# Patient Record
Sex: MALE | Race: BLACK OR AFRICAN AMERICAN | NOT HISPANIC OR LATINO | Employment: OTHER | URBAN - METROPOLITAN AREA
[De-identification: names, ages, dates, MRNs, and addresses within clinical notes are randomized per-mention and may not be internally consistent; named-entity substitution may affect disease eponyms.]

---

## 2017-01-23 DIAGNOSIS — J45.20 MILD INTERMITTENT ASTHMA WITHOUT COMPLICATION: ICD-10-CM

## 2017-01-23 RX ORDER — ALBUTEROL SULFATE 90 UG/1
AEROSOL, METERED RESPIRATORY (INHALATION)
Qty: 18 INHALER | Refills: 2 | Status: SHIPPED | OUTPATIENT
Start: 2017-01-23 | End: 2017-08-22 | Stop reason: SDUPTHER

## 2017-02-23 RX ORDER — METOPROLOL TARTRATE 50 MG/1
TABLET ORAL
Qty: 90 TABLET | Refills: 1 | Status: SHIPPED | OUTPATIENT
Start: 2017-02-23 | End: 2017-08-22 | Stop reason: SDUPTHER

## 2017-03-02 ENCOUNTER — HOSPITAL ENCOUNTER (EMERGENCY)
Facility: HOSPITAL | Age: 77
Discharge: HOME | End: 2017-03-02
Attending: EMERGENCY MEDICINE
Payer: MEDICARE

## 2017-03-02 VITALS
OXYGEN SATURATION: 95 % | TEMPERATURE: 99 F | HEIGHT: 68 IN | BODY MASS INDEX: 36.37 KG/M2 | DIASTOLIC BLOOD PRESSURE: 90 MMHG | WEIGHT: 240 LBS | RESPIRATION RATE: 20 BRPM | SYSTOLIC BLOOD PRESSURE: 191 MMHG | HEART RATE: 69 BPM

## 2017-03-02 DIAGNOSIS — R20.2 PARESTHESIA OF BOTH LOWER EXTREMITIES: ICD-10-CM

## 2017-03-02 DIAGNOSIS — R10.9 ACUTE RIGHT FLANK PAIN: Primary | ICD-10-CM

## 2017-03-02 PROCEDURE — 99283 EMERGENCY DEPT VISIT LOW MDM: CPT

## 2017-03-02 RX ORDER — METHOCARBAMOL 500 MG/1
500 TABLET, FILM COATED ORAL 3 TIMES DAILY
Qty: 30 TABLET | Refills: 0 | Status: SHIPPED | OUTPATIENT
Start: 2017-03-02 | End: 2018-05-03 | Stop reason: ALTCHOICE

## 2017-03-02 RX ORDER — TEMAZEPAM 15 MG/1
15 CAPSULE ORAL NIGHTLY PRN
Qty: 20 CAPSULE | Refills: 0 | Status: SHIPPED | OUTPATIENT
Start: 2017-03-02 | End: 2017-03-22

## 2017-03-02 NOTE — ED PROVIDER NOTES
"Encounter Date: 3/2/2017    SCRIBE #1 NOTE: I, Dolores Jose , am scribing for, and in the presence of,  Armando Hughes MD. I have scribed the following portions of the note - Other sections scribed: HPI and ROS .       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 Pt states his symptoms have improved. No trauma. Pt denies loss of sensation to his feet. Pt reports intermittent previous similar episodes of leg numbness that began approximately 2 years ago. Pt's previous episodes generally last 5-10 minutes then resolve and do not include back pain. Pt states his episode today is worse than usual. Pt is able to walk. Pt also presents difficulty sleeping at night. Pt denies fever, chills, headache, arm trouble or other associated symptoms. Pt's PCP is Dr.Lombard. Pt denies allergies. Pt denies smoking, endorses occasional drinking. Pt states he has a "touch" of DM per his PCP. Pt denies HLD or heart problems. Pt denies physical therapy for his arthritic spine. Pt is compliant with his HTN medication; pt's last dose was this morning.     The history is provided by the patient. No  was used.     Past Medical History:   Diagnosis Date    Allergy     Hypertension      Past Surgical History:   Procedure Laterality Date    APPENDECTOMY      COLONOSCOPY Left 10/19/2015    Procedure: COLONOSCOPY;  Surgeon: Randell Briceno MD;  Location: [facility];  " Service: Endoscopy;  Laterality: Left;     History reviewed. No pertinent family history.  Social History   Substance Use Topics    Smoking status: Former Smoker     Years: 15.00    Smokeless tobacco: Never Used      Comment: Quit 15 years ago    Alcohol use 0.6 oz/week     1 Standard drinks or equivalent per week      Comment: sometimes beer or crown royal     Review of Systems   Constitutional: Negative for chills and fever.   HENT: Negative for ear pain and sore throat.    Eyes: Negative for pain.   Respiratory: Negative for cough and shortness of breath.    Cardiovascular: Negative for chest pain.   Gastrointestinal: Negative for abdominal pain, diarrhea, nausea and vomiting.   Genitourinary: Negative for dysuria.   Musculoskeletal: Positive for arthralgias (bilat knee ) and back pain (right lower laterael lumbar). Negative for myalgias (arm or leg pain).   Skin: Negative for rash.   Neurological: Positive for numbness (legs, bilateral ). Negative for headaches.       Physical Exam   Initial Vitals   BP Pulse Resp Temp SpO2   03/02/17 1252 03/02/17 1252 03/02/17 1252 03/02/17 1252 03/02/17 1252   177/95 79 20 98 °F (36.7 °C) 95 %     Physical Exam    ED Course   Procedures  Labs Reviewed - No data to display                     Scribe Attestation:   Scribe #1: I performed the above scribed service and the documentation accurately describes the services I performed. I attest to the accuracy of the note.    Attending Attestation:           Physician Attestation for Scribe:  Physician Attestation Statement for Scribe #1: I, Armando Hughes MD, reviewed documentation, as scribed by Dolores Jose  in my presence, and it is both accurate and complete.                 ED Course     Clinical Impression:   There were no encounter diagnoses.

## 2017-03-02 NOTE — ED AVS SNAPSHOT
[facility]  [place] [address]               Alexander Duong Jr.   3/2/2017  2:00 PM   ED    Description:  Male : 1940   Department:  [facility]           Your Care was Coordinated By:     Provider Role From To    Armando Hughes MD Attending Provider 17 1413 --      Reason for Visit     Back Pain     Numbness           Diagnoses this Visit        Comments    Acute right flank pain    -  Primary     Paresthesia of both lower extremities     Transient and resolved, history of same for 2 years      ED Disposition     None           To Do List           Follow-up Information     Follow up with Azikiwe K Lombard, MD In 3 days.    Specialty:  Family Medicine    Contact information:    [address]  [place] [address] [phone]         These Medications        Disp Refills Start End    methocarbamol (ROBAXIN) 500 MG Tab 30 tablet 0 3/2/2017     Take 1 tablet (500 mg total) by mouth 3 (three) times daily. - Oral    Pharmacy: [facility]/pharmacy #[id number] - [facility] [address] [place] [address] Ph #: [phone]       temazepam (RESTORIL) 15 mg Cap 20 capsule 0 3/2/2017 3/22/2017    Take 1 capsule (15 mg total) by mouth nightly as needed (Insomnia). - Oral    Pharmacy: [facility]/pharmacy #[id number] - [address] Ph #: [phone]         [facility]     [facility] -  Assistance  Registered nurses in the [facility] provide clinical advisement, health education, appointment booking, and other advisory services.  Call for this free service at [phone].             Medications           Message regarding Medications     Verify the changes and/or additions to your medication regime listed below are the same as discussed with your clinician today.  If any of these changes or additions are incorrect, please notify your healthcare provider.        START taking  these NEW medications        Refills    methocarbamol (ROBAXIN) 500 MG Tab 0    Sig: Take 1 tablet (500 mg total) by mouth 3 (three) times daily.    Class: Print    Route: Oral    temazepam (RESTORIL) 15 mg Cap 0    Sig: Take 1 capsule (15 mg total) by mouth nightly as needed (Insomnia).    Class: Print    Route: Oral           Verify that the below list of medications is an accurate representation of the medications you are currently taking.  If none reported, the list may be blank. If incorrect, please contact your healthcare provider. Carry this list with you in case of emergency.           Current Medications     amlodipine-atorvastatin (CADUET) 10-20 mg per tablet Take 1 tablet by mouth once daily.    benazepril-hydrochlorthiazide (LOTENSIN HCT) 20-25 mg Tab Take 1 tablet by mouth once daily.    levalbuterol (XOPENEX) 1.25 mg/3 mL nebulizer solution Take 3 mLs (1.25 mg total) by nebulization every 8 (eight) hours as needed for Wheezing.    methocarbamol (ROBAXIN) 500 MG Tab Take 1 tablet (500 mg total) by mouth 3 (three) times daily.    metoprolol tartrate (LOPRESSOR) 50 MG tablet TAKE 1 TABLET BY MOUTH EVERY DAY    temazepam (RESTORIL) 15 mg Cap Take 1 capsule (15 mg total) by mouth nightly as needed (Insomnia).    VENTOLIN HFA 90 mcg/actuation inhaler INHALE 2 PUFFS INTO THE LUNGS EVERY 6 HOURS AS NEEDED FOR WHEEZING           Clinical Reference Information           Your Vitals Were     BP                   177/95 (BP Location: Right arm)           Allergies as of 3/2/2017     No Known Allergies      Immunizations Administered on Date of Encounter - 3/2/2017     None      ED Micro, Lab, POCT     None      ED Imaging Orders     None        Discharge Instructions       Please return immediately if you get worse or if new problems develop.  Please make an appointment to see your primary care doctor this week.  Rest.    [facility] Sign-Up     Activating your [facility] account is as easy as 1-2-3!     1) Visit  [email or web address], select Sign Up Now, enter this activation code and your date of birth, then select Next.  [email or web address]  Expires: 4/16/2017  2:43 PM      2) Create a username and password to use when you visit [facility] in the future and select a security question in case you lose your password and select Next.    3) Enter your e-mail address and click Sign Up!    Additional Information  If you have questions, please e-mail [email or web address] or call [phone] to talk to our [facility] staff. Remember, [facility] is NOT to be used for urgent needs. For medical emergencies, dial 911.         Smoking Cessation     If you would like to quit smoking:   You may be eligible for free services if you are a [place] resident and started smoking cigarettes before September 1, 1988.  Call the Smoking Cessation [employer] ([facility]) toll free at ([phone] 825-9723 or ([phone].   Call 2-268-QUIT-NOW if you do not meet the above criteria.             [facility] complies with applicable Federal civil rights laws and does not discriminate on the basis of race, color, national origin, age, disability, or sex.        Language Assistance Services     ATTENTION: Language assistance services are available, free of charge. Please call [phone].      ATENCIÓN: Si habla español, tiene a zamora disposición servicios gratuitos de asistencia lingüística. Llame al [phone].     CHÚ Ý: N?u b?n nói Ti?ng Vi?t, có các d?ch v? h? tr? ngôn ng? mi?n phí dành cho b?n. G?i s? [phone].

## 2017-03-02 NOTE — ED PROVIDER NOTES
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 Pt states his symptoms have nearly resolved. No trauma. Pt denies loss of sensation to his feet. Pt reports intermittent previous similar episodes of leg numbness that began approximately 2 years ago. Pt's previous episodes generally last 5-10 minutes then resolve and do not include back pain. Pt states his episode today is worse than usual. Pt is able to walk. Pt also presents difficulty sleeping at night. Pt denies fever, chills, headache, arm trouble or other associated symptoms. Pt's PCP is Dr.Lombard. Pt denies allergies. Pt denies smoking, endorses occasional drinking. Pt states he has a "touch" of DM per his PCP. Pt denies HLD or heart problems. Pt denies physical therapy for his arthritic spine. Pt is compliant with his HTN medication; pt's last dose was this morning.     Past Medical History:   Diagnosis Date    Allergy     Hypertension      Past Surgical History:   Procedure Laterality Date    APPENDECTOMY      COLONOSCOPY Left 10/19/2015    Procedure: COLONOSCOPY;  Surgeon: Randell Briceno MD;  Location: [facility];  Service: Endoscopy;  Laterality: Left;     History reviewed. No pertinent family history.  Social History   Substance Use Topics    Smoking status: Former Smoker     Years: 15.00    Smokeless tobacco: Never Used      Comment: Quit 15 years ago    Alcohol " use 0.6 oz/week     1 Standard drinks or equivalent per week      Comment: sometimes beer or crown royal     Review of Systems   Constitutional: Negative for chills and fever.   HENT: Negative for ear pain and sore throat.    Eyes: Negative for pain.   Respiratory: Negative for cough and shortness of breath.    Cardiovascular: Negative for chest pain.   Gastrointestinal: Negative for abdominal pain, diarrhea, nausea and vomiting.   Genitourinary: Negative for dysuria.   Musculoskeletal: Positive for arthralgias (bilat knees) and back pain (right lower lateral lumbar ). Negative for myalgias (arm or leg pain).   Skin: Negative for rash.   Neurological: Positive for numbness (bilateral legs). Negative for headaches.       Physical Exam   Initial Vitals   BP Pulse Resp Temp SpO2   03/02/17 1252 03/02/17 1252 03/02/17 1252 03/02/17 1252 03/02/17 1252   177/95 79 20 98 °F (36.7 °C) 95 %     Physical Exam  The patient was examined specifically for the following:   General:No significant distress, Good color, Warm and dry. Head and neck:Scalp atraumatic, Neck supple. Neurological:Appropriate conversation, Gross motor deficits. Eyes:Conjugate gaze, Clear corneas. ENT: No epistaxis. Cardiac: Regular rate and rhythm, Grossly normal heart tones. Pulmonary: Wheezing, Rales. Gastrointestinal: Abdominal tenderness, Abdominal distention. Musculoskeletal: Extremity deformity, Apparent pain with range of motion of the joints. Skin: Rash.   The findings on examination were normal except for the following: The patient has good dorsalis pedis and posterior tibial pulses bilaterally in the lower extremities.  The patient's it stands and changes posture without splinting.  The patient has good sensation and motor function in the lower extremities.  There is minimal tenderness in the right lateral low lumbar back.  The abdomen is soft.  The lungs are clear.  The heart tones are normal.  Patient has regular rate and rhythm.  ED Course    Procedures  Labs Reviewed - No data to display       Medical decision making: Given the above, this patient had very brief very lateral right low lumbar back pain that is now almost completely resolved at onset last night while sleeping.  The midline is unaffected.  The patient has good pulses in both feet dorsalis pedis and posterior tibial.  He has good sensation in both feet.  He has no abdominal pain there is no midline lumbar back pain.  I've considered aortic rupture or dissection.  I feel these are unlikely.  Lumbar radiculopathy is possible.  The patient had pain in his anterior knees.  I favor this being arthritis or possibly a neuropathy.  I will treat with Robaxin and have this patient follow up with primary care.  The patient looks well.  He is concerned about insomnia request a sleep medicine.                       ED Course     Clinical Impression:   The primary encounter diagnosis was Acute right flank pain. A diagnosis of Paresthesia of both lower extremities was also pertinent to this visit.          Armando Hughes MD  03/11/17 7021

## 2017-03-02 NOTE — ED TRIAGE NOTES
Bilateral le3g numbness today and right lower back pain says legs felt funny during the night no trauma

## 2017-06-08 ENCOUNTER — TELEPHONE (OUTPATIENT)
Dept: FAMILY MEDICINE | Facility: CLINIC | Age: 77
End: 2017-06-08

## 2017-06-08 NOTE — TELEPHONE ENCOUNTER
[facility] requesting order which would allow trained personnel to go to patient's home to clip his toe nails.  Please advise.

## 2017-06-08 NOTE — TELEPHONE ENCOUNTER
Called to inform patient that he is due for an office visit.  Phone rings then goes to busy signal - [phone].  [phone] male answered phone and stated that he is not patient and does not know patient.     Patient needs to be advised that an office visit is needed.

## 2017-06-08 NOTE — TELEPHONE ENCOUNTER
----- Message from Paulinatyrone Romano sent at 6/8/2017 10:13 AM CDT -----  Contact: [facility]   Request an order for someone to come to the patient's house to cut the pt's toe nails. Fax number - [phone]. Phone number - [phone]. Thanks!

## 2017-07-16 DIAGNOSIS — I10 ESSENTIAL HYPERTENSION: ICD-10-CM

## 2017-07-16 RX ORDER — BENAZEPRIL/HYDROCHLOROTHIAZIDE 20 MG-25MG
TABLET ORAL
Qty: 30 TABLET | Refills: 0 | Status: SHIPPED | OUTPATIENT
Start: 2017-07-16 | End: 2017-08-22 | Stop reason: SDUPTHER

## 2017-08-22 DIAGNOSIS — J45.20 MILD INTERMITTENT ASTHMA WITHOUT COMPLICATION: ICD-10-CM

## 2017-08-22 DIAGNOSIS — I10 ESSENTIAL HYPERTENSION: ICD-10-CM

## 2017-08-22 NOTE — TELEPHONE ENCOUNTER
Last ov 7-2016  140/80        ----- Message from Cleo Wilson sent at 8/22/2017  2:35 PM CDT -----  Contact: Self  Refill: metoprolol tartrate (LOPRESSOR) 50 MG tablet             benazepril-hydrochlorthiazide (LOTENSIN HCT) 20-25 mg Tab            VENTOLIN HFA 90 mcg/actuation inhaler         Please send to  [facility]/PHARMACY #[id number] - [place], [place] - [address] [facility]

## 2017-08-23 DIAGNOSIS — I10 ESSENTIAL HYPERTENSION: ICD-10-CM

## 2017-08-23 RX ORDER — METOPROLOL TARTRATE 50 MG/1
50 TABLET ORAL DAILY
Qty: 90 TABLET | Refills: 1 | Status: SHIPPED | OUTPATIENT
Start: 2017-08-23 | End: 2017-08-28

## 2017-08-23 RX ORDER — ALBUTEROL SULFATE 90 UG/1
AEROSOL, METERED RESPIRATORY (INHALATION)
Qty: 18 INHALER | Refills: 2 | Status: SHIPPED | OUTPATIENT
Start: 2017-08-23 | End: 2018-01-30 | Stop reason: SDUPTHER

## 2017-08-23 RX ORDER — BENAZEPRIL/HYDROCHLOROTHIAZIDE 20 MG-25MG
1 TABLET ORAL DAILY
Qty: 90 TABLET | Refills: 0 | Status: SHIPPED | OUTPATIENT
Start: 2017-08-23 | End: 2017-08-23

## 2017-08-23 RX ORDER — BENAZEPRIL/HYDROCHLOROTHIAZIDE 20 MG-25MG
TABLET ORAL
Qty: 30 TABLET | Refills: 0 | Status: SHIPPED | OUTPATIENT
Start: 2017-08-23 | End: 2017-09-22

## 2017-08-28 RX ORDER — METOPROLOL TARTRATE 50 MG/1
TABLET ORAL
Qty: 90 TABLET | Refills: 0 | Status: SHIPPED | OUTPATIENT
Start: 2017-08-28 | End: 2018-02-26 | Stop reason: SDUPTHER

## 2017-09-22 ENCOUNTER — OFFICE VISIT (OUTPATIENT)
Dept: FAMILY MEDICINE | Facility: CLINIC | Age: 77
End: 2017-09-22
Payer: MEDICARE

## 2017-09-22 VITALS
WEIGHT: 236.56 LBS | HEART RATE: 70 BPM | SYSTOLIC BLOOD PRESSURE: 176 MMHG | TEMPERATURE: 98 F | HEIGHT: 68 IN | DIASTOLIC BLOOD PRESSURE: 92 MMHG | OXYGEN SATURATION: 95 % | RESPIRATION RATE: 17 BRPM | BODY MASS INDEX: 35.85 KG/M2

## 2017-09-22 DIAGNOSIS — M15.9 PRIMARY OSTEOARTHRITIS INVOLVING MULTIPLE JOINTS: ICD-10-CM

## 2017-09-22 DIAGNOSIS — Z23 NEED FOR PNEUMOCOCCAL VACCINATION: ICD-10-CM

## 2017-09-22 DIAGNOSIS — I10 ESSENTIAL HYPERTENSION: Primary | ICD-10-CM

## 2017-09-22 DIAGNOSIS — E78.00 PURE HYPERCHOLESTEROLEMIA: ICD-10-CM

## 2017-09-22 DIAGNOSIS — N18.30 CKD (CHRONIC KIDNEY DISEASE) STAGE 3, GFR 30-59 ML/MIN: ICD-10-CM

## 2017-09-22 PROCEDURE — 99214 OFFICE O/P EST MOD 30 MIN: CPT | Mod: S$GLB,,, | Performed by: FAMILY MEDICINE

## 2017-09-22 PROCEDURE — 99499 UNLISTED E&M SERVICE: CPT | Mod: S$GLB,,, | Performed by: FAMILY MEDICINE

## 2017-09-22 PROCEDURE — 99999 PR PBB SHADOW E&M-EST. PATIENT-LVL III: CPT | Mod: PBBFAC,,, | Performed by: FAMILY MEDICINE

## 2017-09-22 RX ORDER — AMLODIPINE, VALSARTAN AND HYDROCHLOROTHIAZIDE 10; 320; 25 MG/1; MG/1; MG/1
1 TABLET ORAL DAILY
Qty: 90 TABLET | Refills: 1 | Status: SHIPPED | OUTPATIENT
Start: 2017-09-22 | End: 2018-04-09 | Stop reason: SDUPTHER

## 2017-09-22 RX ORDER — ACETAMINOPHEN AND CODEINE PHOSPHATE 300; 60 MG/1; MG/1
1 TABLET ORAL EVERY 8 HOURS PRN
Qty: 90 TABLET | Refills: 2 | Status: SHIPPED | OUTPATIENT
Start: 2017-09-22 | End: 2018-04-09 | Stop reason: SDUPTHER

## 2017-09-22 NOTE — PROGRESS NOTES
Chief Complaint   Patient presents with    Leg Pain     both sides    Back Pain    Neck Pain       Alexander Duong Jr. is a 77 y.o. male who presents per the Chief Complaint.  Pt is known to me and was last seen by me on 7/12/2016.  All known chronic medical issues have been documented.       Back Pain   This is a chronic problem. The current episode started more than 1 year ago. The problem occurs daily. The problem is unchanged. The pain is present in the lumbar spine. The quality of the pain is described as aching. The pain does not radiate. The pain is at a severity of 5/10. The pain is moderate. The pain is worse during the day. The symptoms are aggravated by bending and position. Associated symptoms include leg pain. Pertinent negatives include no abdominal pain, bladder incontinence, bowel incontinence, chest pain, dysuria, fever, headaches, numbness, paresis, paresthesias, pelvic pain, perianal numbness, tingling, weakness or weight loss. He has tried analgesics, NSAIDs and home exercises for the symptoms. The treatment provided moderate relief.   Hypertension   This is a chronic problem. The current episode started more than 1 year ago. The problem is unchanged. The problem is uncontrolled. Associated symptoms include neck pain. Pertinent negatives include no anxiety, blurred vision, chest pain, headaches, malaise/fatigue, orthopnea, palpitations, peripheral edema, PND, shortness of breath or sweats. There are no associated agents to hypertension. Risk factors for coronary artery disease include male gender, obesity, dyslipidemia and sedentary lifestyle. Past treatments include ACE inhibitors, diuretics and calcium channel blockers. The current treatment provides moderate improvement. Compliance problems include diet and exercise.  There is no history of angina, kidney disease, CAD/MI, CVA, heart failure, left ventricular hypertrophy, PVD, renovascular disease, retinopathy or a thyroid problem. There is no  history of chronic renal disease, coarctation of the aorta, hyperaldosteronism, hypercortisolism, hyperparathyroidism, a hypertension causing med, pheochromocytoma or sleep apnea.   Arthritis   Presents for follow-up visit. He complains of pain and stiffness. He reports no joint swelling or joint warmth. The symptoms have been worsening. Affected locations include the left knee, right knee and neck. His pain is at a severity of 6/10. Pertinent negatives include no diarrhea, dry eyes, dry mouth, dysuria, fatigue, fever, pain at night, pain while resting, rash, Raynaud's syndrome, uveitis or weight loss.        ROS  Review of Systems   Constitutional: Negative for activity change, appetite change, chills, fatigue, fever, malaise/fatigue and weight loss.   HENT: Negative for congestion, ear pain, postnasal drip, rhinorrhea, sinus pressure, sore throat and trouble swallowing.    Eyes: Negative for blurred vision, pain and visual disturbance.   Respiratory: Negative for cough, hemoptysis, chest tightness, shortness of breath and wheezing.    Cardiovascular: Negative for chest pain, palpitations, orthopnea and PND.   Gastrointestinal: Negative for abdominal pain, bowel incontinence, constipation, diarrhea, heartburn, nausea and vomiting.   Genitourinary: Negative for bladder incontinence, difficulty urinating, dysuria, flank pain, frequency, pelvic pain, penile pain, penile swelling, scrotal swelling, testicular pain and urgency.   Musculoskeletal: Positive for arthralgias (both knees), arthritis, back pain, gait problem, myalgias, neck pain and stiffness. Negative for joint swelling and neck stiffness.   Skin: Negative.  Negative for rash.   Allergic/Immunologic: Negative for environmental allergies, food allergies and immunocompromised state.   Neurological: Negative for dizziness, tingling, weakness, light-headedness, numbness, headaches and paresthesias.   Psychiatric/Behavioral: Negative.        Physical  "Exam  Vitals:    09/22/17 0939   BP: (!) 176/92   Pulse: 70   Resp: 17   Temp: 97.9 °F (36.6 °C)    Body mass index is 35.97 kg/m².  Weight: 107.3 kg (236 lb 8.9 oz)   Height: 5' 8" (172.7 cm)     Physical Exam   Constitutional: He is oriented to person, place, and time. He appears well-developed and well-nourished. He is active and cooperative.  Non-toxic appearance. He does not have a sickly appearance. He does not appear ill. No distress.   HENT:   Head: Normocephalic and atraumatic.   Right Ear: Hearing, tympanic membrane, external ear and ear canal normal. No tenderness. No foreign bodies. No mastoid tenderness. Tympanic membrane is not injected, not scarred, not perforated, not erythematous, not retracted and not bulging. No hemotympanum. No decreased hearing is noted.   Left Ear: Hearing, tympanic membrane, external ear and ear canal normal. No tenderness. No foreign bodies. No mastoid tenderness. Tympanic membrane is not injected, not scarred, not perforated, not erythematous, not retracted and not bulging. No hemotympanum. No decreased hearing is noted.   Nose: Nose normal. No sinus tenderness, nasal deformity or septal deviation. No epistaxis.  No foreign bodies.   Mouth/Throat: Uvula is midline, oropharynx is clear and moist and mucous membranes are normal. He does not have dentures. Normal dentition. No uvula swelling or dental caries. No oropharyngeal exudate, posterior oropharyngeal edema, posterior oropharyngeal erythema or tonsillar abscesses.   Eyes: Conjunctivae, EOM and lids are normal. Pupils are equal, round, and reactive to light. Right eye exhibits no chemosis, no discharge, no exudate and no hordeolum. No foreign body present in the right eye. Left eye exhibits no chemosis, no discharge, no exudate and no hordeolum. No foreign body present in the left eye. No scleral icterus.   Neck: Normal range of motion and full passive range of motion without pain. Neck supple. No thyroid mass and no " thyromegaly present.   Cardiovascular: Normal rate, regular rhythm, S1 normal, S2 normal and normal heart sounds.  Exam reveals no gallop and no friction rub.    No murmur heard.  Pulmonary/Chest: Effort normal and breath sounds normal. He has no decreased breath sounds. He has no wheezes. He has no rhonchi. He has no rales.   Abdominal: Soft. Normal appearance and bowel sounds are normal. He exhibits no distension, no ascites and no mass. There is no hepatosplenomegaly. There is no tenderness. There is no rigidity, no rebound and no guarding. No hernia.   Musculoskeletal:        Right knee: He exhibits decreased range of motion and bony tenderness. He exhibits no swelling, no effusion, no ecchymosis, no deformity, no laceration, no erythema, normal alignment, no LCL laxity, normal meniscus and no MCL laxity. Tenderness found. Medial joint line, lateral joint line and patellar tendon tenderness noted. No MCL and no LCL tenderness noted.        Left knee: He exhibits decreased range of motion and bony tenderness. He exhibits no swelling, no effusion, no ecchymosis, no deformity, no laceration, no erythema, normal alignment, no LCL laxity, normal patellar mobility, normal meniscus and no MCL laxity. Tenderness found. Medial joint line, lateral joint line and patellar tendon tenderness noted. No MCL and no LCL tenderness noted.        Cervical back: He exhibits decreased range of motion, tenderness, bony tenderness and pain. He exhibits no swelling, no edema, no deformity, no laceration, no spasm and normal pulse.        Lumbar back: He exhibits decreased range of motion, tenderness, bony tenderness and pain. He exhibits no swelling, no edema, no deformity, no laceration, no spasm and normal pulse.   Lymphadenopathy:        Head (right side): No submental, no submandibular, no preauricular and no posterior auricular adenopathy present.        Head (left side): No submental, no submandibular, no preauricular and no  posterior auricular adenopathy present.     He has no cervical adenopathy.   Neurological: He is alert and oriented to person, place, and time. He has normal strength. He displays no atrophy and no tremor. No cranial nerve deficit or sensory deficit. He exhibits normal muscle tone. He displays no seizure activity.   Skin: Skin is warm, dry and intact. No rash noted. He is not diaphoretic. No pallor.   Psychiatric: He has a normal mood and affect. His speech is normal and behavior is normal. Judgment and thought content normal. Cognition and memory are normal. He is attentive.   Vitals reviewed.      Assessment & Plan    1. Essential hypertension  Patient was counseled and encouraged to maintain a low sodium diet, as well as increasing physical activity.  Recommend random BP checks at home on a regular basis.  Will continue medication at this time, and follow up in 4 weeks, or sooner if blood pressure is not well controlled.  Will follow up in a week for nurse BP check.  - amlodipine-valsartan-hcthiazid -25 mg Tab; Take 1 tablet by mouth once daily.  Dispense: 90 tablet; Refill: 1  - CBC auto differential; Future  - Comprehensive metabolic panel; Future    2. Primary osteoarthritis involving multiple joints  Patient is advised that arthritis is a result of regular daily use, and is caused by inflammation in the joint.  Patient was encouraged to exercise as tolerated, and attempt weight loss with sensible diet and lifestyle changes.  Patient was recommended to incorporate stretching into a daily routine.  Pain medication will be prescribed as necessary.  Patient should follow up if pain is not well controlled.   - acetaminophen-codeine 300-60mg (TYLENOL #4) 300-60 mg Tab; Take 1 tablet by mouth every 8 (eight) hours as needed.  Dispense: 90 tablet; Refill: 2    3. CKD (chronic kidney disease) stage 3, GFR 30-59 ml/min  Stable; encouraged patient to avoid NSAID medications and to increase water and clear liquid  hydration.  Will continue to monitor for decline and refer as necessary.     4. Pure hypercholesterolemia  We discussed ways to manage cholesterol levels, including increasing whole grain foods and decreasing fried and fatty foods.  I also recommended OTC Omega 3 and Omega 6 supplements to improve overall cholesterol levels.  Will not restart therapy at this visit; patient is due for screening blood test at this time.   - Lipid panel; Future    5. Need for pneumococcal vaccination  Refused     Follow up documented    ACTIVE MEDICAL ISSUES:  Documented in Problem List    PAST MEDICAL HISTORY  Documented    PAST SURGICAL HISTORY:  Documented    SOCIAL HISTORY:  Documented    FAMILY HISTORY:  Documented    ALLERGIES AND MEDICATIONS: updated and reviewed.  Documented    Health Maintenance       Date Due Completion Date    TETANUS VACCINE [birth year]1956 ---    Pneumococcal (65+) (1 of 2 - PCV13) 02/11/2003 ---    Influenza Vaccine 08/01/2017 4/8/2016 (Done)    Override on 4/8/2016: Done (not at this present time)    Override on 10/29/2015: Declined (not at this present time)    Lipid Panel 07/20/2021 7/20/2016    Override on 4/8/2016: Done (future)

## 2017-09-24 DIAGNOSIS — I10 ESSENTIAL HYPERTENSION: ICD-10-CM

## 2017-09-25 RX ORDER — BENAZEPRIL/HYDROCHLOROTHIAZIDE 20 MG-25MG
TABLET ORAL
Qty: 30 TABLET | Refills: 0 | OUTPATIENT
Start: 2017-09-25

## 2017-09-29 ENCOUNTER — CLINICAL SUPPORT (OUTPATIENT)
Dept: FAMILY MEDICINE | Facility: CLINIC | Age: 77
End: 2017-09-29
Payer: MEDICARE

## 2017-09-29 ENCOUNTER — LAB VISIT (OUTPATIENT)
Dept: LAB | Facility: HOSPITAL | Age: 77
End: 2017-09-29
Attending: FAMILY MEDICINE
Payer: MEDICARE

## 2017-09-29 VITALS
HEART RATE: 100 BPM | OXYGEN SATURATION: 90 % | DIASTOLIC BLOOD PRESSURE: 66 MMHG | RESPIRATION RATE: 16 BRPM | SYSTOLIC BLOOD PRESSURE: 124 MMHG

## 2017-09-29 DIAGNOSIS — E78.00 PURE HYPERCHOLESTEROLEMIA: ICD-10-CM

## 2017-09-29 DIAGNOSIS — I10 ESSENTIAL HYPERTENSION: Primary | ICD-10-CM

## 2017-09-29 DIAGNOSIS — I10 ESSENTIAL HYPERTENSION: ICD-10-CM

## 2017-09-29 LAB
ALBUMIN SERPL BCP-MCNC: 3.7 G/DL
ALP SERPL-CCNC: 79 U/L
ALT SERPL W/O P-5'-P-CCNC: 18 U/L
ANION GAP SERPL CALC-SCNC: 7 MMOL/L
AST SERPL-CCNC: 25 U/L
BASOPHILS # BLD AUTO: 0.04 K/UL
BASOPHILS NFR BLD: 0.5 %
BILIRUB SERPL-MCNC: 0.7 MG/DL
BUN SERPL-MCNC: 28 MG/DL
CALCIUM SERPL-MCNC: 9.5 MG/DL
CHLORIDE SERPL-SCNC: 102 MMOL/L
CHOLEST SERPL-MCNC: 174 MG/DL
CHOLEST/HDLC SERPL: 7 {RATIO}
CO2 SERPL-SCNC: 29 MMOL/L
CREAT SERPL-MCNC: 2.3 MG/DL
DIFFERENTIAL METHOD: ABNORMAL
EOSINOPHIL # BLD AUTO: 0.5 K/UL
EOSINOPHIL NFR BLD: 5.9 %
ERYTHROCYTE [DISTWIDTH] IN BLOOD BY AUTOMATED COUNT: 12.4 %
EST. GFR  (AFRICAN AMERICAN): 30.1 ML/MIN/1.73 M^2
EST. GFR  (NON AFRICAN AMERICAN): 26 ML/MIN/1.73 M^2
GLUCOSE SERPL-MCNC: 124 MG/DL
HCT VFR BLD AUTO: 37.4 %
HDLC SERPL-MCNC: 25 MG/DL
HDLC SERPL: 14.4 %
HGB BLD-MCNC: 12.5 G/DL
LDLC SERPL CALC-MCNC: 122.2 MG/DL
LYMPHOCYTES # BLD AUTO: 3 K/UL
LYMPHOCYTES NFR BLD: 38.8 %
MCH RBC QN AUTO: 31.2 PG
MCHC RBC AUTO-ENTMCNC: 33.4 G/DL
MCV RBC AUTO: 93 FL
MONOCYTES # BLD AUTO: 0.7 K/UL
MONOCYTES NFR BLD: 8.5 %
NEUTROPHILS # BLD AUTO: 3.5 K/UL
NEUTROPHILS NFR BLD: 46.2 %
NONHDLC SERPL-MCNC: 149 MG/DL
PLATELET # BLD AUTO: 270 K/UL
PMV BLD AUTO: 11.5 FL
POTASSIUM SERPL-SCNC: 4.1 MMOL/L
PROT SERPL-MCNC: 7.9 G/DL
RBC # BLD AUTO: 4.01 M/UL
SODIUM SERPL-SCNC: 138 MMOL/L
TRIGL SERPL-MCNC: 134 MG/DL
WBC # BLD AUTO: 7.63 K/UL

## 2017-09-29 PROCEDURE — 80053 COMPREHEN METABOLIC PANEL: CPT

## 2017-09-29 PROCEDURE — 85025 COMPLETE CBC W/AUTO DIFF WBC: CPT

## 2017-09-29 PROCEDURE — 80061 LIPID PANEL: CPT

## 2017-09-29 PROCEDURE — 99999 PR PBB SHADOW E&M-EST. PATIENT-LVL III: CPT | Mod: PBBFAC,,,

## 2017-09-29 PROCEDURE — 36415 COLL VENOUS BLD VENIPUNCTURE: CPT | Mod: PO

## 2017-09-29 PROCEDURE — 99499 UNLISTED E&M SERVICE: CPT | Mod: S$GLB,,, | Performed by: FAMILY MEDICINE

## 2017-09-29 NOTE — PROGRESS NOTES
..  Alexander Duong Jr. 77 y.o. male is here today for Blood Pressure check.   History of HTN yes.    Review of patient's allergies indicates:  No Known Allergies  Creatinine   Date Value Ref Range Status   07/20/2016 1.7 (H) 0.5 - 1.4 mg/dL Final     Sodium   Date Value Ref Range Status   07/20/2016 142 136 - 145 mmol/L Final     Potassium   Date Value Ref Range Status   07/20/2016 4.4 3.5 - 5.1 mmol/L Final   ]  Patient verifies taking blood pressure medications on a regular basis at the same time of the day.     Current Outpatient Prescriptions:     acetaminophen-codeine 300-60mg (TYLENOL #4) 300-60 mg Tab, Take 1 tablet by mouth every 8 (eight) hours as needed., Disp: 90 tablet, Rfl: 2    albuterol (VENTOLIN HFA) 90 mcg/actuation inhaler, INHALE 2 PUFFS INTO THE LUNGS EVERY 6 HOURS AS NEEDED FOR WHEEZING, Disp: 18 Inhaler, Rfl: 2    amlodipine-valsartan-hcthiazid -25 mg Tab, Take 1 tablet by mouth once daily., Disp: 90 tablet, Rfl: 1    levalbuterol (XOPENEX) 1.25 mg/3 mL nebulizer solution, Take 3 mLs (1.25 mg total) by nebulization every 8 (eight) hours as needed for Wheezing., Disp: 90 mL, Rfl: 5    methocarbamol (ROBAXIN) 500 MG Tab, Take 1 tablet (500 mg total) by mouth 3 (three) times daily., Disp: 30 tablet, Rfl: 0    metoprolol tartrate (LOPRESSOR) 50 MG tablet, TAKE 1 TABLET BY MOUTH EVERY DAY, Disp: 90 tablet, Rfl: 0  Does patient have record of home blood pressure readings no. Readings have been averaging not checking at home.   Last dose of blood pressure medication was taken at 7:30am.  Patient is asymptomatic.   Complains of no complaints.    Vitals:    09/29/17 0855   BP: 124/66   BP Location: Left arm   Patient Position: Sitting   BP Method: Medium (Manual)   Pulse: 100   Resp: 16   SpO2: (!) 90%         Dr. Lombard notified.

## 2017-10-02 ENCOUNTER — TELEPHONE (OUTPATIENT)
Dept: FAMILY MEDICINE | Facility: CLINIC | Age: 77
End: 2017-10-02

## 2017-10-02 NOTE — TELEPHONE ENCOUNTER
----- Message from Azikiwe K. Lombard, MD sent at 10/2/2017  9:47 AM CDT -----  Please notify patient of abnormal sugar and kidney results by phone, and advise to keep scheduled follow up in 3 weeks.

## 2017-10-02 NOTE — PROGRESS NOTES
Please notify patient of abnormal sugar and kidney results by phone, and advise to keep scheduled follow up in 3 weeks.

## 2017-10-23 ENCOUNTER — OFFICE VISIT (OUTPATIENT)
Dept: FAMILY MEDICINE | Facility: CLINIC | Age: 77
End: 2017-10-23
Payer: MEDICARE

## 2017-10-23 VITALS
BODY MASS INDEX: 35.18 KG/M2 | OXYGEN SATURATION: 92 % | RESPIRATION RATE: 18 BRPM | WEIGHT: 232.13 LBS | TEMPERATURE: 98 F | SYSTOLIC BLOOD PRESSURE: 132 MMHG | DIASTOLIC BLOOD PRESSURE: 74 MMHG | HEIGHT: 68 IN | HEART RATE: 83 BPM

## 2017-10-23 DIAGNOSIS — M15.9 PRIMARY OSTEOARTHRITIS INVOLVING MULTIPLE JOINTS: ICD-10-CM

## 2017-10-23 DIAGNOSIS — I10 ESSENTIAL HYPERTENSION: Primary | ICD-10-CM

## 2017-10-23 DIAGNOSIS — Z23 NEED FOR INFLUENZA VACCINATION: ICD-10-CM

## 2017-10-23 PROCEDURE — 99214 OFFICE O/P EST MOD 30 MIN: CPT | Mod: S$GLB,,, | Performed by: FAMILY MEDICINE

## 2017-10-23 PROCEDURE — 99499 UNLISTED E&M SERVICE: CPT | Mod: S$GLB,,, | Performed by: FAMILY MEDICINE

## 2017-10-23 PROCEDURE — 99999 PR PBB SHADOW E&M-EST. PATIENT-LVL III: CPT | Mod: PBBFAC,,, | Performed by: FAMILY MEDICINE

## 2017-10-23 PROCEDURE — G0008 ADMIN INFLUENZA VIRUS VAC: HCPCS | Mod: S$GLB,,, | Performed by: FAMILY MEDICINE

## 2017-10-23 PROCEDURE — 90662 IIV NO PRSV INCREASED AG IM: CPT | Mod: S$GLB,,, | Performed by: FAMILY MEDICINE

## 2017-10-23 RX ORDER — AMLODIPINE, VALSARTAN AND HYDROCHLOROTHIAZIDE 10; 320; 25 MG/1; MG/1; MG/1
1 TABLET ORAL DAILY
Qty: 90 TABLET | Refills: 1 | Status: CANCELLED | OUTPATIENT
Start: 2017-10-23 | End: 2018-10-23

## 2017-10-23 RX ORDER — AMLODIPINE, VALSARTAN AND HYDROCHLOROTHIAZIDE 10; 160; 25 MG/1; MG/1; MG/1
1 TABLET ORAL DAILY
Qty: 90 TABLET | Refills: 0 | Status: SHIPPED | OUTPATIENT
Start: 2017-10-23 | End: 2018-04-09 | Stop reason: DRUGHIGH

## 2017-10-23 RX ORDER — VALSARTAN 160 MG/1
160 TABLET ORAL DAILY
Qty: 90 TABLET | Refills: 0 | Status: SHIPPED | OUTPATIENT
Start: 2017-10-23 | End: 2018-02-08 | Stop reason: ALTCHOICE

## 2017-10-23 NOTE — PROGRESS NOTES
Flu vaccine administered.VIS form given. Patient tolerated well. Advised to wait 15 min to monitor for adverse reactions.

## 2017-10-23 NOTE — PROGRESS NOTES
Chief Complaint   Patient presents with    Hypertension     follow-up    Flu Vaccine    Pneumococcal Vaccine       Alexander Duong Jr. is a 77 y.o. male who presents per the Chief Complaint.  Pt is known to me and was last seen by me on 9/22/2017.  All known chronic medical issues have been documented.       Hypertension   This is a chronic problem. The current episode started more than 1 year ago. The problem is unchanged. The problem is uncontrolled. Associated symptoms include neck pain. Pertinent negatives include no anxiety, blurred vision, chest pain, headaches, malaise/fatigue, orthopnea, palpitations, peripheral edema, PND, shortness of breath or sweats. There are no associated agents to hypertension. Risk factors for coronary artery disease include male gender, obesity, dyslipidemia and sedentary lifestyle. Past treatments include ACE inhibitors, diuretics and calcium channel blockers. The current treatment provides moderate improvement. Compliance problems include diet and exercise.  There is no history of angina, kidney disease, CAD/MI, CVA, heart failure, left ventricular hypertrophy, PVD, renovascular disease, retinopathy or a thyroid problem. There is no history of chronic renal disease, coarctation of the aorta, hyperaldosteronism, hypercortisolism, hyperparathyroidism, a hypertension causing med, pheochromocytoma or sleep apnea.   Back Pain   This is a chronic problem. The current episode started more than 1 year ago. The problem occurs daily. The problem is unchanged. The pain is present in the lumbar spine. The quality of the pain is described as aching. The pain does not radiate. The pain is at a severity of 5/10. The pain is moderate. The pain is worse during the day. The symptoms are aggravated by bending and position. Associated symptoms include leg pain. Pertinent negatives include no abdominal pain, bladder incontinence, bowel incontinence, chest pain, dysuria, fever, headaches, numbness,  paresis, paresthesias, pelvic pain, perianal numbness, tingling, weakness or weight loss. He has tried analgesics, NSAIDs and home exercises for the symptoms. The treatment provided moderate relief.   Arthritis   Presents for follow-up visit. He complains of pain and stiffness. He reports no joint swelling or joint warmth. The symptoms have been worsening. Affected locations include the left knee, right knee and neck. His pain is at a severity of 6/10. Pertinent negatives include no diarrhea, dry eyes, dry mouth, dysuria, fatigue, fever, pain at night, pain while resting, rash, Raynaud's syndrome, uveitis or weight loss.        ROS  Review of Systems   Constitutional: Negative for activity change, appetite change, chills, fatigue, fever, malaise/fatigue and weight loss.   HENT: Negative for congestion, ear pain, postnasal drip, rhinorrhea, sinus pressure, sore throat and trouble swallowing.    Eyes: Negative for blurred vision, pain and visual disturbance.   Respiratory: Negative for cough, chest tightness, shortness of breath and wheezing.    Cardiovascular: Negative for chest pain, palpitations, orthopnea and PND.   Gastrointestinal: Negative for abdominal pain, bowel incontinence, constipation, diarrhea, nausea and vomiting.   Genitourinary: Negative for bladder incontinence, difficulty urinating, dysuria, flank pain, frequency, pelvic pain, penile pain, penile swelling, scrotal swelling, testicular pain and urgency.   Musculoskeletal: Positive for arthralgias (both knees), arthritis, back pain, gait problem, myalgias, neck pain and stiffness. Negative for joint swelling and neck stiffness.   Skin: Negative.  Negative for rash.   Allergic/Immunologic: Negative for environmental allergies, food allergies and immunocompromised state.   Neurological: Negative for dizziness, tingling, weakness, light-headedness, numbness, headaches and paresthesias.   Psychiatric/Behavioral: Negative.        Physical Exam  Vitals:     "10/23/17 1028   BP: 132/74   Pulse: 83   Resp: 18   Temp: 98.1 °F (36.7 °C)    Body mass index is 35.3 kg/m².  Weight: 105.3 kg (232 lb 2.3 oz)   Height: 5' 8" (172.7 cm)     Physical Exam   Constitutional: He is oriented to person, place, and time. He appears well-developed and well-nourished. He is active and cooperative.  Non-toxic appearance. He does not have a sickly appearance. He does not appear ill. No distress.   HENT:   Head: Normocephalic and atraumatic.   Right Ear: Hearing, tympanic membrane, external ear and ear canal normal. No tenderness. No foreign bodies. No mastoid tenderness. Tympanic membrane is not injected, not scarred, not perforated, not erythematous, not retracted and not bulging. No hemotympanum. No decreased hearing is noted.   Left Ear: Hearing, tympanic membrane, external ear and ear canal normal. No tenderness. No foreign bodies. No mastoid tenderness. Tympanic membrane is not injected, not scarred, not perforated, not erythematous, not retracted and not bulging. No hemotympanum. No decreased hearing is noted.   Nose: Nose normal. No sinus tenderness, nasal deformity or septal deviation. No epistaxis.  No foreign bodies.   Mouth/Throat: Uvula is midline, oropharynx is clear and moist and mucous membranes are normal. He does not have dentures. Normal dentition. No uvula swelling or dental caries. No oropharyngeal exudate, posterior oropharyngeal edema, posterior oropharyngeal erythema or tonsillar abscesses.   Eyes: Conjunctivae, EOM and lids are normal. Pupils are equal, round, and reactive to light. Right eye exhibits no chemosis, no discharge, no exudate and no hordeolum. No foreign body present in the right eye. Left eye exhibits no chemosis, no discharge, no exudate and no hordeolum. No foreign body present in the left eye. No scleral icterus.   Neck: Normal range of motion and full passive range of motion without pain. Neck supple. No thyroid mass and no thyromegaly present. "   Cardiovascular: Normal rate, regular rhythm, S1 normal, S2 normal and normal heart sounds.  Exam reveals no gallop and no friction rub.    No murmur heard.  Pulmonary/Chest: Effort normal and breath sounds normal. He has no decreased breath sounds. He has no wheezes. He has no rhonchi. He has no rales.   Abdominal: Soft. Normal appearance and bowel sounds are normal. He exhibits no distension, no ascites and no mass. There is no hepatosplenomegaly. There is no tenderness. There is no rigidity, no rebound and no guarding. No hernia.   Musculoskeletal:        Right knee: He exhibits decreased range of motion and bony tenderness. He exhibits no swelling, no effusion, no ecchymosis, no deformity, no laceration, no erythema, normal alignment, no LCL laxity, normal meniscus and no MCL laxity. Tenderness found. Medial joint line, lateral joint line and patellar tendon tenderness noted. No MCL and no LCL tenderness noted.        Left knee: He exhibits decreased range of motion and bony tenderness. He exhibits no swelling, no effusion, no ecchymosis, no deformity, no laceration, no erythema, normal alignment, no LCL laxity, normal patellar mobility, normal meniscus and no MCL laxity. Tenderness found. Medial joint line, lateral joint line and patellar tendon tenderness noted. No MCL and no LCL tenderness noted.        Cervical back: He exhibits decreased range of motion, tenderness, bony tenderness and pain. He exhibits no swelling, no edema, no deformity, no laceration, no spasm and normal pulse.        Lumbar back: He exhibits decreased range of motion, tenderness, bony tenderness and pain. He exhibits no swelling, no edema, no deformity, no laceration, no spasm and normal pulse.   Lymphadenopathy:        Head (right side): No submental, no submandibular, no preauricular and no posterior auricular adenopathy present.        Head (left side): No submental, no submandibular, no preauricular and no posterior auricular  adenopathy present.     He has no cervical adenopathy.   Neurological: He is alert and oriented to person, place, and time. He has normal strength. He displays no atrophy and no tremor. No cranial nerve deficit or sensory deficit. He exhibits normal muscle tone. He displays no seizure activity.   Skin: Skin is warm, dry and intact. No rash noted. He is not diaphoretic. No pallor.   Psychiatric: He has a normal mood and affect. His speech is normal and behavior is normal. Judgment and thought content normal. Cognition and memory are normal. He is attentive.   Vitals reviewed.      Assessment & Plan    1. Essential hypertension  Patient was counseled and encouraged to maintain a low sodium diet, as well as increasing physical activity.  Recommend random BP checks at home on a regular basis.  Repeat BP at end of visit was not necessary. Will continue medication at this time, and follow up in 2 months, or sooner if blood pressure is not well controlled.  Patient received 3 month supply of current medication in 3 separate bottles and inadvertently took a pill from each bottle for a month.  Due to insurance restrictions, will start 2 separate medication equal to same dose at this time and restart previous medication in January.    - amlodipine-valsartan-hcthiazid -25 mg Tab; Take 1 tablet by mouth once daily.  Dispense: 90 tablet; Refill: 0  - valsartan (DIOVAN) 160 MG tablet; Take 1 tablet (160 mg total) by mouth once daily.  Dispense: 90 tablet; Refill: 0    2. Primary osteoarthritis involving multiple joints  The current medical regimen is effective at this time and no changes to present plan or medications will be made at this visit.       3. Need for influenza vaccination  Patient requested Flu vaccine, and was consented and vaccine given in office without complication.   - Influenza - Quadrivalent (3 years & older) (PF)       Follow up documented    ACTIVE MEDICAL ISSUES:  Documented in Problem List    PAST  MEDICAL HISTORY  Documented    PAST SURGICAL HISTORY:  Documented    SOCIAL HISTORY:  Documented    FAMILY HISTORY:  Documented    ALLERGIES AND MEDICATIONS: updated and reviewed.  Documented    Health Maintenance       Date Due Completion Date    TETANUS VACCINE [birth year]1956 ---    Pneumococcal (65+) (1 of 2 - PCV13) 02/11/2003 ---    Influenza Vaccine 08/01/2017 4/8/2016 (Done)    Override on 4/8/2016: Done (not at this present time)    Override on 10/29/2015: Declined (not at this present time)    Lipid Panel 09/29/2022 9/29/2017    Override on 4/8/2016: Done (future)

## 2017-12-26 ENCOUNTER — NURSE TRIAGE (OUTPATIENT)
Dept: ADMINISTRATIVE | Facility: CLINIC | Age: 77
End: 2017-12-26

## 2018-01-23 DIAGNOSIS — I10 ESSENTIAL HYPERTENSION: ICD-10-CM

## 2018-01-23 RX ORDER — AMLODIPINE, VALSARTAN AND HYDROCHLOROTHIAZIDE 10; 160; 25 MG/1; MG/1; MG/1
1 TABLET ORAL DAILY
Qty: 90 TABLET | Refills: 0 | OUTPATIENT
Start: 2018-01-23 | End: 2019-01-23

## 2018-01-26 ENCOUNTER — TELEPHONE (OUTPATIENT)
Dept: FAMILY MEDICINE | Facility: CLINIC | Age: 78
End: 2018-01-26

## 2018-01-26 NOTE — TELEPHONE ENCOUNTER
----- Message from Tonia Cheung sent at 1/26/2018  9:17 AM CST -----  Contact: Self   [employer] _     Patient is experiencing cough, chest pain, headache, nasal pain, wheezing.  Please call back at [phone].    XAVIER MANRIQUE

## 2018-01-26 NOTE — TELEPHONE ENCOUNTER
[facility] patient    Have you had any of the following symptoms: fever >100.4/chills, URI symptoms, body aches, nausea, vomiting, or diarrhea?    Which symptoms have you had?  How long have you had the symptoms?   Did the symptoms start suddenly or did they come on gradually?   Have you been in contact with anyone who has the flu?   If so, how close was the contact and when was the last contact?  Did you receive the flu shot during this current flu season?    Patient stated that he does not have any fever, feel really bad, patient sxs started Monday but was taking nyquil to try to help sxs, but it is worse, nasal congestion, non productive cough. Unable to sleep, no N&V. Have not been around anyone sick., patient did receive vaccine

## 2018-01-29 NOTE — TELEPHONE ENCOUNTER
Patient stated that someone told him he had an appointment scheduled appt for this AM, I notified patient that there was no documentation noting this, offered patient appt tomorrow, patient scheduled with Meera

## 2018-01-30 ENCOUNTER — OFFICE VISIT (OUTPATIENT)
Dept: FAMILY MEDICINE | Facility: CLINIC | Age: 78
End: 2018-01-30
Payer: MEDICARE

## 2018-01-30 VITALS
DIASTOLIC BLOOD PRESSURE: 94 MMHG | WEIGHT: 233.94 LBS | HEART RATE: 85 BPM | TEMPERATURE: 98 F | OXYGEN SATURATION: 95 % | BODY MASS INDEX: 35.45 KG/M2 | SYSTOLIC BLOOD PRESSURE: 150 MMHG | HEIGHT: 68 IN | RESPIRATION RATE: 18 BRPM

## 2018-01-30 DIAGNOSIS — J42 CHRONIC BRONCHITIS WITH ACUTE EXACERBATION: Primary | ICD-10-CM

## 2018-01-30 DIAGNOSIS — Z23 NEED FOR PNEUMOCOCCAL VACCINATION: ICD-10-CM

## 2018-01-30 DIAGNOSIS — J20.9 CHRONIC BRONCHITIS WITH ACUTE EXACERBATION: Primary | ICD-10-CM

## 2018-01-30 PROCEDURE — 1159F MED LIST DOCD IN RCRD: CPT | Mod: S$GLB,,, | Performed by: PHYSICIAN ASSISTANT

## 2018-01-30 PROCEDURE — 3008F BODY MASS INDEX DOCD: CPT | Mod: S$GLB,,, | Performed by: PHYSICIAN ASSISTANT

## 2018-01-30 PROCEDURE — 99499 UNLISTED E&M SERVICE: CPT | Mod: S$GLB,,, | Performed by: PHYSICIAN ASSISTANT

## 2018-01-30 PROCEDURE — 99999 PR PBB SHADOW E&M-EST. PATIENT-LVL IV: CPT | Mod: PBBFAC,,, | Performed by: PHYSICIAN ASSISTANT

## 2018-01-30 PROCEDURE — 1126F AMNT PAIN NOTED NONE PRSNT: CPT | Mod: S$GLB,,, | Performed by: PHYSICIAN ASSISTANT

## 2018-01-30 PROCEDURE — 99214 OFFICE O/P EST MOD 30 MIN: CPT | Mod: S$GLB,,, | Performed by: PHYSICIAN ASSISTANT

## 2018-01-30 RX ORDER — DOXYCYCLINE 100 MG/1
100 CAPSULE ORAL EVERY 12 HOURS
Qty: 14 CAPSULE | Refills: 0 | Status: SHIPPED | OUTPATIENT
Start: 2018-01-30 | End: 2018-02-09

## 2018-01-30 RX ORDER — ALBUTEROL SULFATE 90 UG/1
AEROSOL, METERED RESPIRATORY (INHALATION)
Qty: 18 INHALER | Refills: 2 | Status: SHIPPED | OUTPATIENT
Start: 2018-01-30 | End: 2019-02-04 | Stop reason: SDUPTHER

## 2018-01-30 RX ORDER — PREDNISONE 20 MG/1
TABLET ORAL
Qty: 10 TABLET | Refills: 0 | Status: SHIPPED | OUTPATIENT
Start: 2018-01-30 | End: 2018-04-09 | Stop reason: ALTCHOICE

## 2018-01-30 RX ORDER — PROMETHAZINE HYDROCHLORIDE AND DEXTROMETHORPHAN HYDROBROMIDE 6.25; 15 MG/5ML; MG/5ML
5 SYRUP ORAL NIGHTLY PRN
Qty: 118 ML | Refills: 0 | Status: SHIPPED | OUTPATIENT
Start: 2018-01-30 | End: 2018-02-09

## 2018-01-30 RX ORDER — LEVALBUTEROL INHALATION SOLUTION 1.25 MG/3ML
1 SOLUTION RESPIRATORY (INHALATION) EVERY 8 HOURS PRN
Qty: 90 ML | Refills: 5 | Status: SHIPPED | OUTPATIENT
Start: 2018-01-30 | End: 2021-05-31

## 2018-01-30 NOTE — PROGRESS NOTES
Subjective:       Patient ID: Alexander Duong Jr. is a 77 y.o. male.    Chief Complaint: Sinus Problem (cold,chest congestion, nasal congestion, headache for 1 week)    Cough   This is a new problem. The current episode started 1 to 4 weeks ago. The problem has been unchanged. The cough is non-productive. Associated symptoms include ear congestion, headaches, nasal congestion, shortness of breath and wheezing. Pertinent negatives include no chest pain, ear pain, fever, hemoptysis or sore throat. Treatments tried: nyquil, vicks,  The treatment provided mild relief.     Began smoking at 17, quit around 48. 1 ppd    Review of Systems   Constitutional: Negative for fever.   HENT: Negative for ear pain and sore throat.    Respiratory: Positive for cough, shortness of breath and wheezing. Negative for hemoptysis.    Cardiovascular: Negative for chest pain.   Neurological: Positive for headaches.       Objective:      Physical Exam   Constitutional: He appears well-developed. No distress.   HENT:   Right Ear: Tympanic membrane and ear canal normal.   Left Ear: Tympanic membrane and ear canal normal.   Cardiovascular: Normal rate and regular rhythm.    Pulmonary/Chest: He has wheezes in the right upper field, the right middle field, the right lower field, the left upper field, the left middle field and the left lower field. He has rhonchi in the right lower field and the left lower field.   Skin: Skin is warm and dry. He is not diaphoretic.   Vitals reviewed.      Assessment:       1. Chronic bronchitis with acute exacerbation    2. Need for pneumococcal vaccination        Plan:         Alexander was seen today for sinus problem.    Diagnoses and all orders for this visit:    Chronic bronchitis with acute exacerbation  -     albuterol (VENTOLIN HFA) 90 mcg/actuation inhaler; INHALE 2 PUFFS INTO THE LUNGS EVERY 6 HOURS AS NEEDED FOR WHEEZING  -     levalbuterol (XOPENEX) 1.25 mg/3 mL nebulizer solution; Take 3 mLs (1.25 mg total)  by nebulization every 8 (eight) hours as needed for Wheezing.  -     Complete PFT w/o bronchodilator; Future  -     predniSONE (DELTASONE) 20 MG tablet; Take 3 pills for 1 day then 2 pills for 2 days then 1 pill for 3 days  -     promethazine-dextromethorphan (PROMETHAZINE-DM) 6.25-15 mg/5 mL Syrp; Take 5 mLs by mouth nightly as needed.  -     doxycycline (VIBRAMYCIN) 100 MG Cap; Take 1 capsule (100 mg total) by mouth every 12 (twelve) hours.  -     Increase fluids, return if worse, go to ED if SOB worsens; recommend PFT to assess     Need for pneumococcal vaccination  - will wait until symptoms resolve     Other orders  -     Cancel: Pneumococcal Conjugate Vaccine (13 Valent) (IM)

## 2018-01-31 ENCOUNTER — TELEPHONE (OUTPATIENT)
Dept: FAMILY MEDICINE | Facility: CLINIC | Age: 78
End: 2018-01-31

## 2018-01-31 NOTE — TELEPHONE ENCOUNTER
Spoke with patient. States that when he called the pharmacy the first time they told him it wasn't available,then when he called again they told him it was ready. Instructed patient to call back if he has any other concerns or questions. Verbalized understanding.

## 2018-01-31 NOTE — TELEPHONE ENCOUNTER
----- Message from Deneen Eaton sent at 1/31/2018 12:42 PM CST -----  Contact: Self   Patient says his pharmacy told him they did not have his medications. Please call patient at [phone]

## 2018-02-01 ENCOUNTER — TELEPHONE (OUTPATIENT)
Dept: FAMILY MEDICINE | Facility: CLINIC | Age: 78
End: 2018-02-01

## 2018-02-01 DIAGNOSIS — I10 ESSENTIAL HYPERTENSION: ICD-10-CM

## 2018-02-01 NOTE — TELEPHONE ENCOUNTER
Advised pt that promethazine cough syrup is for the cough and sleep. Pt verbally understood.            Pt stated that a medication was supposed to be sent over to his pharmacy for sleep. Please advise.      ----- Message from Shanique Bailon sent at 2/1/2018  9:50 AM CST -----  Contact: self  Patient would like to know if doxycycline (VIBRAMYCIN) for sleeping? Patient can be reached at [phone].        Thanks,

## 2018-02-02 NOTE — TELEPHONE ENCOUNTER
----- Message from Shanique Bailon sent at 2/2/2018  8:55 AM CST -----  Contact: self  Patient is following up on cough syrup and something to sleep. Patient can be reached at [phone].      Thanks,

## 2018-02-05 ENCOUNTER — TELEPHONE (OUTPATIENT)
Dept: FAMILY MEDICINE | Facility: CLINIC | Age: 78
End: 2018-02-05

## 2018-02-05 NOTE — TELEPHONE ENCOUNTER
This cough medication has been on back order for months.            ----- Message from Agustín Harvey sent at 2/5/2018  2:12 PM CST -----  Contact: Self    Pt states [facility] says they did not receive script for promethazine-dextromethorphan (PROMETHAZINE-DM) 6.25-15 mg/5 mL Syrp. Pt can be reached @ [phone].

## 2018-02-08 RX ORDER — AMLODIPINE, VALSARTAN AND HYDROCHLOROTHIAZIDE 10; 160; 25 MG/1; MG/1; MG/1
1 TABLET ORAL DAILY
Qty: 90 TABLET | Refills: 0 | OUTPATIENT
Start: 2018-02-08 | End: 2019-02-08

## 2018-02-23 ENCOUNTER — TELEPHONE (OUTPATIENT)
Dept: FAMILY MEDICINE | Facility: CLINIC | Age: 78
End: 2018-02-23

## 2018-02-23 DIAGNOSIS — I10 ESSENTIAL HYPERTENSION: Primary | ICD-10-CM

## 2018-02-23 RX ORDER — METOPROLOL TARTRATE 50 MG/1
50 TABLET ORAL DAILY
Qty: 90 TABLET | Refills: 0 | Status: CANCELLED | OUTPATIENT
Start: 2018-02-23

## 2018-02-23 NOTE — TELEPHONE ENCOUNTER
----- Message from Lissa Jefferson sent at 2/23/2018 12:30 PM CST -----  Contact: self  Refill: metoprolol tartrate (LOPRESSOR) 50 MG tablet    [facility] #[id number] - [place] - [address]    Patient can be reached at [phone]. Thank you!

## 2018-02-23 NOTE — TELEPHONE ENCOUNTER
----- Message from Landy Londono sent at 2/22/2018  4:40 PM CST -----  Contact: self  Pt would like to know why---metoprolol tartrate (LOPRESSOR) 50 MG tablet--- was denied.   Pt call back [phone]

## 2018-02-23 NOTE — TELEPHONE ENCOUNTER
----- Message from Madie Dubose sent at 2/23/2018  3:36 PM CST -----  Contact: Alexander [phone]  Pt is calling to check on the status of a refill: METOPROLOL TARTRATE . Please call at your earliest convenience.

## 2018-02-26 NOTE — TELEPHONE ENCOUNTER
----- Message from Deneen Eaton sent at 2/26/2018  8:56 AM CST -----  Contact: Self   Patient says he is still waiting on a refill for his blood pressure medication. Please call patient at [phone]. He is completely out.     metoprolol tartrate (LOPRESSOR) 50 MG tablet

## 2018-03-01 RX ORDER — METOPROLOL TARTRATE 50 MG/1
50 TABLET ORAL DAILY
Qty: 90 TABLET | Refills: 0 | Status: SHIPPED | OUTPATIENT
Start: 2018-03-01 | End: 2018-04-26 | Stop reason: SDUPTHER

## 2018-04-09 ENCOUNTER — CLINICAL SUPPORT (OUTPATIENT)
Dept: FAMILY MEDICINE | Facility: CLINIC | Age: 78
End: 2018-04-09
Payer: MEDICARE

## 2018-04-09 ENCOUNTER — LAB VISIT (OUTPATIENT)
Dept: LAB | Facility: HOSPITAL | Age: 78
End: 2018-04-09
Attending: FAMILY MEDICINE
Payer: MEDICARE

## 2018-04-09 ENCOUNTER — OFFICE VISIT (OUTPATIENT)
Dept: FAMILY MEDICINE | Facility: CLINIC | Age: 78
End: 2018-04-09
Payer: MEDICARE

## 2018-04-09 VITALS
HEIGHT: 68 IN | DIASTOLIC BLOOD PRESSURE: 68 MMHG | HEART RATE: 64 BPM | SYSTOLIC BLOOD PRESSURE: 112 MMHG | TEMPERATURE: 98 F | HEART RATE: 64 BPM | RESPIRATION RATE: 20 BRPM | DIASTOLIC BLOOD PRESSURE: 68 MMHG | SYSTOLIC BLOOD PRESSURE: 112 MMHG | OXYGEN SATURATION: 97 % | OXYGEN SATURATION: 97 %

## 2018-04-09 DIAGNOSIS — Z23 NEED FOR VACCINATION AGAINST STREPTOCOCCUS PNEUMONIAE USING PNEUMOCOCCAL CONJUGATE VACCINE 13: ICD-10-CM

## 2018-04-09 DIAGNOSIS — N18.30 CKD (CHRONIC KIDNEY DISEASE) STAGE 3, GFR 30-59 ML/MIN: ICD-10-CM

## 2018-04-09 DIAGNOSIS — M15.9 PRIMARY OSTEOARTHRITIS INVOLVING MULTIPLE JOINTS: ICD-10-CM

## 2018-04-09 DIAGNOSIS — I10 ESSENTIAL HYPERTENSION: Primary | ICD-10-CM

## 2018-04-09 DIAGNOSIS — F51.01 PRIMARY INSOMNIA: ICD-10-CM

## 2018-04-09 DIAGNOSIS — E78.00 PURE HYPERCHOLESTEROLEMIA: ICD-10-CM

## 2018-04-09 LAB
ANION GAP SERPL CALC-SCNC: 8 MMOL/L
BUN SERPL-MCNC: 24 MG/DL
CALCIUM SERPL-MCNC: 9.3 MG/DL
CHLORIDE SERPL-SCNC: 102 MMOL/L
CHOLEST SERPL-MCNC: 173 MG/DL
CHOLEST/HDLC SERPL: 6.4 {RATIO}
CO2 SERPL-SCNC: 28 MMOL/L
CREAT SERPL-MCNC: 1.6 MG/DL
EST. GFR  (AFRICAN AMERICAN): 46.3 ML/MIN/1.73 M^2
EST. GFR  (NON AFRICAN AMERICAN): 40.1 ML/MIN/1.73 M^2
GLUCOSE SERPL-MCNC: 95 MG/DL
HDLC SERPL-MCNC: 27 MG/DL
HDLC SERPL: 15.6 %
LDLC SERPL CALC-MCNC: 120.4 MG/DL
NONHDLC SERPL-MCNC: 146 MG/DL
POTASSIUM SERPL-SCNC: 4.2 MMOL/L
SODIUM SERPL-SCNC: 138 MMOL/L
TRIGL SERPL-MCNC: 128 MG/DL

## 2018-04-09 PROCEDURE — 90670 PCV13 VACCINE IM: CPT | Mod: S$GLB,,, | Performed by: FAMILY MEDICINE

## 2018-04-09 PROCEDURE — 36415 COLL VENOUS BLD VENIPUNCTURE: CPT | Mod: PO

## 2018-04-09 PROCEDURE — 83036 HEMOGLOBIN GLYCOSYLATED A1C: CPT

## 2018-04-09 PROCEDURE — 3074F SYST BP LT 130 MM HG: CPT | Mod: CPTII,S$GLB,, | Performed by: FAMILY MEDICINE

## 2018-04-09 PROCEDURE — 99999 PR PBB SHADOW E&M-EST. PATIENT-LVL I: CPT | Mod: PBBFAC,,,

## 2018-04-09 PROCEDURE — G0009 ADMIN PNEUMOCOCCAL VACCINE: HCPCS | Mod: S$GLB,,, | Performed by: FAMILY MEDICINE

## 2018-04-09 PROCEDURE — 99999 PR PBB SHADOW E&M-EST. PATIENT-LVL III: CPT | Mod: PBBFAC,,, | Performed by: FAMILY MEDICINE

## 2018-04-09 PROCEDURE — 80061 LIPID PANEL: CPT

## 2018-04-09 PROCEDURE — 99499 UNLISTED E&M SERVICE: CPT | Mod: S$GLB,,, | Performed by: FAMILY MEDICINE

## 2018-04-09 PROCEDURE — 99215 OFFICE O/P EST HI 40 MIN: CPT | Mod: S$GLB,,, | Performed by: FAMILY MEDICINE

## 2018-04-09 PROCEDURE — 3078F DIAST BP <80 MM HG: CPT | Mod: CPTII,S$GLB,, | Performed by: FAMILY MEDICINE

## 2018-04-09 PROCEDURE — 80048 BASIC METABOLIC PNL TOTAL CA: CPT

## 2018-04-09 RX ORDER — TRAZODONE HYDROCHLORIDE 100 MG/1
100 TABLET ORAL NIGHTLY PRN
Qty: 90 TABLET | Refills: 0 | Status: SHIPPED | OUTPATIENT
Start: 2018-04-09 | End: 2018-07-04 | Stop reason: SDUPTHER

## 2018-04-09 RX ORDER — ACETAMINOPHEN AND CODEINE PHOSPHATE 300; 60 MG/1; MG/1
1 TABLET ORAL EVERY 8 HOURS PRN
Qty: 90 TABLET | Refills: 2 | Status: SHIPPED | OUTPATIENT
Start: 2018-04-09 | End: 2018-12-04 | Stop reason: SDUPTHER

## 2018-04-09 RX ORDER — AMLODIPINE, VALSARTAN AND HYDROCHLOROTHIAZIDE 10; 320; 25 MG/1; MG/1; MG/1
1 TABLET ORAL DAILY
Qty: 90 TABLET | Refills: 1 | Status: SHIPPED | OUTPATIENT
Start: 2018-04-09 | End: 2018-04-26 | Stop reason: SDUPTHER

## 2018-04-09 NOTE — PROGRESS NOTES
"Chief Complaint   Patient presents with    Chest Pain     denies pain at this time    Insomnia    Leg Swelling     left leg    Headache     of and on       Alexander Duong Jr. is a 78 y.o. male who presents per the Chief Complaint.  Pt is known to me and was last seen by me on 10/23/2017.  All known chronic medical issues have been documented.       HPI     ROS  Review of Systems   Constitutional: Negative for activity change, appetite change, chills, fatigue and fever.   HENT: Negative for congestion, ear pain, postnasal drip, rhinorrhea, sinus pressure, sore throat and trouble swallowing.    Eyes: Negative for pain and visual disturbance.   Respiratory: Negative for cough, chest tightness, shortness of breath and wheezing.    Cardiovascular: Positive for chest pain. Negative for palpitations.   Gastrointestinal: Negative for abdominal pain, constipation, diarrhea, nausea and vomiting.   Genitourinary: Negative for difficulty urinating, dysuria, flank pain, frequency, penile pain, penile swelling, scrotal swelling, testicular pain and urgency.   Musculoskeletal: Positive for arthralgias (both knees), back pain, gait problem, myalgias and neck pain. Negative for joint swelling and neck stiffness.   Skin: Negative.  Negative for rash.   Allergic/Immunologic: Negative for environmental allergies, food allergies and immunocompromised state.   Neurological: Positive for light-headedness. Negative for dizziness, seizures, syncope, facial asymmetry, speech difficulty, weakness, numbness and headaches.   Psychiatric/Behavioral: Positive for sleep disturbance.       Physical Exam  Vitals:    04/09/18 1240   BP: 112/68   Pulse: 64   Resp: 20   Temp: 98.2 °F (36.8 °C)    There is no height or weight on file to calculate BMI.      Height: 5' 8" (172.7 cm)     Physical Exam   Constitutional: He is oriented to person, place, and time. He appears well-developed and well-nourished. He is active and cooperative.  Non-toxic " appearance. He does not have a sickly appearance. He does not appear ill. No distress.   HENT:   Head: Normocephalic and atraumatic.   Right Ear: Hearing and external ear normal. No decreased hearing is noted.   Left Ear: Hearing and external ear normal. No decreased hearing is noted.   Nose: Nose normal. No rhinorrhea or nasal deformity.   Mouth/Throat: Uvula is midline and oropharynx is clear and moist. He does not have dentures. Normal dentition.   Eyes: Conjunctivae, EOM and lids are normal. Pupils are equal, round, and reactive to light. Right eye exhibits no chemosis, no discharge and no exudate. No foreign body present in the right eye. Left eye exhibits no chemosis, no discharge and no exudate. No foreign body present in the left eye. No scleral icterus.   Neck: Normal range of motion and full passive range of motion without pain. Neck supple.   Cardiovascular: Normal rate, regular rhythm, S1 normal, S2 normal and normal heart sounds.  Exam reveals no gallop and no friction rub.    No murmur heard.  Pulmonary/Chest: Effort normal and breath sounds normal. No accessory muscle usage. No respiratory distress. He has no decreased breath sounds. He has no wheezes. He has no rhonchi. He has no rales.   Abdominal: Soft. Normal appearance. He exhibits no distension. There is no hepatosplenomegaly. There is no tenderness. There is no rigidity, no rebound and no guarding.   Musculoskeletal:        Right hip: He exhibits decreased range of motion and decreased strength. He exhibits no tenderness, no bony tenderness, no swelling, no crepitus, no deformity and no laceration.        Left hip: He exhibits decreased range of motion and decreased strength. He exhibits no tenderness, no bony tenderness, no swelling, no crepitus, no deformity and no laceration.        Right knee: He exhibits decreased range of motion. He exhibits no swelling, no effusion, no ecchymosis, no deformity, no laceration, no erythema, normal  alignment, no LCL laxity, no bony tenderness, normal meniscus and no MCL laxity. Tenderness found. Medial joint line, lateral joint line and patellar tendon tenderness noted. No MCL and no LCL tenderness noted.        Left knee: He exhibits normal range of motion, no swelling, no effusion, no ecchymosis, no deformity, no laceration, no erythema, normal alignment, no LCL laxity, normal patellar mobility, no bony tenderness, normal meniscus and no MCL laxity. Tenderness found. Medial joint line, lateral joint line and patellar tendon tenderness noted. No MCL and no LCL tenderness noted.        Arms:  Neurological: He is alert and oriented to person, place, and time. He has normal strength. No cranial nerve deficit or sensory deficit. He exhibits normal muscle tone. He displays no seizure activity. Coordination and gait normal.   Skin: Skin is warm, dry and intact. No rash noted. He is not diaphoretic.   Psychiatric: He has a normal mood and affect. His speech is normal and behavior is normal. Judgment and thought content normal. Cognition and memory are normal. He is attentive.       Assessment & Plan    1. Essential hypertension  Patient was counseled and encouraged to maintain a low sodium diet, as well as increasing physical activity.  Recommend random BP checks at home on a regular basis.  Repeat BP at end of visit was not necessary. Will continue medication at this time, and follow up in 3-6 months, or sooner if blood pressure begins to increase.     - amLODIPine-valsartan-hcthiazid -25 mg Tab; Take 1 tablet by mouth once daily.  Dispense: 90 tablet; Refill: 1    2. Primary osteoarthritis involving multiple joints  Chest pain most likely inner shoulder pain related to arthritis; non-cardiac.  Will continue non-NSAID therapy due to CKD.    - acetaminophen-codeine 300-60mg (TYLENOL #4) 300-60 mg Tab; Take 1 tablet by mouth every 8 (eight) hours as needed.  Dispense: 90 tablet; Refill: 2    3. Pure  hypercholesterolemia  We discussed ways to manage cholesterol levels, including increasing whole grain foods and decreasing fried and fatty foods.  I also recommended OTC Omega 3 and Omega 6 supplements to improve overall cholesterol levels.  Will continue current therapy at this visit; patient is due for screening blood test at this time.   - Lipid panel; Future    4. Primary insomnia  Patient was advised to practice good sleep hygiene, which includes decreasing stimulation at least one hour before bedtime.  This includes no television, no stimulation reading or games.  Natural sleep aids were recommended as well including lavender and chamomile.  The problem of recurrent insomnia is discussed. Avoidance of caffeine sources is strongly encouraged. Sleep hygiene issues are reviewed.  Medication prescribed to treat current symptoms; advised to use medication only as symptoms require.   - traZODone (DESYREL) 100 MG tablet; Take 1 tablet (100 mg total) by mouth nightly as needed for Insomnia.  Dispense: 90 tablet; Refill: 0    5. CKD (chronic kidney disease) stage 3, GFR 30-59 ml/min  Screening test will be ordered and once results available patient will be notified of results and managed accordingly.    - Basic metabolic panel; Future  - Hemoglobin A1c; Future    6. Need for vaccination against Streptococcus pneumoniae using pneumococcal conjugate vaccine 13  Patient due for pneumonia vaccine; Patient agreed and vaccine was administered in clinic today without complications.   - (In Office Administered) Pneumococcal Conjugate Vaccine (13 Valent) (IM)      Follow up documented    ACTIVE MEDICAL ISSUES:  Documented in Problem List    PAST MEDICAL HISTORY  Documented    PAST SURGICAL HISTORY:  Documented    SOCIAL HISTORY:  Documented    FAMILY HISTORY:  Documented    ALLERGIES AND MEDICATIONS: updated and reviewed.  Documented    Health Maintenance       Date Due Completion Date    TETANUS VACCINE 02/11/1958 ---     Pneumococcal (65+) (1 of 2 - PCV13) 02/11/2003 ---    Lipid Panel 09/29/2022 9/29/2017    Override on 4/8/2016: Done (future)

## 2018-04-09 NOTE — PROGRESS NOTES
Pt tolerated pneumonia 13 vaccine to right deltoid without difficulty; no adverse reaction noted; VIS given

## 2018-04-09 NOTE — PROGRESS NOTES
..  Alexander Duong Jr. 78 y.o. male is here today for Blood Pressure check.   History of HTN yes.    Review of patient's allergies indicates:  No Known Allergies  Creatinine   Date Value Ref Range Status   09/29/2017 2.3 (H) 0.5 - 1.4 mg/dL Final     Sodium   Date Value Ref Range Status   09/29/2017 138 136 - 145 mmol/L Final     Potassium   Date Value Ref Range Status   09/29/2017 4.1 3.5 - 5.1 mmol/L Final   ]  Patient verifies taking blood pressure medications on a regular basis at the same time of the day.     Current Outpatient Prescriptions:     acetaminophen-codeine 300-60mg (TYLENOL #4) 300-60 mg Tab, Take 1 tablet by mouth every 8 (eight) hours as needed., Disp: 90 tablet, Rfl: 2    albuterol (VENTOLIN HFA) 90 mcg/actuation inhaler, INHALE 2 PUFFS INTO THE LUNGS EVERY 6 HOURS AS NEEDED FOR WHEEZING, Disp: 18 Inhaler, Rfl: 2    amlodipine-valsartan-hcthiazid -25 mg Tab, Take 1 tablet by mouth once daily., Disp: 90 tablet, Rfl: 0    amlodipine-valsartan-hcthiazid -25 mg Tab, Take 1 tablet by mouth once daily., Disp: 90 tablet, Rfl: 1    levalbuterol (XOPENEX) 1.25 mg/3 mL nebulizer solution, Take 3 mLs (1.25 mg total) by nebulization every 8 (eight) hours as needed for Wheezing., Disp: 90 mL, Rfl: 5    methocarbamol (ROBAXIN) 500 MG Tab, Take 1 tablet (500 mg total) by mouth 3 (three) times daily., Disp: 30 tablet, Rfl: 0    metoprolol tartrate (LOPRESSOR) 50 MG tablet, Take 1 tablet (50 mg total) by mouth once daily., Disp: 90 tablet, Rfl: 0    predniSONE (DELTASONE) 20 MG tablet, Take 3 pills for 1 day then 2 pills for 2 days then 1 pill for 3 days, Disp: 10 tablet, Rfl: 0  Does patient have record of home blood pressure readings no. Readings have been averaging N/A.   Last dose of blood pressure medication was taken at 4/9/2018.  Patient is symptomatic.   Complains of chest pains and headaches.Patient states last night he started with chest pain,headache,dizziness and swelling in left  leg.Denies shortness of breath. Denies heaviness on chest. No sure of how long headaches have been going on . Come and go.Also states that he needs something for sleep. Patient scheduled at this time with Dr.Lombard.      ,   .    Blood pressure reading after 15 minutes was 112/68, Pulse 64.  Dr. Lombard notified.

## 2018-04-10 LAB
ESTIMATED AVG GLUCOSE: 97 MG/DL
HBA1C MFR BLD HPLC: 5 %

## 2018-04-12 ENCOUNTER — TELEPHONE (OUTPATIENT)
Dept: FAMILY MEDICINE | Facility: CLINIC | Age: 78
End: 2018-04-12

## 2018-04-12 NOTE — TELEPHONE ENCOUNTER
----- Message from Azikiwe K. Lombard, MD sent at 4/12/2018  1:16 PM CDT -----  Please notify patient of normal results by phone, and keep follow up in 2 months.

## 2018-04-26 DIAGNOSIS — I10 ESSENTIAL HYPERTENSION: ICD-10-CM

## 2018-04-27 ENCOUNTER — TELEPHONE (OUTPATIENT)
Dept: FAMILY MEDICINE | Facility: CLINIC | Age: 78
End: 2018-04-27

## 2018-04-27 RX ORDER — AMLODIPINE, VALSARTAN AND HYDROCHLOROTHIAZIDE 10; 320; 25 MG/1; MG/1; MG/1
1 TABLET ORAL DAILY
Qty: 90 TABLET | Refills: 1 | Status: ON HOLD | OUTPATIENT
Start: 2018-04-27 | End: 2018-07-15 | Stop reason: HOSPADM

## 2018-04-27 RX ORDER — METOPROLOL TARTRATE 50 MG/1
50 TABLET ORAL DAILY
Qty: 90 TABLET | Refills: 0 | Status: SHIPPED | OUTPATIENT
Start: 2018-04-27 | End: 2018-08-20

## 2018-04-27 NOTE — TELEPHONE ENCOUNTER
Spoke with patient requesting refill for amlodipine-valsartan-[facility] -25 was told that is ready to  at the pharmacy [facility] , patient verbalized understand .

## 2018-04-27 NOTE — TELEPHONE ENCOUNTER
----- Message from Lissa Jefferson sent at 4/27/2018 10:27 AM CDT -----  Contact: self  Pt calling to check the status of Amlodipine & Metoprolol. Pt states he is out of his medication. He can be reached at [phone]. Thank you!

## 2018-05-03 ENCOUNTER — OFFICE VISIT (OUTPATIENT)
Dept: FAMILY MEDICINE | Facility: CLINIC | Age: 78
End: 2018-05-03
Payer: MEDICARE

## 2018-05-03 VITALS
WEIGHT: 235.25 LBS | BODY MASS INDEX: 35.65 KG/M2 | HEART RATE: 65 BPM | HEIGHT: 68 IN | OXYGEN SATURATION: 95 % | RESPIRATION RATE: 18 BRPM | SYSTOLIC BLOOD PRESSURE: 130 MMHG | TEMPERATURE: 98 F | DIASTOLIC BLOOD PRESSURE: 60 MMHG

## 2018-05-03 DIAGNOSIS — N18.30 CKD (CHRONIC KIDNEY DISEASE) STAGE 3, GFR 30-59 ML/MIN: ICD-10-CM

## 2018-05-03 DIAGNOSIS — I10 ESSENTIAL HYPERTENSION: Primary | ICD-10-CM

## 2018-05-03 PROCEDURE — 3078F DIAST BP <80 MM HG: CPT | Mod: CPTII,S$GLB,, | Performed by: FAMILY MEDICINE

## 2018-05-03 PROCEDURE — 99999 PR PBB SHADOW E&M-EST. PATIENT-LVL III: CPT | Mod: PBBFAC,,, | Performed by: FAMILY MEDICINE

## 2018-05-03 PROCEDURE — 99214 OFFICE O/P EST MOD 30 MIN: CPT | Mod: S$GLB,,, | Performed by: FAMILY MEDICINE

## 2018-05-03 PROCEDURE — 3075F SYST BP GE 130 - 139MM HG: CPT | Mod: CPTII,S$GLB,, | Performed by: FAMILY MEDICINE

## 2018-05-03 PROCEDURE — 99499 UNLISTED E&M SERVICE: CPT | Mod: S$GLB,,, | Performed by: FAMILY MEDICINE

## 2018-05-03 RX ORDER — AMLODIPINE AND VALSARTAN 10; 320 MG/1; MG/1
1 TABLET ORAL DAILY
Qty: 90 TABLET | Refills: 0 | Status: SHIPPED | OUTPATIENT
Start: 2018-05-03 | End: 2018-06-04 | Stop reason: ALTCHOICE

## 2018-05-03 NOTE — PROGRESS NOTES
Chief Complaint   Patient presents with    Dizziness       Alexander Duong Jr. is a 78 y.o. male who presents per the Chief Complaint.  Pt is known to me and was last seen by me on 4/9/2018.  All known chronic medical issues have been documented.       Dizziness:    Associated symptoms: light-headedness and chest pain.no ear pain, no fever, no headaches, no nausea, no vomiting, no weakness and no palpitations.no anxiety and no environmental allergies.  Hypertension   This is a chronic problem. The current episode started more than 1 year ago. The problem is unchanged. The problem is uncontrolled. Associated symptoms include chest pain and neck pain. Pertinent negatives include no anxiety, blurred vision, headaches, malaise/fatigue, orthopnea, palpitations, peripheral edema, PND, shortness of breath or sweats. There are no associated agents to hypertension. Risk factors for coronary artery disease include male gender, obesity, dyslipidemia and sedentary lifestyle. Past treatments include ACE inhibitors, diuretics and calcium channel blockers. The current treatment provides moderate improvement. Compliance problems include diet and exercise.  There is no history of angina, kidney disease, CAD/MI, CVA, heart failure, left ventricular hypertrophy, PVD, renovascular disease or retinopathy. There is no history of chronic renal disease, coarctation of the aorta, hyperaldosteronism, hypercortisolism, hyperparathyroidism, a hypertension causing med, pheochromocytoma, sleep apnea or a thyroid problem.   Back Pain   This is a chronic problem. The current episode started more than 1 year ago. The problem occurs daily. The problem is unchanged. The pain is present in the lumbar spine. The quality of the pain is described as aching. The pain does not radiate. The pain is at a severity of 5/10. The pain is moderate. The pain is worse during the day. The symptoms are aggravated by bending and position. Associated symptoms include  chest pain and leg pain. Pertinent negatives include no abdominal pain, bladder incontinence, bowel incontinence, dysuria, fever, headaches, numbness, paresis, paresthesias, pelvic pain, perianal numbness, tingling, weakness or weight loss. He has tried analgesics, NSAIDs and home exercises for the symptoms. The treatment provided moderate relief.   Arthritis   Presents for follow-up visit. He complains of pain and stiffness. He reports no joint swelling or joint warmth. The symptoms have been worsening. Affected locations include the left knee, right knee and neck. His pain is at a severity of 6/10. Pertinent negatives include no diarrhea, dry eyes, dry mouth, dysuria, fatigue, fever, pain at night, pain while resting, rash, Raynaud's syndrome, uveitis or weight loss.        ROS  Review of Systems   Constitutional: Negative for activity change, appetite change, chills, fatigue, fever, malaise/fatigue and weight loss.   HENT: Negative for congestion, ear pain, postnasal drip, rhinorrhea, sinus pressure, sore throat and trouble swallowing.    Eyes: Negative for blurred vision, pain and visual disturbance.   Respiratory: Negative for cough, chest tightness, shortness of breath and wheezing.    Cardiovascular: Positive for chest pain. Negative for palpitations, orthopnea and PND.   Gastrointestinal: Negative for abdominal pain, bowel incontinence, constipation, diarrhea, nausea and vomiting.   Genitourinary: Positive for frequency. Negative for bladder incontinence, decreased urine volume, difficulty urinating, discharge, dysuria, enuresis, flank pain, genital sores, hematuria, pelvic pain, penile pain, penile swelling, scrotal swelling, testicular pain and urgency.   Musculoskeletal: Positive for arthralgias (both knees), arthritis, back pain, gait problem, myalgias, neck pain and stiffness. Negative for joint swelling and neck stiffness.   Skin: Negative.  Negative for rash.   Allergic/Immunologic: Negative for  "environmental allergies, food allergies and immunocompromised state.   Neurological: Positive for dizziness and light-headedness. Negative for tingling, tremors, seizures, syncope, facial asymmetry, speech difficulty, weakness, numbness, headaches and paresthesias.   Psychiatric/Behavioral: Positive for sleep disturbance.       Physical Exam  Vitals:    05/03/18 1313   BP: 130/60   Pulse: 65   Resp: 18   Temp: 98 °F (36.7 °C)    Body mass index is 35.77 kg/m².  Weight: 106.7 kg (235 lb 3.7 oz)   Height: 5' 8" (172.7 cm)     Physical Exam   Constitutional: He is oriented to person, place, and time. He appears well-developed and well-nourished. He is active and cooperative.  Non-toxic appearance. He does not have a sickly appearance. He does not appear ill. No distress.   HENT:   Head: Normocephalic and atraumatic.   Right Ear: Hearing and external ear normal. No decreased hearing is noted.   Left Ear: Hearing and external ear normal. No decreased hearing is noted.   Nose: Nose normal. No rhinorrhea or nasal deformity.   Mouth/Throat: Uvula is midline and oropharynx is clear and moist. He does not have dentures. Normal dentition.   Eyes: Conjunctivae, EOM and lids are normal. Pupils are equal, round, and reactive to light. Right eye exhibits no chemosis, no discharge and no exudate. No foreign body present in the right eye. Left eye exhibits no chemosis, no discharge and no exudate. No foreign body present in the left eye. No scleral icterus.   Neck: Normal range of motion and full passive range of motion without pain. Neck supple.   Cardiovascular: Normal rate, regular rhythm, S1 normal, S2 normal and normal heart sounds.  Exam reveals no gallop and no friction rub.    No murmur heard.  Pulmonary/Chest: Effort normal and breath sounds normal. No accessory muscle usage. No respiratory distress. He has no decreased breath sounds. He has no wheezes. He has no rhonchi. He has no rales.   Abdominal: Soft. Normal " appearance. He exhibits no distension. There is no hepatosplenomegaly. There is no tenderness. There is no rigidity, no rebound and no guarding.   Musculoskeletal:        Right hip: He exhibits decreased range of motion and decreased strength. He exhibits no tenderness, no bony tenderness, no swelling, no crepitus, no deformity and no laceration.        Left hip: He exhibits decreased range of motion and decreased strength. He exhibits no tenderness, no bony tenderness, no swelling, no crepitus, no deformity and no laceration.        Right knee: He exhibits decreased range of motion. He exhibits no swelling, no effusion, no ecchymosis, no deformity, no laceration, no erythema, normal alignment, no LCL laxity, no bony tenderness, normal meniscus and no MCL laxity. Tenderness found. Medial joint line, lateral joint line and patellar tendon tenderness noted. No MCL and no LCL tenderness noted.        Left knee: He exhibits normal range of motion, no swelling, no effusion, no ecchymosis, no deformity, no laceration, no erythema, normal alignment, no LCL laxity, normal patellar mobility, no bony tenderness, normal meniscus and no MCL laxity. Tenderness found. Medial joint line, lateral joint line and patellar tendon tenderness noted. No MCL and no LCL tenderness noted.        Arms:  Neurological: He is alert and oriented to person, place, and time. He has normal strength. No cranial nerve deficit or sensory deficit. He exhibits normal muscle tone. He displays no seizure activity. Coordination and gait normal.   Skin: Skin is warm, dry and intact. No rash noted. He is not diaphoretic.   Psychiatric: He has a normal mood and affect. His speech is normal and behavior is normal. Judgment and thought content normal. Cognition and memory are normal. He is attentive.       Assessment & Plan    1. Essential hypertension  ***      Follow up documented    ACTIVE MEDICAL ISSUES:  Documented in Problem List    PAST MEDICAL  HISTORY  Documented    PAST SURGICAL HISTORY:  Documented    SOCIAL HISTORY:  Documented    FAMILY HISTORY:  Documented    ALLERGIES AND MEDICATIONS: updated and reviewed.  Documented    Health Maintenance       Date Due Completion Date    TETANUS VACCINE 02/11/1958 ---    Influenza Vaccine 08/01/2018 10/23/2017    Override on 4/8/2016: Done (not at this present time)    Override on 10/29/2015: Declined (not at this present time)    Pneumococcal (65+) (2 of 2 - PPSV23) 04/09/2019 4/9/2018    Lipid Panel 04/09/2023 4/9/2018    Override on 4/8/2016: Done (future)

## 2018-05-03 NOTE — PROGRESS NOTES
Chief Complaint   Patient presents with    Dizziness       Alexander Duong Jr. is a 78 y.o. male who presents per the Chief Complaint.  Pt is known to me and was last seen by me on 4/9/2018.  All known chronic medical issues have been documented.       Dizziness:   Chronicity:  Recurrent  Onset:  1 to 4 weeks ago  Progression since onset:  Waxing and waning  Frequency:  Every few hours  Pain Scale:  0/10  Severity:  Moderate  Duration:  1 minute  Dizziness characteristics:  Lightheaded/impending faintno hearing loss, no ear congestion, no ear pain, no fever, no headaches, no tinnitus, no nausea, no vomiting, no diaphoresis, no aural fullness, no weakness, no visual disturbances, no light-headedness, no syncope, no palpitations, no panic, no facial weakness, no slurred speech, no numbness in extremities and no chest pain.  Aggravated by:  Getting up and position changesno strokes, no cardiac surgery, no neurologic disease, no head trauma, no balance testing, no ear trauma, no ear surgery, no head trauma, no ear infections, no anxiety, no ear tubes, no environmental allergies, no MRI head and no CT head.       ROS  Review of Systems   Constitutional: Negative for diaphoresis and fever.   HENT: Negative for ear pain, hearing loss and tinnitus.    Cardiovascular: Negative for chest pain, palpitations and syncope.   Gastrointestinal: Negative for nausea and vomiting.   Genitourinary: Positive for frequency. Negative for decreased urine volume, difficulty urinating, discharge, dysuria, enuresis, flank pain, genital sores, hematuria, penile pain, penile swelling, scrotal swelling, testicular pain and urgency.   Allergic/Immunologic: Negative for environmental allergies.   Neurological: Positive for dizziness. Negative for weakness, light-headedness and headaches.       Physical Exam  Vitals:    05/03/18 1313   BP: 130/60   Pulse: 65   Resp: 18   Temp: 98 °F (36.7 °C)    Body mass index is 35.77 kg/m².  Weight: 106.7 kg (235  "lb 3.7 oz)   Height: 5' 8" (172.7 cm)     Physical Exam   Constitutional: He is oriented to person, place, and time. He appears well-developed and well-nourished. He is active and cooperative.  Non-toxic appearance. He does not have a sickly appearance. He does not appear ill. No distress.   HENT:   Head: Normocephalic and atraumatic.   Right Ear: Hearing and external ear normal. No decreased hearing is noted.   Left Ear: Hearing and external ear normal. No decreased hearing is noted.   Nose: Nose normal. No rhinorrhea or nasal deformity.   Mouth/Throat: Uvula is midline and oropharynx is clear and moist. He does not have dentures. Normal dentition.   Eyes: Conjunctivae, EOM and lids are normal. Pupils are equal, round, and reactive to light. Right eye exhibits no chemosis, no discharge and no exudate. No foreign body present in the right eye. Left eye exhibits no chemosis, no discharge and no exudate. No foreign body present in the left eye. No scleral icterus.   Neck: Normal range of motion and full passive range of motion without pain. Neck supple.   Cardiovascular: Normal rate, regular rhythm, S1 normal, S2 normal and normal heart sounds.  Exam reveals no gallop and no friction rub.    No murmur heard.  Pulmonary/Chest: Effort normal and breath sounds normal. No accessory muscle usage. No respiratory distress. He has no decreased breath sounds. He has no wheezes. He has no rhonchi. He has no rales.   Abdominal: Soft. Normal appearance. He exhibits no distension. There is no hepatosplenomegaly. There is no tenderness. There is no rigidity, no rebound and no guarding.   Musculoskeletal:        Right hip: He exhibits decreased range of motion and decreased strength. He exhibits no tenderness, no bony tenderness, no swelling, no crepitus, no deformity and no laceration.        Left hip: He exhibits decreased range of motion and decreased strength. He exhibits no tenderness, no bony tenderness, no swelling, no " crepitus, no deformity and no laceration.        Right knee: He exhibits decreased range of motion. He exhibits no swelling, no effusion, no ecchymosis, no deformity, no laceration, no erythema, normal alignment, no LCL laxity, no bony tenderness, normal meniscus and no MCL laxity. Tenderness found. Medial joint line, lateral joint line and patellar tendon tenderness noted. No MCL and no LCL tenderness noted.        Left knee: He exhibits normal range of motion, no swelling, no effusion, no ecchymosis, no deformity, no laceration, no erythema, normal alignment, no LCL laxity, normal patellar mobility, no bony tenderness, normal meniscus and no MCL laxity. Tenderness found. Medial joint line, lateral joint line and patellar tendon tenderness noted. No MCL and no LCL tenderness noted.        Arms:  Neurological: He is alert and oriented to person, place, and time. He has normal strength. No cranial nerve deficit or sensory deficit. He exhibits normal muscle tone. He displays no seizure activity. Coordination and gait normal.   Skin: Skin is warm, dry and intact. No rash noted. He is not diaphoretic.   Psychiatric: He has a normal mood and affect. His speech is normal and behavior is normal. Judgment and thought content normal. Cognition and memory are normal. He is attentive.       Assessment & Plan    1. Essential hypertension  Will hold diuretic due to increased urination and symptoms of lightheadedness and dizziness, which could be associated with HCTZ use.  Will have patient return in 2 weeks for BP check.  - amlodipine-valsartan (EXFORGE)  mg per tablet; Take 1 tablet by mouth once daily.  Dispense: 90 tablet; Refill: 0    2. CKD (chronic kidney disease) stage 3, GFR 30-59 ml/min  Stable; encouraged patient to avoid NSAID medications and to increase water and clear liquid hydration.  Will continue to monitor for decline and refer as necessary.       Follow up documented    ACTIVE MEDICAL ISSUES:  Documented  in Problem List    PAST MEDICAL HISTORY  Documented    PAST SURGICAL HISTORY:  Documented    SOCIAL HISTORY:  Documented    FAMILY HISTORY:  Documented    ALLERGIES AND MEDICATIONS: updated and reviewed.  Documented    Health Maintenance       Date Due Completion Date    TETANUS VACCINE 02/11/1958 ---    Influenza Vaccine 08/01/2018 10/23/2017    Override on 4/8/2016: Done (not at this present time)    Override on 10/29/2015: Declined (not at this present time)    Pneumococcal (65+) (2 of 2 - PPSV23) 04/09/2019 4/9/2018    Lipid Panel 04/09/2023 4/9/2018    Override on 4/8/2016: Done (future)

## 2018-05-17 ENCOUNTER — CLINICAL SUPPORT (OUTPATIENT)
Dept: FAMILY MEDICINE | Facility: CLINIC | Age: 78
End: 2018-05-17
Payer: MEDICARE

## 2018-05-17 VITALS — DIASTOLIC BLOOD PRESSURE: 72 MMHG | SYSTOLIC BLOOD PRESSURE: 126 MMHG

## 2018-05-17 DIAGNOSIS — I10 HYPERTENSION, UNSPECIFIED TYPE: Primary | ICD-10-CM

## 2018-05-17 PROCEDURE — 99999 PR PBB SHADOW E&M-EST. PATIENT-LVL II: CPT | Mod: PBBFAC,,,

## 2018-05-17 PROCEDURE — 99499 UNLISTED E&M SERVICE: CPT | Mod: S$GLB,,, | Performed by: FAMILY MEDICINE

## 2018-05-17 NOTE — PROGRESS NOTES
Alexander Duong Jr. 78 y.o. male is here today for Blood Pressure check.   History of HTN yes.    Review of patient's allergies indicates:  No Known Allergies  Creatinine   Date Value Ref Range Status   04/09/2018 1.6 (H) 0.5 - 1.4 mg/dL Final     Sodium   Date Value Ref Range Status   04/09/2018 138 136 - 145 mmol/L Final     Potassium   Date Value Ref Range Status   04/09/2018 4.2 3.5 - 5.1 mmol/L Final   ]  Patient verifies taking blood pressure medications on a regular basis at the same time of the day.     Current Outpatient Prescriptions:     acetaminophen-codeine 300-60mg (TYLENOL #4) 300-60 mg Tab, Take 1 tablet by mouth every 8 (eight) hours as needed., Disp: 90 tablet, Rfl: 2    albuterol (VENTOLIN HFA) 90 mcg/actuation inhaler, INHALE 2 PUFFS INTO THE LUNGS EVERY 6 HOURS AS NEEDED FOR WHEEZING, Disp: 18 Inhaler, Rfl: 2    amlodipine-valsartan (EXFORGE)  mg per tablet, Take 1 tablet by mouth once daily., Disp: 90 tablet, Rfl: 0    amLODIPine-valsartan-hcthiazid -25 mg Tab, Take 1 tablet by mouth once daily., Disp: 90 tablet, Rfl: 1    levalbuterol (XOPENEX) 1.25 mg/3 mL nebulizer solution, Take 3 mLs (1.25 mg total) by nebulization every 8 (eight) hours as needed for Wheezing., Disp: 90 mL, Rfl: 5    metoprolol tartrate (LOPRESSOR) 50 MG tablet, Take 1 tablet (50 mg total) by mouth once daily., Disp: 90 tablet, Rfl: 0    traZODone (DESYREL) 100 MG tablet, Take 1 tablet (100 mg total) by mouth nightly as needed for Insomnia., Disp: 90 tablet, Rfl: 0  Does patient have record of home blood pressure readings no. Readings have been averaging no readings to report.   Last dose of blood pressure medication was taken at 5:30 am 5/17/18.  Patient is asymptomatic.   Complains of no complaints.    Vitals:    05/17/18 0921   BP: 126/72   BP Location: Right arm   Patient Position: Sitting   BP Method: Medium (Manual)         Dr. Lombard informed of nurse visit.

## 2018-05-18 NOTE — PROGRESS NOTES
Patient, Alexander Duong Jr. (MRN #[id number]), presented with a recorded BMI of 35.77 kg/m^2 and a documented comorbidity(s):  - Hypertension  to which the severe obesity is a contributing factor. This is consistent with the definition of severe obesity (BMI 35.0-35.9) with comorbidity (ICD-10 E66.01, Z68.35). The patient's severe obesity was monitored, evaluated, addressed and/or treated. This addendum to the medical record is made on 05/18/2018.

## 2018-06-04 ENCOUNTER — OFFICE VISIT (OUTPATIENT)
Dept: FAMILY MEDICINE | Facility: CLINIC | Age: 78
End: 2018-06-04
Payer: MEDICARE

## 2018-06-04 VITALS
BODY MASS INDEX: 35.52 KG/M2 | WEIGHT: 234.38 LBS | TEMPERATURE: 98 F | OXYGEN SATURATION: 95 % | HEIGHT: 68 IN | DIASTOLIC BLOOD PRESSURE: 82 MMHG | HEART RATE: 80 BPM | SYSTOLIC BLOOD PRESSURE: 136 MMHG | RESPIRATION RATE: 17 BRPM

## 2018-06-04 DIAGNOSIS — I10 ESSENTIAL HYPERTENSION: Primary | ICD-10-CM

## 2018-06-04 DIAGNOSIS — N18.30 CKD (CHRONIC KIDNEY DISEASE) STAGE 3, GFR 30-59 ML/MIN: ICD-10-CM

## 2018-06-04 DIAGNOSIS — M15.9 PRIMARY OSTEOARTHRITIS INVOLVING MULTIPLE JOINTS: ICD-10-CM

## 2018-06-04 PROCEDURE — 3075F SYST BP GE 130 - 139MM HG: CPT | Mod: CPTII,S$GLB,, | Performed by: FAMILY MEDICINE

## 2018-06-04 PROCEDURE — 3079F DIAST BP 80-89 MM HG: CPT | Mod: CPTII,S$GLB,, | Performed by: FAMILY MEDICINE

## 2018-06-04 PROCEDURE — 99214 OFFICE O/P EST MOD 30 MIN: CPT | Mod: S$GLB,,, | Performed by: FAMILY MEDICINE

## 2018-06-04 PROCEDURE — 99999 PR PBB SHADOW E&M-EST. PATIENT-LVL III: CPT | Mod: PBBFAC,,, | Performed by: FAMILY MEDICINE

## 2018-06-04 NOTE — PROGRESS NOTES
Chief Complaint   Patient presents with    Hypertension     follow up     Dizziness       Alexander Duong Jr. is a 78 y.o. male who presents per the Chief Complaint.  Pt is known to me and was last seen by me on 5/3/2018.  All known chronic medical issues have been documented.       Hypertension   This is a chronic problem. The current episode started more than 1 year ago. The problem has been gradually improving since onset. The problem is controlled. Associated symptoms include neck pain. Pertinent negatives include no anxiety, blurred vision, chest pain, headaches, malaise/fatigue, orthopnea, palpitations, peripheral edema, PND, shortness of breath or sweats. There are no associated agents to hypertension. Risk factors for coronary artery disease include male gender, obesity, dyslipidemia and sedentary lifestyle. Past treatments include ACE inhibitors, diuretics and calcium channel blockers. The current treatment provides moderate improvement. Compliance problems include diet and exercise.  There is no history of angina, kidney disease, CAD/MI, CVA, heart failure, left ventricular hypertrophy, PVD or retinopathy. There is no history of chronic renal disease, coarctation of the aorta, hyperaldosteronism, hypercortisolism, hyperparathyroidism, a hypertension causing med, pheochromocytoma, renovascular disease, sleep apnea or a thyroid problem.   Dizziness:   Chronicity:  Recurrent  Onset:  1 to 4 weeks ago  Progression since onset:  Waxing and waning  Frequency:  Every few hours  Pain Scale:  0/10  Severity:  Moderate  Duration:  1 minute  Dizziness characteristics:  Lightheaded/impending faintno hearing loss, no ear congestion, no ear pain, no fever, no headaches, no tinnitus, no nausea, no vomiting, no diaphoresis, no aural fullness, no weakness, no visual disturbances, no light-headedness, no syncope, no palpitations, no panic, no facial weakness, no slurred speech, no numbness in extremities and no chest  pain.  Aggravated by:  Getting up and position changesno strokes, no cardiac surgery, no neurologic disease, no head trauma, no balance testing, no ear trauma, no ear surgery, no head trauma, no ear infections, no anxiety, no ear tubes, no environmental allergies, no MRI head and no CT head.  Arthritis   Presents for follow-up visit. He complains of pain and stiffness. He reports no joint swelling or joint warmth. The symptoms have been worsening. Affected locations include the left knee, right knee and neck. His pain is at a severity of 6/10. Pertinent negatives include no diarrhea, dry eyes, dry mouth, dysuria, fatigue, fever, pain at night, pain while resting, rash, Raynaud's syndrome, uveitis or weight loss.        ROS  Review of Systems   Constitutional: Negative.  Negative for activity change, appetite change, chills, diaphoresis, fatigue, fever, malaise/fatigue, unexpected weight change and weight loss.   HENT: Negative.  Negative for congestion, ear pain, hearing loss, nosebleeds, postnasal drip, rhinorrhea, sinus pressure, sneezing, sore throat, tinnitus and trouble swallowing.    Eyes: Negative for blurred vision, pain and visual disturbance.   Respiratory: Negative for cough, choking and shortness of breath.    Cardiovascular: Negative for chest pain, palpitations, orthopnea, leg swelling, syncope and PND.   Gastrointestinal: Negative for abdominal pain, bowel incontinence, constipation, diarrhea, nausea and vomiting.   Genitourinary: Negative for bladder incontinence, difficulty urinating, dysuria, frequency, pelvic pain and urgency.   Musculoskeletal: Positive for arthritis, back pain, neck pain and stiffness. Negative for arthralgias, gait problem, joint swelling and myalgias.   Skin: Negative.  Negative for rash.   Allergic/Immunologic: Negative for environmental allergies and food allergies.   Neurological: Positive for dizziness. Negative for tingling, tremors, seizures, syncope, facial asymmetry,  "speech difficulty, weakness, light-headedness, numbness, headaches and paresthesias.   Psychiatric/Behavioral: Negative.  Negative for confusion, decreased concentration, dysphoric mood and sleep disturbance. The patient is not nervous/anxious.        Physical Exam  Vitals:    06/04/18 1339   BP: 136/82   Pulse: 80   Resp: 17   Temp: 97.8 °F (36.6 °C)    Body mass index is 35.63 kg/m².  Weight: 106.3 kg (234 lb 5.6 oz)   Height: 5' 8" (172.7 cm)     Physical Exam   Constitutional: He is oriented to person, place, and time. He appears well-developed and well-nourished. He is active and cooperative.  Non-toxic appearance. He does not have a sickly appearance. He does not appear ill. No distress.   HENT:   Head: Normocephalic and atraumatic.   Right Ear: Hearing and external ear normal. No decreased hearing is noted.   Left Ear: Hearing and external ear normal. No decreased hearing is noted.   Nose: Nose normal. No rhinorrhea or nasal deformity.   Mouth/Throat: Uvula is midline and oropharynx is clear and moist. He does not have dentures. Normal dentition.   Eyes: Conjunctivae, EOM and lids are normal. Pupils are equal, round, and reactive to light. Right eye exhibits no chemosis, no discharge and no exudate. No foreign body present in the right eye. Left eye exhibits no chemosis, no discharge and no exudate. No foreign body present in the left eye. No scleral icterus.   Neck: Normal range of motion and full passive range of motion without pain. Neck supple.   Cardiovascular: Normal rate, regular rhythm, S1 normal, S2 normal and normal heart sounds.  Exam reveals no gallop and no friction rub.    No murmur heard.  Pulmonary/Chest: Effort normal and breath sounds normal. No accessory muscle usage. No respiratory distress. He has no decreased breath sounds. He has no wheezes. He has no rhonchi. He has no rales.   Abdominal: Soft. Normal appearance. He exhibits no distension. There is no hepatosplenomegaly. There is no " tenderness. There is no rigidity, no rebound and no guarding.   Musculoskeletal:        Right hip: He exhibits decreased range of motion and decreased strength. He exhibits no tenderness, no bony tenderness, no swelling, no crepitus, no deformity and no laceration.        Left hip: He exhibits decreased range of motion and decreased strength. He exhibits no tenderness, no bony tenderness, no swelling, no crepitus, no deformity and no laceration.        Right knee: He exhibits decreased range of motion. He exhibits no swelling, no effusion, no ecchymosis, no deformity, no laceration, no erythema, normal alignment, no LCL laxity, no bony tenderness, normal meniscus and no MCL laxity. Tenderness found. Medial joint line, lateral joint line and patellar tendon tenderness noted. No MCL and no LCL tenderness noted.        Left knee: He exhibits normal range of motion, no swelling, no effusion, no ecchymosis, no deformity, no laceration, no erythema, normal alignment, no LCL laxity, normal patellar mobility, no bony tenderness, normal meniscus and no MCL laxity. Tenderness found. Medial joint line, lateral joint line and patellar tendon tenderness noted. No MCL and no LCL tenderness noted.        Arms:  Neurological: He is alert and oriented to person, place, and time. He has normal strength. No cranial nerve deficit or sensory deficit. He exhibits normal muscle tone. He displays no seizure activity. Coordination and gait normal.   Skin: Skin is warm, dry and intact. No rash noted. He is not diaphoretic.   Psychiatric: He has a normal mood and affect. His speech is normal and behavior is normal. Judgment and thought content normal. Cognition and memory are normal. He is attentive.       Assessment & Plan    1. Essential hypertension  Patient was counseled and encouraged to maintain a low sodium diet, as well as increasing physical activity.  Recommend random BP checks at home on a regular basis.  Repeat BP at end of visit  was not necessary. Will continue medication at this time, and follow up in 3-6 months, or sooner if blood pressure begins to increase.       2. CKD (chronic kidney disease) stage 3, GFR 30-59 ml/min  Stable; encouraged patient to avoid NSAID medications and to increase water and clear liquid hydration.  Will continue to monitor for decline and refer as necessary.     3. Primary osteoarthritis involving multiple joints  The current medical regimen is effective at this time and no changes to present plan or medications will be made at this visit.  Patient states he does not need medication daily.      Follow up documented    ACTIVE MEDICAL ISSUES:  Documented in Problem List    PAST MEDICAL HISTORY  Documented    PAST SURGICAL HISTORY:  Documented    SOCIAL HISTORY:  Documented    FAMILY HISTORY:  Documented    ALLERGIES AND MEDICATIONS: updated and reviewed.  Documented    Health Maintenance       Date Due Completion Date    TETANUS VACCINE 02/11/1958 ---    Influenza Vaccine 08/01/2018 10/23/2017    Override on 4/8/2016: Done (not at this present time)    Override on 10/29/2015: Declined (not at this present time)    Pneumococcal (65+) (2 of 2 - PPSV23) 04/09/2019 4/9/2018    Lipid Panel 04/09/2023 4/9/2018    Override on 4/8/2016: Done (future)

## 2018-07-04 DIAGNOSIS — F51.01 PRIMARY INSOMNIA: ICD-10-CM

## 2018-07-05 RX ORDER — TRAZODONE HYDROCHLORIDE 100 MG/1
100 TABLET ORAL NIGHTLY PRN
Qty: 90 TABLET | Refills: 0 | Status: SHIPPED | OUTPATIENT
Start: 2018-07-05 | End: 2018-08-02

## 2018-07-14 ENCOUNTER — HOSPITAL ENCOUNTER (OUTPATIENT)
Facility: HOSPITAL | Age: 78
Discharge: HOME | End: 2018-07-15
Attending: EMERGENCY MEDICINE | Admitting: INTERNAL MEDICINE
Payer: MEDICARE

## 2018-07-14 DIAGNOSIS — N18.30 CKD (CHRONIC KIDNEY DISEASE) STAGE 3, GFR 30-59 ML/MIN: ICD-10-CM

## 2018-07-14 DIAGNOSIS — R73.09 ELEVATED GLUCOSE: ICD-10-CM

## 2018-07-14 DIAGNOSIS — E78.00 PURE HYPERCHOLESTEROLEMIA: ICD-10-CM

## 2018-07-14 DIAGNOSIS — I10 ESSENTIAL HYPERTENSION: ICD-10-CM

## 2018-07-14 DIAGNOSIS — E78.2 MIXED HYPERLIPIDEMIA: ICD-10-CM

## 2018-07-14 DIAGNOSIS — R07.9 CHEST PAIN: Primary | ICD-10-CM

## 2018-07-14 DIAGNOSIS — R53.1 WEAKNESS: ICD-10-CM

## 2018-07-14 LAB
ALBUMIN SERPL BCP-MCNC: 3.9 G/DL
ALP SERPL-CCNC: 86 U/L
ALT SERPL W/O P-5'-P-CCNC: 12 U/L
ANION GAP SERPL CALC-SCNC: 12 MMOL/L
AST SERPL-CCNC: 24 U/L
BASOPHILS # BLD AUTO: 0.04 K/UL
BASOPHILS NFR BLD: 0.7 %
BILIRUB SERPL-MCNC: 0.6 MG/DL
BNP SERPL-MCNC: 82 PG/ML
BUN SERPL-MCNC: 15 MG/DL
CALCIUM SERPL-MCNC: 9.2 MG/DL
CHLORIDE SERPL-SCNC: 106 MMOL/L
CO2 SERPL-SCNC: 24 MMOL/L
CREAT SERPL-MCNC: 1.5 MG/DL
DIFFERENTIAL METHOD: ABNORMAL
EOSINOPHIL # BLD AUTO: 0.4 K/UL
EOSINOPHIL NFR BLD: 6.7 %
ERYTHROCYTE [DISTWIDTH] IN BLOOD BY AUTOMATED COUNT: 12.7 %
EST. GFR  (AFRICAN AMERICAN): 50 ML/MIN/1.73 M^2
EST. GFR  (NON AFRICAN AMERICAN): 43 ML/MIN/1.73 M^2
GLUCOSE SERPL-MCNC: 117 MG/DL
HCT VFR BLD AUTO: 34.6 %
HGB BLD-MCNC: 12 G/DL
LYMPHOCYTES # BLD AUTO: 2.2 K/UL
LYMPHOCYTES NFR BLD: 35.6 %
MCH RBC QN AUTO: 32 PG
MCHC RBC AUTO-ENTMCNC: 34.7 G/DL
MCV RBC AUTO: 92 FL
MONOCYTES # BLD AUTO: 0.6 K/UL
MONOCYTES NFR BLD: 9.6 %
NEUTROPHILS # BLD AUTO: 2.9 K/UL
NEUTROPHILS NFR BLD: 47.4 %
PLATELET # BLD AUTO: 221 K/UL
PMV BLD AUTO: 10.9 FL
POTASSIUM SERPL-SCNC: 3.9 MMOL/L
PROT SERPL-MCNC: 7.9 G/DL
RBC # BLD AUTO: 3.75 M/UL
SODIUM SERPL-SCNC: 142 MMOL/L
TROPONIN I SERPL DL<=0.01 NG/ML-MCNC: 0.01 NG/ML
TROPONIN I SERPL DL<=0.01 NG/ML-MCNC: 0.02 NG/ML
WBC # BLD AUTO: 6.13 K/UL

## 2018-07-14 PROCEDURE — 36000 PLACE NEEDLE IN VEIN: CPT

## 2018-07-14 PROCEDURE — 84484 ASSAY OF TROPONIN QUANT: CPT

## 2018-07-14 PROCEDURE — 25000003 PHARM REV CODE 250: Performed by: NURSE PRACTITIONER

## 2018-07-14 PROCEDURE — G0378 HOSPITAL OBSERVATION PER HR: HCPCS

## 2018-07-14 PROCEDURE — 99284 EMERGENCY DEPT VISIT MOD MDM: CPT | Mod: 25

## 2018-07-14 PROCEDURE — 85025 COMPLETE CBC W/AUTO DIFF WBC: CPT

## 2018-07-14 PROCEDURE — 93010 ELECTROCARDIOGRAM REPORT: CPT | Mod: ,,, | Performed by: INTERNAL MEDICINE

## 2018-07-14 PROCEDURE — 80053 COMPREHEN METABOLIC PANEL: CPT

## 2018-07-14 PROCEDURE — 83880 ASSAY OF NATRIURETIC PEPTIDE: CPT

## 2018-07-14 RX ORDER — PANTOPRAZOLE SODIUM 40 MG/1
40 TABLET, DELAYED RELEASE ORAL DAILY
Status: DISCONTINUED | OUTPATIENT
Start: 2018-07-15 | End: 2018-07-15 | Stop reason: HOSPADM

## 2018-07-14 RX ORDER — ASPIRIN 325 MG
325 TABLET ORAL
Status: COMPLETED | OUTPATIENT
Start: 2018-07-14 | End: 2018-07-14

## 2018-07-14 RX ORDER — ACETAMINOPHEN 325 MG/1
650 TABLET ORAL EVERY 8 HOURS PRN
Status: DISCONTINUED | OUTPATIENT
Start: 2018-07-14 | End: 2018-07-15 | Stop reason: HOSPADM

## 2018-07-14 RX ORDER — RAMELTEON 8 MG/1
8 TABLET ORAL NIGHTLY PRN
Status: DISCONTINUED | OUTPATIENT
Start: 2018-07-15 | End: 2018-07-15 | Stop reason: HOSPADM

## 2018-07-14 RX ORDER — ONDANSETRON 8 MG/1
8 TABLET, ORALLY DISINTEGRATING ORAL EVERY 8 HOURS PRN
Status: DISCONTINUED | OUTPATIENT
Start: 2018-07-14 | End: 2018-07-15 | Stop reason: HOSPADM

## 2018-07-14 RX ADMIN — ASPIRIN 325 MG ORAL TABLET 325 MG: 325 PILL ORAL at 03:07

## 2018-07-14 NOTE — ED TRIAGE NOTES
Pt arrived to ED w/ c/o chest pain and left leg pain that began intermittently since last Tuesday. Pt reports that pain worsened today in his leg and chest and last 5-10 minutes. Pt denies any other symptoms such as SOB, nausea, fever, and chills.

## 2018-07-14 NOTE — ED PROVIDER NOTES
"Encounter Date: 7/14/2018    This is a SORT/MSE of a 78 y.o. male presenting to the ED with c/o chest pain which has been intermittent since Tuesday and more consistent today.  Patient also reports that he is concerned because his left leg is getting stiff on me which started today. Pertinent exam findings remarkable for no acute distress, speaking with ease in full sentences, ambulated to the triage desk without difficulty. Care will be transferred to an alternate provider when patient is roomed for a full evaluation and final disposition. SAMUEL Fung, VIVI-RHEA 7/14/2018 2:51 PM    SCRIBE #1 NOTE: I, Keke De La Torre, am scribing for, and in the presence of,  Bala Juárez MD. I have scribed the following portions of the note - Other sections scribed: HPI, ROS, PEx, and EKG.       History     Chief Complaint   Patient presents with    Chest Pain     intermittent since Tuesday, more consistent today; left leg "stiffness" today    Leg Pain     CC: Chest Pain and Leg Pain    HPI: This 78 y.o. Male with PMHx of allergy and Hypertension presents to the ED c/o 3 day hx of acute onset, intermittent, and severe chest pain with an onset that lasts for 5 to 10 minutes. Pt describes chest pain as "dull." Pain is exacerbated with minimal exertion. Pt also c/o left leg cramping. Pt describes leg pain as "tight." Pt does not take Aspirin daily. Pt is compliant with his medications. Pt denies a hx of stress test. Pt is followed by Dr.Lombard. Pt reports a family hx of heart problems (father and mother). Pt denies a hx of heart attack. Pt also denies fever, SOB, and any other associated symptoms.       The history is provided by the patient. No  was used.     Review of patient's allergies indicates:  No Known Allergies  Past Medical History:   Diagnosis Date    Allergy     Hypertension      Past Surgical History:   Procedure Laterality Date    APPENDECTOMY      COLONOSCOPY Left 10/19/2015    Procedure: " "COLONOSCOPY;  Surgeon: Randell Briceno MD;  Location: [facility];  Service: Endoscopy;  Laterality: Left;     History reviewed. No pertinent family history.  Social History   Substance Use Topics    Smoking status: Former Smoker     Years: 15.00    Smokeless tobacco: Never Used      Comment: Quit 15 years ago    Alcohol use 0.6 oz/week     1 Standard drinks or equivalent per week      Comment: sometimes beer or [employer]     Review of Systems   Constitutional: Negative for chills, diaphoresis, fatigue and fever.   HENT: Negative for ear pain, nosebleeds and sore throat.    Eyes: Negative for pain and redness.   Respiratory: Negative for shortness of breath.    Cardiovascular: Positive for chest pain ( intermittent, and severe chest pain with an onset that lasts for 5 to 10 minutes, "dull"). Negative for leg swelling.   Gastrointestinal: Negative for abdominal pain, diarrhea, nausea and vomiting.   Genitourinary: Negative for dysuria and hematuria.   Musculoskeletal: Positive for myalgias ( left leg cramping, "tight"). Negative for back pain and joint swelling.   Skin: Negative for rash and wound.   Neurological: Negative for seizures, syncope, weakness and headaches.   Psychiatric/Behavioral: Negative for confusion. The patient is not nervous/anxious.        Physical Exam     Initial Vitals [07/14/18 1452]   BP Pulse Resp Temp SpO2   (!) 181/83 90 20 98 °F (36.7 °C) 95 %      MAP       --         Physical Exam    Nursing note and vitals reviewed.  Constitutional: He appears well-developed and well-nourished. He is not diaphoretic. No distress.   HENT:   Head: Normocephalic and atraumatic.   Eyes: EOM are normal. Pupils are equal, round, and reactive to light.   Neck: Normal range of motion. Neck supple.   Cardiovascular: Normal rate and regular rhythm.   Pulmonary/Chest: Breath sounds normal. No stridor. No respiratory distress. He has no wheezes. He exhibits no tenderness.   No chest wall TTP   Abdominal: " Soft. Bowel sounds are normal. He exhibits no distension. There is no tenderness. There is no rebound.   Musculoskeletal: Normal range of motion. He exhibits no edema or tenderness.   Neurological: He is alert and oriented to person, place, and time. He has normal strength.   Skin: Skin is warm and dry. No rash noted.   Psychiatric: He has a normal mood and affect. His behavior is normal.         ED Course   Procedures  Labs Reviewed   CBC W/ AUTO DIFFERENTIAL - Abnormal; Notable for the following:        Result Value    RBC 3.75 (*)     Hemoglobin 12.0 (*)     Hematocrit 34.6 (*)     MCH 32.0 (*)     All other components within normal limits   COMPREHENSIVE METABOLIC PANEL - Abnormal; Notable for the following:     Glucose 117 (*)     Creatinine 1.5 (*)     eGFR if  50 (*)     eGFR if non  43 (*)     All other components within normal limits   TROPONIN I   B-TYPE NATRIURETIC PEPTIDE   TROPONIN I     EKG Readings: (Independently Interpreted)   Initial Reading: No STEMI. Heart Rate: 90.   Sinus rhythm with PVC.       Imaging Results          X-Ray Chest PA And Lateral (Final result)  Result time 07/14/18 16:25:08    Final result by Edmond Gurrola MD (07/14/18 16:25:08)                 Impression:      As above.      Electronically signed by: Edmond Gurrola MD  Date:    07/14/2018  Time:    16:25             Narrative:    EXAMINATION:  XR CHEST PA AND LATERAL    CLINICAL HISTORY:  Chest pain, unspecified    TECHNIQUE:  PA and lateral views of the chest were performed.    COMPARISON:  None.    FINDINGS:  Nonspecific mild interstitial prominence noted in the lower lung zones.  There is no consolidation, effusion, or pneumothorax.  Cardiac silhouette is enlarged.  Thoracic spondylosis noted.                                 Medical Decision Making:   Clinical Tests:   Lab Tests: Ordered and Reviewed  Radiological Study: Ordered and Reviewed  Medical Tests: Ordered and Reviewed  ED  Management:  78-year-old male with history of hypertension with complaint of chest pain. EKG with no significant ischemic changes with PVC, troponin is negative x1.  Patient heart score is 4, appropriate for cardiac obs.  Will admit patient to observation.  Other:   I have discussed this case with another health care provider.            Scribe Attestation:   Scribe #1: I performed the above scribed service and the documentation accurately describes the services I performed. I attest to the accuracy of the note.    Attending Attestation:           Physician Attestation for Scribe:  Physician Attestation Statement for Scribe #1: I, Bala Juárez MD, reviewed documentation, as scribed by Keke De La Torre in my presence, and it is both accurate and complete.                    Clinical Impression:   The encounter diagnosis was Chest pain.                             Bala Juárez MD  07/14/18 [id number]

## 2018-07-15 VITALS
HEIGHT: 68 IN | SYSTOLIC BLOOD PRESSURE: 169 MMHG | OXYGEN SATURATION: 96 % | WEIGHT: 229.25 LBS | HEART RATE: 71 BPM | BODY MASS INDEX: 34.75 KG/M2 | DIASTOLIC BLOOD PRESSURE: 88 MMHG | TEMPERATURE: 99 F | RESPIRATION RATE: 18 BRPM

## 2018-07-15 PROBLEM — E78.2 MIXED HYPERLIPIDEMIA: Status: ACTIVE | Noted: 2018-07-15

## 2018-07-15 PROBLEM — R07.9 CHEST PAIN: Status: RESOLVED | Noted: 2018-07-14 | Resolved: 2018-07-15

## 2018-07-15 PROBLEM — R73.09 ELEVATED GLUCOSE: Status: ACTIVE | Noted: 2018-07-15

## 2018-07-15 LAB
BACTERIA #/AREA URNS HPF: NORMAL /HPF
BILIRUB UR QL STRIP: NEGATIVE
CHOLEST SERPL-MCNC: 206 MG/DL
CHOLEST/HDLC SERPL: 6.4 {RATIO}
CLARITY UR: CLEAR
COLOR UR: YELLOW
ESTIMATED PA SYSTOLIC PRESSURE: 58.13
GLOBAL PERICARDIAL EFFUSION: ABNORMAL
GLUCOSE UR QL STRIP: NEGATIVE
HDLC SERPL-MCNC: 32 MG/DL
HDLC SERPL: 15.5 %
HGB UR QL STRIP: ABNORMAL
HYALINE CASTS #/AREA URNS LPF: 1 /LPF
KETONES UR QL STRIP: NEGATIVE
LDLC SERPL CALC-MCNC: 151.2 MG/DL
LEUKOCYTE ESTERASE UR QL STRIP: NEGATIVE
MICROSCOPIC COMMENT: NORMAL
MITRAL VALVE MOBILITY: NORMAL
NITRITE UR QL STRIP: NEGATIVE
NON-SQ EPI CELLS #/AREA URNS HPF: 0 /HPF
NONHDLC SERPL-MCNC: 174 MG/DL
PH UR STRIP: 7 [PH] (ref 5–8)
PROT UR QL STRIP: ABNORMAL
RBC #/AREA URNS HPF: 0 /HPF (ref 0–4)
RETIRED EF AND QEF - SEE NOTES: 60 (ref 55–65)
SP GR UR STRIP: 1.01 (ref 1–1.03)
SQUAMOUS #/AREA URNS HPF: 1 /HPF
TRICUSPID VALVE REGURGITATION: ABNORMAL
TRIGL SERPL-MCNC: 114 MG/DL
TROPONIN I SERPL DL<=0.01 NG/ML-MCNC: 0.01 NG/ML
TROPONIN I SERPL DL<=0.01 NG/ML-MCNC: 0.03 NG/ML
URN SPEC COLLECT METH UR: ABNORMAL
UROBILINOGEN UR STRIP-ACNC: NEGATIVE EU/DL
WBC #/AREA URNS HPF: 0 /HPF (ref 0–5)

## 2018-07-15 PROCEDURE — 25000003 PHARM REV CODE 250: Performed by: INTERNAL MEDICINE

## 2018-07-15 PROCEDURE — 93306 TTE W/DOPPLER COMPLETE: CPT | Mod: 26,,, | Performed by: INTERNAL MEDICINE

## 2018-07-15 PROCEDURE — 36415 COLL VENOUS BLD VENIPUNCTURE: CPT

## 2018-07-15 PROCEDURE — 80061 LIPID PANEL: CPT

## 2018-07-15 PROCEDURE — 84484 ASSAY OF TROPONIN QUANT: CPT | Mod: 91

## 2018-07-15 PROCEDURE — 93306 TTE W/DOPPLER COMPLETE: CPT

## 2018-07-15 PROCEDURE — 81000 URINALYSIS NONAUTO W/SCOPE: CPT

## 2018-07-15 PROCEDURE — 25000003 PHARM REV CODE 250: Performed by: EMERGENCY MEDICINE

## 2018-07-15 PROCEDURE — 84484 ASSAY OF TROPONIN QUANT: CPT

## 2018-07-15 PROCEDURE — 25000003 PHARM REV CODE 250: Performed by: NURSE PRACTITIONER

## 2018-07-15 PROCEDURE — G0378 HOSPITAL OBSERVATION PER HR: HCPCS

## 2018-07-15 PROCEDURE — 99220 PR INITIAL OBSERVATION CARE,LEVL III: CPT | Mod: ,,, | Performed by: INTERNAL MEDICINE

## 2018-07-15 RX ORDER — PANTOPRAZOLE SODIUM 40 MG/1
40 TABLET, DELAYED RELEASE ORAL DAILY
Qty: 30 TABLET | Refills: 11 | Status: SHIPPED | OUTPATIENT
Start: 2018-07-16 | End: 2018-08-02 | Stop reason: SDUPTHER

## 2018-07-15 RX ORDER — LOSARTAN POTASSIUM 25 MG/1
50 TABLET ORAL DAILY
Status: DISCONTINUED | OUTPATIENT
Start: 2018-07-15 | End: 2018-07-15

## 2018-07-15 RX ORDER — HYDROCHLOROTHIAZIDE 25 MG/1
25 TABLET ORAL DAILY
Status: DISCONTINUED | OUTPATIENT
Start: 2018-07-15 | End: 2018-07-15

## 2018-07-15 RX ORDER — METOPROLOL TARTRATE 25 MG/1
25 TABLET, FILM COATED ORAL 2 TIMES DAILY
Status: DISCONTINUED | OUTPATIENT
Start: 2018-07-15 | End: 2018-07-15 | Stop reason: HOSPADM

## 2018-07-15 RX ORDER — ASPIRIN 81 MG/1
81 TABLET ORAL DAILY
Refills: 0 | COMMUNITY
Start: 2018-07-16 | End: 2018-08-02 | Stop reason: SDUPTHER

## 2018-07-15 RX ORDER — HYDROCHLOROTHIAZIDE 12.5 MG/1
12.5 TABLET ORAL DAILY
Status: DISCONTINUED | OUTPATIENT
Start: 2018-07-15 | End: 2018-07-15

## 2018-07-15 RX ORDER — AMLODIPINE BESYLATE 10 MG/1
10 TABLET ORAL DAILY
Qty: 30 TABLET | Refills: 6 | Status: SHIPPED | OUTPATIENT
Start: 2018-07-16 | End: 2018-08-02 | Stop reason: SDUPTHER

## 2018-07-15 RX ORDER — ATORVASTATIN CALCIUM 40 MG/1
40 TABLET, FILM COATED ORAL NIGHTLY
Status: DISCONTINUED | OUTPATIENT
Start: 2018-07-15 | End: 2018-07-15 | Stop reason: HOSPADM

## 2018-07-15 RX ORDER — ATORVASTATIN CALCIUM 40 MG/1
40 TABLET, FILM COATED ORAL NIGHTLY
Qty: 90 TABLET | Refills: 3 | Status: SHIPPED | OUTPATIENT
Start: 2018-07-15 | End: 2018-08-02 | Stop reason: SDUPTHER

## 2018-07-15 RX ORDER — HYDRALAZINE HYDROCHLORIDE 50 MG/1
50 TABLET, FILM COATED ORAL EVERY 12 HOURS
Qty: 90 TABLET | Refills: 6 | Status: SHIPPED | OUTPATIENT
Start: 2018-07-15 | End: 2018-08-02 | Stop reason: SDUPTHER

## 2018-07-15 RX ORDER — AMLODIPINE BESYLATE 5 MG/1
10 TABLET ORAL DAILY
Status: DISCONTINUED | OUTPATIENT
Start: 2018-07-15 | End: 2018-07-15 | Stop reason: HOSPADM

## 2018-07-15 RX ORDER — AMLODIPINE BESYLATE 5 MG/1
10 TABLET ORAL DAILY
Status: DISCONTINUED | OUTPATIENT
Start: 2018-07-15 | End: 2018-07-15

## 2018-07-15 RX ORDER — ASPIRIN 81 MG/1
81 TABLET ORAL DAILY
Status: DISCONTINUED | OUTPATIENT
Start: 2018-07-15 | End: 2018-07-15 | Stop reason: HOSPADM

## 2018-07-15 RX ADMIN — METOPROLOL TARTRATE 25 MG: 25 TABLET ORAL at 08:07

## 2018-07-15 RX ADMIN — RAMELTEON 8 MG: 8 TABLET, FILM COATED ORAL at 12:07

## 2018-07-15 RX ADMIN — AMLODIPINE BESYLATE 10 MG: 5 TABLET ORAL at 08:07

## 2018-07-15 RX ADMIN — PANTOPRAZOLE SODIUM 40 MG: 40 TABLET, DELAYED RELEASE ORAL at 08:07

## 2018-07-15 RX ADMIN — ASPIRIN 81 MG: 81 TABLET, COATED ORAL at 08:07

## 2018-07-15 RX ADMIN — HYDROCHLOROTHIAZIDE 25 MG: 25 TABLET ORAL at 08:07

## 2018-07-15 NOTE — CONSULTS
"[facility]  Cardiology  Consult Note    Patient Name: Alexander Duong Jr.  MRN: [id number]  Admission Date: 7/14/2018  Hospital Length of Stay: 0 days  Code Status: Full Code   Attending Provider: Malissa Richmond MD   Consulting Provider: Partha Dobson MD  Primary Care Physician: Azikiwe K Lombard, MD  Principal Problem:Chest pain    Patient information was obtained from patient and ER records.     Inpatient consult to Cardiology  Consult performed by: PARTHA DOBSON  Consult ordered by: JOSÉ SHETTY  Reason for consult: CP        Subjective:     Chief Complaint:  CP, leg pain     HPI:   78 y.o. Male with PMHx of Hypertension presents to the ED c/o 3 day hx of acute onset, intermittent, and severe chest pain with an onset that lasts for 5 to 10 minutes. Pt describes chest pain as "dull." Pain is exacerbated with minimal exertion. Pt also c/o left leg cramping. Pt describes leg pain as "tight." Pt does not take Aspirin daily. Pt is compliant with his medications. Pt denies a hx of stress test. Pt is followed by Dr.Lombard. Pt reports a family hx of heart problems (father and mother). Pt denies a hx of heart attack. Pt also denies fever, SOB, and any other associated symptoms.     Pt presented to [facility] complaining of LLE "warmth" and episodic atyp CP.  CP would last for 10 sec and resolve, nondescript sxs.  Currently pain free.  EKG nonischemic.  Tropo negx2, with minimal elev in-between.  Doubt ACS.    Past Medical History:   Diagnosis Date    Allergy     Hypertension        Past Surgical History:   Procedure Laterality Date    APPENDECTOMY      COLONOSCOPY Left 10/19/2015    Procedure: COLONOSCOPY;  Surgeon: Randell Briceno MD;  Location: [facility];  Service: Endoscopy;  Laterality: Left;       Review of patient's allergies indicates:  No Known Allergies    No current facility-administered medications on file prior to encounter.      Current Outpatient Prescriptions on File " Prior to Encounter   Medication Sig    acetaminophen-codeine 300-60mg (TYLENOL #4) 300-60 mg Tab Take 1 tablet by mouth every 8 (eight) hours as needed.    albuterol (VENTOLIN HFA) 90 mcg/actuation inhaler INHALE 2 PUFFS INTO THE LUNGS EVERY 6 HOURS AS NEEDED FOR WHEEZING    amLODIPine-valsartan-hcthiazid -25 mg Tab Take 1 tablet by mouth once daily.    levalbuterol (XOPENEX) 1.25 mg/3 mL nebulizer solution Take 3 mLs (1.25 mg total) by nebulization every 8 (eight) hours as needed for Wheezing.    metoprolol tartrate (LOPRESSOR) 50 MG tablet Take 1 tablet (50 mg total) by mouth once daily.    traZODone (DESYREL) 100 MG tablet TAKE 1 TABLET (100 MG TOTAL) BY MOUTH NIGHTLY AS NEEDED FOR INSOMNIA.     Family History     None        Social History Main Topics    Smoking status: Former Smoker     Years: 15.00    Smokeless tobacco: Never Used      Comment: Quit 15 years ago    Alcohol use 0.6 oz/week     1 Standard drinks or equivalent per week      Comment: sometimes beer or crown royal    Drug use: No    Sexual activity: Not Currently     Review of Systems   Constitution: Negative for chills, diaphoresis, fever, weakness and malaise/fatigue.   HENT: Negative for nosebleeds.    Eyes: Negative for blurred vision and double vision.   Cardiovascular: Positive for chest pain. Negative for claudication, cyanosis, dyspnea on exertion, leg swelling, orthopnea, palpitations, paroxysmal nocturnal dyspnea and syncope.   Respiratory: Negative for cough, shortness of breath and wheezing.    Skin: Negative for dry skin and poor wound healing.   Musculoskeletal: Positive for muscle cramps. Negative for back pain, joint swelling and myalgias.   Gastrointestinal: Negative for abdominal pain, nausea and vomiting.   Genitourinary: Negative for hematuria.   Neurological: Negative for dizziness, headaches, numbness and seizures.   Psychiatric/Behavioral: Negative for altered mental status and depression.     Objective:      Vital Signs (Most Recent):  Temp: 98.5 °F (36.9 °C) (07/15/18 0729)  Pulse: 73 (07/15/18 0733)  Resp: 18 (07/15/18 0729)  BP: (!) 170/79 (07/15/18 0729)  SpO2: 95 % (07/15/18 0729) Vital Signs (24h Range):  Temp:  [97.9 °F (36.6 °C)-98.6 °F (37 °C)] 98.5 °F (36.9 °C)  Pulse:  [65-90] 73  Resp:  [18-20] 18  SpO2:  [94 %-98 %] 95 %  BP: (137-210)/(64-83) 170/79     Weight: 104 kg (229 lb 4.5 oz)  Body mass index is 34.86 kg/m².    SpO2: 95 %  O2 Device (Oxygen Therapy): room air      Intake/Output Summary (Last 24 hours) at 07/15/18 0811  Last data filed at 07/15/18 0600   Gross per 24 hour   Intake                0 ml   Output             1100 ml   Net            -1100 ml       Lines/Drains/Airways     Peripheral Intravenous Line                 Peripheral IV - Single Lumen 07/14/18 1500 Left Antecubital less than 1 day                Physical Exam   Constitutional: He is oriented to person, place, and time. He appears well-developed and well-nourished.   HENT:   Head: Normocephalic and atraumatic.   Eyes: Conjunctivae and EOM are normal. Pupils are equal, round, and reactive to light. No scleral icterus.   Neck: Normal range of motion. Neck supple. No JVD present. Carotid bruit is not present. No tracheal deviation present. No thyromegaly present.   Cardiovascular: Normal rate, regular rhythm, S1 normal and S2 normal.  Exam reveals no gallop and no friction rub.    No murmur heard.  Pulses:       Posterior tibial pulses are 2+ on the left side.   Pulmonary/Chest: Effort normal and breath sounds normal. No respiratory distress. He has no wheezes. He has no rales. He exhibits no tenderness.   Abdominal: Soft. He exhibits no distension.   obese   Musculoskeletal: He exhibits no edema.   Neurological: He is alert and oriented to person, place, and time. He has normal strength. No cranial nerve deficit.   Skin: Skin is warm and dry. No rash noted.   Psychiatric: He has a normal mood and affect. His behavior is  normal.       Current Medications:   pantoprazole  40 mg Oral Daily       acetaminophen, acetaminophen, ondansetron, ramelteon    Laboratory:  CBC:    Recent Labs  Lab 10/21/15  0509  10/22/15  2351 10/23/15  0731 07/20/16  0750 09/29/17  0910 07/14/18  1518   WHITE BLOOD CELL COUNT 7.87  --   --  7.79 7.99 7.63 6.13   HEMOGLOBIN 7.4 L  < > 8.4 L 8.8 L 12.6 L 12.5 L 12.0 L   HEMATOCRIT 22.6 L  < > 26.6 L 27.6 L 40.1 37.4 L 34.6 L   PLATELETS 203  --   --  258 252 270 221   < > = values in this interval not displayed.    CHEMISTRIES:    Recent Labs  Lab 10/16/15  1335  10/18/15  0510 07/20/16  0750 09/29/17  0910 04/09/18  1407 07/14/18  1518   GLUCOSE 126 H  < > 108 135 H 124 H 95 117 H   SODIUM 142  < > 140 142 138 138 142   POTASSIUM 4.0  < > 3.7 4.4 4.1 4.2 3.9   BUN BLD 26 H  < > 13 16 28 H 24 H 15   CREATININE 1.6 H  < > 1.5 H 1.7 H 2.3 H 1.6 H 1.5 H   EGFR IF  47 A  < > 51 A 43.7 A 30.1 A 46.3 A 50 A   EGFR IF NON- 41 A  < > 44 A 37.8 A 26.0 A 40.1 A 43 A   CALCIUM 8.7  < > 8.3 L 9.6 9.5 9.3 9.2   MAGNESIUM 1.6  --   --   --   --   --   --    < > = values in this interval not displayed.    CARDIAC BIOMARKERS:    Recent Labs  Lab 10/16/15  1335 07/14/18  1518 07/14/18  1749 07/15/18  0014 07/15/18  0546   TROPONIN I <0.006 0.017 0.011 0.029 H 0.011       COAGS:    Recent Labs  Lab 10/16/15  1335 10/17/15  0611 10/18/15  0510 10/19/15  0448   INR 1.1 1.1 1.1 1.1       LIPIDS/LFTS:    Recent Labs  Lab 10/16/15  1335 10/17/15  0611 07/20/16  0750 09/29/17  0910 04/09/18  1407 07/14/18  1518   CHOLESTEROL  --   --  131 174 173  --    TRIGLYCERIDES  --   --  118 134 128  --    HDL  --   --  28 L 25 L 27 L  --    LDL CHOLESTEROL  --   --  79.4 122.2 120.4  --    NON-HDL CHOLESTEROL  --   --  103 149 146  --    AST 17 19 25 25  --  24   ALT 13 14 18 18  --  12       BNP:    Recent Labs  Lab 07/14/18  1518   BNP 82       TSH:        Free T4:            Diagnostic Results:  ECG  (personally reviewed tracings):  7/14/18 1454 SR 90, PVCs    Chest X-Ray (personally reviewed image(s)): 7/14/18 NAD    Echo: ordered      Assessment and Plan:     * Chest pain    Atyp CP  Neg EKG, nondiag trop in setting of CRI, doubt ACS  Agree with asa  Add statin  Check echo  Assuming no significant abnormality on echo, OK for discharge and follow up with me in the office for outpatient stress testing.        Essential hypertension    Cont home med rx        CKD (chronic kidney disease) stage 3, GFR 30-59 ml/min    Stable, monitor creat        Mixed hyperlipidemia    Start atrova 40mg qhs  Check lipids/LFT 3 months (mid Oct 2018)            VTE Risk Mitigation         Ordered     Place SATINDER hose  Until discontinued      07/15/18 0646     IP VTE HIGH RISK PATIENT  Once      07/14/18 2124          Thank you for your consult. I will f/u with pt. Please contact us if you have any additional questions.    Partha Rodriguez MD  Cardiology   [facility]    Addendum 1230pm:  Echo 7/15/18 (images pers rev):    1 - Normal left ventricular systolic function (EF 60-65%).     2 - No wall motion abnormalities.     3 - Concentric hypertrophy.     4 - Mild tricuspid regurgitation.     5 - Pulmonary hypertension. The estimated PA systolic pressure is 58 mmHg.     No further inpat cardiac testing planned.  Cardiology will sign off, pls call with questions.  Pt to follow up with me in the office for outpat stress testing.

## 2018-07-15 NOTE — ASSESSMENT & PLAN NOTE
Appears atypical - Lencho score 1 - CP resolved - non-ischemic EKG - normal initial troponin 1 level, mildly bumped second trop unimpressive at 0.029 trended to normal. Cardiology following  Continuous cardiac monitoring   initial dose then 81 mg PO Daily  Nitro SL prn CP  2D Echo  UA, TSH, lipid panel

## 2018-07-15 NOTE — SUBJECTIVE & OBJECTIVE
Past Medical History:   Diagnosis Date    Allergy     Hypertension        Past Surgical History:   Procedure Laterality Date    APPENDECTOMY      COLONOSCOPY Left 10/19/2015    Procedure: COLONOSCOPY;  Surgeon: Randell Briceno MD;  Location: [facility];  Service: Endoscopy;  Laterality: Left;       Review of patient's allergies indicates:  No Known Allergies    No current facility-administered medications on file prior to encounter.      Current Outpatient Prescriptions on File Prior to Encounter   Medication Sig    acetaminophen-codeine 300-60mg (TYLENOL #4) 300-60 mg Tab Take 1 tablet by mouth every 8 (eight) hours as needed.    albuterol (VENTOLIN HFA) 90 mcg/actuation inhaler INHALE 2 PUFFS INTO THE LUNGS EVERY 6 HOURS AS NEEDED FOR WHEEZING    amLODIPine-valsartan-hcthiazid -25 mg Tab Take 1 tablet by mouth once daily.    levalbuterol (XOPENEX) 1.25 mg/3 mL nebulizer solution Take 3 mLs (1.25 mg total) by nebulization every 8 (eight) hours as needed for Wheezing.    metoprolol tartrate (LOPRESSOR) 50 MG tablet Take 1 tablet (50 mg total) by mouth once daily.    traZODone (DESYREL) 100 MG tablet TAKE 1 TABLET (100 MG TOTAL) BY MOUTH NIGHTLY AS NEEDED FOR INSOMNIA.     Family History     None        Social History Main Topics    Smoking status: Former Smoker     Years: 15.00    Smokeless tobacco: Never Used      Comment: Quit 15 years ago    Alcohol use 0.6 oz/week     1 Standard drinks or equivalent per week      Comment: sometimes beer or crown royal    Drug use: No    Sexual activity: Not Currently     Review of Systems   Constitutional: Negative for appetite change, chills, diaphoresis and fever.   HENT: Negative for congestion, hearing loss, sore throat, tinnitus and trouble swallowing.    Eyes: Negative for photophobia, discharge, itching and visual disturbance.   Respiratory: Negative for apnea, cough, wheezing and stridor.    Cardiovascular: Positive for chest pain. Negative for  palpitations and leg swelling.   Gastrointestinal: Negative for abdominal distention, abdominal pain, blood in stool, constipation, diarrhea and nausea.   Endocrine: Negative for polydipsia, polyphagia and polyuria.   Genitourinary: Negative for difficulty urinating, dysuria, flank pain and frequency.   Musculoskeletal: Negative for arthralgias, joint swelling and neck stiffness.        Leg pain   Skin: Negative for color change, rash and wound.   Neurological: Negative for dizziness, tremors, seizures, light-headedness, numbness and headaches.   Hematological: Negative for adenopathy.   Psychiatric/Behavioral: Negative for hallucinations and self-injury.     Objective:     Vital Signs (Most Recent):  Temp: 98.5 °F (36.9 °C) (07/15/18 0729)  Pulse: 73 (07/15/18 0733)  Resp: 18 (07/15/18 0729)  BP: (!) 170/79 (07/15/18 0729)  SpO2: 95 % (07/15/18 0729) Vital Signs (24h Range):  Temp:  [97.9 °F (36.6 °C)-98.6 °F (37 °C)] 98.5 °F (36.9 °C)  Pulse:  [65-90] 73  Resp:  [18-20] 18  SpO2:  [94 %-98 %] 95 %  BP: (137-210)/(64-83) 170/79     Weight: 104 kg (229 lb 4.5 oz)  Body mass index is 34.86 kg/m².    Physical Exam   Constitutional: He is oriented to person, place, and time. He appears well-developed and well-nourished. He is cooperative.   HENT:   Head: Normocephalic and atraumatic.   Eyes: Conjunctivae, EOM and lids are normal. Pupils are equal, round, and reactive to light.   Neck: Normal range of motion and full passive range of motion without pain. Neck supple. No JVD present. No edema present. No thyroid mass present.   Cardiovascular: Normal rate, S1 normal, S2 normal and intact distal pulses.    No murmur heard.  Pulmonary/Chest: Effort normal.   Abdominal: Soft. Bowel sounds are normal. He exhibits no distension and no abdominal bruit. There is no splenomegaly or hepatomegaly. There is no tenderness. There is no CVA tenderness.   Musculoskeletal: Normal range of motion.   Lymphadenopathy:     He has no  cervical adenopathy.     He has no axillary adenopathy.   Neurological: He is alert and oriented to person, place, and time. He has normal reflexes. He displays no tremor. He displays no seizure activity.   Skin: Skin is warm, dry and intact.   Psychiatric: He has a normal mood and affect. His speech is normal. Thought content normal. Cognition and memory are normal.         CRANIAL NERVES     CN III, IV, VI   Pupils are equal, round, and reactive to light.  Extraocular motions are normal.        Significant Labs:   CBC:   Recent Labs  Lab 07/14/18  1518   WBC 6.13   HGB 12.0*   HCT 34.6*        CMP:   Recent Labs  Lab 07/14/18  1518      K 3.9      CO2 24   *   BUN 15   CREATININE 1.5*   CALCIUM 9.2   PROT 7.9   ALBUMIN 3.9   BILITOT 0.6   ALKPHOS 86   AST 24   ALT 12   ANIONGAP 12   EGFRNONAA 43*     Cardiac Markers:   Recent Labs  Lab 07/14/18  1518   BNP 82        Recent Labs  Lab 07/14/18  1749 07/15/18  0014 07/15/18  0546   TROPONINI 0.011 0.029* 0.011     Significant Imaging:   Imaging Results          X-Ray Chest PA And Lateral (Final result)  Result time 07/14/18 16:25:08    Final result by Edmond Gurrola MD (07/14/18 16:25:08)                 Impression:      As above.      Electronically signed by: Edmond Gurrola MD  Date:    07/14/2018  Time:    16:25             Narrative:    EXAMINATION:  XR CHEST PA AND LATERAL    CLINICAL HISTORY:  Chest pain, unspecified    TECHNIQUE:  PA and lateral views of the chest were performed.    COMPARISON:  None.    FINDINGS:  Nonspecific mild interstitial prominence noted in the lower lung zones.  There is no consolidation, effusion, or pneumothorax.  Cardiac silhouette is enlarged.  Thoracic spondylosis noted.

## 2018-07-15 NOTE — HPI
"78 y.o. Male with PMHx of Hypertension presents to the ED c/o 3 day hx of acute onset, intermittent, and severe chest pain with an onset that lasts for 5 to 10 minutes. Pt describes chest pain as "dull." Pain is exacerbated with minimal exertion. Pt also c/o left leg cramping. Pt describes leg pain as "tight." Pt does not take Aspirin daily. Pt is compliant with his medications. Pt denies a hx of stress test. Pt is followed by Dr.Lombard. Pt reports a family hx of heart problems (father and mother). Pt denies a hx of heart attack. Pt also denies fever, SOB, and any other associated symptoms.     [facility] "warm[facility]" and episodic atyp CP.  CP would last for 10 sec and resolve, nondescript sxs.  Currently pain free.  EKG nonischemic.  Tropo negx2, with minimal elev in-between.  Doubt ACS.  "

## 2018-07-15 NOTE — PLAN OF CARE
07/15/18 0931   Discharge Assessment   Assessment Type Discharge Planning Assessment   Confirmed/corrected address and phone number on facesheet? Yes   Assessment information obtained from? Patient   Communicated expected length of stay with patient/caregiver yes   Prior to hospitilization cognitive status: Alert/Oriented   Prior to hospitalization functional status: Independent;Needs Assistance  ([place] assists and transports)   Current cognitive status: Alert/Oriented   Current Functional Status: Independent;Needs Assistance  (Sister assists and transports)   Facility Arrived From: Home   Lives With alone  (Sister visits frequently)   Able to Return to Prior Arrangements yes   Is patient able to care for self after discharge? Yes   Who are your caregiver(s) and their phone number(s)? Areli-sister: [phone]; [phone]   Patient's perception of discharge disposition home or selfcare   Readmission Within The Last 30 Days no previous admission in last 30 days   Patient currently being followed by outpatient case management? No   Patient currently receives any other outside agency services? No   Equipment Currently Used at Home none   Do you have any problems affording any of your prescribed medications? No   Is the patient taking medications as prescribed? yes   Does the patient have transportation home? Yes   Transportation Available family or friend will provide   Does the patient receive services at the [facility]mad[facility] Clinic? No   Discharge Plan A Home   Discharge Plan B Other  (TBD)   Patient/Family In Agreement With Plan yes     Patient indicated that he is home alone; sister comes by frequently-almost daily to assist as needed; some assist needed at times; sister transports; no current services; no DME; no needs anticipated at this time.    PCP: Azikiwe K Lombard, MD     Extended Emergency Contact Information  Primary Emergency Contact: Davie Seymourna  Address: [address] [place] [id number]  [place]  Home Phone: [phone]  Mobile Phone: [phone]  Relation: Sister     [id number] - [place], [place] - [address]  [address] [facility] [address]  Phone: [phone] Fax: [phone]    Payor: [employer] MEDICARE / Plan: [employer] [facility] HEALTH / Product Type: Medicare Advantage /      1)  Warning signs/symptoms information reviewed with and provided to patient in blue folder  2)  Discharge planning begins upon admission   3)  Discussed and reinforced patient responsibility for managing health care at home including:        a) Acquiring prescribed medications; b) following-through with scheduled follow-up appointments or scheduling those that could not be set; and c) monitoring health at home.

## 2018-07-15 NOTE — PLAN OF CARE
Problem: Fall Risk (Adult)  Goal: Absence of Falls  Patient will demonstrate the desired outcomes by discharge/transition of care.   Outcome: Ongoing (interventions implemented as appropriate)   07/15/18 0590   Fall Risk (Adult)   Absence of Falls making progress toward outcome       Problem: Patient Care Overview  Goal: Plan of Care Review  Outcome: Ongoing (interventions implemented as appropriate)   07/15/18 0516   Coping/Psychosocial   Plan Of Care Reviewed With patient

## 2018-07-15 NOTE — ASSESSMENT & PLAN NOTE
Poorly controlled start amlodipine, HCTZ, metoprolol at home doses, hold valsartan for now and monitor

## 2018-07-15 NOTE — SUBJECTIVE & OBJECTIVE
Past Medical History:   Diagnosis Date    Allergy     Hypertension        Past Surgical History:   Procedure Laterality Date    APPENDECTOMY      COLONOSCOPY Left 10/19/2015    Procedure: COLONOSCOPY;  Surgeon: Randell Briceno MD;  Location: [facility];  Service: Endoscopy;  Laterality: Left;       Review of patient's allergies indicates:  No Known Allergies    No current facility-administered medications on file prior to encounter.      Current Outpatient Prescriptions on File Prior to Encounter   Medication Sig    acetaminophen-codeine 300-60mg (TYLENOL #4) 300-60 mg Tab Take 1 tablet by mouth every 8 (eight) hours as needed.    albuterol (VENTOLIN HFA) 90 mcg/actuation inhaler INHALE 2 PUFFS INTO THE LUNGS EVERY 6 HOURS AS NEEDED FOR WHEEZING    amLODIPine-valsartan-hcthiazid -25 mg Tab Take 1 tablet by mouth once daily.    levalbuterol (XOPENEX) 1.25 mg/3 mL nebulizer solution Take 3 mLs (1.25 mg total) by nebulization every 8 (eight) hours as needed for Wheezing.    metoprolol tartrate (LOPRESSOR) 50 MG tablet Take 1 tablet (50 mg total) by mouth once daily.    traZODone (DESYREL) 100 MG tablet TAKE 1 TABLET (100 MG TOTAL) BY MOUTH NIGHTLY AS NEEDED FOR INSOMNIA.     Family History     None        Social History Main Topics    Smoking status: Former Smoker     Years: 15.00    Smokeless tobacco: Never Used      Comment: Quit 15 years ago    Alcohol use 0.6 oz/week     1 Standard drinks or equivalent per week      Comment: sometimes beer or crown royal    Drug use: No    Sexual activity: Not Currently     Review of Systems   Constitution: Negative for chills, diaphoresis, fever, weakness and malaise/fatigue.   HENT: Negative for nosebleeds.    Eyes: Negative for blurred vision and double vision.   Cardiovascular: Positive for chest pain. Negative for claudication, cyanosis, dyspnea on exertion, leg swelling, orthopnea, palpitations, paroxysmal nocturnal dyspnea and syncope.   Respiratory:  Negative for cough, shortness of breath and wheezing.    Skin: Negative for dry skin and poor wound healing.   Musculoskeletal: Positive for muscle cramps. Negative for back pain, joint swelling and myalgias.   Gastrointestinal: Negative for abdominal pain, nausea and vomiting.   Genitourinary: Negative for hematuria.   Neurological: Negative for dizziness, headaches, numbness and seizures.   Psychiatric/Behavioral: Negative for altered mental status and depression.     Objective:     Vital Signs (Most Recent):  Temp: 98.5 °F (36.9 °C) (07/15/18 0729)  Pulse: 73 (07/15/18 0733)  Resp: 18 (07/15/18 0729)  BP: (!) 170/79 (07/15/18 0729)  SpO2: 95 % (07/15/18 0729) Vital Signs (24h Range):  Temp:  [97.9 °F (36.6 °C)-98.6 °F (37 °C)] 98.5 °F (36.9 °C)  Pulse:  [65-90] 73  Resp:  [18-20] 18  SpO2:  [94 %-98 %] 95 %  BP: (137-210)/(64-83) 170/79     Weight: 104 kg (229 lb 4.5 oz)  Body mass index is 34.86 kg/m².    SpO2: 95 %  O2 Device (Oxygen Therapy): room air      Intake/Output Summary (Last 24 hours) at 07/15/18 0811  Last data filed at 07/15/18 0600   Gross per 24 hour   Intake                0 ml   Output             1100 ml   Net            -1100 ml       Lines/Drains/Airways     Peripheral Intravenous Line                 Peripheral IV - Single Lumen 07/14/18 1500 Left Antecubital less than 1 day                Physical Exam   Constitutional: He is oriented to person, place, and time. He appears well-developed and well-nourished.   HENT:   Head: Normocephalic and atraumatic.   Eyes: Conjunctivae and EOM are normal. Pupils are equal, round, and reactive to light. No scleral icterus.   Neck: Normal range of motion. Neck supple. No JVD present. Carotid bruit is not present. No tracheal deviation present. No thyromegaly present.   Cardiovascular: Normal rate, regular rhythm, S1 normal and S2 normal.  Exam reveals no gallop and no friction rub.    No murmur heard.  Pulses:       Posterior tibial pulses are 2+ on the  left side.   Pulmonary/Chest: Effort normal and breath sounds normal. No respiratory distress. He has no wheezes. He has no rales. He exhibits no tenderness.   Abdominal: Soft. He exhibits no distension.   obese   Musculoskeletal: He exhibits no edema.   Neurological: He is alert and oriented to person, place, and time. He has normal strength. No cranial nerve deficit.   Skin: Skin is warm and dry. No rash noted.   Psychiatric: He has a normal mood and affect. His behavior is normal.       Current Medications:   pantoprazole  40 mg Oral Daily       acetaminophen, acetaminophen, ondansetron, ramelteon    Laboratory:  CBC:    Recent Labs  Lab 10/21/15  0509  10/22/15  2351 10/23/15  0731 07/20/16  0750 09/29/17  0910 07/14/18  1518   WHITE BLOOD CELL COUNT 7.87  --   --  7.79 7.99 7.63 6.13   HEMOGLOBIN 7.4 L  < > 8.4 L 8.8 L 12.6 L 12.5 L 12.0 L   HEMATOCRIT 22.6 L  < > 26.6 L 27.6 L 40.1 37.4 L 34.6 L   PLATELETS 203  --   --  258 252 270 221   < > = values in this interval not displayed.    CHEMISTRIES:    Recent Labs  Lab 10/16/15  1335  10/18/15  0510 07/20/16  0750 09/29/17  0910 04/09/18  1407 07/14/18  1518   GLUCOSE 126 H  < > 108 135 H 124 H 95 117 H   SODIUM 142  < > 140 142 138 138 142   POTASSIUM 4.0  < > 3.7 4.4 4.1 4.2 3.9   BUN BLD 26 H  < > 13 16 28 H 24 H 15   CREATININE 1.6 H  < > 1.5 H 1.7 H 2.3 H 1.6 H 1.5 H   EGFR IF  47 A  < > 51 A 43.7 A 30.1 A 46.3 A 50 A   EGFR IF NON- 41 A  < > 44 A 37.8 A 26.0 A 40.1 A 43 A   CALCIUM 8.7  < > 8.3 L 9.6 9.5 9.3 9.2   MAGNESIUM 1.6  --   --   --   --   --   --    < > = values in this interval not displayed.    CARDIAC BIOMARKERS:    Recent Labs  Lab 10/16/15  1335 07/14/18  1518 07/14/18  1749 07/15/18  0014 07/15/18  0546   TROPONIN I <0.006 0.017 0.011 0.029 H 0.011       COAGS:    Recent Labs  Lab 10/16/15  1335 10/17/15  0611 10/18/15  0510 10/19/15  0448   INR 1.1 1.1 1.1 1.1       LIPIDS/LFTS:    Recent Labs  Lab  10/16/15  1335 10/17/15  0611 07/20/16  0750 09/29/17  0910 04/09/18  1407 07/14/18  1518   CHOLESTEROL  --   --  131 174 173  --    TRIGLYCERIDES  --   --  118 134 128  --    HDL  --   --  28 L 25 L 27 L  --    LDL CHOLESTEROL  --   --  79.4 122.2 120.4  --    NON-HDL CHOLESTEROL  --   --  103 149 146  --    AST 17 19 25 25  --  24   ALT 13 14 18 18  --  12       BNP:    Recent Labs  Lab 07/14/18  1518   BNP 82       TSH:        Free T4:            Diagnostic Results:  ECG (personally reviewed tracings):  7/14/18 1454 SR 90, PVCs    Chest X-Ray (personally reviewed image(s)): 7/14/18 NAD    Echo: ordered

## 2018-07-15 NOTE — ASSESSMENT & PLAN NOTE
Atyp CP  Neg EKG, nondiag trop in setting of CRI, doubt ACS  Agree with asa  Add statin  Check echo  Assuming no significant abnormality on echo, OK for discharge and follow up with me in the office for outpatient stress testing.

## 2018-07-15 NOTE — ASSESSMENT & PLAN NOTE
At baseline, slightly better - monitor closely, holding losartan X 1 day - hopefully will self correct  Check Urinalysis  Voiding well

## 2018-07-15 NOTE — DISCHARGE SUMMARY
[facility] Hospital Medicine  Discharge Summary      Patient Name: Alexander Duong Jr.  MRN: [id number]  Admission Date: 7/14/2018  Hospital Length of Stay: 0 days  Discharge Date and Time:  07/15/2018 10:21 AM  Attending Physician: Malissa iRchmond MD   Discharging Provider: VIVI Plaza  Primary Care Provider: Azikiwe K Lombard, MD      HPI:   Alexander Duong Jr. is a 78 y.o. AAM with medical history significant for HTN, presents for evaluation of acute intermittent CP that has been off and on for about 4 weeks that feels like a dull ache 5/10. It is located under his Left lateral breast slightly upper abdominal without radiation. Per patient Dr. Lombard gave him something for it about 4 weeks ago that has not yet resolved the issue. He denies HX of smoking or FmHx CAD.    Creatine 1.5/GFR 50;     * No surgery found *      Hospital Course:   Patient's pain was transient seen by cardiology who recommended 2D echo and outpatient close followup for further workup as warranted. Cannot exclude GI origin, recently started on medication that is not on his med list by primary. Suspect it is protonix or PPI - will order PPI trial. Additionally, mild dehydration will hold ARB & HCTZ for now expect to self-correct. Stop home losartan & HCTZ; Continue home amlodipine 10 mg and metoprolol 50 mg BID. Add hydralazine 50 mg BID for tighter BP control. Cardiology can consider adding ACEI back after renal functions rechecked in outpatient setting.     Consults:   Consults         Status Ordering Provider     Inpatient consult to Cardiology  Once     Provider:  Partha Rodriguez MD    Completed JOSÉ SHETTY          No new Assessment & Plan notes have been filed under this hospital service since the last note was generated.  Service: Hospital Medicine    Final Active Diagnoses:    Diagnosis Date Noted POA    Elevated glucose [R73.09] 07/15/2018 Yes    Essential hypertension [I10] 10/17/2015  Yes    CKD (chronic kidney disease) stage 3, GFR 30-59 ml/min [N18.3] 10/17/2015 Yes    Mixed hyperlipidemia [E78.2] 07/15/2018 Yes      Problems Resolved During this Admission:    Diagnosis Date Noted Date Resolved POA    PRINCIPAL PROBLEM:  Chest pain [R07.9] 07/14/2018 07/15/2018 Yes       Discharged Condition: stable    Disposition: Home or Self Care    Follow Up:  Follow-up Information     Partha Rodriguez MD.    Specialties:  Cardiology, INTERVENTIONAL CARDIOLOGY  Why:  Call the office to schedule appointment, For outpatient follow-up/post hospitalizaton outpatient workup  Contact information:  [address]  [address]  [place] [address]  [phone]             Azikiwe K Lombard, MD.    Specialty:  Family Medicine  Contact information:  [address]  [place] [address] [phone]                 Patient Instructions:     Diet Cardiac     Activity as tolerated         Significant Diagnostic Studies: Labs:   CMP   Recent Labs  Lab 07/14/18  1518      K 3.9      CO2 24   *   BUN 15   CREATININE 1.5*   CALCIUM 9.2   PROT 7.9   ALBUMIN 3.9   BILITOT 0.6   ALKPHOS 86   AST 24   ALT 12   ANIONGAP 12   ESTGFRAFRICA 50*   EGFRNONAA 43*   , CBC   Recent Labs  Lab 07/14/18  1518   WBC 6.13   HGB 12.0*   HCT 34.6*      , INR   Lab Results   Component Value Date    INR 1.1 10/19/2015    INR 1.1 10/18/2015    INR 1.1 10/17/2015   , Lipid Panel   Lab Results   Component Value Date    CHOL 206 (H) 07/15/2018    HDL 32 (L) 07/15/2018    LDLCALC 120.4 04/09/2018    TRIG 128 04/09/2018    CHOLHDL 15.5 (L) 07/15/2018   , Troponin   Recent Labs  Lab 07/15/18  0546   TROPONINI 0.011    and A1C:   Recent Labs  Lab 04/09/18  1407   HGBA1C 5.0       Pending Diagnostic Studies:     Procedure Component Value Units Date/Time    2D echo with color flow doppler [[id number]]     Order Status:  Sent Lab Status:  No result          Medications:  Reconciled Home Medications:      Medication List       START taking these medications    amLODIPine 10 MG tablet  Commonly known as:  NORVASC  Take 1 tablet (10 mg total) by mouth once daily.  Start taking on:  7/16/2018     aspirin 81 MG EC tablet  Commonly known as:  ECOTRIN  Take 1 tablet (81 mg total) by mouth once daily.  Start taking on:  7/16/2018     atorvastatin 40 MG tablet  Commonly known as:  LIPITOR  Take 1 tablet (40 mg total) by mouth every evening.     hydrALAZINE 50 MG tablet  Commonly known as:  APRESOLINE  Take 1 tablet (50 mg total) by mouth every 12 (twelve) hours.     pantoprazole 40 MG tablet  Commonly known as:  PROTONIX  Take 1 tablet (40 mg total) by mouth once daily.  Start taking on:  7/16/2018        CONTINUE taking these medications    acetaminophen-codeine 300-60mg 300-60 mg Tab  Commonly known as:  TYLENOL #4  Take 1 tablet by mouth every 8 (eight) hours as needed.     albuterol 90 mcg/actuation inhaler  Commonly known as:  VENTOLIN HFA  INHALE 2 PUFFS INTO THE LUNGS EVERY 6 HOURS AS NEEDED FOR WHEEZING     levalbuterol 1.25 mg/3 mL nebulizer solution  Commonly known as:  XOPENEX  Take 3 mLs (1.25 mg total) by nebulization every 8 (eight) hours as needed for Wheezing.     metoprolol tartrate 50 MG tablet  Commonly known as:  LOPRESSOR  Take 1 tablet (50 mg total) by mouth once daily.     traZODone 100 MG tablet  Commonly known as:  DESYREL  TAKE 1 TABLET (100 MG TOTAL) BY MOUTH NIGHTLY AS NEEDED FOR INSOMNIA.        STOP taking these medications    amLODIPine-valsartan-hcthiazid -25 mg Tab            Indwelling Lines/Drains at time of discharge:   Lines/Drains/Airways          No matching active lines, drains, or airways          Time spent on the discharge of patient: 45 minutes  Patient was seen and examined on the date of discharge and determined to be suitable for discharge.         SAMUEL Green, FNP-C  Hospitalist - Department of Hospital Medicine  [address] [place]  [address]  Office [phone]; Pager [phone]

## 2018-07-15 NOTE — HPI
Alexander Duong  is a 78 y.o. AAM with medical history significant for HTN, presents for evaluation of acute intermittent CP that has been off and on for about 4 weeks that feels like a dull ache 5/10. It is located under his Left lateral breast slightly upper abdominal without radiation. Per patient Dr. Lombard gave him something for it about 4 weeks ago that has not yet resolved the issue. He denies HX of smoking or FmHx CAD.    Creatine 1.5/GFR 50;

## 2018-07-15 NOTE — PROGRESS NOTES
Pt arrived to unit; dx of chest pain; no distress noted; transported by staff via w/c; awake and alert; ambulated independently to bathroom; tele box in place; oriented to room; call light and personal belongings within reach; will continue to monitor.

## 2018-07-15 NOTE — H&P
"[facility] Hospital Medicine  History & Physical    Patient Name: Alexander Duong Jr.  MRN: [id number]  Admission Date: 7/14/2018  Attending Physician: Malissa Richmond MD   Primary Care Provider: Azikiwe K Lombard, MD         Patient information was obtained from patient and ER records.     Subjective:     Principal Problem:Chest pain    Chief Complaint:   Chief Complaint   Patient presents with    Chest Pain     intermittent since Tuesday, more consistent today; left leg "stiffness" today    Leg Pain        HPI: Alexander Duong Jr. is a 78 y.o. AAM with medical history significant for HTN, presents for evaluation of acute onset of chest pain that started like a dull deep pain, lasted about 5 or 10 minutes    Family history; denies smoking history    Past Medical History:   Diagnosis Date    Allergy     Hypertension        Past Surgical History:   Procedure Laterality Date    APPENDECTOMY      COLONOSCOPY Left 10/19/2015    Procedure: COLONOSCOPY;  Surgeon: Randell Briceno MD;  Location: [facility];  Service: Endoscopy;  Laterality: Left;       Review of patient's allergies indicates:  No Known Allergies    No current facility-administered medications on file prior to encounter.      Current Outpatient Prescriptions on File Prior to Encounter   Medication Sig    acetaminophen-codeine 300-60mg (TYLENOL #4) 300-60 mg Tab Take 1 tablet by mouth every 8 (eight) hours as needed.    albuterol (VENTOLIN HFA) 90 mcg/actuation inhaler INHALE 2 PUFFS INTO THE LUNGS EVERY 6 HOURS AS NEEDED FOR WHEEZING    amLODIPine-valsartan-hcthiazid -25 mg Tab Take 1 tablet by mouth once daily.    levalbuterol (XOPENEX) 1.25 mg/3 mL nebulizer solution Take 3 mLs (1.25 mg total) by nebulization every 8 (eight) hours as needed for Wheezing.    metoprolol tartrate (LOPRESSOR) 50 MG tablet Take 1 tablet (50 mg total) by mouth once daily.    traZODone (DESYREL) 100 MG tablet TAKE 1 TABLET (100 MG TOTAL) BY " MOUTH NIGHTLY AS NEEDED FOR INSOMNIA.     Family History     None        Social History Main Topics    Smoking status: Former Smoker     Years: 15.00    Smokeless tobacco: Never Used      Comment: Quit 15 years ago    Alcohol use 0.6 oz/week     1 Standard drinks or equivalent per week      Comment: sometimes beer or crown royal    Drug use: No    Sexual activity: Not Currently     Review of Systems   Constitutional: Negative for appetite change, chills, diaphoresis and fever.   HENT: Negative for congestion, hearing loss, sore throat, tinnitus and trouble swallowing.    Eyes: Negative for photophobia, discharge, itching and visual disturbance.   Respiratory: Negative for apnea, cough, wheezing and stridor.    Cardiovascular: Positive for chest pain. Negative for palpitations and leg swelling.   Gastrointestinal: Negative for abdominal distention, abdominal pain, blood in stool, constipation, diarrhea and nausea.   Endocrine: Negative for polydipsia, polyphagia and polyuria.   Genitourinary: Negative for difficulty urinating, dysuria, flank pain and frequency.   Musculoskeletal: Negative for arthralgias, joint swelling and neck stiffness.        Leg pain   Skin: Negative for color change, rash and wound.   Neurological: Negative for dizziness, tremors, seizures, light-headedness, numbness and headaches.   Hematological: Negative for adenopathy.   Psychiatric/Behavioral: Negative for hallucinations and self-injury.     Objective:     Vital Signs (Most Recent):  Temp: 98.5 °F (36.9 °C) (07/15/18 0729)  Pulse: 73 (07/15/18 0733)  Resp: 18 (07/15/18 0729)  BP: (!) 170/79 (07/15/18 0729)  SpO2: 95 % (07/15/18 0729) Vital Signs (24h Range):  Temp:  [97.9 °F (36.6 °C)-98.6 °F (37 °C)] 98.5 °F (36.9 °C)  Pulse:  [65-90] 73  Resp:  [18-20] 18  SpO2:  [94 %-98 %] 95 %  BP: (137-210)/(64-83) 170/79     Weight: 104 kg (229 lb 4.5 oz)  Body mass index is 34.86 kg/m².    Physical Exam   Constitutional: He is oriented to  person, place, and time. He appears well-developed and well-nourished. He is cooperative.   HENT:   Head: Normocephalic and atraumatic.   Eyes: Conjunctivae, EOM and lids are normal. Pupils are equal, round, and reactive to light.   Neck: Normal range of motion and full passive range of motion without pain. Neck supple. No JVD present. No edema present. No thyroid mass present.   Cardiovascular: Normal rate, S1 normal, S2 normal and intact distal pulses.    No murmur heard.  Pulmonary/Chest: Effort normal.   Abdominal: Soft. Bowel sounds are normal. He exhibits no distension and no abdominal bruit. There is no splenomegaly or hepatomegaly. There is no tenderness. There is no CVA tenderness.   Musculoskeletal: Normal range of motion.   Lymphadenopathy:     He has no cervical adenopathy.     He has no axillary adenopathy.   Neurological: He is alert and oriented to person, place, and time. He has normal reflexes. He displays no tremor. He displays no seizure activity.   Skin: Skin is warm, dry and intact.   Psychiatric: He has a normal mood and affect. His speech is normal. Thought content normal. Cognition and memory are normal.         CRANIAL NERVES     CN III, IV, VI   Pupils are equal, round, and reactive to light.  Extraocular motions are normal.        Significant Labs:   CBC:   Recent Labs  Lab 07/14/18  1518   WBC 6.13   HGB 12.0*   HCT 34.6*        CMP:   Recent Labs  Lab 07/14/18  1518      K 3.9      CO2 24   *   BUN 15   CREATININE 1.5*   CALCIUM 9.2   PROT 7.9   ALBUMIN 3.9   BILITOT 0.6   ALKPHOS 86   AST 24   ALT 12   ANIONGAP 12   EGFRNONAA 43*     Cardiac Markers:   Recent Labs  Lab 07/14/18  1518   BNP 82        Recent Labs  Lab 07/14/18  1749 07/15/18  0014 07/15/18  0546   TROPONINI 0.011 0.029* 0.011     Significant Imaging:   Imaging Results          X-Ray Chest PA And Lateral (Final result)  Result time 07/14/18 16:25:08    Final result by Edmond Gurrola MD (07/14/18  16:25:08)                 Impression:      As above.      Electronically signed by: Edmond Gurrola MD  Date:    07/14/2018  Time:    16:25             Narrative:    EXAMINATION:  XR CHEST PA AND LATERAL    CLINICAL HISTORY:  Chest pain, unspecified    TECHNIQUE:  PA and lateral views of the chest were performed.    COMPARISON:  None.    FINDINGS:  Nonspecific mild interstitial prominence noted in the lower lung zones.  There is no consolidation, effusion, or pneumothorax.  Cardiac silhouette is enlarged.  Thoracic spondylosis noted.                                  Assessment/Plan:     * Chest pain    Appears atypical - Lencho score 1 - CP resolved - non-ischemic EKG - normal initial troponin 1 level, mildly bumped second trop unimpressive at 0.029 trended to normal. Cardiology following  Continuous cardiac monitoring   initial dose then 81 mg PO Daily  Nitro SL prn CP  2D Echo  UA, TSH, lipid panel                Elevated glucose    Incidental finding??117 - HgA1c          CKD (chronic kidney disease) stage 3, GFR 30-59 ml/min    At baseline, slightly better - monitor closely, holding losartan X 1 day - hopefully will self correct  Check Urinalysis  Voiding well          Essential hypertension    Poorly controlled start amlodipine, HCTZ, metoprolol at home doses, hold valsartan for now and monitor        Mixed hyperlipidemia    Good control according to lipid panel in 4/2018 will repeat  Not on statin          VTE Risk Mitigation         Ordered     Place SATINDER hose  Until discontinued      07/15/18 0646     IP VTE HIGH RISK PATIENT  Once      07/14/18 2122             SAMUEL Green, FNP-C  Hospitalist - Department of Hospital Medicine  [address], [place] [address]  Office [phone]; Pager [phone]

## 2018-08-02 ENCOUNTER — OFFICE VISIT (OUTPATIENT)
Dept: FAMILY MEDICINE | Facility: CLINIC | Age: 78
End: 2018-08-02
Payer: MEDICARE

## 2018-08-02 VITALS
OXYGEN SATURATION: 95 % | WEIGHT: 222.25 LBS | TEMPERATURE: 98 F | BODY MASS INDEX: 33.68 KG/M2 | HEART RATE: 102 BPM | HEIGHT: 68 IN | RESPIRATION RATE: 17 BRPM | SYSTOLIC BLOOD PRESSURE: 154 MMHG | DIASTOLIC BLOOD PRESSURE: 56 MMHG

## 2018-08-02 DIAGNOSIS — M15.9 PRIMARY OSTEOARTHRITIS INVOLVING MULTIPLE JOINTS: ICD-10-CM

## 2018-08-02 DIAGNOSIS — I10 ESSENTIAL HYPERTENSION: Primary | ICD-10-CM

## 2018-08-02 DIAGNOSIS — N18.30 CKD (CHRONIC KIDNEY DISEASE) STAGE 3, GFR 30-59 ML/MIN: ICD-10-CM

## 2018-08-02 DIAGNOSIS — E78.2 MIXED HYPERLIPIDEMIA: ICD-10-CM

## 2018-08-02 DIAGNOSIS — K21.9 GASTROESOPHAGEAL REFLUX DISEASE WITHOUT ESOPHAGITIS: ICD-10-CM

## 2018-08-02 DIAGNOSIS — I27.20 PULMONARY HYPERTENSION: ICD-10-CM

## 2018-08-02 PROBLEM — R73.09 ELEVATED GLUCOSE: Status: RESOLVED | Noted: 2018-07-15 | Resolved: 2018-08-02

## 2018-08-02 PROCEDURE — 99215 OFFICE O/P EST HI 40 MIN: CPT | Mod: S$GLB,,, | Performed by: FAMILY MEDICINE

## 2018-08-02 PROCEDURE — 99499 UNLISTED E&M SERVICE: CPT | Mod: S$GLB,,, | Performed by: FAMILY MEDICINE

## 2018-08-02 PROCEDURE — 99999 PR PBB SHADOW E&M-EST. PATIENT-LVL III: CPT | Mod: PBBFAC,,, | Performed by: FAMILY MEDICINE

## 2018-08-02 PROCEDURE — 3078F DIAST BP <80 MM HG: CPT | Mod: CPTII,S$GLB,, | Performed by: FAMILY MEDICINE

## 2018-08-02 PROCEDURE — 3077F SYST BP >= 140 MM HG: CPT | Mod: CPTII,S$GLB,, | Performed by: FAMILY MEDICINE

## 2018-08-02 RX ORDER — PANTOPRAZOLE SODIUM 40 MG/1
40 TABLET, DELAYED RELEASE ORAL DAILY
Qty: 90 TABLET | Refills: 0 | Status: SHIPPED | OUTPATIENT
Start: 2018-08-02 | End: 2018-11-06 | Stop reason: SDUPTHER

## 2018-08-02 RX ORDER — HYDRALAZINE HYDROCHLORIDE 50 MG/1
50 TABLET, FILM COATED ORAL EVERY 12 HOURS
Qty: 180 TABLET | Refills: 0 | Status: SHIPPED | OUTPATIENT
Start: 2018-08-02 | End: 2018-12-04 | Stop reason: SDUPTHER

## 2018-08-02 RX ORDER — AMLODIPINE BESYLATE 10 MG/1
10 TABLET ORAL DAILY
Qty: 90 TABLET | Refills: 1 | Status: SHIPPED | OUTPATIENT
Start: 2018-08-02 | End: 2018-09-04

## 2018-08-02 RX ORDER — ASPIRIN 81 MG/1
81 TABLET ORAL DAILY
Qty: 90 TABLET | Refills: 3 | Status: SHIPPED | OUTPATIENT
Start: 2018-08-02 | End: 2019-08-01 | Stop reason: SDUPTHER

## 2018-08-02 RX ORDER — ATORVASTATIN CALCIUM 40 MG/1
40 TABLET, FILM COATED ORAL NIGHTLY
Qty: 90 TABLET | Refills: 0 | Status: SHIPPED | OUTPATIENT
Start: 2018-08-02 | End: 2018-12-04 | Stop reason: SDUPTHER

## 2018-08-02 RX ORDER — AMLODIPINE, VALSARTAN AND HYDROCHLOROTHIAZIDE 10; 320; 25 MG/1; MG/1; MG/1
TABLET ORAL
COMMUNITY
Start: 2018-07-22 | End: 2018-08-02

## 2018-08-02 NOTE — PROGRESS NOTES
Chief Complaint   Patient presents with    Hospital Follow Up       Alexander Duong Jr. is a 78 y.o. male who presents per the Chief Complaint.  Pt is known to me and was last seen by me on 6/4/2018.  All known chronic medical issues have been documented.       Hypertension   This is a chronic problem. The current episode started more than 1 year ago. The problem has been gradually improving since onset. The problem is controlled. Associated symptoms include chest pain (non cardiac) and neck pain. Pertinent negatives include no anxiety, blurred vision, headaches, malaise/fatigue, orthopnea, palpitations, peripheral edema, PND, shortness of breath or sweats. There are no associated agents to hypertension. Risk factors for coronary artery disease include male gender, obesity, dyslipidemia and sedentary lifestyle. Past treatments include ACE inhibitors, diuretics and calcium channel blockers. The current treatment provides moderate improvement. Compliance problems include diet and exercise.  There is no history of angina, kidney disease, CAD/MI, CVA, heart failure, left ventricular hypertrophy, PVD or retinopathy. There is no history of chronic renal disease, coarctation of the aorta, hyperaldosteronism, hypercortisolism, hyperparathyroidism, a hypertension causing med, pheochromocytoma, renovascular disease, sleep apnea or a thyroid problem.   Arthritis   Presents for follow-up visit. He complains of pain and stiffness. He reports no joint swelling or joint warmth. The symptoms have been improving. Affected locations include the left knee, right knee and neck. His pain is at a severity of 6/10. Pertinent negatives include no diarrhea, dry eyes, dry mouth, dysuria, fatigue, fever, pain at night, pain while resting, rash, Raynaud's syndrome, uveitis or weight loss.        ROS  Review of Systems   Constitutional: Negative.  Negative for activity change, appetite change, chills, diaphoresis, fatigue, fever,  "malaise/fatigue, unexpected weight change and weight loss.   HENT: Negative.  Negative for congestion, ear pain, hearing loss, nosebleeds, postnasal drip, rhinorrhea, sinus pressure, sneezing, sore throat, tinnitus and trouble swallowing.    Eyes: Negative for blurred vision, pain and visual disturbance.   Respiratory: Negative for cough, choking and shortness of breath.    Cardiovascular: Positive for chest pain (non cardiac). Negative for palpitations, orthopnea, leg swelling, syncope and PND.   Gastrointestinal: Negative for abdominal pain, constipation, diarrhea, nausea and vomiting.   Genitourinary: Negative for difficulty urinating, dysuria, frequency and urgency.   Musculoskeletal: Positive for arthritis, back pain, neck pain and stiffness. Negative for arthralgias, gait problem, joint swelling and myalgias.   Skin: Negative.  Negative for rash.   Allergic/Immunologic: Negative for environmental allergies and food allergies.   Neurological: Negative for dizziness, tremors, seizures, syncope, facial asymmetry, speech difficulty, weakness, light-headedness, numbness and headaches.   Psychiatric/Behavioral: Negative.  Negative for confusion, decreased concentration, dysphoric mood and sleep disturbance. The patient is not nervous/anxious.        Physical Exam  Vitals:    08/02/18 1432   BP: (!) 154/56   Pulse: 102   Resp: 17   Temp: 98.4 °F (36.9 °C)    Body mass index is 33.79 kg/m².  Weight: 100.8 kg (222 lb 3.6 oz)   Height: 5' 8" (172.7 cm)     Physical Exam   Constitutional: He is oriented to person, place, and time. He appears well-developed and well-nourished. He is active and cooperative.  Non-toxic appearance. He does not have a sickly appearance. He does not appear ill. No distress.   HENT:   Head: Normocephalic and atraumatic.   Right Ear: Hearing and external ear normal. No decreased hearing is noted.   Left Ear: Hearing and external ear normal. No decreased hearing is noted.   Nose: Nose normal. No " rhinorrhea or nasal deformity.   Mouth/Throat: Uvula is midline and oropharynx is clear and moist. He does not have dentures. Normal dentition.   Eyes: Conjunctivae, EOM and lids are normal. Pupils are equal, round, and reactive to light. Right eye exhibits no chemosis, no discharge and no exudate. No foreign body present in the right eye. Left eye exhibits no chemosis, no discharge and no exudate. No foreign body present in the left eye. No scleral icterus.   Neck: Normal range of motion and full passive range of motion without pain. Neck supple.   Cardiovascular: Normal rate, regular rhythm, S1 normal, S2 normal and normal heart sounds.  Exam reveals no gallop and no friction rub.    No murmur heard.  Pulmonary/Chest: Effort normal and breath sounds normal. No accessory muscle usage. No respiratory distress. He has no decreased breath sounds. He has no wheezes. He has no rhonchi. He has no rales.   Abdominal: Soft. Normal appearance. He exhibits no distension. There is no hepatosplenomegaly. There is no tenderness. There is no rigidity, no rebound and no guarding.   Musculoskeletal:        Right hip: He exhibits tenderness. He exhibits normal range of motion, normal strength, no bony tenderness, no swelling, no crepitus, no deformity and no laceration.        Left hip: He exhibits tenderness. He exhibits normal range of motion, normal strength, no bony tenderness, no swelling, no crepitus, no deformity and no laceration.        Right knee: He exhibits decreased range of motion. He exhibits no swelling, no effusion, no ecchymosis, no deformity, no laceration, no erythema, normal alignment, no LCL laxity, no bony tenderness, normal meniscus and no MCL laxity. Tenderness found. Patellar tendon tenderness noted. No MCL and no LCL tenderness noted.        Left knee: He exhibits decreased range of motion. He exhibits no swelling, no effusion, no ecchymosis, no deformity, no laceration, no erythema, normal alignment, no  LCL laxity, normal patellar mobility, no bony tenderness, normal meniscus and no MCL laxity. Tenderness found. Patellar tendon tenderness noted. No medial joint line, no lateral joint line, no MCL and no LCL tenderness noted.   Neurological: He is alert and oriented to person, place, and time. He has normal strength. No cranial nerve deficit or sensory deficit. He exhibits normal muscle tone. He displays no seizure activity. Coordination and gait normal.   Skin: Skin is warm, dry and intact. No rash noted. He is not diaphoretic.   Psychiatric: He has a normal mood and affect. His speech is normal and behavior is normal. Judgment and thought content normal. Cognition and memory are normal. He is attentive.       Assessment & Plan    1. Essential hypertension  Patient was counseled and encouraged to maintain a low sodium diet, as well as increasing physical activity.  Recommend random BP checks at home on a regular basis.  Repeat BP at end of visit was not necessary. Will continue medication at this time, and follow up in 3-6 months, or sooner if blood pressure begins to increase.  Patient taken off ARB in hospital and started hydralazine.  - aspirin (ECOTRIN) 81 MG EC tablet; Take 1 tablet (81 mg total) by mouth once daily.  Dispense: 90 tablet; Refill: 3  - amLODIPine (NORVASC) 10 MG tablet; Take 1 tablet (10 mg total) by mouth once daily.  Dispense: 90 tablet; Refill: 1  - hydrALAZINE (APRESOLINE) 50 MG tablet; Take 1 tablet (50 mg total) by mouth every 12 (twelve) hours.  Dispense: 180 tablet; Refill: 0    2. Pulmonary hypertension  Stable; no therapeutic changes at this time.     3. Gastroesophageal reflux disease without esophagitis  Patient was advised to continue medication and to decrease intake of caffeine and spicy foods and to elevate head while lying down.  Also advised to avoid lying down within 3 hours of last meal.  If symptoms continue with treatment, follow up for further evaluation.     -  pantoprazole (PROTONIX) 40 MG tablet; Take 1 tablet (40 mg total) by mouth once daily.  Dispense: 90 tablet; Refill: 0    4. Mixed hyperlipidemia  We discussed ways to manage cholesterol levels, including increasing whole grain foods and decreasing fried and fatty foods.  I also recommended OTC Omega 3 and Omega 6 supplements to improve overall cholesterol levels.  Will continue current therapy at this visit; patient is not due for screening blood test at this time.   - atorvastatin (LIPITOR) 40 MG tablet; Take 1 tablet (40 mg total) by mouth every evening.  Dispense: 90 tablet; Refill: 0    5. Primary osteoarthritis involving multiple joints  Stable; no therapeutic changes at this time.     6. CKD (chronic kidney disease) stage 3, GFR 30-59 ml/min  Improving; will monitor and manage accordingly.      Follow up documented    ACTIVE MEDICAL ISSUES:  Documented in Problem List    PAST MEDICAL HISTORY  Documented    PAST SURGICAL HISTORY:  Documented    SOCIAL HISTORY:  Documented    FAMILY HISTORY:  Documented    ALLERGIES AND MEDICATIONS: updated and reviewed.  Documented    Health Maintenance       Date Due Completion Date    TETANUS VACCINE 02/11/1958 ---    Influenza Vaccine 08/01/2018 10/23/2017    Override on 4/8/2016: Done (not at this present time)    Override on 10/29/2015: Declined (not at this present time)    Pneumococcal (65+) (2 of 2 - PPSV23) 04/09/2019 4/9/2018    Lipid Panel 07/15/2023 7/15/2018    Override on 4/8/2016: Done (future)

## 2018-08-13 ENCOUNTER — TELEPHONE (OUTPATIENT)
Dept: FAMILY MEDICINE | Facility: CLINIC | Age: 78
End: 2018-08-13

## 2018-08-13 NOTE — TELEPHONE ENCOUNTER
Spoke to patient. States that the refills he picked up from the pharmacy is different from the ones he was prescribed from the time he went to the hospital about 3 weeks ago.Patient spelled out the names of the medication. Advised patient that that is the same medication prescribed by Dr.Lombard also. Asked patient to look at bottles received form pharmacy and see if the names matched. Verified it did. Advised patient to continue taking medications as prescribed and if he has any other questions or concerns to give us a call. Verbalized understanding.

## 2018-08-13 NOTE — TELEPHONE ENCOUNTER
----- Message from Yoselin Johnson sent at 8/13/2018  9:54 AM CDT -----  Contact: self  Pt would like to speak with regarding his amLODIPine (NORVASC) 10 MG tablet. States he was given another medication when he was int [facility]. Please contact him at [phone].    Thanks

## 2018-08-20 RX ORDER — METOPROLOL TARTRATE 50 MG/1
50 TABLET ORAL DAILY
Qty: 90 TABLET | Refills: 0 | Status: SHIPPED | OUTPATIENT
Start: 2018-08-20 | End: 2018-08-27

## 2018-08-27 RX ORDER — METOPROLOL TARTRATE 50 MG/1
50 TABLET ORAL DAILY
Qty: 90 TABLET | Refills: 0 | Status: SHIPPED | OUTPATIENT
Start: 2018-08-27 | End: 2018-12-04 | Stop reason: SDUPTHER

## 2018-09-04 ENCOUNTER — OFFICE VISIT (OUTPATIENT)
Dept: FAMILY MEDICINE | Facility: CLINIC | Age: 78
End: 2018-09-04
Payer: MEDICARE

## 2018-09-04 VITALS
TEMPERATURE: 98 F | HEIGHT: 68 IN | OXYGEN SATURATION: 95 % | WEIGHT: 232.13 LBS | RESPIRATION RATE: 17 BRPM | SYSTOLIC BLOOD PRESSURE: 138 MMHG | DIASTOLIC BLOOD PRESSURE: 64 MMHG | BODY MASS INDEX: 35.18 KG/M2 | HEART RATE: 100 BPM

## 2018-09-04 DIAGNOSIS — F51.01 PRIMARY INSOMNIA: ICD-10-CM

## 2018-09-04 DIAGNOSIS — I10 ESSENTIAL HYPERTENSION: Primary | ICD-10-CM

## 2018-09-04 DIAGNOSIS — M15.9 PRIMARY OSTEOARTHRITIS INVOLVING MULTIPLE JOINTS: ICD-10-CM

## 2018-09-04 DIAGNOSIS — E66.01 SEVERE OBESITY (BMI 35.0-39.9): ICD-10-CM

## 2018-09-04 PROCEDURE — 99214 OFFICE O/P EST MOD 30 MIN: CPT | Mod: S$PBB,,, | Performed by: FAMILY MEDICINE

## 2018-09-04 PROCEDURE — 1101F PT FALLS ASSESS-DOCD LE1/YR: CPT | Mod: CPTII,,, | Performed by: FAMILY MEDICINE

## 2018-09-04 PROCEDURE — 99999 PR PBB SHADOW E&M-EST. PATIENT-LVL III: CPT | Mod: PBBFAC,,, | Performed by: FAMILY MEDICINE

## 2018-09-04 PROCEDURE — 3078F DIAST BP <80 MM HG: CPT | Mod: CPTII,,, | Performed by: FAMILY MEDICINE

## 2018-09-04 PROCEDURE — 99213 OFFICE O/P EST LOW 20 MIN: CPT | Mod: PBBFAC,PO | Performed by: FAMILY MEDICINE

## 2018-09-04 PROCEDURE — 3075F SYST BP GE 130 - 139MM HG: CPT | Mod: CPTII,,, | Performed by: FAMILY MEDICINE

## 2018-09-04 RX ORDER — TRAZODONE HYDROCHLORIDE 100 MG/1
100 TABLET ORAL NIGHTLY PRN
Qty: 90 TABLET | Refills: 0 | Status: SHIPPED | OUTPATIENT
Start: 2018-09-04 | End: 2019-01-05 | Stop reason: SDUPTHER

## 2018-09-04 RX ORDER — AMLODIPINE, VALSARTAN AND HYDROCHLOROTHIAZIDE 10; 320; 25 MG/1; MG/1; MG/1
1 TABLET ORAL DAILY
Refills: 1 | COMMUNITY
Start: 2018-08-10 | End: 2018-12-04 | Stop reason: SDUPTHER

## 2018-09-04 NOTE — PROGRESS NOTES
Chief Complaint   Patient presents with    Hypertension       Alexander Duong Jr. is a 78 y.o. male who presents per the Chief Complaint.  Pt is known to me and was last seen by me on 8/2/2018.  All known chronic medical issues have been documented.       Hypertension   This is a chronic problem. The current episode started more than 1 year ago. The problem has been gradually improving since onset. The problem is controlled. Associated symptoms include chest pain (non cardiac) and neck pain. Pertinent negatives include no anxiety, blurred vision, headaches, malaise/fatigue, orthopnea, palpitations, peripheral edema, PND, shortness of breath or sweats. There are no associated agents to hypertension. Risk factors for coronary artery disease include male gender, obesity, dyslipidemia and sedentary lifestyle. Past treatments include ACE inhibitors, diuretics and calcium channel blockers. The current treatment provides moderate improvement. Compliance problems include diet and exercise.  There is no history of angina, kidney disease, CAD/MI, CVA, heart failure, left ventricular hypertrophy, PVD or retinopathy. There is no history of chronic renal disease, coarctation of the aorta, hyperaldosteronism, hypercortisolism, hyperparathyroidism, a hypertension causing med, pheochromocytoma, renovascular disease, sleep apnea or a thyroid problem.   Arthritis   Presents for follow-up visit. He complains of pain and stiffness. He reports no joint swelling or joint warmth. The symptoms have been improving. Affected locations include the left knee, right knee and neck. His pain is at a severity of 5/10. Pertinent negatives include no diarrhea, dry eyes, dry mouth, dysuria, fatigue, fever, pain at night, pain while resting, rash, Raynaud's syndrome, uveitis or weight loss.        ROS  Review of Systems   Constitutional: Negative for fatigue, fever, malaise/fatigue and weight loss.   Eyes: Negative for blurred vision.   Respiratory:  "Negative for shortness of breath.    Cardiovascular: Positive for chest pain (non cardiac). Negative for palpitations, orthopnea and PND.   Gastrointestinal: Negative for diarrhea.   Genitourinary: Negative for dysuria.   Musculoskeletal: Positive for arthritis, neck pain and stiffness. Negative for joint swelling.   Skin: Negative for rash.   Neurological: Negative for headaches.       Physical Exam  Vitals:    09/04/18 1418   BP: 138/64   Pulse:    Resp:    Temp:     Body mass index is 35.3 kg/m².  Weight: 105.3 kg (232 lb 2.3 oz)   Height: 5' 8" (172.7 cm)     Physical Exam   Constitutional: He is oriented to person, place, and time. He appears well-developed and well-nourished. He is active and cooperative.  Non-toxic appearance. He does not have a sickly appearance. He does not appear ill. No distress.   HENT:   Head: Normocephalic and atraumatic.   Right Ear: Hearing and external ear normal. No decreased hearing is noted.   Left Ear: Hearing and external ear normal. No decreased hearing is noted.   Nose: Nose normal. No rhinorrhea or nasal deformity.   Mouth/Throat: Uvula is midline and oropharynx is clear and moist. He does not have dentures. Normal dentition.   Eyes: Conjunctivae, EOM and lids are normal. Pupils are equal, round, and reactive to light. Right eye exhibits no chemosis, no discharge and no exudate. No foreign body present in the right eye. Left eye exhibits no chemosis, no discharge and no exudate. No foreign body present in the left eye. No scleral icterus.   Neck: Normal range of motion and full passive range of motion without pain. Neck supple.   Cardiovascular: Normal rate, regular rhythm, S1 normal, S2 normal and normal heart sounds. Exam reveals no gallop and no friction rub.   No murmur heard.  Pulmonary/Chest: Effort normal and breath sounds normal. No accessory muscle usage. No respiratory distress. He has no decreased breath sounds. He has no wheezes. He has no rhonchi. He has no " rales.   Abdominal: Soft. Normal appearance. He exhibits no distension. There is no hepatosplenomegaly. There is no tenderness. There is no rigidity, no rebound and no guarding.   Musculoskeletal:        Right hip: He exhibits tenderness. He exhibits normal range of motion, normal strength, no bony tenderness, no swelling, no crepitus, no deformity and no laceration.        Left hip: He exhibits tenderness. He exhibits normal range of motion, normal strength, no bony tenderness, no swelling, no crepitus, no deformity and no laceration.        Right knee: He exhibits decreased range of motion. He exhibits no swelling, no effusion, no ecchymosis, no deformity, no laceration, no erythema, normal alignment, no LCL laxity, no bony tenderness, normal meniscus and no MCL laxity. Tenderness found. Patellar tendon tenderness noted. No MCL and no LCL tenderness noted.        Left knee: He exhibits decreased range of motion. He exhibits no swelling, no effusion, no ecchymosis, no deformity, no laceration, no erythema, normal alignment, no LCL laxity, normal patellar mobility, no bony tenderness, normal meniscus and no MCL laxity. Tenderness found. Patellar tendon tenderness noted. No medial joint line, no lateral joint line, no MCL and no LCL tenderness noted.   Neurological: He is alert and oriented to person, place, and time. He has normal strength. No cranial nerve deficit or sensory deficit. He exhibits normal muscle tone. He displays no seizure activity. Coordination and gait normal.   Skin: Skin is warm, dry and intact. No rash noted. He is not diaphoretic.   Psychiatric: He has a normal mood and affect. His speech is normal and behavior is normal. Judgment and thought content normal. Cognition and memory are normal. He is attentive.       Assessment & Plan    1. Essential hypertension  Patient was counseled and encouraged to maintain a low sodium diet, as well as increasing physical activity.  Recommend random BP checks  at home on a regular basis.  Repeat BP at end of visit was not necessary. Will continue medication at this time, and follow up in 3-6 months, or sooner if blood pressure begins to increase.  Patient had a high sodium diet yesterday.    2. Primary osteoarthritis involving multiple joints  The current medical regimen is effective at this time and no changes to present plan or medications will be made at this visit.       3. Primary insomnia  Patient was advised to practice good sleep hygiene, which includes decreasing stimulation at least one hour before bedtime.  This includes no television, no stimulation reading or games.  Natural sleep aids were recommended as well including lavender and chamomile.  The problem of recurrent insomnia is discussed. Avoidance of caffeine sources is strongly encouraged. Sleep hygiene issues are reviewed.    - traZODone (DESYREL) 100 MG tablet; Take 1 tablet (100 mg total) by mouth nightly as needed for Insomnia.  Dispense: 90 tablet; Refill: 0    4. Severe obesity (BMI 35.0-39.9)  We discussed weight and safe, effective ways of losing pounds, including low carbohydrate, low fat diet and increasing physical activity to improve cardiovascular performance and increase metabolism.  Patient was encouraged to set realistic attainable goals for weight loss, and we will follow up periodically.        Follow up documented    ACTIVE MEDICAL ISSUES:  Documented in Problem List    PAST MEDICAL HISTORY  Documented    PAST SURGICAL HISTORY:  Documented    SOCIAL HISTORY:  Documented    FAMILY HISTORY:  Documented    ALLERGIES AND MEDICATIONS: updated and reviewed.  Documented    Health Maintenance       Date Due Completion Date    TETANUS VACCINE [birth year]1956 ---    Zoster Vaccine 02/11/1998 ---    Influenza Vaccine 08/01/2018 10/23/2017    Override on 4/8/2016: Done (not at this present time)    Override on 10/29/2015: Declined (not at this present time)    Pneumococcal (65+) (2 of 2 - PPSV23)  04/09/2019 4/9/2018    Lipid Panel 07/15/2019 7/15/2018    Override on 4/8/2016: Done (future)

## 2018-11-06 DIAGNOSIS — K21.9 GASTROESOPHAGEAL REFLUX DISEASE WITHOUT ESOPHAGITIS: ICD-10-CM

## 2018-11-08 RX ORDER — PANTOPRAZOLE SODIUM 40 MG/1
TABLET, DELAYED RELEASE ORAL
Qty: 90 TABLET | Refills: 0 | Status: SHIPPED | OUTPATIENT
Start: 2018-11-08 | End: 2020-12-08

## 2018-12-04 ENCOUNTER — OFFICE VISIT (OUTPATIENT)
Dept: FAMILY MEDICINE | Facility: CLINIC | Age: 78
End: 2018-12-04
Payer: MEDICARE

## 2018-12-04 VITALS
BODY MASS INDEX: 36.58 KG/M2 | RESPIRATION RATE: 18 BRPM | SYSTOLIC BLOOD PRESSURE: 110 MMHG | DIASTOLIC BLOOD PRESSURE: 74 MMHG | TEMPERATURE: 98 F | OXYGEN SATURATION: 95 % | HEIGHT: 68 IN | WEIGHT: 241.38 LBS | HEART RATE: 74 BPM

## 2018-12-04 DIAGNOSIS — I10 ESSENTIAL HYPERTENSION: Primary | ICD-10-CM

## 2018-12-04 DIAGNOSIS — E78.2 MIXED HYPERLIPIDEMIA: ICD-10-CM

## 2018-12-04 DIAGNOSIS — M15.9 PRIMARY OSTEOARTHRITIS INVOLVING MULTIPLE JOINTS: ICD-10-CM

## 2018-12-04 DIAGNOSIS — H25.12 AGE-RELATED NUCLEAR CATARACT OF LEFT EYE: ICD-10-CM

## 2018-12-04 DIAGNOSIS — Z23 NEED FOR INFLUENZA VACCINATION: ICD-10-CM

## 2018-12-04 PROCEDURE — 3078F DIAST BP <80 MM HG: CPT | Mod: CPTII,S$GLB,, | Performed by: FAMILY MEDICINE

## 2018-12-04 PROCEDURE — 90662 IIV NO PRSV INCREASED AG IM: CPT | Mod: S$GLB,,, | Performed by: FAMILY MEDICINE

## 2018-12-04 PROCEDURE — 1101F PT FALLS ASSESS-DOCD LE1/YR: CPT | Mod: CPTII,S$GLB,, | Performed by: FAMILY MEDICINE

## 2018-12-04 PROCEDURE — 99215 OFFICE O/P EST HI 40 MIN: CPT | Mod: 25,S$GLB,, | Performed by: FAMILY MEDICINE

## 2018-12-04 PROCEDURE — 3074F SYST BP LT 130 MM HG: CPT | Mod: CPTII,S$GLB,, | Performed by: FAMILY MEDICINE

## 2018-12-04 PROCEDURE — 99999 PR PBB SHADOW E&M-EST. PATIENT-LVL III: CPT | Mod: PBBFAC,,, | Performed by: FAMILY MEDICINE

## 2018-12-04 PROCEDURE — G0008 ADMIN INFLUENZA VIRUS VAC: HCPCS | Mod: S$GLB,,, | Performed by: FAMILY MEDICINE

## 2018-12-04 RX ORDER — METOPROLOL TARTRATE 50 MG/1
50 TABLET ORAL DAILY
Qty: 90 TABLET | Refills: 0 | Status: SHIPPED | OUTPATIENT
Start: 2018-12-04 | End: 2019-07-02 | Stop reason: SDUPTHER

## 2018-12-04 RX ORDER — ATORVASTATIN CALCIUM 40 MG/1
40 TABLET, FILM COATED ORAL NIGHTLY
Qty: 90 TABLET | Refills: 1 | Status: SHIPPED | OUTPATIENT
Start: 2018-12-04 | End: 2020-01-15

## 2018-12-04 RX ORDER — HYDRALAZINE HYDROCHLORIDE 50 MG/1
50 TABLET, FILM COATED ORAL EVERY 12 HOURS
Qty: 180 TABLET | Refills: 1 | Status: SHIPPED | OUTPATIENT
Start: 2018-12-04 | End: 2019-12-17 | Stop reason: SDUPTHER

## 2018-12-04 RX ORDER — AMLODIPINE, VALSARTAN AND HYDROCHLOROTHIAZIDE 10; 320; 25 MG/1; MG/1; MG/1
1 TABLET ORAL DAILY
Qty: 90 TABLET | Refills: 1 | Status: SHIPPED | OUTPATIENT
Start: 2018-12-04 | End: 2019-10-30 | Stop reason: SDUPTHER

## 2018-12-04 RX ORDER — ACETAMINOPHEN AND CODEINE PHOSPHATE 300; 60 MG/1; MG/1
1 TABLET ORAL EVERY 8 HOURS PRN
Qty: 90 TABLET | Refills: 2 | Status: SHIPPED | OUTPATIENT
Start: 2018-12-04 | End: 2021-05-31

## 2018-12-04 NOTE — HOSPITAL COURSE
Patient's pain was transient seen by cardiology who recommended 2D echo and outpatient close followup for further workup as warranted. Cannot exclude GI origin, recently started on medication that is not on his med list by primary. Suspect it is protonix or PPI - will order PPI trial. Additionally, mild dehydration will hold ARB & HCTZ for now expect to self-correct. Stop home losartan & HCTZ; Continue home amlodipine 10 mg and metoprolol 50 mg BID. Add hydralazine 50 mg BID for tighter BP control. Cardiology can consider adding ACEI back after renal functions rechecked in outpatient setting.   Small bowel perforation

## 2018-12-04 NOTE — PROGRESS NOTES
Chief Complaint   Patient presents with    Hypertension    Pre-op Exam     Eye Surgery lt side       Alexander Duong Jr. is a 78 y.o. male who presents per the Chief Complaint.  Pt is known to me and was last seen by me on 9/4/2018.  All known chronic medical issues have been documented.       Hypertension   This is a chronic problem. The current episode started more than 1 year ago. The problem has been gradually improving since onset. The problem is controlled. Associated symptoms include blurred vision, chest pain (non cardiac) and neck pain. Pertinent negatives include no anxiety, headaches, malaise/fatigue, orthopnea, palpitations, peripheral edema, PND, shortness of breath or sweats. There are no associated agents to hypertension. Risk factors for coronary artery disease include male gender, obesity, dyslipidemia and sedentary lifestyle. Past treatments include ACE inhibitors, diuretics and calcium channel blockers. The current treatment provides moderate improvement. Compliance problems include diet and exercise.  There is no history of angina, kidney disease, CAD/MI, CVA, heart failure, left ventricular hypertrophy, PVD or retinopathy. There is no history of chronic renal disease, coarctation of the aorta, hyperaldosteronism, hypercortisolism, hyperparathyroidism, a hypertension causing med, pheochromocytoma, renovascular disease, sleep apnea or a thyroid problem.   Arthritis   Presents for follow-up visit. He complains of pain and stiffness. He reports no joint swelling or joint warmth. The symptoms have been improving. Affected locations include the left knee, right knee and neck. His pain is at a severity of 5/10. Pertinent negatives include no diarrhea, dry eyes, dry mouth, dysuria, fatigue, fever, pain at night, pain while resting, rash, Raynaud's syndrome, uveitis or weight loss.   Eye Problem    The left eye is affected. This is a chronic problem. The current episode started more than 1 year ago. The  problem occurs daily. The problem has been unchanged. There was no injury mechanism. The pain is at a severity of 0/10. The patient is experiencing no pain. There is no known exposure to pink eye. He does not wear contacts. Associated symptoms include blurred vision. Pertinent negatives include no eye discharge, double vision, eye redness, fever, foreign body sensation, itching, nausea, photophobia, recent URI, vomiting or weakness. He has tried nothing for the symptoms.        ROS  Review of Systems   Constitutional: Negative.  Negative for activity change, appetite change, chills, diaphoresis, fatigue, fever, malaise/fatigue, unexpected weight change and weight loss.   HENT: Negative.  Negative for congestion, ear pain, hearing loss, nosebleeds, postnasal drip, rhinorrhea, sinus pressure, sneezing, sore throat and trouble swallowing.    Eyes: Positive for blurred vision and visual disturbance. Negative for double vision, photophobia, pain, discharge, redness and itching.   Respiratory: Negative for cough, choking and shortness of breath.    Cardiovascular: Positive for chest pain (non cardiac). Negative for palpitations, orthopnea, leg swelling and PND.   Gastrointestinal: Negative for abdominal pain, constipation, diarrhea, nausea and vomiting.   Genitourinary: Negative for difficulty urinating, dysuria, frequency and urgency.   Musculoskeletal: Positive for arthritis, neck pain and stiffness. Negative for arthralgias, back pain, gait problem, joint swelling and myalgias.   Skin: Negative.  Negative for rash.   Allergic/Immunologic: Negative for environmental allergies and food allergies.   Neurological: Negative.  Negative for dizziness, seizures, syncope, weakness, light-headedness and headaches.   Psychiatric/Behavioral: Negative.  Negative for confusion, decreased concentration, dysphoric mood and sleep disturbance. The patient is not nervous/anxious.        Physical Exam  Vitals:    12/04/18 0857   BP:  "110/74   Pulse: 74   Resp: 18   Temp: 97.7 °F (36.5 °C)    Body mass index is 36.71 kg/m².  Weight: 109.5 kg (241 lb 6.5 oz)   Height: 5' 8" (172.7 cm)     Physical Exam   Constitutional: He is oriented to person, place, and time. He appears well-developed and well-nourished. He is active and cooperative.  Non-toxic appearance. He does not have a sickly appearance. He does not appear ill. No distress.   HENT:   Head: Normocephalic and atraumatic.   Right Ear: Hearing and external ear normal. No decreased hearing is noted.   Left Ear: Hearing and external ear normal. No decreased hearing is noted.   Nose: Nose normal. No rhinorrhea or nasal deformity.   Mouth/Throat: Uvula is midline and oropharynx is clear and moist. He does not have dentures. Normal dentition.   Eyes: Conjunctivae, EOM and lids are normal. Pupils are equal, round, and reactive to light. Right eye exhibits no chemosis, no discharge and no exudate. No foreign body present in the right eye. Left eye exhibits no chemosis, no discharge and no exudate. No foreign body present in the left eye. No scleral icterus.   Neck: Normal range of motion and full passive range of motion without pain. Neck supple.   Cardiovascular: Normal rate, regular rhythm, S1 normal, S2 normal and normal heart sounds. Exam reveals no gallop and no friction rub.   No murmur heard.  Pulmonary/Chest: Effort normal and breath sounds normal. No accessory muscle usage. No respiratory distress. He has no decreased breath sounds. He has no wheezes. He has no rhonchi. He has no rales.   Abdominal: Soft. Normal appearance. He exhibits no distension. There is no hepatosplenomegaly. There is no tenderness. There is no rigidity, no rebound and no guarding.   Musculoskeletal:        Right hip: He exhibits tenderness. He exhibits normal range of motion, normal strength, no bony tenderness, no swelling, no crepitus, no deformity and no laceration.        Left hip: He exhibits tenderness. He " exhibits normal range of motion, normal strength, no bony tenderness, no swelling, no crepitus, no deformity and no laceration.        Right knee: He exhibits decreased range of motion. He exhibits no swelling, no effusion, no ecchymosis, no deformity, no laceration, no erythema, normal alignment, no LCL laxity, no bony tenderness, normal meniscus and no MCL laxity. Tenderness found. Patellar tendon tenderness noted. No MCL and no LCL tenderness noted.        Left knee: He exhibits decreased range of motion. He exhibits no swelling, no effusion, no ecchymosis, no deformity, no laceration, no erythema, normal alignment, no LCL laxity, normal patellar mobility, no bony tenderness, normal meniscus and no MCL laxity. Tenderness found. Patellar tendon tenderness noted. No medial joint line, no lateral joint line, no MCL and no LCL tenderness noted.   Neurological: He is alert and oriented to person, place, and time. He has normal strength. No cranial nerve deficit or sensory deficit. He exhibits normal muscle tone. He displays no seizure activity. Coordination and gait normal.   Skin: Skin is warm, dry and intact. No rash noted. He is not diaphoretic.   Psychiatric: He has a normal mood and affect. His speech is normal and behavior is normal. Judgment and thought content normal. Cognition and memory are normal. He is attentive.       Assessment & Plan    1. Essential hypertension  Patient was counseled and encouraged to maintain a low sodium diet, as well as increasing physical activity.  Recommend random BP checks at home on a regular basis.  Repeat BP at end of visit was not necessary. Will continue medication at this time, and follow up in 3-6 months, or sooner if blood pressure begins to increase.     - amLODIPine-valsartan-hcthiazid (EXFORGE HCT) -25 mg Tab; Take 1 tablet by mouth once daily.  Dispense: 90 tablet; Refill: 1  - hydrALAZINE (APRESOLINE) 50 MG tablet; Take 1 tablet (50 mg total) by mouth every 12  (twelve) hours.  Dispense: 180 tablet; Refill: 1  - metoprolol tartrate (LOPRESSOR) 50 MG tablet; Take 1 tablet (50 mg total) by mouth once daily.  Dispense: 90 tablet; Refill: 0    2. Mixed hyperlipidemia  The current medical regimen is effective at this time and no changes to present plan or medications will be made at this visit.     - atorvastatin (LIPITOR) 40 MG tablet; Take 1 tablet (40 mg total) by mouth every evening.  Dispense: 90 tablet; Refill: 1    3. Age-related nuclear cataract of left eye  Schedule for removal next week.  Based on physical exam today, Alexander Duong Jr. is cleared to undergo planned surgery.  I will review results of ordered test and if necessary, discuss with surgeon any appropriate action necessary.  I will submit all necessary clinical information to the surgeon.  Patient is encouraged to follow up after surgery to discuss results and manage as necessary.     4. Primary osteoarthritis involving multiple joints  The current medical regimen is effective at this time and no changes to present plan or medications will be made at this visit.     - acetaminophen-codeine 300-60mg (TYLENOL #4) 300-60 mg Tab; Take 1 tablet by mouth every 8 (eight) hours as needed.  Dispense: 90 tablet; Refill: 2    5. Need for influenza vaccination  Patient requested Flu vaccine, and was consented and vaccine given in office without complication.   - Influenza - High Dose (65+) (PF) (IM)      Follow up documented    ACTIVE MEDICAL ISSUES:  Documented in Problem List    PAST MEDICAL HISTORY  Documented    PAST SURGICAL HISTORY:  Documented    SOCIAL HISTORY:  Documented    FAMILY HISTORY:  Documented    ALLERGIES AND MEDICATIONS: updated and reviewed.  Documented    Health Maintenance       Date Due Completion Date    Influenza Vaccine 08/01/2018 10/23/2017    Override on 4/8/2016: Done (not at this present time)    Override on 10/29/2015: Declined (not at this present time)    Zoster Vaccine 09/01/2020  (Originally 2/11/1998) ---    TETANUS VACCINE 09/01/2020 (Originally 2/11/1958) ---    Pneumococcal Vaccine (65+ Low/Medium Risk) (2 of 2 - PPSV23) 04/09/2019 4/9/2018    Lipid Panel 07/15/2019 7/15/2018    Override on 4/8/2016: Done (future)

## 2019-01-05 DIAGNOSIS — F51.01 PRIMARY INSOMNIA: ICD-10-CM

## 2019-01-07 RX ORDER — TRAZODONE HYDROCHLORIDE 100 MG/1
100 TABLET ORAL NIGHTLY PRN
Qty: 90 TABLET | Refills: 0 | Status: SHIPPED | OUTPATIENT
Start: 2019-01-07 | End: 2019-04-05 | Stop reason: SDUPTHER

## 2019-02-04 ENCOUNTER — HOSPITAL ENCOUNTER (OUTPATIENT)
Dept: RADIOLOGY | Facility: HOSPITAL | Age: 79
Discharge: HOME | End: 2019-02-04
Attending: FAMILY MEDICINE
Payer: MEDICARE

## 2019-02-04 ENCOUNTER — OFFICE VISIT (OUTPATIENT)
Dept: FAMILY MEDICINE | Facility: CLINIC | Age: 79
End: 2019-02-04
Payer: MEDICARE

## 2019-02-04 VITALS
OXYGEN SATURATION: 95 % | DIASTOLIC BLOOD PRESSURE: 78 MMHG | HEART RATE: 80 BPM | SYSTOLIC BLOOD PRESSURE: 120 MMHG | RESPIRATION RATE: 16 BRPM | BODY MASS INDEX: 35.28 KG/M2 | WEIGHT: 232.81 LBS | HEIGHT: 68 IN | TEMPERATURE: 98 F

## 2019-02-04 DIAGNOSIS — R07.9 CHEST PAIN OF UNCERTAIN ETIOLOGY: ICD-10-CM

## 2019-02-04 DIAGNOSIS — E78.2 MIXED HYPERLIPIDEMIA: ICD-10-CM

## 2019-02-04 DIAGNOSIS — I10 ESSENTIAL HYPERTENSION: Primary | ICD-10-CM

## 2019-02-04 DIAGNOSIS — M15.9 PRIMARY OSTEOARTHRITIS INVOLVING MULTIPLE JOINTS: ICD-10-CM

## 2019-02-04 DIAGNOSIS — J41.8 MIXED SIMPLE AND MUCOPURULENT CHRONIC BRONCHITIS: ICD-10-CM

## 2019-02-04 DIAGNOSIS — N18.30 CKD (CHRONIC KIDNEY DISEASE) STAGE 3, GFR 30-59 ML/MIN: ICD-10-CM

## 2019-02-04 DIAGNOSIS — I27.20 PULMONARY HYPERTENSION: ICD-10-CM

## 2019-02-04 PROBLEM — J20.9 CHRONIC BRONCHITIS WITH ACUTE EXACERBATION: Status: ACTIVE | Noted: 2019-02-04

## 2019-02-04 PROBLEM — J42 CHRONIC BRONCHITIS WITH ACUTE EXACERBATION: Status: ACTIVE | Noted: 2019-02-04

## 2019-02-04 PROCEDURE — 99999 PR PBB SHADOW E&M-EST. PATIENT-LVL III: CPT | Mod: PBBFAC,,, | Performed by: FAMILY MEDICINE

## 2019-02-04 PROCEDURE — 99499 UNLISTED E&M SERVICE: CPT | Mod: S$GLB,,, | Performed by: FAMILY MEDICINE

## 2019-02-04 PROCEDURE — 3074F SYST BP LT 130 MM HG: CPT | Mod: CPTII,S$GLB,, | Performed by: FAMILY MEDICINE

## 2019-02-04 PROCEDURE — 71046 X-RAY EXAM CHEST 2 VIEWS: CPT | Mod: 26,,, | Performed by: RADIOLOGY

## 2019-02-04 PROCEDURE — 99499 RISK ADDL DX/OHS AUDIT: ICD-10-PCS | Mod: S$GLB,,, | Performed by: FAMILY MEDICINE

## 2019-02-04 PROCEDURE — 99999 PR PBB SHADOW E&M-EST. PATIENT-LVL III: ICD-10-PCS | Mod: PBBFAC,,, | Performed by: FAMILY MEDICINE

## 2019-02-04 PROCEDURE — 71046 XR CHEST PA AND LATERAL: ICD-10-PCS | Mod: 26,,, | Performed by: RADIOLOGY

## 2019-02-04 PROCEDURE — 3078F PR MOST RECENT DIASTOLIC BLOOD PRESSURE < 80 MM HG: ICD-10-PCS | Mod: CPTII,S$GLB,, | Performed by: FAMILY MEDICINE

## 2019-02-04 PROCEDURE — 3074F PR MOST RECENT SYSTOLIC BLOOD PRESSURE < 130 MM HG: ICD-10-PCS | Mod: CPTII,S$GLB,, | Performed by: FAMILY MEDICINE

## 2019-02-04 PROCEDURE — 1101F PT FALLS ASSESS-DOCD LE1/YR: CPT | Mod: CPTII,S$GLB,, | Performed by: FAMILY MEDICINE

## 2019-02-04 PROCEDURE — 3078F DIAST BP <80 MM HG: CPT | Mod: CPTII,S$GLB,, | Performed by: FAMILY MEDICINE

## 2019-02-04 PROCEDURE — 1101F PR PT FALLS ASSESS DOC 0-1 FALLS W/OUT INJ PAST YR: ICD-10-PCS | Mod: CPTII,S$GLB,, | Performed by: FAMILY MEDICINE

## 2019-02-04 PROCEDURE — 71046 X-RAY EXAM CHEST 2 VIEWS: CPT | Mod: TC,FY,PO

## 2019-02-04 PROCEDURE — 99214 PR OFFICE/OUTPT VISIT, EST, LEVL IV, 30-39 MIN: ICD-10-PCS | Mod: S$GLB,,, | Performed by: FAMILY MEDICINE

## 2019-02-04 PROCEDURE — 99214 OFFICE O/P EST MOD 30 MIN: CPT | Mod: S$GLB,,, | Performed by: FAMILY MEDICINE

## 2019-02-04 RX ORDER — ALBUTEROL SULFATE 90 UG/1
AEROSOL, METERED RESPIRATORY (INHALATION)
Qty: 18 INHALER | Refills: 5 | Status: SHIPPED | OUTPATIENT
Start: 2019-02-04 | End: 2019-09-20 | Stop reason: SDUPTHER

## 2019-02-04 NOTE — PROGRESS NOTES
Chief Complaint   Patient presents with    Chest Pain    Arthritis       Alexander Duong Jr. is a 78 y.o. male who presents per the Chief Complaint.  Pt is known to me and was last seen by me on 12/4/2018.  All known chronic medical issues have been documented.       Chest Pain    Pertinent negatives include no abdominal pain, back pain, cough, diaphoresis, dizziness, fever, headaches, malaise/fatigue, nausea, orthopnea, palpitations, PND, shortness of breath, vomiting or weakness.   Pertinent negatives for past medical history include no PVD, no seizures, no sleep apnea and no thyroid problem.   Arthritis   Presents for follow-up visit. He reports no joint swelling. The symptoms have been improving. Affected locations include the left knee, right knee and neck. His pain is at a severity of 5/10. Pertinent negatives include no diarrhea, dry eyes, dry mouth, dysuria, fatigue, fever, pain at night, pain while resting, rash, Raynaud's syndrome, uveitis or weight loss.   Hypertension   This is a chronic problem. The current episode started more than 1 year ago. The problem has been waxing and waning since onset. The problem is uncontrolled. Associated symptoms include chest pain and neck pain. Pertinent negatives include no anxiety, blurred vision, headaches, malaise/fatigue, orthopnea, palpitations, peripheral edema, PND, shortness of breath or sweats. There are no associated agents to hypertension. Risk factors for coronary artery disease include male gender, obesity, dyslipidemia and sedentary lifestyle. Past treatments include diuretics, calcium channel blockers, angiotensin blockers and direct vasodilators. The current treatment provides moderate improvement. Compliance problems include diet and exercise.  There is no history of angina, kidney disease, CAD/MI, CVA, heart failure, left ventricular hypertrophy, PVD or retinopathy. There is no history of chronic renal disease, coarctation of the aorta,  "hyperaldosteronism, hypercortisolism, hyperparathyroidism, a hypertension causing med, pheochromocytoma, renovascular disease, sleep apnea or a thyroid problem.        ROS  Review of Systems   Constitutional: Negative.  Negative for activity change, appetite change, chills, diaphoresis, fatigue, fever, malaise/fatigue, unexpected weight change and weight loss.   HENT: Negative.  Negative for congestion, ear pain, hearing loss, nosebleeds, postnasal drip, rhinorrhea, sinus pressure, sneezing, sore throat and trouble swallowing.    Eyes: Negative for blurred vision, pain and visual disturbance.   Respiratory: Negative for cough, choking and shortness of breath.    Cardiovascular: Positive for chest pain. Negative for palpitations, orthopnea, leg swelling and PND.   Gastrointestinal: Negative for abdominal pain, constipation, diarrhea, nausea and vomiting.   Genitourinary: Positive for frequency. Negative for decreased urine volume, difficulty urinating, discharge, dysuria, genital sores, hematuria, penile pain, penile swelling, scrotal swelling, testicular pain and urgency.   Musculoskeletal: Positive for arthralgias, arthritis and neck pain. Negative for back pain, gait problem, joint swelling and myalgias.   Skin: Negative.  Negative for rash.   Allergic/Immunologic: Negative for environmental allergies and food allergies.   Neurological: Negative.  Negative for dizziness, seizures, syncope, weakness, light-headedness and headaches.   Psychiatric/Behavioral: Negative.  Negative for confusion, decreased concentration, dysphoric mood and sleep disturbance. The patient is not nervous/anxious.        Physical Exam  Vitals:    02/04/19 1330   BP: 120/78   Pulse:    Resp:    Temp:     Body mass index is 35.4 kg/m².  Weight: 105.6 kg (232 lb 12.9 oz)   Height: 5' 8" (172.7 cm)     Physical Exam   Constitutional: He is oriented to person, place, and time. He appears well-developed and well-nourished. He is active and " cooperative.  Non-toxic appearance. He does not have a sickly appearance. He does not appear ill. No distress.   HENT:   Head: Normocephalic and atraumatic.   Right Ear: Hearing and external ear normal. No decreased hearing is noted.   Left Ear: Hearing and external ear normal. No decreased hearing is noted.   Nose: Nose normal. No rhinorrhea or nasal deformity.   Mouth/Throat: Uvula is midline and oropharynx is clear and moist. He does not have dentures. Normal dentition.   Eyes: Conjunctivae, EOM and lids are normal. Pupils are equal, round, and reactive to light. Right eye exhibits no chemosis, no discharge and no exudate. No foreign body present in the right eye. Left eye exhibits no chemosis, no discharge and no exudate. No foreign body present in the left eye. No scleral icterus.   Neck: Normal range of motion and full passive range of motion without pain. Neck supple.   Cardiovascular: Normal rate, regular rhythm, S1 normal, S2 normal and normal heart sounds. Exam reveals no gallop and no friction rub.   No murmur heard.  Pulmonary/Chest: Effort normal and breath sounds normal. No accessory muscle usage. No respiratory distress. He has no decreased breath sounds. He has no wheezes. He has no rhonchi. He has no rales.   Abdominal: Soft. Normal appearance. He exhibits no distension. There is no hepatosplenomegaly. There is no tenderness. There is no rigidity, no rebound and no guarding.   Musculoskeletal:        Right hip: He exhibits tenderness. He exhibits normal range of motion, normal strength, no bony tenderness, no swelling, no crepitus, no deformity and no laceration.        Left hip: He exhibits tenderness. He exhibits normal range of motion, normal strength, no bony tenderness, no swelling, no crepitus, no deformity and no laceration.        Right knee: He exhibits decreased range of motion. He exhibits no swelling, no effusion, no ecchymosis, no deformity, no laceration, no erythema, normal alignment,  no LCL laxity, no bony tenderness, normal meniscus and no MCL laxity. Tenderness found. Patellar tendon tenderness noted. No MCL and no LCL tenderness noted.        Left knee: He exhibits decreased range of motion. He exhibits no swelling, no effusion, no ecchymosis, no deformity, no laceration, no erythema, normal alignment, no LCL laxity, normal patellar mobility, no bony tenderness, normal meniscus and no MCL laxity. Tenderness found. Patellar tendon tenderness noted. No medial joint line, no lateral joint line, no MCL and no LCL tenderness noted.   Neurological: He is alert and oriented to person, place, and time. He has normal strength. No cranial nerve deficit or sensory deficit. He exhibits normal muscle tone. He displays no seizure activity. Coordination and gait normal.   Skin: Skin is warm, dry and intact. No rash noted. He is not diaphoretic.   Psychiatric: He has a normal mood and affect. His speech is normal and behavior is normal. Judgment and thought content normal. Cognition and memory are normal. He is attentive.       Assessment & Plan    1. Essential hypertension  Patient was counseled and encouraged to maintain a low sodium diet, as well as increasing physical activity.  Recommend random BP checks at home on a regular basis.  Repeat BP at end of visit was improved; larger cuff used. Will continue medication at this time, and follow up in 3-6 months, or sooner if blood pressure begins to increase.     - CBC auto differential; Future  - Comprehensive metabolic panel; Future    2. Chest pain of uncertain etiology  No cardiac symptoms; blood test ordered to confirm.  Likely MSK or pulmonary origin.  Will order imaging to further evaluate and manage accordingly.   - Troponin I; Future  - X-Ray Chest PA And Lateral; Future    3. Mixed hyperlipidemia  Screening test will be ordered and once results available patient will be notified of results and managed accordingly.    - Lipid panel; Future    4.  Mixed simple and mucopurulent chronic bronchitis  Medication prescribed to treat current symptoms; advised to use medication only as symptoms require.   - albuterol (VENTOLIN HFA) 90 mcg/actuation inhaler; INHALE 2 PUFFS INTO THE LUNGS EVERY 6 HOURS AS NEEDED FOR WHEEZING  Dispense: 18 Inhaler; Refill: 5    5. Pulmonary hypertension  Stable; no therapeutic changes at this time.     6. Primary osteoarthritis involving multiple joints  Stable; no therapeutic changes at this time.     7. CKD (chronic kidney disease) stage 3, GFR 30-59 ml/min  Screening test will be ordered and once results available patient will be notified of results and managed accordingly.    - Comprehensive metabolic panel; Future      Follow up documented    ACTIVE MEDICAL ISSUES:  Documented in Problem List    PAST MEDICAL HISTORY  Documented    PAST SURGICAL HISTORY:  Documented    SOCIAL HISTORY:  Documented    FAMILY HISTORY:  Documented    ALLERGIES AND MEDICATIONS: updated and reviewed.  Documented    Health Maintenance       Date Due Completion Date    TETANUS VACCINE [birth year]/1956 ---    Zoster Vaccine 02/11/1998 ---    Pneumococcal Vaccine (65+ Low/Medium Risk) (2 of 2 - PPSV23) 04/09/2019 4/9/2018    Lipid Panel 07/15/2019 7/15/2018    Override on 4/8/2016: Done (future)

## 2019-02-07 NOTE — PROGRESS NOTES
Patient, Alexander Duong Jr. (MRN #[id number]), presented with a recorded BMI of 35.4 kg/m^2 and a documented comorbidity(s):  - Hypertension  - Hyperlipidemia  to which the severe obesity is a contributing factor. This is consistent with the definition of severe obesity (BMI 35.0-39.9) with comorbidity (ICD-10 E66.01, Z68.35). The patient's severe obesity was monitored, evaluated, addressed and/or treated. This addendum to the medical record is made on 02/07/2019.

## 2019-04-05 DIAGNOSIS — F51.01 PRIMARY INSOMNIA: ICD-10-CM

## 2019-04-08 RX ORDER — TRAZODONE HYDROCHLORIDE 100 MG/1
100 TABLET ORAL NIGHTLY PRN
Qty: 90 TABLET | Refills: 1 | Status: SHIPPED | OUTPATIENT
Start: 2019-04-08 | End: 2019-10-02 | Stop reason: SDUPTHER

## 2019-05-07 ENCOUNTER — OFFICE VISIT (OUTPATIENT)
Dept: FAMILY MEDICINE | Facility: CLINIC | Age: 79
End: 2019-05-07
Payer: MEDICARE

## 2019-05-07 VITALS
OXYGEN SATURATION: 96 % | TEMPERATURE: 98 F | RESPIRATION RATE: 16 BRPM | HEIGHT: 68 IN | SYSTOLIC BLOOD PRESSURE: 128 MMHG | HEART RATE: 75 BPM | BODY MASS INDEX: 35.15 KG/M2 | WEIGHT: 231.94 LBS | DIASTOLIC BLOOD PRESSURE: 70 MMHG

## 2019-05-07 DIAGNOSIS — Z23 NEED FOR PNEUMOCOCCAL VACCINATION: ICD-10-CM

## 2019-05-07 DIAGNOSIS — R07.9 CHEST PAIN, UNSPECIFIED TYPE: Primary | ICD-10-CM

## 2019-05-07 DIAGNOSIS — M54.42 CHRONIC BILATERAL LOW BACK PAIN WITH LEFT-SIDED SCIATICA: ICD-10-CM

## 2019-05-07 DIAGNOSIS — G89.29 CHRONIC BILATERAL LOW BACK PAIN WITH LEFT-SIDED SCIATICA: ICD-10-CM

## 2019-05-07 DIAGNOSIS — B35.1 ONYCHOMYCOSIS: ICD-10-CM

## 2019-05-07 PROCEDURE — 3078F PR MOST RECENT DIASTOLIC BLOOD PRESSURE < 80 MM HG: ICD-10-PCS | Mod: CPTII,S$GLB,, | Performed by: PHYSICIAN ASSISTANT

## 2019-05-07 PROCEDURE — 99999 PR PBB SHADOW E&M-EST. PATIENT-LVL V: CPT | Mod: PBBFAC,,, | Performed by: PHYSICIAN ASSISTANT

## 2019-05-07 PROCEDURE — 99999 PR PBB SHADOW E&M-EST. PATIENT-LVL V: ICD-10-PCS | Mod: PBBFAC,,, | Performed by: PHYSICIAN ASSISTANT

## 2019-05-07 PROCEDURE — 93010 ELECTROCARDIOGRAM REPORT: CPT | Mod: S$GLB,,, | Performed by: INTERNAL MEDICINE

## 2019-05-07 PROCEDURE — 1101F PR PT FALLS ASSESS DOC 0-1 FALLS W/OUT INJ PAST YR: ICD-10-PCS | Mod: CPTII,S$GLB,, | Performed by: PHYSICIAN ASSISTANT

## 2019-05-07 PROCEDURE — 3078F DIAST BP <80 MM HG: CPT | Mod: CPTII,S$GLB,, | Performed by: PHYSICIAN ASSISTANT

## 2019-05-07 PROCEDURE — 1101F PT FALLS ASSESS-DOCD LE1/YR: CPT | Mod: CPTII,S$GLB,, | Performed by: PHYSICIAN ASSISTANT

## 2019-05-07 PROCEDURE — 3074F SYST BP LT 130 MM HG: CPT | Mod: CPTII,S$GLB,, | Performed by: PHYSICIAN ASSISTANT

## 2019-05-07 PROCEDURE — 3074F PR MOST RECENT SYSTOLIC BLOOD PRESSURE < 130 MM HG: ICD-10-PCS | Mod: CPTII,S$GLB,, | Performed by: PHYSICIAN ASSISTANT

## 2019-05-07 PROCEDURE — 93010 EKG 12-LEAD: ICD-10-PCS | Mod: S$GLB,,, | Performed by: INTERNAL MEDICINE

## 2019-05-07 PROCEDURE — 93005 EKG 12-LEAD: ICD-10-PCS | Mod: S$GLB,,, | Performed by: PHYSICIAN ASSISTANT

## 2019-05-07 PROCEDURE — 93005 ELECTROCARDIOGRAM TRACING: CPT | Mod: S$GLB,,, | Performed by: PHYSICIAN ASSISTANT

## 2019-05-07 PROCEDURE — 90732 PNEUMOCOCCAL POLYSACCHARIDE VACCINE 23-VALENT =>2YO SQ IM: ICD-10-PCS | Mod: S$GLB,,, | Performed by: PHYSICIAN ASSISTANT

## 2019-05-07 PROCEDURE — 90732 PPSV23 VACC 2 YRS+ SUBQ/IM: CPT | Mod: S$GLB,,, | Performed by: PHYSICIAN ASSISTANT

## 2019-05-07 PROCEDURE — G0009 PNEUMOCOCCAL POLYSACCHARIDE VACCINE 23-VALENT =>2YO SQ IM: ICD-10-PCS | Mod: S$GLB,,, | Performed by: PHYSICIAN ASSISTANT

## 2019-05-07 PROCEDURE — 99214 OFFICE O/P EST MOD 30 MIN: CPT | Mod: 25,S$GLB,, | Performed by: PHYSICIAN ASSISTANT

## 2019-05-07 PROCEDURE — 99214 PR OFFICE/OUTPT VISIT, EST, LEVL IV, 30-39 MIN: ICD-10-PCS | Mod: 25,S$GLB,, | Performed by: PHYSICIAN ASSISTANT

## 2019-05-07 PROCEDURE — G0009 ADMIN PNEUMOCOCCAL VACCINE: HCPCS | Mod: S$GLB,,, | Performed by: PHYSICIAN ASSISTANT

## 2019-05-07 RX ORDER — CICLOPIROX 80 MG/ML
SOLUTION TOPICAL NIGHTLY
Qty: 6.6 ML | Refills: 0 | Status: SHIPPED | OUTPATIENT
Start: 2019-05-07 | End: 2019-05-14 | Stop reason: SDUPTHER

## 2019-05-07 RX ORDER — GABAPENTIN 100 MG/1
100 CAPSULE ORAL 3 TIMES DAILY PRN
Qty: 60 CAPSULE | Refills: 0 | Status: SHIPPED | OUTPATIENT
Start: 2019-05-07 | End: 2019-05-23 | Stop reason: SDUPTHER

## 2019-05-07 NOTE — PROGRESS NOTES
Pt tolerated pneumonia 23 vaccine to right deltoid without difficulty; no adverse reaction noted; VIS given

## 2019-05-07 NOTE — PROGRESS NOTES
Subjective:       Patient ID: Alexander Duong Jr. is a 79 y.o. male.    Chief Complaint: Leg Pain; Chest Pain; and Nail Problem    Chest Pain    This is a new problem. The current episode started 1 to 4 weeks ago. The onset quality is gradual. The problem occurs every several days (lasts couple seconds). The pain is present in the lateral region. The quality of the pain is described as sharp. Associated symptoms include back pain, dizziness, exertional chest pressure, leg pain and numbness (left leg foot). Pertinent negatives include no diaphoresis, irregular heartbeat, nausea, palpitations or shortness of breath. The pain is aggravated by movement. He has tried nothing for the symptoms. Risk factors include male gender, obesity and being elderly.   His past medical history is significant for hypertension.   Pertinent negatives for past medical history include no CHF and no MI.   Pertinent negatives for family medical history include: no early MI. Prior diagnostic workup includes exercise treadmill test.     Nail problem: darken and thickness of nails on both hands. Over 1 year. Gradually worsening. No pain.   Social History     Socioeconomic History    Marital status: Single     Spouse name: Not on file    Number of children: Not on file    Years of education: Not on file    Highest education level: Not on file   Occupational History    Not on file   Social Needs    Financial resource strain: Not on file    Food insecurity:     Worry: Not on file     Inability: Not on file    Transportation needs:     Medical: Not on file     Non-medical: Not on file   Tobacco Use    Smoking status: Former Smoker     Years: 15.00    Smokeless tobacco: Never Used    Tobacco comment: Quit 15 years ago   Substance and Sexual Activity    Alcohol use: Yes     Alcohol/week: 0.6 oz     Types: 1 Standard drinks or equivalent per week     Comment: sometimes beer or crown royal    Drug use: No    Sexual activity: Not Currently    Lifestyle    Physical activity:     Days per week: Not on file     Minutes per session: Not on file    Stress: Not on file   Relationships    Social connections:     Talks on phone: Not on file     Gets together: Not on file     Attends [employer] service: Not on file     Active member of club or organization: Not on file     Attends meetings of clubs or organizations: Not on file     Relationship status: Not on file   Other Topics Concern    Not on file   Social History Narrative    Not on file       Review of Systems   Constitutional: Negative for diaphoresis.   Respiratory: Negative for shortness of breath.    Cardiovascular: Positive for chest pain and leg swelling. Negative for palpitations.   Gastrointestinal: Negative for nausea.   Musculoskeletal: Positive for back pain and myalgias.   Neurological: Positive for dizziness and numbness (left leg foot).       Objective:      Physical Exam   Constitutional: He is oriented to person, place, and time. He appears well-developed and well-nourished.   HENT:   Head: Normocephalic and atraumatic.   Cardiovascular: Normal rate and regular rhythm.   Pulmonary/Chest: Effort normal and breath sounds normal.   Musculoskeletal:        Right hip: He exhibits decreased range of motion. He exhibits normal strength.        Left hip: He exhibits decreased range of motion. He exhibits normal strength.        Lumbar back: He exhibits normal range of motion, no tenderness and no bony tenderness.        Hands:  Neurological: He is alert and oriented to person, place, and time.   Psychiatric: He has a normal mood and affect. His behavior is normal.   Vitals reviewed.      Assessment:       1. Chest pain, unspecified type    2. Onychomycosis    3. Need for pneumococcal vaccination    4. Chronic bilateral low back pain with left-sided sciatica        Plan:         Alexander was seen today for leg pain, chest pain and nail problem.    Diagnoses and all orders for this visit:    Chest  pain, unspecified type  -     IN OFFICE EKG 12-LEAD (to Muse)  -     If persists will refer to cardiology    Onychomycosis  -     ciclopirox (PENLAC) 8 % Soln; Apply topically nightly. To affected nail    Need for pneumococcal vaccination  -     Pneumococcal Polysaccharide Vaccine (23 Valent) (SQ/IM)    Chronic bilateral low back pain with left-sided sciatica  -     gabapentin (NEURONTIN) 100 MG capsule; Take 1 capsule (100 mg total) by mouth 3 (three) times daily as needed. For low back and leg pain

## 2019-05-07 NOTE — PROGRESS NOTES
Diabetes Panel-Poor Control (A1c Every 3 Months)- will get today         Pneumococcal Vaccine (65+ Low/Medium Risk)- will get today

## 2019-05-08 ENCOUNTER — TELEPHONE (OUTPATIENT)
Dept: FAMILY MEDICINE | Facility: CLINIC | Age: 79
End: 2019-05-08

## 2019-05-08 NOTE — TELEPHONE ENCOUNTER
----- Message from Madie Dubose sent at 5/8/2019 12:30 PM CDT -----  Contact: Alexander [phone]  Type: Patient Call Back    Who called:Alexander    What is the request in detail: The patient is requesting a call back from the staff. He reports that on his visit with  on yesterday, she was supposed to call him something in to the pharmacy for his nails    Can the clinic reply by [facility]?no    Would the patient rather a call back or a response via [facility]? Call back    Best call back number:[phone]

## 2019-05-08 NOTE — TELEPHONE ENCOUNTER
Spoke with patient was told that medication was sent to [facility] pharmacy [place] % and gabapentin , patietn verbalized understand .

## 2019-05-14 DIAGNOSIS — B35.1 ONYCHOMYCOSIS: ICD-10-CM

## 2019-05-14 RX ORDER — CICLOPIROX 80 MG/ML
SOLUTION TOPICAL NIGHTLY
Qty: 6.6 ML | Refills: 0 | Status: SHIPPED | OUTPATIENT
Start: 2019-05-14 | End: 2021-05-31

## 2019-05-23 DIAGNOSIS — G89.29 CHRONIC BILATERAL LOW BACK PAIN WITH LEFT-SIDED SCIATICA: ICD-10-CM

## 2019-05-23 DIAGNOSIS — M54.42 CHRONIC BILATERAL LOW BACK PAIN WITH LEFT-SIDED SCIATICA: ICD-10-CM

## 2019-05-24 RX ORDER — GABAPENTIN 100 MG/1
100 CAPSULE ORAL 3 TIMES DAILY PRN
Qty: 60 CAPSULE | Refills: 0 | Status: SHIPPED | OUTPATIENT
Start: 2019-05-24 | End: 2019-06-15 | Stop reason: SDUPTHER

## 2019-06-15 DIAGNOSIS — M54.42 CHRONIC BILATERAL LOW BACK PAIN WITH LEFT-SIDED SCIATICA: ICD-10-CM

## 2019-06-15 DIAGNOSIS — G89.29 CHRONIC BILATERAL LOW BACK PAIN WITH LEFT-SIDED SCIATICA: ICD-10-CM

## 2019-06-17 RX ORDER — GABAPENTIN 100 MG/1
100 CAPSULE ORAL 3 TIMES DAILY PRN
Qty: 60 CAPSULE | Refills: 0 | Status: SHIPPED | OUTPATIENT
Start: 2019-06-17 | End: 2019-07-02 | Stop reason: SDUPTHER

## 2019-07-02 DIAGNOSIS — I10 ESSENTIAL HYPERTENSION: ICD-10-CM

## 2019-07-02 DIAGNOSIS — M54.42 CHRONIC BILATERAL LOW BACK PAIN WITH LEFT-SIDED SCIATICA: ICD-10-CM

## 2019-07-02 DIAGNOSIS — G89.29 CHRONIC BILATERAL LOW BACK PAIN WITH LEFT-SIDED SCIATICA: ICD-10-CM

## 2019-07-02 RX ORDER — METOPROLOL TARTRATE 50 MG/1
TABLET ORAL
Qty: 90 TABLET | Refills: 0 | Status: SHIPPED | OUTPATIENT
Start: 2019-07-02 | End: 2019-11-18 | Stop reason: SDUPTHER

## 2019-07-03 RX ORDER — GABAPENTIN 100 MG/1
CAPSULE ORAL
Qty: 60 CAPSULE | Refills: 2 | Status: SHIPPED | OUTPATIENT
Start: 2019-07-03 | End: 2019-09-13 | Stop reason: SDUPTHER

## 2019-08-01 DIAGNOSIS — I10 ESSENTIAL HYPERTENSION: ICD-10-CM

## 2019-08-02 RX ORDER — ASPIRIN 81 MG/1
81 TABLET ORAL DAILY
Qty: 90 TABLET | Refills: 3 | Status: SHIPPED | OUTPATIENT
Start: 2019-08-02 | End: 2020-08-19

## 2019-08-28 ENCOUNTER — TELEPHONE (OUTPATIENT)
Dept: FAMILY MEDICINE | Facility: CLINIC | Age: 79
End: 2019-08-28

## 2019-08-28 NOTE — TELEPHONE ENCOUNTER
"Pt wanting to know why Dr.Lombard had not sent in refill on "pain" medication.  Let pt know I did not see any specific pain meds in chart.  States he has no idea on name of med.  I said maybe gabapentin, he said that might be it because he knows he was taking in 3 times daily.  I let him know that you should have 2 refills left on that medication and should just be able to request refill from pharmacy.  He states understanding and will give us a call if he has any trouble.   "

## 2019-08-28 NOTE — TELEPHONE ENCOUNTER
----- Message from Lio Gabriel sent at 8/28/2019  3:06 PM CDT -----  Contact: Self: [phone]  Type: Patient Call Back    Who called:Self    What is the request in detail:Patient wants to know why the Doctor has not refilled his pain medication. He doesn't know the name os the mediaction    Can the clinic reply by [facility]? No    Would the patient rather a call back or a response via [facility]? Callback    Best call back number:[phone]    Additional Information:N/A

## 2019-09-13 DIAGNOSIS — G89.29 CHRONIC BILATERAL LOW BACK PAIN WITH LEFT-SIDED SCIATICA: ICD-10-CM

## 2019-09-13 DIAGNOSIS — M54.42 CHRONIC BILATERAL LOW BACK PAIN WITH LEFT-SIDED SCIATICA: ICD-10-CM

## 2019-09-13 RX ORDER — GABAPENTIN 100 MG/1
CAPSULE ORAL
Qty: 60 CAPSULE | Refills: 2 | Status: SHIPPED | OUTPATIENT
Start: 2019-09-13 | End: 2019-11-29 | Stop reason: SDUPTHER

## 2019-09-20 DIAGNOSIS — J41.8 MIXED SIMPLE AND MUCOPURULENT CHRONIC BRONCHITIS: ICD-10-CM

## 2019-09-23 RX ORDER — ALBUTEROL SULFATE 90 UG/1
AEROSOL, METERED RESPIRATORY (INHALATION)
Qty: 18 INHALER | Refills: 5 | Status: SHIPPED | OUTPATIENT
Start: 2019-09-23 | End: 2021-05-26 | Stop reason: SDUPTHER

## 2019-10-02 DIAGNOSIS — F51.01 PRIMARY INSOMNIA: ICD-10-CM

## 2019-10-02 RX ORDER — TRAZODONE HYDROCHLORIDE 100 MG/1
100 TABLET ORAL NIGHTLY PRN
Qty: 90 TABLET | Refills: 1 | Status: SHIPPED | OUTPATIENT
Start: 2019-10-02 | End: 2020-04-07 | Stop reason: SDUPTHER

## 2019-10-30 DIAGNOSIS — I10 ESSENTIAL HYPERTENSION: ICD-10-CM

## 2019-10-30 RX ORDER — AMLODIPINE, VALSARTAN AND HYDROCHLOROTHIAZIDE 10; 320; 25 MG/1; MG/1; MG/1
TABLET ORAL
Qty: 90 TABLET | Refills: 1 | Status: SHIPPED | OUTPATIENT
Start: 2019-10-30 | End: 2020-04-23 | Stop reason: SDUPTHER

## 2019-11-18 DIAGNOSIS — I10 ESSENTIAL HYPERTENSION: ICD-10-CM

## 2019-11-21 RX ORDER — METOPROLOL TARTRATE 50 MG/1
TABLET ORAL
Qty: 90 TABLET | Refills: 0 | Status: SHIPPED | OUTPATIENT
Start: 2019-11-21 | End: 2020-02-26

## 2019-11-29 DIAGNOSIS — M54.42 CHRONIC BILATERAL LOW BACK PAIN WITH LEFT-SIDED SCIATICA: ICD-10-CM

## 2019-11-29 DIAGNOSIS — G89.29 CHRONIC BILATERAL LOW BACK PAIN WITH LEFT-SIDED SCIATICA: ICD-10-CM

## 2019-12-02 RX ORDER — GABAPENTIN 100 MG/1
CAPSULE ORAL
Qty: 60 CAPSULE | Refills: 2 | Status: SHIPPED | OUTPATIENT
Start: 2019-12-02 | End: 2020-01-27

## 2019-12-17 DIAGNOSIS — I10 ESSENTIAL HYPERTENSION: ICD-10-CM

## 2019-12-17 RX ORDER — HYDRALAZINE HYDROCHLORIDE 50 MG/1
50 TABLET, FILM COATED ORAL EVERY 12 HOURS
Qty: 180 TABLET | Refills: 1 | Status: SHIPPED | OUTPATIENT
Start: 2019-12-17 | End: 2020-06-16 | Stop reason: SDUPTHER

## 2020-01-13 DIAGNOSIS — E78.2 MIXED HYPERLIPIDEMIA: ICD-10-CM

## 2020-01-15 RX ORDER — ATORVASTATIN CALCIUM 40 MG/1
TABLET, FILM COATED ORAL
Qty: 90 TABLET | Refills: 1 | Status: SHIPPED | OUTPATIENT
Start: 2020-01-15 | End: 2020-12-08 | Stop reason: SDUPTHER

## 2020-01-25 DIAGNOSIS — G89.29 CHRONIC BILATERAL LOW BACK PAIN WITH LEFT-SIDED SCIATICA: ICD-10-CM

## 2020-01-25 DIAGNOSIS — M54.42 CHRONIC BILATERAL LOW BACK PAIN WITH LEFT-SIDED SCIATICA: ICD-10-CM

## 2020-01-27 RX ORDER — GABAPENTIN 100 MG/1
CAPSULE ORAL
Qty: 60 CAPSULE | Refills: 2 | Status: SHIPPED | OUTPATIENT
Start: 2020-01-27 | End: 2020-03-21

## 2020-02-14 DIAGNOSIS — I10 ESSENTIAL HYPERTENSION: ICD-10-CM

## 2020-02-25 DIAGNOSIS — I10 ESSENTIAL HYPERTENSION: ICD-10-CM

## 2020-02-26 RX ORDER — METOPROLOL TARTRATE 50 MG/1
TABLET ORAL
Qty: 90 TABLET | Refills: 0 | Status: SHIPPED | OUTPATIENT
Start: 2020-02-26 | End: 2020-05-25

## 2020-03-21 DIAGNOSIS — M54.42 CHRONIC BILATERAL LOW BACK PAIN WITH LEFT-SIDED SCIATICA: ICD-10-CM

## 2020-03-21 DIAGNOSIS — G89.29 CHRONIC BILATERAL LOW BACK PAIN WITH LEFT-SIDED SCIATICA: ICD-10-CM

## 2020-03-21 RX ORDER — GABAPENTIN 100 MG/1
CAPSULE ORAL
Qty: 60 CAPSULE | Refills: 2 | Status: SHIPPED | OUTPATIENT
Start: 2020-03-21 | End: 2020-05-27

## 2020-04-07 DIAGNOSIS — F51.01 PRIMARY INSOMNIA: ICD-10-CM

## 2020-04-07 RX ORDER — TRAZODONE HYDROCHLORIDE 100 MG/1
100 TABLET ORAL NIGHTLY PRN
Qty: 90 TABLET | Refills: 1 | Status: SHIPPED | OUTPATIENT
Start: 2020-04-07 | End: 2020-10-02

## 2020-04-23 DIAGNOSIS — I10 ESSENTIAL HYPERTENSION: ICD-10-CM

## 2020-04-23 RX ORDER — AMLODIPINE, VALSARTAN AND HYDROCHLOROTHIAZIDE 10; 320; 25 MG/1; MG/1; MG/1
1 TABLET ORAL DAILY
Qty: 90 TABLET | Refills: 0 | Status: SHIPPED | OUTPATIENT
Start: 2020-04-23 | End: 2020-07-24

## 2020-04-23 NOTE — TELEPHONE ENCOUNTER
Last Office Visit Info:   The patient's last visit with Azikiwe K Lombard, MD was on 2/4/2019.    The patient's last visit in current department was on Visit date not found.        Last CBC Results:   Lab Results   Component Value Date    WBC 6.93 02/04/2019    HGB 11.3 (L) 02/04/2019    HCT 35.7 (L) 02/04/2019     02/04/2019       Last CMP Results  Lab Results   Component Value Date     02/04/2019    K 4.4 02/04/2019     02/04/2019    CO2 29 02/04/2019    BUN 19 02/04/2019    CREATININE 1.7 (H) 02/04/2019    CALCIUM 9.7 02/04/2019    ALBUMIN 3.9 02/04/2019    AST 26 02/04/2019    ALT 19 02/04/2019       Last Lipids  Lab Results   Component Value Date    CHOL 108 (L) 02/04/2019    TRIG 86 02/04/2019    HDL 25 (L) 02/04/2019    LDLCALC 65.8 02/04/2019       Last A1C  Lab Results   Component Value Date    HGBA1C 5.0 04/09/2018       Last TSH  No results found for: TSH      Current Med Refills  Medication List with Changes/Refills   Current Medications    ACETAMINOPHEN-CODEINE 300-60MG (TYLENOL #4) 300-60 MG TAB    Take 1 tablet by mouth every 8 (eight) hours as needed.       Start Date: 12/4/2018 End Date: --    ALBUTEROL (PROVENTIL/VENTOLIN HFA) 90 MCG/ACTUATION INHALER    TAKE 2 PUFFS BY MOUTH EVERY 6 HOURS AS NEEDED FOR WHEEZE       Start Date: 9/23/2019 End Date: --    AMLODIPINE-VALSARTAN-HCTHIAZID (EXFORGE HCT) -25 MG TAB    TAKE 1 TABLET BY MOUTH EVERY DAY       Start Date: 10/30/2019End Date: --    ASPIRIN (ECOTRIN) 81 MG EC TABLET    TAKE 1 TABLET (81 MG TOTAL) BY MOUTH ONCE DAILY.       Start Date: 8/2/2019  End Date: 8/1/2020    ATORVASTATIN (LIPITOR) 40 MG TABLET    TAKE 1 TABLET BY MOUTH EVERY DAY IN THE EVENING       Start Date: 1/15/2020 End Date: --    CICLOPIROX (PENLAC) 8 % SOLN    Apply topically nightly. To affected nail       Start Date: 5/14/2019 End Date: 6/23/2019    GABAPENTIN (NEURONTIN) 100 MG CAPSULE    TAKE 1 CAPSULE BY MOUTH 3 TIMES A DAY AS NEEDED FOR LOWER BACK  AND LEG PAIN       Start Date: 3/21/2020 End Date: --    HYDRALAZINE (APRESOLINE) 50 MG TABLET    TAKE 1 TABLET (50 MG TOTAL) BY MOUTH EVERY 12 (TWELVE) HOURS.       Start Date: 12/17/2019End Date: 12/16/2020    LEVALBUTEROL (XOPENEX) 1.25 MG/3 ML NEBULIZER SOLUTION    Take 3 mLs (1.25 mg total) by nebulization every 8 (eight) hours as needed for Wheezing.       Start Date: 1/30/2018 End Date: 5/7/2019    METOPROLOL TARTRATE (LOPRESSOR) 50 MG TABLET    TAKE 1 TABLET BY MOUTH EVERY DAY       Start Date: 2/26/2020 End Date: --    PANTOPRAZOLE (PROTONIX) 40 MG TABLET    TAKE 1 TABLET BY MOUTH EVERY DAY       Start Date: 11/8/2018 End Date: --    TRAZODONE (DESYREL) 100 MG TABLET    Take 1 tablet (100 mg total) by mouth nightly as needed for Insomnia.       Start Date: 4/7/2020  End Date: 4/7/2021       Order(s) placed per written order guidelines:     Please advise.

## 2020-05-24 DIAGNOSIS — I10 ESSENTIAL HYPERTENSION: ICD-10-CM

## 2020-05-25 RX ORDER — METOPROLOL TARTRATE 50 MG/1
TABLET ORAL
Qty: 90 TABLET | Refills: 0 | Status: SHIPPED | OUTPATIENT
Start: 2020-05-25 | End: 2020-08-20

## 2020-05-26 DIAGNOSIS — I10 ESSENTIAL HYPERTENSION: Primary | ICD-10-CM

## 2020-05-26 DIAGNOSIS — G89.29 CHRONIC BILATERAL LOW BACK PAIN WITH LEFT-SIDED SCIATICA: ICD-10-CM

## 2020-05-26 DIAGNOSIS — M54.42 CHRONIC BILATERAL LOW BACK PAIN WITH LEFT-SIDED SCIATICA: ICD-10-CM

## 2020-05-27 RX ORDER — GABAPENTIN 100 MG/1
CAPSULE ORAL
Qty: 60 CAPSULE | Refills: 2 | Status: SHIPPED | OUTPATIENT
Start: 2020-05-27 | End: 2020-07-08

## 2020-05-29 ENCOUNTER — LAB VISIT (OUTPATIENT)
Dept: LAB | Facility: HOSPITAL | Age: 80
End: 2020-05-29
Attending: FAMILY MEDICINE
Payer: MEDICARE

## 2020-05-29 DIAGNOSIS — I10 ESSENTIAL HYPERTENSION: ICD-10-CM

## 2020-05-29 LAB
ALBUMIN SERPL BCP-MCNC: 3.8 G/DL (ref 3.5–5.2)
ALP SERPL-CCNC: 70 U/L (ref 55–135)
ALT SERPL W/O P-5'-P-CCNC: 14 U/L (ref 10–44)
ANION GAP SERPL CALC-SCNC: 9 MMOL/L (ref 8–16)
AST SERPL-CCNC: 20 U/L (ref 10–40)
BASOPHILS # BLD AUTO: 0.05 K/UL (ref 0–0.2)
BASOPHILS NFR BLD: 0.7 % (ref 0–1.9)
BILIRUB SERPL-MCNC: 0.5 MG/DL (ref 0.1–1)
BUN SERPL-MCNC: 23 MG/DL (ref 8–23)
CALCIUM SERPL-MCNC: 9.5 MG/DL (ref 8.7–10.5)
CHLORIDE SERPL-SCNC: 105 MMOL/L (ref 95–110)
CHOLEST SERPL-MCNC: 185 MG/DL (ref 120–199)
CHOLEST/HDLC SERPL: 6.4 {RATIO} (ref 2–5)
CO2 SERPL-SCNC: 27 MMOL/L (ref 23–29)
CREAT SERPL-MCNC: 1.9 MG/DL (ref 0.5–1.4)
DIFFERENTIAL METHOD: ABNORMAL
EOSINOPHIL # BLD AUTO: 0.3 K/UL (ref 0–0.5)
EOSINOPHIL NFR BLD: 4.5 % (ref 0–8)
ERYTHROCYTE [DISTWIDTH] IN BLOOD BY AUTOMATED COUNT: 13.3 % (ref 11.5–14.5)
EST. GFR  (AFRICAN AMERICAN): 37.1 ML/MIN/1.73 M^2
EST. GFR  (NON AFRICAN AMERICAN): 32.1 ML/MIN/1.73 M^2
GLUCOSE SERPL-MCNC: 97 MG/DL (ref 70–110)
HCT VFR BLD AUTO: 36 % (ref 40–54)
HDLC SERPL-MCNC: 29 MG/DL (ref 40–75)
HDLC SERPL: 15.7 % (ref 20–50)
HGB BLD-MCNC: 10.7 G/DL (ref 14–18)
IMM GRANULOCYTES # BLD AUTO: 0.01 K/UL (ref 0–0.04)
IMM GRANULOCYTES NFR BLD AUTO: 0.1 % (ref 0–0.5)
LDLC SERPL CALC-MCNC: 134 MG/DL (ref 63–159)
LYMPHOCYTES # BLD AUTO: 2.9 K/UL (ref 1–4.8)
LYMPHOCYTES NFR BLD: 39.6 % (ref 18–48)
MCH RBC QN AUTO: 30.6 PG (ref 27–31)
MCHC RBC AUTO-ENTMCNC: 29.7 G/DL (ref 32–36)
MCV RBC AUTO: 103 FL (ref 82–98)
MONOCYTES # BLD AUTO: 0.7 K/UL (ref 0.3–1)
MONOCYTES NFR BLD: 9.9 % (ref 4–15)
NEUTROPHILS # BLD AUTO: 3.3 K/UL (ref 1.8–7.7)
NEUTROPHILS NFR BLD: 45.2 % (ref 38–73)
NONHDLC SERPL-MCNC: 156 MG/DL
NRBC BLD-RTO: 0 /100 WBC
PLATELET # BLD AUTO: 225 K/UL (ref 150–350)
PMV BLD AUTO: 11 FL (ref 9.2–12.9)
POTASSIUM SERPL-SCNC: 4.2 MMOL/L (ref 3.5–5.1)
PROT SERPL-MCNC: 7.7 G/DL (ref 6–8.4)
RBC # BLD AUTO: 3.5 M/UL (ref 4.6–6.2)
SODIUM SERPL-SCNC: 141 MMOL/L (ref 136–145)
TRIGL SERPL-MCNC: 110 MG/DL (ref 30–150)
WBC # BLD AUTO: 7.28 K/UL (ref 3.9–12.7)

## 2020-05-29 PROCEDURE — 80061 LIPID PANEL: CPT

## 2020-05-29 PROCEDURE — 36415 COLL VENOUS BLD VENIPUNCTURE: CPT | Mod: PO

## 2020-05-29 PROCEDURE — 85025 COMPLETE CBC W/AUTO DIFF WBC: CPT

## 2020-05-29 PROCEDURE — 80053 COMPREHEN METABOLIC PANEL: CPT

## 2020-06-16 DIAGNOSIS — I10 ESSENTIAL HYPERTENSION: ICD-10-CM

## 2020-06-17 RX ORDER — HYDRALAZINE HYDROCHLORIDE 50 MG/1
50 TABLET, FILM COATED ORAL EVERY 12 HOURS
Qty: 180 TABLET | Refills: 1 | Status: SHIPPED | OUTPATIENT
Start: 2020-06-17 | End: 2020-12-08 | Stop reason: SDUPTHER

## 2020-06-17 NOTE — TELEPHONE ENCOUNTER
Trying to notify patient medication was approved ,no one answer  left message to check with pharmacy .

## 2020-06-23 ENCOUNTER — PATIENT OUTREACH (OUTPATIENT)
Dept: ADMINISTRATIVE | Facility: HOSPITAL | Age: 80
End: 2020-06-23

## 2020-07-07 NOTE — PROGRESS NOTES
"Subjective:       Patient ID: Alexander Duong Jr. is a pleasant 80 y.o. Black or  male patient    Chief Complaint: Follow-up      Patient is a new pt to me but established pt from Dr. Lombard.  Last visit in February 19.  ee last notes and list of problems below. He was also also by ESTELA Moody, in May 2019, see her last notes.      HPI     It is not an easy visit as the patient has severe hearing loss.  He reports that he still have lower back pain going into the legs.  He was given gabapentin low-dose.  Pain is mainly during the night.  He also complained of constipation and states that he would like to have a BM once a day, but goes each 3 days. He cannot tell me if it is very hard stools.    Patient Active Problem List   Diagnosis    Primary osteoarthritis involving multiple joints    Acute blood loss anemia    Essential hypertension    CKD (chronic kidney disease) stage 3, GFR 30-59 ml/min    Mixed hyperlipidemia    Pulmonary hypertension    Mixed simple and mucopurulent chronic bronchitis    Chronic bronchitis with acute exacerbation          ACTIVE MEDICAL ISSUES:  Documented in Problem List     PAST MEDICAL HISTORY  Documented     PAST SURGICAL HISTORY:  Documented     SOCIAL HISTORY:  Documented     FAMILY HISTORY:  Documented     ALLERGIES AND MEDICATIONS: updated and reviewed.  Documented    Review of Systems   Constitutional: Negative.    HENT: Negative.    Respiratory: Negative.    Gastrointestinal: Negative for abdominal distention, abdominal pain, blood in stool, diarrhea and nausea.        BM each 3 days.    Musculoskeletal: Positive for back pain.       Objective:          Vitals:    07/08/20 1604   BP: 130/68   BP Location: Right arm   Patient Position: Sitting   BP Method: Large (Manual)   Pulse: 95   Resp: 20   Temp: 97.7 °F (36.5 °C)   TempSrc: Oral   SpO2: (!) 94%   Weight: 103.7 kg (228 lb 9.9 oz)   Height: 5' 8" (1.727 m)     Body mass index is 34.76 kg/m².    PE with no " relevant findings except pain in regard of lower back when stands, up, has to do this slowly.    RESULTS: Reviewed labs from last 12  months    Assessment:       1. Essential hypertension    2. Constipation, unspecified constipation type    3. Primary osteoarthritis involving multiple joints        Plan:   Alexander was seen today for follow-up.    Diagnoses and all orders for this visit:    Essential hypertension    BP at goal, same treatment.    Constipation, unspecified constipation type  -     polyethylene glycol (GLYCOLAX) 17 gram PwPk; Take 17 g by mouth once daily. To use PRN    Stated to be a new issue.  I tried to explain to the pt that it is fine to have a BM each 3 days but major concern for the pt. I will give him Glycolax to use PRN, advised to drink plenty of fluid and to eat more fruits and vegetables.    Primary osteoarthritis involving multiple joints  -     gabapentin (NEURONTIN) 300 MG capsule; Take 1 capsule (300 mg total) by mouth daily as needed. To take QHS PRN    Not a new issue.  Stated to be related to severe osteoarthritis.  I do not see a suspicion of spinal stenosis in former notes.  Rather than taking gabapentin 100 mg 3 times a day, I will ask him to take 300 mg in the evening.  Patient will follow-up with the Dr. Lombard.    Follow up in about 2 months (around 9/8/2020) for f-up BP, and other issues Dr. Lombard.

## 2020-07-08 ENCOUNTER — OFFICE VISIT (OUTPATIENT)
Dept: FAMILY MEDICINE | Facility: CLINIC | Age: 80
End: 2020-07-08
Payer: MEDICARE

## 2020-07-08 VITALS
SYSTOLIC BLOOD PRESSURE: 130 MMHG | DIASTOLIC BLOOD PRESSURE: 68 MMHG | HEIGHT: 68 IN | OXYGEN SATURATION: 94 % | TEMPERATURE: 98 F | BODY MASS INDEX: 34.65 KG/M2 | HEART RATE: 95 BPM | WEIGHT: 228.63 LBS | RESPIRATION RATE: 20 BRPM

## 2020-07-08 DIAGNOSIS — I10 ESSENTIAL HYPERTENSION: Primary | ICD-10-CM

## 2020-07-08 DIAGNOSIS — M15.9 PRIMARY OSTEOARTHRITIS INVOLVING MULTIPLE JOINTS: ICD-10-CM

## 2020-07-08 DIAGNOSIS — K59.00 CONSTIPATION, UNSPECIFIED CONSTIPATION TYPE: ICD-10-CM

## 2020-07-08 PROCEDURE — 1101F PR PT FALLS ASSESS DOC 0-1 FALLS W/OUT INJ PAST YR: ICD-10-PCS | Mod: CPTII,S$GLB,, | Performed by: INTERNAL MEDICINE

## 2020-07-08 PROCEDURE — 99499 UNLISTED E&M SERVICE: CPT | Mod: S$GLB,,, | Performed by: INTERNAL MEDICINE

## 2020-07-08 PROCEDURE — 3078F DIAST BP <80 MM HG: CPT | Mod: CPTII,S$GLB,, | Performed by: INTERNAL MEDICINE

## 2020-07-08 PROCEDURE — 1159F MED LIST DOCD IN RCRD: CPT | Mod: S$GLB,,, | Performed by: INTERNAL MEDICINE

## 2020-07-08 PROCEDURE — 1125F AMNT PAIN NOTED PAIN PRSNT: CPT | Mod: S$GLB,,, | Performed by: INTERNAL MEDICINE

## 2020-07-08 PROCEDURE — 1125F PR PAIN SEVERITY QUANTIFIED, PAIN PRESENT: ICD-10-PCS | Mod: S$GLB,,, | Performed by: INTERNAL MEDICINE

## 2020-07-08 PROCEDURE — 3075F PR MOST RECENT SYSTOLIC BLOOD PRESS GE 130-139MM HG: ICD-10-PCS | Mod: CPTII,S$GLB,, | Performed by: INTERNAL MEDICINE

## 2020-07-08 PROCEDURE — 3075F SYST BP GE 130 - 139MM HG: CPT | Mod: CPTII,S$GLB,, | Performed by: INTERNAL MEDICINE

## 2020-07-08 PROCEDURE — 1159F PR MEDICATION LIST DOCUMENTED IN MEDICAL RECORD: ICD-10-PCS | Mod: S$GLB,,, | Performed by: INTERNAL MEDICINE

## 2020-07-08 PROCEDURE — 99214 OFFICE O/P EST MOD 30 MIN: CPT | Mod: S$GLB,,, | Performed by: INTERNAL MEDICINE

## 2020-07-08 PROCEDURE — 99999 PR PBB SHADOW E&M-EST. PATIENT-LVL IV: CPT | Mod: PBBFAC,,, | Performed by: INTERNAL MEDICINE

## 2020-07-08 PROCEDURE — 99499 RISK ADDL DX/OHS AUDIT: ICD-10-PCS | Mod: S$GLB,,, | Performed by: INTERNAL MEDICINE

## 2020-07-08 PROCEDURE — 1101F PT FALLS ASSESS-DOCD LE1/YR: CPT | Mod: CPTII,S$GLB,, | Performed by: INTERNAL MEDICINE

## 2020-07-08 PROCEDURE — 99214 PR OFFICE/OUTPT VISIT, EST, LEVL IV, 30-39 MIN: ICD-10-PCS | Mod: S$GLB,,, | Performed by: INTERNAL MEDICINE

## 2020-07-08 PROCEDURE — 99999 PR PBB SHADOW E&M-EST. PATIENT-LVL IV: ICD-10-PCS | Mod: PBBFAC,,, | Performed by: INTERNAL MEDICINE

## 2020-07-08 PROCEDURE — 3078F PR MOST RECENT DIASTOLIC BLOOD PRESSURE < 80 MM HG: ICD-10-PCS | Mod: CPTII,S$GLB,, | Performed by: INTERNAL MEDICINE

## 2020-07-08 RX ORDER — GABAPENTIN 300 MG/1
300 CAPSULE ORAL DAILY PRN
Qty: 30 CAPSULE | Refills: 2 | Status: SHIPPED | OUTPATIENT
Start: 2020-07-08 | End: 2020-10-02

## 2020-07-08 RX ORDER — POLYETHYLENE GLYCOL 3350 17 G/17G
17 POWDER, FOR SOLUTION ORAL DAILY
Qty: 14 EACH | Refills: 2 | Status: SHIPPED | OUTPATIENT
Start: 2020-07-08 | End: 2020-12-08 | Stop reason: SDUPTHER

## 2020-09-08 ENCOUNTER — OFFICE VISIT (OUTPATIENT)
Dept: FAMILY MEDICINE | Facility: CLINIC | Age: 80
End: 2020-09-08
Payer: MEDICARE

## 2020-09-08 VITALS
TEMPERATURE: 98 F | OXYGEN SATURATION: 95 % | HEIGHT: 68 IN | DIASTOLIC BLOOD PRESSURE: 70 MMHG | RESPIRATION RATE: 20 BRPM | WEIGHT: 228.38 LBS | SYSTOLIC BLOOD PRESSURE: 136 MMHG | BODY MASS INDEX: 34.61 KG/M2 | HEART RATE: 75 BPM

## 2020-09-08 DIAGNOSIS — M54.42 CHRONIC BILATERAL LOW BACK PAIN WITH LEFT-SIDED SCIATICA: ICD-10-CM

## 2020-09-08 DIAGNOSIS — Z23 NEED FOR IMMUNIZATION AGAINST INFLUENZA: ICD-10-CM

## 2020-09-08 DIAGNOSIS — N18.30 CKD (CHRONIC KIDNEY DISEASE) STAGE 3, GFR 30-59 ML/MIN: ICD-10-CM

## 2020-09-08 DIAGNOSIS — K59.00 CONSTIPATION, UNSPECIFIED CONSTIPATION TYPE: ICD-10-CM

## 2020-09-08 DIAGNOSIS — I27.20 PULMONARY HYPERTENSION: ICD-10-CM

## 2020-09-08 DIAGNOSIS — G89.29 CHRONIC BILATERAL LOW BACK PAIN WITH LEFT-SIDED SCIATICA: ICD-10-CM

## 2020-09-08 PROBLEM — E66.01 MORBID (SEVERE) OBESITY DUE TO EXCESS CALORIES: Status: ACTIVE | Noted: 2020-09-08

## 2020-09-08 PROCEDURE — 1101F PR PT FALLS ASSESS DOC 0-1 FALLS W/OUT INJ PAST YR: ICD-10-PCS | Mod: CPTII,S$GLB,, | Performed by: INTERNAL MEDICINE

## 2020-09-08 PROCEDURE — 99999 PR PBB SHADOW E&M-EST. PATIENT-LVL IV: CPT | Mod: PBBFAC,,, | Performed by: INTERNAL MEDICINE

## 2020-09-08 PROCEDURE — 90694 FLU VACCINE - QUADRIVALENT - ADJUVANTED: ICD-10-PCS | Mod: S$GLB,,, | Performed by: INTERNAL MEDICINE

## 2020-09-08 PROCEDURE — 99499 RISK ADDL DX/OHS AUDIT: ICD-10-PCS | Mod: S$GLB,,, | Performed by: INTERNAL MEDICINE

## 2020-09-08 PROCEDURE — 1126F AMNT PAIN NOTED NONE PRSNT: CPT | Mod: S$GLB,,, | Performed by: INTERNAL MEDICINE

## 2020-09-08 PROCEDURE — 3075F SYST BP GE 130 - 139MM HG: CPT | Mod: CPTII,S$GLB,, | Performed by: INTERNAL MEDICINE

## 2020-09-08 PROCEDURE — 3078F PR MOST RECENT DIASTOLIC BLOOD PRESSURE < 80 MM HG: ICD-10-PCS | Mod: CPTII,S$GLB,, | Performed by: INTERNAL MEDICINE

## 2020-09-08 PROCEDURE — G0008 PR ADMIN INFLUENZA VIRUS VAC: ICD-10-PCS | Mod: S$GLB,,, | Performed by: INTERNAL MEDICINE

## 2020-09-08 PROCEDURE — 1159F PR MEDICATION LIST DOCUMENTED IN MEDICAL RECORD: ICD-10-PCS | Mod: S$GLB,,, | Performed by: INTERNAL MEDICINE

## 2020-09-08 PROCEDURE — 99499 UNLISTED E&M SERVICE: CPT | Mod: S$GLB,,, | Performed by: INTERNAL MEDICINE

## 2020-09-08 PROCEDURE — G0008 ADMIN INFLUENZA VIRUS VAC: HCPCS | Mod: S$GLB,,, | Performed by: INTERNAL MEDICINE

## 2020-09-08 PROCEDURE — 99214 PR OFFICE/OUTPT VISIT, EST, LEVL IV, 30-39 MIN: ICD-10-PCS | Mod: 25,S$GLB,, | Performed by: INTERNAL MEDICINE

## 2020-09-08 PROCEDURE — 1126F PR PAIN SEVERITY QUANTIFIED, NO PAIN PRESENT: ICD-10-PCS | Mod: S$GLB,,, | Performed by: INTERNAL MEDICINE

## 2020-09-08 PROCEDURE — 1101F PT FALLS ASSESS-DOCD LE1/YR: CPT | Mod: CPTII,S$GLB,, | Performed by: INTERNAL MEDICINE

## 2020-09-08 PROCEDURE — 99999 PR PBB SHADOW E&M-EST. PATIENT-LVL IV: ICD-10-PCS | Mod: PBBFAC,,, | Performed by: INTERNAL MEDICINE

## 2020-09-08 PROCEDURE — 99214 OFFICE O/P EST MOD 30 MIN: CPT | Mod: 25,S$GLB,, | Performed by: INTERNAL MEDICINE

## 2020-09-08 PROCEDURE — 1159F MED LIST DOCD IN RCRD: CPT | Mod: S$GLB,,, | Performed by: INTERNAL MEDICINE

## 2020-09-08 PROCEDURE — 3078F DIAST BP <80 MM HG: CPT | Mod: CPTII,S$GLB,, | Performed by: INTERNAL MEDICINE

## 2020-09-08 PROCEDURE — 3075F PR MOST RECENT SYSTOLIC BLOOD PRESS GE 130-139MM HG: ICD-10-PCS | Mod: CPTII,S$GLB,, | Performed by: INTERNAL MEDICINE

## 2020-09-08 PROCEDURE — 90694 VACC AIIV4 NO PRSRV 0.5ML IM: CPT | Mod: S$GLB,,, | Performed by: INTERNAL MEDICINE

## 2020-09-08 NOTE — PROGRESS NOTES
Pt tolerated flu vaccine to right deltoid without difficulty; no adverse reaction noted; VIS given

## 2020-09-08 NOTE — PROGRESS NOTES
Health Maintenance Due   Topic     Shingles Vaccine (1 of 2)     Influenza Vaccine (1) Ok to get

## 2020-09-08 NOTE — PROGRESS NOTES
Subjective:       Patient ID: Alexander Duong Jr. is a pleasant 80 y.o. Black or  male patient    Chief Complaint: Follow-up      Patient is a pt I saw last on 8th July 2020.  See my last note list of problems below.    HPI    He comes today stating that he feels better.  He has less back pain and no more constipation.  It is not an easy visit as patient has very poor hearing.  However he states that he only comes today to have a flu shot.    Patient Active Problem List   Diagnosis    Primary osteoarthritis involving multiple joints    Acute blood loss anemia    Essential hypertension    CKD (chronic kidney disease) stage 3, GFR 30-59 ml/min    Mixed hyperlipidemia    Pulmonary hypertension    Mixed simple and mucopurulent chronic bronchitis    Chronic bronchitis with acute exacerbation    Morbid (severe) obesity due to excess calories          ACTIVE MEDICAL ISSUES:  Documented in Problem List     PAST MEDICAL HISTORY  Documented     PAST SURGICAL HISTORY:  Documented     SOCIAL HISTORY:  Documented     FAMILY HISTORY:  Documented     ALLERGIES AND MEDICATIONS: updated and reviewed.  Documented    Review of Systems   Constitutional: Negative.    HENT: Positive for hearing loss.    Respiratory: Negative.    Gastrointestinal: Negative for abdominal distention, abdominal pain, blood in stool, diarrhea and nausea.        BM each 3 days.    Musculoskeletal: Positive for back pain (better).       Objective:      Physical Exam  Vitals signs and nursing note reviewed.   Constitutional:       Appearance: Normal appearance.   HENT:      Ears:      Comments: Bilateral severe hearing loss     Nose: No congestion or rhinorrhea.   Cardiovascular:      Rate and Rhythm: Normal rate and regular rhythm.      Pulses: Normal pulses.      Heart sounds: Normal heart sounds.   Pulmonary:      Effort: Pulmonary effort is normal.      Breath sounds: Normal breath sounds.   Musculoskeletal: Normal range of motion.  "  Skin:     General: Skin is warm and dry.   Neurological:      Mental Status: He is alert.         Vitals:    09/08/20 1020   BP: 136/70   BP Location: Left arm   Patient Position: Sitting   BP Method: Large (Manual)   Pulse: 75   Resp: 20   Temp: 97.9 °F (36.6 °C)   TempSrc: Oral   SpO2: 95%   Weight: 103.6 kg (228 lb 6.3 oz)   Height: 5' 8" (1.727 m)     Body mass index is 34.73 kg/m².    RESULTS: Reviewed labs from last 12 months    Assessment:       1. Constipation, unspecified constipation type    2. Chronic bilateral low back pain with left-sided sciatica    3. Pulmonary hypertension    4. CKD (chronic kidney disease) stage 3, GFR 30-59 ml/min    5. Need for immunization against influenza        Plan:   Alexander was seen today for follow-up.    Diagnoses and all orders for this visit:    Constipation, unspecified constipation type    Doing better, patient has no complaint at this point.    Chronic bilateral low back pain with left-sided sciatica    Better with increase of gabapentin.    Pulmonary hypertension    As per TTE done in 2018, pressure was 58.      CKD (chronic kidney disease) stage 3, GFR 30-59 ml/min    Stable, patient does not take any NSAIDs.    Need for immunization against influenza  -     Influenza - Quadrivalent (Adjuvanted)    Administered today.    Follow up in about 3 months (around 12/8/2020) for f-up with Dr. Lombard.    This note was created by combination of typed  and M-Modal dictation.  Transcription errors may be present.  If there are any questions, please contact me.  "

## 2020-11-03 DIAGNOSIS — I10 ESSENTIAL HYPERTENSION: ICD-10-CM

## 2020-11-03 RX ORDER — AMLODIPINE, VALSARTAN AND HYDROCHLOROTHIAZIDE 10; 320; 25 MG/1; MG/1; MG/1
1 TABLET ORAL DAILY
Qty: 90 TABLET | Refills: 0 | Status: SHIPPED | OUTPATIENT
Start: 2020-11-03 | End: 2020-12-08 | Stop reason: SDUPTHER

## 2020-11-03 NOTE — TELEPHONE ENCOUNTER
Last Office Visit Info:   The patient's last visit with Azikiwe K Lombard, MD was on 2/4/2019.    The patient's last visit in current department was on 9/8/2020.        Last CBC Results:   Lab Results   Component Value Date    WBC 7.28 05/29/2020    HGB 10.7 (L) 05/29/2020    HCT 36.0 (L) 05/29/2020     05/29/2020       Last CMP Results  Lab Results   Component Value Date     05/29/2020    K 4.2 05/29/2020     05/29/2020    CO2 27 05/29/2020    BUN 23 05/29/2020    CREATININE 1.9 (H) 05/29/2020    CALCIUM 9.5 05/29/2020    ALBUMIN 3.8 05/29/2020    AST 20 05/29/2020    ALT 14 05/29/2020       Last Lipids  Lab Results   Component Value Date    CHOL 185 05/29/2020    TRIG 110 05/29/2020    HDL 29 (L) 05/29/2020    LDLCALC 134.0 05/29/2020       Last A1C  Lab Results   Component Value Date    HGBA1C 5.0 04/09/2018       Last TSH  No results found for: TSH      Current Med Refills  Medication List with Changes/Refills   Current Medications    ACETAMINOPHEN-CODEINE 300-60MG (TYLENOL #4) 300-60 MG TAB    Take 1 tablet by mouth every 8 (eight) hours as needed.       Start Date: 12/4/2018 End Date: --    ALBUTEROL (PROVENTIL/VENTOLIN HFA) 90 MCG/ACTUATION INHALER    TAKE 2 PUFFS BY MOUTH EVERY 6 HOURS AS NEEDED FOR WHEEZE       Start Date: 9/23/2019 End Date: --    AMLODIPINE-VALSARTAN-HCTHIAZID (EXFORGE HCT) -25 MG TAB    TAKE 1 TABLET BY MOUTH EVERY DAY       Start Date: 7/24/2020 End Date: --    ASPIRIN (ECOTRIN) 81 MG EC TABLET    TAKE 1 TABLET (81 MG TOTAL) BY MOUTH ONCE DAILY.       Start Date: 8/19/2020 End Date: 8/19/2021    ATORVASTATIN (LIPITOR) 40 MG TABLET    TAKE 1 TABLET BY MOUTH EVERY DAY IN THE EVENING       Start Date: 1/15/2020 End Date: --    CICLOPIROX (PENLAC) 8 % SOLN    Apply topically nightly. To affected nail       Start Date: 5/14/2019 End Date: 9/8/2020    GABAPENTIN (NEURONTIN) 300 MG CAPSULE    TAKE 1 CAPSULE (300 MG TOTAL) BY MOUTH DAILY AS NEEDED       Start Date:  10/2/2020 End Date: --    HYDRALAZINE (APRESOLINE) 50 MG TABLET    Take 1 tablet (50 mg total) by mouth every 12 (twelve) hours.       Start Date: 6/17/2020 End Date: 6/17/2021    LEVALBUTEROL (XOPENEX) 1.25 MG/3 ML NEBULIZER SOLUTION    Take 3 mLs (1.25 mg total) by nebulization every 8 (eight) hours as needed for Wheezing.       Start Date: 1/30/2018 End Date: 9/8/2020    METOPROLOL TARTRATE (LOPRESSOR) 50 MG TABLET    TAKE 1 TABLET BY MOUTH EVERY DAY       Start Date: 8/20/2020 End Date: --    PANTOPRAZOLE (PROTONIX) 40 MG TABLET    TAKE 1 TABLET BY MOUTH EVERY DAY       Start Date: 11/8/2018 End Date: --    POLYETHYLENE GLYCOL (GLYCOLAX) 17 GRAM PWPK    Take 17 g by mouth once daily. To use PRN       Start Date: 7/8/2020  End Date: --    TRAZODONE (DESYREL) 100 MG TABLET    TAKE 1 TABLET (100 MG TOTAL) BY MOUTH NIGHTLY AS NEEDED FOR INSOMNIA.       Start Date: 10/2/2020 End Date: 10/2/2021       Order(s) placed per written order guidelines:     Please advise.

## 2020-12-08 ENCOUNTER — OFFICE VISIT (OUTPATIENT)
Dept: FAMILY MEDICINE | Facility: CLINIC | Age: 80
End: 2020-12-08
Payer: MEDICARE

## 2020-12-08 ENCOUNTER — LAB VISIT (OUTPATIENT)
Dept: LAB | Facility: HOSPITAL | Age: 80
End: 2020-12-08
Attending: FAMILY MEDICINE
Payer: MEDICARE

## 2020-12-08 VITALS
OXYGEN SATURATION: 95 % | HEIGHT: 68 IN | TEMPERATURE: 98 F | DIASTOLIC BLOOD PRESSURE: 80 MMHG | WEIGHT: 235.25 LBS | SYSTOLIC BLOOD PRESSURE: 136 MMHG | HEART RATE: 120 BPM | BODY MASS INDEX: 35.65 KG/M2 | RESPIRATION RATE: 16 BRPM

## 2020-12-08 DIAGNOSIS — I10 ESSENTIAL HYPERTENSION: ICD-10-CM

## 2020-12-08 DIAGNOSIS — R00.0 TACHYCARDIA WITH HYPERTENSION: ICD-10-CM

## 2020-12-08 DIAGNOSIS — E66.01 MORBID (SEVERE) OBESITY DUE TO EXCESS CALORIES: ICD-10-CM

## 2020-12-08 DIAGNOSIS — J41.8 MIXED SIMPLE AND MUCOPURULENT CHRONIC BRONCHITIS: ICD-10-CM

## 2020-12-08 DIAGNOSIS — I10 ESSENTIAL HYPERTENSION: Primary | ICD-10-CM

## 2020-12-08 DIAGNOSIS — N18.32 STAGE 3B CHRONIC KIDNEY DISEASE: ICD-10-CM

## 2020-12-08 DIAGNOSIS — M54.16 CHRONIC LUMBAR RADICULOPATHY: ICD-10-CM

## 2020-12-08 DIAGNOSIS — K59.01 SLOW TRANSIT CONSTIPATION: ICD-10-CM

## 2020-12-08 DIAGNOSIS — E78.2 MIXED HYPERLIPIDEMIA: ICD-10-CM

## 2020-12-08 DIAGNOSIS — Z12.11 SCREEN FOR COLON CANCER: ICD-10-CM

## 2020-12-08 DIAGNOSIS — I10 TACHYCARDIA WITH HYPERTENSION: ICD-10-CM

## 2020-12-08 DIAGNOSIS — M15.9 PRIMARY OSTEOARTHRITIS INVOLVING MULTIPLE JOINTS: ICD-10-CM

## 2020-12-08 LAB
ANION GAP SERPL CALC-SCNC: 9 MMOL/L (ref 8–16)
BUN SERPL-MCNC: 24 MG/DL (ref 8–23)
CALCIUM SERPL-MCNC: 8.8 MG/DL (ref 8.7–10.5)
CHLORIDE SERPL-SCNC: 102 MMOL/L (ref 95–110)
CHOLEST SERPL-MCNC: 198 MG/DL (ref 120–199)
CHOLEST/HDLC SERPL: 8.3 {RATIO} (ref 2–5)
CO2 SERPL-SCNC: 29 MMOL/L (ref 23–29)
CREAT SERPL-MCNC: 2.1 MG/DL (ref 0.5–1.4)
EST. GFR  (AFRICAN AMERICAN): 32.9 ML/MIN/1.73 M^2
EST. GFR  (NON AFRICAN AMERICAN): 28.5 ML/MIN/1.73 M^2
GLUCOSE SERPL-MCNC: 121 MG/DL (ref 70–110)
HDLC SERPL-MCNC: 24 MG/DL (ref 40–75)
HDLC SERPL: 12.1 % (ref 20–50)
LDLC SERPL CALC-MCNC: 141.2 MG/DL (ref 63–159)
NONHDLC SERPL-MCNC: 174 MG/DL
POTASSIUM SERPL-SCNC: 4.1 MMOL/L (ref 3.5–5.1)
SODIUM SERPL-SCNC: 140 MMOL/L (ref 136–145)
TRIGL SERPL-MCNC: 164 MG/DL (ref 30–150)

## 2020-12-08 PROCEDURE — 1101F PR PT FALLS ASSESS DOC 0-1 FALLS W/OUT INJ PAST YR: ICD-10-PCS | Mod: CPTII,S$GLB,, | Performed by: FAMILY MEDICINE

## 2020-12-08 PROCEDURE — 3288F FALL RISK ASSESSMENT DOCD: CPT | Mod: CPTII,S$GLB,, | Performed by: FAMILY MEDICINE

## 2020-12-08 PROCEDURE — 80061 LIPID PANEL: CPT

## 2020-12-08 PROCEDURE — 3079F DIAST BP 80-89 MM HG: CPT | Mod: CPTII,S$GLB,, | Performed by: FAMILY MEDICINE

## 2020-12-08 PROCEDURE — 3075F PR MOST RECENT SYSTOLIC BLOOD PRESS GE 130-139MM HG: ICD-10-PCS | Mod: CPTII,S$GLB,, | Performed by: FAMILY MEDICINE

## 2020-12-08 PROCEDURE — 99214 OFFICE O/P EST MOD 30 MIN: CPT | Mod: S$GLB,,, | Performed by: FAMILY MEDICINE

## 2020-12-08 PROCEDURE — 1159F PR MEDICATION LIST DOCUMENTED IN MEDICAL RECORD: ICD-10-PCS | Mod: S$GLB,,, | Performed by: FAMILY MEDICINE

## 2020-12-08 PROCEDURE — 80048 BASIC METABOLIC PNL TOTAL CA: CPT

## 2020-12-08 PROCEDURE — 36415 COLL VENOUS BLD VENIPUNCTURE: CPT | Mod: PO

## 2020-12-08 PROCEDURE — 1125F PR PAIN SEVERITY QUANTIFIED, PAIN PRESENT: ICD-10-PCS | Mod: S$GLB,,, | Performed by: FAMILY MEDICINE

## 2020-12-08 PROCEDURE — 3079F PR MOST RECENT DIASTOLIC BLOOD PRESSURE 80-89 MM HG: ICD-10-PCS | Mod: CPTII,S$GLB,, | Performed by: FAMILY MEDICINE

## 2020-12-08 PROCEDURE — 99214 PR OFFICE/OUTPT VISIT, EST, LEVL IV, 30-39 MIN: ICD-10-PCS | Mod: S$GLB,,, | Performed by: FAMILY MEDICINE

## 2020-12-08 PROCEDURE — 3075F SYST BP GE 130 - 139MM HG: CPT | Mod: CPTII,S$GLB,, | Performed by: FAMILY MEDICINE

## 2020-12-08 PROCEDURE — 99999 PR PBB SHADOW E&M-EST. PATIENT-LVL V: CPT | Mod: PBBFAC,,, | Performed by: FAMILY MEDICINE

## 2020-12-08 PROCEDURE — 99499 RISK ADDL DX/OHS AUDIT: ICD-10-PCS | Mod: S$GLB,,, | Performed by: FAMILY MEDICINE

## 2020-12-08 PROCEDURE — 1101F PT FALLS ASSESS-DOCD LE1/YR: CPT | Mod: CPTII,S$GLB,, | Performed by: FAMILY MEDICINE

## 2020-12-08 PROCEDURE — 3288F PR FALLS RISK ASSESSMENT DOCUMENTED: ICD-10-PCS | Mod: CPTII,S$GLB,, | Performed by: FAMILY MEDICINE

## 2020-12-08 PROCEDURE — 1125F AMNT PAIN NOTED PAIN PRSNT: CPT | Mod: S$GLB,,, | Performed by: FAMILY MEDICINE

## 2020-12-08 PROCEDURE — 99499 UNLISTED E&M SERVICE: CPT | Mod: S$GLB,,, | Performed by: FAMILY MEDICINE

## 2020-12-08 PROCEDURE — 1159F MED LIST DOCD IN RCRD: CPT | Mod: S$GLB,,, | Performed by: FAMILY MEDICINE

## 2020-12-08 PROCEDURE — 99999 PR PBB SHADOW E&M-EST. PATIENT-LVL V: ICD-10-PCS | Mod: PBBFAC,,, | Performed by: FAMILY MEDICINE

## 2020-12-08 RX ORDER — POLYETHYLENE GLYCOL 3350 17 G/17G
17 POWDER, FOR SOLUTION ORAL DAILY
Qty: 14 EACH | Refills: 2 | Status: SHIPPED | OUTPATIENT
Start: 2020-12-08 | End: 2021-05-26 | Stop reason: SDUPTHER

## 2020-12-08 RX ORDER — GABAPENTIN 300 MG/1
CAPSULE ORAL
Qty: 90 CAPSULE | Refills: 1 | Status: SHIPPED | OUTPATIENT
Start: 2020-12-08 | End: 2021-02-10 | Stop reason: SDUPTHER

## 2020-12-08 RX ORDER — HYDRALAZINE HYDROCHLORIDE 50 MG/1
50 TABLET, FILM COATED ORAL EVERY 12 HOURS
Qty: 180 TABLET | Refills: 1 | Status: SHIPPED | OUTPATIENT
Start: 2020-12-08 | End: 2021-05-31 | Stop reason: SDUPTHER

## 2020-12-08 RX ORDER — AMLODIPINE, VALSARTAN AND HYDROCHLOROTHIAZIDE 10; 320; 25 MG/1; MG/1; MG/1
1 TABLET ORAL DAILY
Qty: 90 TABLET | Refills: 0 | Status: SHIPPED | OUTPATIENT
Start: 2020-12-08 | End: 2021-02-10 | Stop reason: SDUPTHER

## 2020-12-08 RX ORDER — METOPROLOL TARTRATE 50 MG/1
50 TABLET ORAL DAILY
Qty: 90 TABLET | Refills: 1 | Status: SHIPPED | OUTPATIENT
Start: 2020-12-08 | End: 2021-05-26 | Stop reason: SDUPTHER

## 2020-12-08 RX ORDER — ATORVASTATIN CALCIUM 40 MG/1
40 TABLET, FILM COATED ORAL NIGHTLY
Qty: 90 TABLET | Refills: 1 | Status: SHIPPED | OUTPATIENT
Start: 2020-12-08 | End: 2021-02-10 | Stop reason: SDUPTHER

## 2020-12-08 NOTE — PROGRESS NOTES
Health Maintenance Due   Topic     Shingles Vaccine (1 of 2) No hx chickenpox ; inform pt can get vaccine at pharmacy.    Colonoscopy  Pending cscope orders

## 2020-12-08 NOTE — PROGRESS NOTES
Chief Complaint   Patient presents with    Hypertension     follow up     Numbness       Alexander Duong Jr. is a 80 y.o. male who presents per the Chief Complaint.  Pt is known to me and was last seen by me on 2/4/2019.  All known chronic medical issues have been documented.    Hypertension  This is a chronic problem. The current episode started more than 1 year ago. The problem has been waxing and waning since onset. The problem is uncontrolled. Associated symptoms include neck pain. Pertinent negatives include no anxiety, blurred vision, chest pain, headaches, malaise/fatigue, orthopnea, palpitations, peripheral edema, PND, shortness of breath or sweats. There are no associated agents to hypertension. Risk factors for coronary artery disease include male gender, obesity, dyslipidemia and sedentary lifestyle. Past treatments include diuretics, calcium channel blockers, angiotensin blockers and direct vasodilators. The current treatment provides moderate improvement. Compliance problems include diet and exercise.  There is no history of angina, kidney disease, CAD/MI, CVA, heart failure, left ventricular hypertrophy, PVD or retinopathy. There is no history of chronic renal disease, coarctation of the aorta, hyperaldosteronism, hypercortisolism, hyperparathyroidism, a hypertension causing med, pheochromocytoma, renovascular disease, sleep apnea or a thyroid problem.   Arthritis  Presents for follow-up visit. He complains of joint swelling (both hands; at night and early morning). The symptoms have been improving. Affected locations include the left knee, right knee and neck. His pain is at a severity of 5/10. Pertinent negatives include no diarrhea, dry eyes, dry mouth, dysuria, fatigue, fever, pain at night, pain while resting, rash, Raynaud's syndrome, uveitis or weight loss.     ROS  Review of Systems   Constitutional: Negative.  Negative for activity change, appetite change, chills, diaphoresis, fatigue, fever,  "malaise/fatigue, unexpected weight change and weight loss.   HENT: Negative.  Negative for congestion, ear pain, hearing loss, nosebleeds, postnasal drip, rhinorrhea, sinus pressure, sneezing, sore throat and trouble swallowing.    Eyes: Negative for blurred vision, pain and visual disturbance.   Respiratory: Negative for cough, choking and shortness of breath.    Cardiovascular: Positive for leg swelling (with standing). Negative for chest pain, palpitations, orthopnea and PND.   Gastrointestinal: Positive for abdominal distention, constipation and nausea (occasionally). Negative for abdominal pain, anal bleeding, blood in stool, diarrhea, rectal pain and vomiting.   Genitourinary: Positive for frequency. Negative for decreased urine volume, difficulty urinating, discharge, dysuria, genital sores, hematuria, penile pain, penile swelling, scrotal swelling, testicular pain and urgency.   Musculoskeletal: Positive for arthralgias, arthritis, joint swelling (both hands; at night and early morning) and neck pain. Negative for back pain, gait problem and myalgias.   Skin: Negative.  Negative for rash.   Allergic/Immunologic: Negative for environmental allergies and food allergies.   Neurological: Negative.  Negative for dizziness, seizures, syncope, weakness, light-headedness and headaches.   Psychiatric/Behavioral: Negative.  Negative for confusion, decreased concentration, dysphoric mood and sleep disturbance. The patient is not nervous/anxious.      Physical Exam  Vitals:    12/08/20 0930   BP: 136/80   Pulse: (!) 120   Resp: 16   Temp: 98.2 °F (36.8 °C)    Body mass index is 35.77 kg/m².  Weight: 106.7 kg (235 lb 3.7 oz)   Height: 5' 8" (172.7 cm)     Physical Exam  Constitutional:       General: He is not in acute distress.     Appearance: Normal appearance. He is well-developed. He is not ill-appearing, toxic-appearing or diaphoretic.   HENT:      Head: Normocephalic and atraumatic.      Right Ear: Hearing and " external ear normal. No decreased hearing noted.      Left Ear: Hearing and external ear normal. No decreased hearing noted.      Nose: Nose normal. No nasal deformity or rhinorrhea.      Mouth/Throat:      Dentition: Normal dentition. Does not have dentures.      Pharynx: Uvula midline.   Eyes:      General: Lids are normal. No scleral icterus.        Right eye: No foreign body or discharge.         Left eye: No foreign body or discharge.      Conjunctiva/sclera: Conjunctivae normal.      Right eye: No chemosis or exudate.     Left eye: No chemosis or exudate.     Pupils: Pupils are equal, round, and reactive to light.   Neck:      Musculoskeletal: Full passive range of motion without pain, normal range of motion and neck supple.   Cardiovascular:      Rate and Rhythm: Normal rate and regular rhythm.      Heart sounds: Normal heart sounds, S1 normal and S2 normal. No murmur. No friction rub. No gallop.    Pulmonary:      Effort: Pulmonary effort is normal. No accessory muscle usage or respiratory distress.      Breath sounds: Normal breath sounds. No decreased breath sounds, wheezing, rhonchi or rales.   Abdominal:      General: There is no distension.      Palpations: Abdomen is soft. Abdomen is not rigid.      Tenderness: There is no abdominal tenderness. There is no guarding or rebound.   Musculoskeletal:      Right hip: He exhibits tenderness. He exhibits normal range of motion, normal strength, no bony tenderness, no swelling, no crepitus, no deformity and no laceration.      Left hip: He exhibits tenderness. He exhibits normal range of motion, normal strength, no bony tenderness, no swelling, no crepitus, no deformity and no laceration.      Right knee: He exhibits decreased range of motion. He exhibits no swelling, no effusion, no ecchymosis, no deformity, no laceration, no erythema, normal alignment, no LCL laxity, no bony tenderness, normal meniscus and no MCL laxity. Tenderness found. Patellar tendon  tenderness noted. No MCL and no LCL tenderness noted.      Left knee: He exhibits decreased range of motion. He exhibits no swelling, no effusion, no ecchymosis, no deformity, no laceration, no erythema, normal alignment, no LCL laxity, normal patellar mobility, no bony tenderness, normal meniscus and no MCL laxity. Tenderness found. Patellar tendon tenderness noted. No medial joint line, no lateral joint line, no MCL and no LCL tenderness noted.   Skin:     General: Skin is warm and dry.      Findings: No rash.   Neurological:      Mental Status: He is alert and oriented to person, place, and time.      Cranial Nerves: No cranial nerve deficit.      Sensory: No sensory deficit.      Motor: No abnormal muscle tone or seizure activity.      Coordination: Coordination normal.      Gait: Gait normal.   Psychiatric:         Attention and Perception: He is attentive.         Speech: Speech normal.         Behavior: Behavior normal. Behavior is cooperative.         Thought Content: Thought content normal.         Judgment: Judgment normal.     Assessment & Plan    Discussion of plan of care including treatment options regarding health and wellness were reviewed and discussed with patient.  Any changes to medication or treatment plan, as well as any screening blood test, imaging, or referrals to specialist, are documented.  Follow up as indicated.     1. Essential hypertension  Patient was counseled and encouraged to maintain a low sodium diet, as well as increasing physical activity.  Recommend random BP checks at home on a regular basis.  Repeat BP at end of visit was not necessary. Will continue medication at this time, and follow up in 3-6 months, or sooner if blood pressure begins to increase.     - amLODIPine-valsartan-hcthiazid (EXFORGE HCT) -25 mg Tab; Take 1 tablet by mouth once daily.  Dispense: 90 tablet; Refill: 0  - hydrALAZINE (APRESOLINE) 50 MG tablet; Take 1 tablet (50 mg total) by mouth every 12  (twelve) hours.  Dispense: 180 tablet; Refill: 1  - Basic Metabolic Panel; Future    2. Chronic lumbar radiculopathy  The current medical regimen will be continued at this time as discussed.  Medication prescribed for use only as symptoms require.   - gabapentin (NEURONTIN) 300 MG capsule; TAKE 1 CAPSULE (300 MG TOTAL) BY MOUTH three times a day AS NEEDED  Dispense: 90 capsule; Refill: 1    3. Mixed simple and mucopurulent chronic bronchitis  Stable; no therapeutic changes at this time.     4. Mixed hyperlipidemia  We discussed ways to manage cholesterol levels, including increasing whole grain foods and decreasing fried and fatty foods.  I also recommended OTC Omega 3 and Omega 6 supplements to improve overall cholesterol levels.  Will continue current therapy at this visit and will monitor lipids appropriately.   - atorvastatin (LIPITOR) 40 MG tablet; Take 1 tablet (40 mg total) by mouth every evening.  Dispense: 90 tablet; Refill: 1  - Lipid Panel; Future    5. Slow transit constipation  Patient was advised to eat a high fiber diet and increase fluid hydration.  I also encouraged trying a natural juice like prune or natural apple juice.  A stool softener like colase was also recommended or a bulk laxative like polyethylene glycol or magnesium citrate could be used as well.  If symptoms not resolved, or regular bowel movement not achieved, patient should follow up for further evaluation.   - polyethylene glycol (GLYCOLAX) 17 gram PwPk; Take 17 g by mouth once daily. To use PRN  Dispense: 14 each; Refill: 2    6. Tachycardia with hypertension  The current medical regimen will be continued at this time as discussed.   - metoprolol tartrate (LOPRESSOR) 50 MG tablet; Take 1 tablet (50 mg total) by mouth once daily.  Dispense: 90 tablet; Refill: 1    7. Stage 3b chronic kidney disease  Stable; encouraged patient to avoid NSAID medications and to increase water and clear liquid hydration.  Will continue to monitor for  decline and refer as necessary.   - Basic Metabolic Panel; Future    8. Primary osteoarthritis involving multiple joints  Stable; no therapeutic changes at this time.     9. Morbid (severe) obesity due to excess calories  We discussed weight and safe, effective ways of losing pounds, including low carbohydrate, low fat diet and increasing physical activity to improve cardiovascular performance and increase metabolism.  Patient was encouraged to set realistic attainable goals for weight loss, and we will follow up periodically.     10. Screen for colon cancer  Patient is due for colonoscopy.  Order placed in [facility] and patient will be contacted to schedule appointment.  I will notify patient of results once available.    - Case Request Endoscopy: COLONOSCOPY       Follow up in about 6 months (around 6/8/2021) for Chronic Disease Management.      ACTIVE MEDICAL ISSUES:  Documented in Problem List    PAST MEDICAL HISTORY  Documented    PAST SURGICAL HISTORY:  Documented    SOCIAL HISTORY:  Documented    FAMILY HISTORY:  Documented    ALLERGIES AND MEDICATIONS: updated and reviewed.  Documented    Health Maintenance       Date Due Completion Date    Shingles Vaccine (1 of 2) 02/11/1988 ---    Colonoscopy 02/26/2021 2/26/2016    Lipid Panel 05/29/2021 5/29/2020    Override on 4/8/2016: Done (future)

## 2020-12-09 DIAGNOSIS — R73.09 ELEVATED GLUCOSE: ICD-10-CM

## 2020-12-09 DIAGNOSIS — E78.2 MIXED HYPERLIPIDEMIA: ICD-10-CM

## 2020-12-09 DIAGNOSIS — N18.32 STAGE 3B CHRONIC KIDNEY DISEASE: Primary | ICD-10-CM

## 2020-12-10 ENCOUNTER — TELEPHONE (OUTPATIENT)
Dept: FAMILY MEDICINE | Facility: CLINIC | Age: 80
End: 2020-12-10

## 2020-12-10 NOTE — TELEPHONE ENCOUNTER
----- Message from Azikiwe K. Lombard, MD sent at 12/9/2020 11:56 PM CST -----  Please call patient to schedule follow up in 2 months due to elevated blood sugar and decreased kidney function.  Schedule blood test a few days prior.

## 2021-02-08 ENCOUNTER — LAB VISIT (OUTPATIENT)
Dept: LAB | Facility: HOSPITAL | Age: 81
End: 2021-02-08
Attending: FAMILY MEDICINE
Payer: MEDICARE

## 2021-02-08 DIAGNOSIS — R73.09 ELEVATED GLUCOSE: ICD-10-CM

## 2021-02-08 DIAGNOSIS — N18.32 STAGE 3B CHRONIC KIDNEY DISEASE: ICD-10-CM

## 2021-02-08 DIAGNOSIS — E78.2 MIXED HYPERLIPIDEMIA: ICD-10-CM

## 2021-02-08 LAB
ANION GAP SERPL CALC-SCNC: 6 MMOL/L (ref 8–16)
BUN SERPL-MCNC: 15 MG/DL (ref 8–23)
CALCIUM SERPL-MCNC: 8.8 MG/DL (ref 8.7–10.5)
CHLORIDE SERPL-SCNC: 105 MMOL/L (ref 95–110)
CO2 SERPL-SCNC: 31 MMOL/L (ref 23–29)
CREAT SERPL-MCNC: 1.5 MG/DL (ref 0.5–1.4)
EST. GFR  (AFRICAN AMERICAN): 49.4 ML/MIN/1.73 M^2
EST. GFR  (NON AFRICAN AMERICAN): 42.7 ML/MIN/1.73 M^2
ESTIMATED AVG GLUCOSE: 94 MG/DL (ref 68–131)
GLUCOSE SERPL-MCNC: 105 MG/DL (ref 70–110)
HBA1C MFR BLD: 4.9 % (ref 4–5.6)
POTASSIUM SERPL-SCNC: 4.5 MMOL/L (ref 3.5–5.1)
SODIUM SERPL-SCNC: 142 MMOL/L (ref 136–145)

## 2021-02-08 PROCEDURE — 83036 HEMOGLOBIN GLYCOSYLATED A1C: CPT

## 2021-02-08 PROCEDURE — 36415 COLL VENOUS BLD VENIPUNCTURE: CPT | Mod: PO

## 2021-02-08 PROCEDURE — 80048 BASIC METABOLIC PNL TOTAL CA: CPT

## 2021-02-10 ENCOUNTER — OFFICE VISIT (OUTPATIENT)
Dept: FAMILY MEDICINE | Facility: CLINIC | Age: 81
End: 2021-02-10
Payer: MEDICARE

## 2021-02-10 VITALS
HEIGHT: 68 IN | OXYGEN SATURATION: 95 % | SYSTOLIC BLOOD PRESSURE: 138 MMHG | TEMPERATURE: 98 F | WEIGHT: 232.38 LBS | DIASTOLIC BLOOD PRESSURE: 86 MMHG | BODY MASS INDEX: 35.22 KG/M2 | HEART RATE: 66 BPM | RESPIRATION RATE: 16 BRPM

## 2021-02-10 DIAGNOSIS — N52.1 ERECTILE DYSFUNCTION DUE TO DISEASES CLASSIFIED ELSEWHERE: ICD-10-CM

## 2021-02-10 DIAGNOSIS — F51.01 PRIMARY INSOMNIA: ICD-10-CM

## 2021-02-10 DIAGNOSIS — I27.20 PULMONARY HYPERTENSION: ICD-10-CM

## 2021-02-10 DIAGNOSIS — N32.81 OVERACTIVE BLADDER: ICD-10-CM

## 2021-02-10 DIAGNOSIS — M54.16 CHRONIC LUMBAR RADICULOPATHY: ICD-10-CM

## 2021-02-10 DIAGNOSIS — E78.2 MIXED HYPERLIPIDEMIA: ICD-10-CM

## 2021-02-10 DIAGNOSIS — J41.8 MIXED SIMPLE AND MUCOPURULENT CHRONIC BRONCHITIS: ICD-10-CM

## 2021-02-10 DIAGNOSIS — I10 ESSENTIAL HYPERTENSION: Primary | ICD-10-CM

## 2021-02-10 DIAGNOSIS — E66.01 MORBID (SEVERE) OBESITY DUE TO EXCESS CALORIES: ICD-10-CM

## 2021-02-10 PROCEDURE — 3079F PR MOST RECENT DIASTOLIC BLOOD PRESSURE 80-89 MM HG: ICD-10-PCS | Mod: CPTII,S$GLB,, | Performed by: FAMILY MEDICINE

## 2021-02-10 PROCEDURE — 1159F MED LIST DOCD IN RCRD: CPT | Mod: S$GLB,,, | Performed by: FAMILY MEDICINE

## 2021-02-10 PROCEDURE — 3079F DIAST BP 80-89 MM HG: CPT | Mod: CPTII,S$GLB,, | Performed by: FAMILY MEDICINE

## 2021-02-10 PROCEDURE — 3288F FALL RISK ASSESSMENT DOCD: CPT | Mod: CPTII,S$GLB,, | Performed by: FAMILY MEDICINE

## 2021-02-10 PROCEDURE — 3288F PR FALLS RISK ASSESSMENT DOCUMENTED: ICD-10-PCS | Mod: CPTII,S$GLB,, | Performed by: FAMILY MEDICINE

## 2021-02-10 PROCEDURE — 99499 RISK ADDL DX/OHS AUDIT: ICD-10-PCS | Mod: S$GLB,,, | Performed by: FAMILY MEDICINE

## 2021-02-10 PROCEDURE — 99214 PR OFFICE/OUTPT VISIT, EST, LEVL IV, 30-39 MIN: ICD-10-PCS | Mod: S$GLB,,, | Performed by: FAMILY MEDICINE

## 2021-02-10 PROCEDURE — 1159F PR MEDICATION LIST DOCUMENTED IN MEDICAL RECORD: ICD-10-PCS | Mod: S$GLB,,, | Performed by: FAMILY MEDICINE

## 2021-02-10 PROCEDURE — 3075F SYST BP GE 130 - 139MM HG: CPT | Mod: CPTII,S$GLB,, | Performed by: FAMILY MEDICINE

## 2021-02-10 PROCEDURE — 99999 PR PBB SHADOW E&M-EST. PATIENT-LVL III: CPT | Mod: PBBFAC,,, | Performed by: FAMILY MEDICINE

## 2021-02-10 PROCEDURE — 1101F PT FALLS ASSESS-DOCD LE1/YR: CPT | Mod: CPTII,S$GLB,, | Performed by: FAMILY MEDICINE

## 2021-02-10 PROCEDURE — 1126F AMNT PAIN NOTED NONE PRSNT: CPT | Mod: S$GLB,,, | Performed by: FAMILY MEDICINE

## 2021-02-10 PROCEDURE — 3075F PR MOST RECENT SYSTOLIC BLOOD PRESS GE 130-139MM HG: ICD-10-PCS | Mod: CPTII,S$GLB,, | Performed by: FAMILY MEDICINE

## 2021-02-10 PROCEDURE — 1101F PR PT FALLS ASSESS DOC 0-1 FALLS W/OUT INJ PAST YR: ICD-10-PCS | Mod: CPTII,S$GLB,, | Performed by: FAMILY MEDICINE

## 2021-02-10 PROCEDURE — 99214 OFFICE O/P EST MOD 30 MIN: CPT | Mod: S$GLB,,, | Performed by: FAMILY MEDICINE

## 2021-02-10 PROCEDURE — 99999 PR PBB SHADOW E&M-EST. PATIENT-LVL III: ICD-10-PCS | Mod: PBBFAC,,, | Performed by: FAMILY MEDICINE

## 2021-02-10 PROCEDURE — 99499 UNLISTED E&M SERVICE: CPT | Mod: S$GLB,,, | Performed by: FAMILY MEDICINE

## 2021-02-10 PROCEDURE — 1126F PR PAIN SEVERITY QUANTIFIED, NO PAIN PRESENT: ICD-10-PCS | Mod: S$GLB,,, | Performed by: FAMILY MEDICINE

## 2021-02-10 RX ORDER — ATORVASTATIN CALCIUM 40 MG/1
40 TABLET, FILM COATED ORAL NIGHTLY
Qty: 90 TABLET | Refills: 1 | Status: SHIPPED | OUTPATIENT
Start: 2021-02-10 | End: 2021-05-31 | Stop reason: SDUPTHER

## 2021-02-10 RX ORDER — OXYBUTYNIN CHLORIDE 10 MG/1
10 TABLET, EXTENDED RELEASE ORAL
Qty: 90 TABLET | Refills: 0 | Status: SHIPPED | OUTPATIENT
Start: 2021-02-10 | End: 2021-06-21

## 2021-02-10 RX ORDER — TRAZODONE HYDROCHLORIDE 100 MG/1
100 TABLET ORAL NIGHTLY PRN
Qty: 90 TABLET | Refills: 1 | Status: SHIPPED | OUTPATIENT
Start: 2021-02-10 | End: 2021-05-31 | Stop reason: SDUPTHER

## 2021-02-10 RX ORDER — SILDENAFIL 100 MG/1
100 TABLET, FILM COATED ORAL DAILY PRN
Qty: 30 TABLET | Refills: 5 | Status: SHIPPED | OUTPATIENT
Start: 2021-02-10 | End: 2021-05-31

## 2021-02-10 RX ORDER — AMLODIPINE, VALSARTAN AND HYDROCHLOROTHIAZIDE 10; 320; 25 MG/1; MG/1; MG/1
1 TABLET ORAL DAILY
Qty: 90 TABLET | Refills: 1 | Status: SHIPPED | OUTPATIENT
Start: 2021-02-10 | End: 2021-05-31

## 2021-02-10 RX ORDER — GABAPENTIN 300 MG/1
CAPSULE ORAL
Qty: 90 CAPSULE | Refills: 1 | Status: SHIPPED | OUTPATIENT
Start: 2021-02-10 | End: 2021-04-17

## 2021-04-13 ENCOUNTER — IMMUNIZATION (OUTPATIENT)
Dept: PHARMACY | Facility: CLINIC | Age: 81
End: 2021-04-13
Payer: MEDICARE

## 2021-04-13 DIAGNOSIS — Z23 NEED FOR VACCINATION: Primary | ICD-10-CM

## 2021-04-27 ENCOUNTER — TELEPHONE (OUTPATIENT)
Dept: ENDOSCOPY | Facility: HOSPITAL | Age: 81
End: 2021-04-27

## 2021-05-15 ENCOUNTER — HOSPITAL ENCOUNTER (INPATIENT)
Facility: HOSPITAL | Age: 81
LOS: 7 days | Discharge: HOME HEALTH | DRG: 378 | End: 2021-05-22
Attending: EMERGENCY MEDICINE | Admitting: INTERNAL MEDICINE
Payer: MEDICARE

## 2021-05-15 DIAGNOSIS — D62 ACUTE BLOOD LOSS ANEMIA: ICD-10-CM

## 2021-05-15 DIAGNOSIS — K57.31 DIVERTICULOSIS OF COLON WITH HEMORRHAGE OF LARGE INTESTINE: ICD-10-CM

## 2021-05-15 DIAGNOSIS — K92.2 GI BLEED: ICD-10-CM

## 2021-05-15 DIAGNOSIS — D64.9 SYMPTOMATIC ANEMIA: Primary | ICD-10-CM

## 2021-05-15 PROBLEM — N17.9 AKI (ACUTE KIDNEY INJURY): Status: ACTIVE | Noted: 2021-05-15

## 2021-05-15 PROBLEM — K92.1 BLOOD IN STOOL: Status: ACTIVE | Noted: 2021-05-15

## 2021-05-15 LAB
ABO + RH BLD: NORMAL
ALBUMIN SERPL BCP-MCNC: 3.2 G/DL (ref 3.5–5.2)
ALP SERPL-CCNC: 48 U/L (ref 55–135)
ALT SERPL W/O P-5'-P-CCNC: 9 U/L (ref 10–44)
ANION GAP SERPL CALC-SCNC: 10 MMOL/L (ref 8–16)
AST SERPL-CCNC: 16 U/L (ref 10–40)
BASOPHILS # BLD AUTO: 0.03 K/UL (ref 0–0.2)
BASOPHILS NFR BLD: 0.4 % (ref 0–1.9)
BILIRUB SERPL-MCNC: 0.4 MG/DL (ref 0.1–1)
BLD GP AB SCN CELLS X3 SERPL QL: NORMAL
BLD PROD TYP BPU: NORMAL
BLD PROD TYP BPU: NORMAL
BLOOD UNIT EXPIRATION DATE: NORMAL
BLOOD UNIT EXPIRATION DATE: NORMAL
BLOOD UNIT TYPE CODE: 6200
BLOOD UNIT TYPE CODE: 6200
BLOOD UNIT TYPE: NORMAL
BLOOD UNIT TYPE: NORMAL
BUN SERPL-MCNC: 40 MG/DL (ref 8–23)
CALCIUM SERPL-MCNC: 8.4 MG/DL (ref 8.7–10.5)
CHLORIDE SERPL-SCNC: 107 MMOL/L (ref 95–110)
CO2 SERPL-SCNC: 25 MMOL/L (ref 23–29)
CODING SYSTEM: NORMAL
CODING SYSTEM: NORMAL
CREAT SERPL-MCNC: 2.3 MG/DL (ref 0.5–1.4)
CTP QC/QA: YES
DIFFERENTIAL METHOD: ABNORMAL
DISPENSE STATUS: NORMAL
DISPENSE STATUS: NORMAL
EOSINOPHIL # BLD AUTO: 0.4 K/UL (ref 0–0.5)
EOSINOPHIL NFR BLD: 4.5 % (ref 0–8)
ERYTHROCYTE [DISTWIDTH] IN BLOOD BY AUTOMATED COUNT: 13.8 % (ref 11.5–14.5)
EST. GFR  (AFRICAN AMERICAN): 29 ML/MIN/1.73 M^2
EST. GFR  (NON AFRICAN AMERICAN): 25 ML/MIN/1.73 M^2
GLUCOSE SERPL-MCNC: 123 MG/DL (ref 70–110)
HCT VFR BLD AUTO: 16.8 % (ref 40–54)
HGB BLD-MCNC: 5.3 G/DL (ref 14–18)
HYPOCHROMIA BLD QL SMEAR: ABNORMAL
IMM GRANULOCYTES # BLD AUTO: 0.03 K/UL (ref 0–0.04)
IMM GRANULOCYTES NFR BLD AUTO: 0.4 % (ref 0–0.5)
INR PPP: 1.1 (ref 0.8–1.2)
LIPASE SERPL-CCNC: 22 U/L (ref 4–60)
LYMPHOCYTES # BLD AUTO: 1.7 K/UL (ref 1–4.8)
LYMPHOCYTES NFR BLD: 20.9 % (ref 18–48)
MCH RBC QN AUTO: 30.8 PG (ref 27–31)
MCHC RBC AUTO-ENTMCNC: 31.5 G/DL (ref 32–36)
MCV RBC AUTO: 98 FL (ref 82–98)
MONOCYTES # BLD AUTO: 0.9 K/UL (ref 0.3–1)
MONOCYTES NFR BLD: 10.6 % (ref 4–15)
NEUTROPHILS # BLD AUTO: 5.2 K/UL (ref 1.8–7.7)
NEUTROPHILS NFR BLD: 63.2 % (ref 38–73)
NRBC BLD-RTO: 1 /100 WBC
PLATELET # BLD AUTO: 182 K/UL (ref 150–450)
PLATELET BLD QL SMEAR: ABNORMAL
PMV BLD AUTO: 11 FL (ref 9.2–12.9)
POTASSIUM SERPL-SCNC: 4.1 MMOL/L (ref 3.5–5.1)
PROT SERPL-MCNC: 6.2 G/DL (ref 6–8.4)
PROTHROMBIN TIME: 11.2 SEC (ref 9–12.5)
RBC # BLD AUTO: 1.72 M/UL (ref 4.6–6.2)
SARS-COV-2 RDRP RESP QL NAA+PROBE: NEGATIVE
SODIUM SERPL-SCNC: 142 MMOL/L (ref 136–145)
TRANS ERYTHROCYTES VOL PATIENT: NORMAL ML
TRANS ERYTHROCYTES VOL PATIENT: NORMAL ML
WBC # BLD AUTO: 8.19 K/UL (ref 3.9–12.7)

## 2021-05-15 PROCEDURE — 83690 ASSAY OF LIPASE: CPT | Performed by: EMERGENCY MEDICINE

## 2021-05-15 PROCEDURE — 20000000 HC ICU ROOM

## 2021-05-15 PROCEDURE — C9113 INJ PANTOPRAZOLE SODIUM, VIA: HCPCS | Performed by: PHYSICIAN ASSISTANT

## 2021-05-15 PROCEDURE — U0002 COVID-19 LAB TEST NON-CDC: HCPCS | Performed by: EMERGENCY MEDICINE

## 2021-05-15 PROCEDURE — 63600175 PHARM REV CODE 636 W HCPCS: Performed by: PHYSICIAN ASSISTANT

## 2021-05-15 PROCEDURE — 96374 THER/PROPH/DIAG INJ IV PUSH: CPT

## 2021-05-15 PROCEDURE — 25000003 PHARM REV CODE 250: Performed by: EMERGENCY MEDICINE

## 2021-05-15 PROCEDURE — 85025 COMPLETE CBC W/AUTO DIFF WBC: CPT | Performed by: EMERGENCY MEDICINE

## 2021-05-15 PROCEDURE — 86920 COMPATIBILITY TEST SPIN: CPT | Performed by: ANESTHESIOLOGY

## 2021-05-15 PROCEDURE — 94761 N-INVAS EAR/PLS OXIMETRY MLT: CPT

## 2021-05-15 PROCEDURE — 36430 TRANSFUSION BLD/BLD COMPNT: CPT

## 2021-05-15 PROCEDURE — 99285 EMERGENCY DEPT VISIT HI MDM: CPT | Mod: 25

## 2021-05-15 PROCEDURE — 93010 ELECTROCARDIOGRAM REPORT: CPT | Mod: ,,, | Performed by: INTERNAL MEDICINE

## 2021-05-15 PROCEDURE — 25000003 PHARM REV CODE 250: Performed by: INTERNAL MEDICINE

## 2021-05-15 PROCEDURE — 86900 BLOOD TYPING SEROLOGIC ABO: CPT | Performed by: EMERGENCY MEDICINE

## 2021-05-15 PROCEDURE — 80053 COMPREHEN METABOLIC PANEL: CPT | Performed by: EMERGENCY MEDICINE

## 2021-05-15 PROCEDURE — 86920 COMPATIBILITY TEST SPIN: CPT | Performed by: EMERGENCY MEDICINE

## 2021-05-15 PROCEDURE — 86920 COMPATIBILITY TEST SPIN: CPT | Performed by: PHYSICIAN ASSISTANT

## 2021-05-15 PROCEDURE — 25000003 PHARM REV CODE 250: Performed by: PHYSICIAN ASSISTANT

## 2021-05-15 PROCEDURE — 93010 EKG 12-LEAD: ICD-10-PCS | Mod: ,,, | Performed by: INTERNAL MEDICINE

## 2021-05-15 PROCEDURE — C9113 INJ PANTOPRAZOLE SODIUM, VIA: HCPCS | Performed by: EMERGENCY MEDICINE

## 2021-05-15 PROCEDURE — 93005 ELECTROCARDIOGRAM TRACING: CPT

## 2021-05-15 PROCEDURE — 85610 PROTHROMBIN TIME: CPT | Performed by: EMERGENCY MEDICINE

## 2021-05-15 PROCEDURE — 63600175 PHARM REV CODE 636 W HCPCS: Performed by: EMERGENCY MEDICINE

## 2021-05-15 PROCEDURE — P9021 RED BLOOD CELLS UNIT: HCPCS | Performed by: EMERGENCY MEDICINE

## 2021-05-15 RX ORDER — HYDROCODONE BITARTRATE AND ACETAMINOPHEN 500; 5 MG/1; MG/1
TABLET ORAL
Status: DISCONTINUED | OUTPATIENT
Start: 2021-05-15 | End: 2021-05-17

## 2021-05-15 RX ORDER — PANTOPRAZOLE SODIUM 40 MG/10ML
80 INJECTION, POWDER, LYOPHILIZED, FOR SOLUTION INTRAVENOUS
Status: COMPLETED | OUTPATIENT
Start: 2021-05-15 | End: 2021-05-15

## 2021-05-15 RX ORDER — SODIUM CHLORIDE 9 MG/ML
INJECTION, SOLUTION INTRAVENOUS CONTINUOUS
Status: DISCONTINUED | OUTPATIENT
Start: 2021-05-15 | End: 2021-05-16

## 2021-05-15 RX ORDER — ATORVASTATIN CALCIUM 40 MG/1
40 TABLET, FILM COATED ORAL NIGHTLY
Status: DISCONTINUED | OUTPATIENT
Start: 2021-05-15 | End: 2021-05-16

## 2021-05-15 RX ORDER — PANTOPRAZOLE SODIUM 40 MG/10ML
40 INJECTION, POWDER, LYOPHILIZED, FOR SOLUTION INTRAVENOUS 2 TIMES DAILY
Status: DISCONTINUED | OUTPATIENT
Start: 2021-05-15 | End: 2021-05-15

## 2021-05-15 RX ADMIN — PANTOPRAZOLE SODIUM 80 MG: 40 INJECTION, POWDER, FOR SOLUTION INTRAVENOUS at 03:05

## 2021-05-15 RX ADMIN — SODIUM CHLORIDE 100 ML/HR: 0.9 INJECTION, SOLUTION INTRAVENOUS at 07:05

## 2021-05-15 RX ADMIN — SODIUM CHLORIDE 1000 ML: 0.9 INJECTION, SOLUTION INTRAVENOUS at 03:05

## 2021-05-15 RX ADMIN — ATORVASTATIN CALCIUM 40 MG: 40 TABLET, FILM COATED ORAL at 08:05

## 2021-05-15 RX ADMIN — PANTOPRAZOLE SODIUM 8 MG/HR: 40 INJECTION, POWDER, FOR SOLUTION INTRAVENOUS at 07:05

## 2021-05-15 RX ADMIN — SODIUM CHLORIDE 1000 ML: 0.9 INJECTION, SOLUTION INTRAVENOUS at 04:05

## 2021-05-16 PROBLEM — E66.9 CLASS 1 OBESITY IN ADULT: Status: ACTIVE | Noted: 2020-09-08

## 2021-05-16 PROBLEM — K59.01 SLOW TRANSIT CONSTIPATION: Status: RESOLVED | Noted: 2020-09-08 | Resolved: 2021-05-16

## 2021-05-16 PROBLEM — E66.811 CLASS 1 OBESITY IN ADULT: Status: ACTIVE | Noted: 2020-09-08

## 2021-05-16 PROBLEM — J41.8 MIXED SIMPLE AND MUCOPURULENT CHRONIC BRONCHITIS: Status: RESOLVED | Noted: 2019-02-04 | Resolved: 2021-05-16

## 2021-05-16 LAB
ALBUMIN SERPL BCP-MCNC: 2.6 G/DL (ref 3.5–5.2)
ALBUMIN SERPL BCP-MCNC: 2.7 G/DL (ref 3.5–5.2)
ALP SERPL-CCNC: 43 U/L (ref 55–135)
ALT SERPL W/O P-5'-P-CCNC: 10 U/L (ref 10–44)
ANION GAP SERPL CALC-SCNC: 6 MMOL/L (ref 8–16)
ANION GAP SERPL CALC-SCNC: 8 MMOL/L (ref 8–16)
AST SERPL-CCNC: 18 U/L (ref 10–40)
BASOPHILS # BLD AUTO: 0.03 K/UL (ref 0–0.2)
BASOPHILS # BLD AUTO: 0.03 K/UL (ref 0–0.2)
BASOPHILS # BLD AUTO: 0.04 K/UL (ref 0–0.2)
BASOPHILS # BLD AUTO: 0.04 K/UL (ref 0–0.2)
BASOPHILS NFR BLD: 0.3 % (ref 0–1.9)
BILIRUB SERPL-MCNC: 1.2 MG/DL (ref 0.1–1)
BLD PROD TYP BPU: NORMAL
BLOOD UNIT EXPIRATION DATE: NORMAL
BLOOD UNIT TYPE CODE: 6200
BLOOD UNIT TYPE: NORMAL
BUN SERPL-MCNC: 38 MG/DL (ref 8–23)
BUN SERPL-MCNC: 39 MG/DL (ref 8–23)
CALCIUM SERPL-MCNC: 7.4 MG/DL (ref 8.7–10.5)
CALCIUM SERPL-MCNC: 7.4 MG/DL (ref 8.7–10.5)
CHLORIDE SERPL-SCNC: 111 MMOL/L (ref 95–110)
CHLORIDE SERPL-SCNC: 112 MMOL/L (ref 95–110)
CO2 SERPL-SCNC: 22 MMOL/L (ref 23–29)
CO2 SERPL-SCNC: 23 MMOL/L (ref 23–29)
CODING SYSTEM: NORMAL
CREAT SERPL-MCNC: 1.8 MG/DL (ref 0.5–1.4)
CREAT SERPL-MCNC: 1.8 MG/DL (ref 0.5–1.4)
DIFFERENTIAL METHOD: ABNORMAL
DISPENSE STATUS: NORMAL
EOSINOPHIL # BLD AUTO: 0.2 K/UL (ref 0–0.5)
EOSINOPHIL # BLD AUTO: 0.2 K/UL (ref 0–0.5)
EOSINOPHIL # BLD AUTO: 0.4 K/UL (ref 0–0.5)
EOSINOPHIL # BLD AUTO: 0.5 K/UL (ref 0–0.5)
EOSINOPHIL NFR BLD: 1.9 % (ref 0–8)
EOSINOPHIL NFR BLD: 1.9 % (ref 0–8)
EOSINOPHIL NFR BLD: 3.2 % (ref 0–8)
EOSINOPHIL NFR BLD: 5.2 % (ref 0–8)
ERYTHROCYTE [DISTWIDTH] IN BLOOD BY AUTOMATED COUNT: 14 % (ref 11.5–14.5)
ERYTHROCYTE [DISTWIDTH] IN BLOOD BY AUTOMATED COUNT: 14 % (ref 11.5–14.5)
ERYTHROCYTE [DISTWIDTH] IN BLOOD BY AUTOMATED COUNT: 14.6 % (ref 11.5–14.5)
ERYTHROCYTE [DISTWIDTH] IN BLOOD BY AUTOMATED COUNT: 15.3 % (ref 11.5–14.5)
EST. GFR  (AFRICAN AMERICAN): 39 ML/MIN/1.73 M^2
EST. GFR  (AFRICAN AMERICAN): 39 ML/MIN/1.73 M^2
EST. GFR  (NON AFRICAN AMERICAN): 34 ML/MIN/1.73 M^2
EST. GFR  (NON AFRICAN AMERICAN): 34 ML/MIN/1.73 M^2
GIANT PLATELETS BLD QL SMEAR: PRESENT
GLUCOSE SERPL-MCNC: 130 MG/DL (ref 70–110)
GLUCOSE SERPL-MCNC: 132 MG/DL (ref 70–110)
HCT VFR BLD AUTO: 18.5 % (ref 40–54)
HCT VFR BLD AUTO: 18.5 % (ref 40–54)
HCT VFR BLD AUTO: 21.1 % (ref 40–54)
HCT VFR BLD AUTO: 22 % (ref 40–54)
HGB BLD-MCNC: 6 G/DL (ref 14–18)
HGB BLD-MCNC: 6 G/DL (ref 14–18)
HGB BLD-MCNC: 7.1 G/DL (ref 14–18)
HGB BLD-MCNC: 7.3 G/DL (ref 14–18)
IMM GRANULOCYTES # BLD AUTO: 0.05 K/UL (ref 0–0.04)
IMM GRANULOCYTES # BLD AUTO: 0.05 K/UL (ref 0–0.04)
IMM GRANULOCYTES # BLD AUTO: 0.06 K/UL (ref 0–0.04)
IMM GRANULOCYTES # BLD AUTO: 0.06 K/UL (ref 0–0.04)
IMM GRANULOCYTES NFR BLD AUTO: 0.4 % (ref 0–0.5)
IMM GRANULOCYTES NFR BLD AUTO: 0.4 % (ref 0–0.5)
IMM GRANULOCYTES NFR BLD AUTO: 0.5 % (ref 0–0.5)
IMM GRANULOCYTES NFR BLD AUTO: 0.6 % (ref 0–0.5)
LYMPHOCYTES # BLD AUTO: 1.6 K/UL (ref 1–4.8)
LYMPHOCYTES # BLD AUTO: 1.7 K/UL (ref 1–4.8)
LYMPHOCYTES # BLD AUTO: 1.7 K/UL (ref 1–4.8)
LYMPHOCYTES # BLD AUTO: 1.8 K/UL (ref 1–4.8)
LYMPHOCYTES NFR BLD: 13.5 % (ref 18–48)
LYMPHOCYTES NFR BLD: 13.5 % (ref 18–48)
LYMPHOCYTES NFR BLD: 14.3 % (ref 18–48)
LYMPHOCYTES NFR BLD: 18.4 % (ref 18–48)
MCH RBC QN AUTO: 31.5 PG (ref 27–31)
MCH RBC QN AUTO: 31.9 PG (ref 27–31)
MCH RBC QN AUTO: 31.9 PG (ref 27–31)
MCH RBC QN AUTO: 32.3 PG (ref 27–31)
MCHC RBC AUTO-ENTMCNC: 32.4 G/DL (ref 32–36)
MCHC RBC AUTO-ENTMCNC: 32.4 G/DL (ref 32–36)
MCHC RBC AUTO-ENTMCNC: 33.2 G/DL (ref 32–36)
MCHC RBC AUTO-ENTMCNC: 33.6 G/DL (ref 32–36)
MCV RBC AUTO: 95 FL (ref 82–98)
MCV RBC AUTO: 96 FL (ref 82–98)
MCV RBC AUTO: 98 FL (ref 82–98)
MCV RBC AUTO: 98 FL (ref 82–98)
MONOCYTES # BLD AUTO: 1 K/UL (ref 0.3–1)
MONOCYTES # BLD AUTO: 1.1 K/UL (ref 0.3–1)
MONOCYTES # BLD AUTO: 1.3 K/UL (ref 0.3–1)
MONOCYTES # BLD AUTO: 1.3 K/UL (ref 0.3–1)
MONOCYTES NFR BLD: 10.1 % (ref 4–15)
MONOCYTES NFR BLD: 10.4 % (ref 4–15)
MONOCYTES NFR BLD: 10.4 % (ref 4–15)
MONOCYTES NFR BLD: 9.6 % (ref 4–15)
NEUTROPHILS # BLD AUTO: 6.4 K/UL (ref 1.8–7.7)
NEUTROPHILS # BLD AUTO: 7.9 K/UL (ref 1.8–7.7)
NEUTROPHILS # BLD AUTO: 9.1 K/UL (ref 1.8–7.7)
NEUTROPHILS # BLD AUTO: 9.1 K/UL (ref 1.8–7.7)
NEUTROPHILS NFR BLD: 65.4 % (ref 38–73)
NEUTROPHILS NFR BLD: 72.1 % (ref 38–73)
NEUTROPHILS NFR BLD: 73.5 % (ref 38–73)
NEUTROPHILS NFR BLD: 73.5 % (ref 38–73)
NRBC BLD-RTO: 1 /100 WBC
PHOSPHATE SERPL-MCNC: 2.4 MG/DL (ref 2.7–4.5)
PLATELET # BLD AUTO: 135 K/UL (ref 150–450)
PLATELET # BLD AUTO: 143 K/UL (ref 150–450)
PLATELET # BLD AUTO: 143 K/UL (ref 150–450)
PLATELET # BLD AUTO: 153 K/UL (ref 150–450)
PLATELET BLD QL SMEAR: ABNORMAL
PMV BLD AUTO: 10.2 FL (ref 9.2–12.9)
PMV BLD AUTO: 10.2 FL (ref 9.2–12.9)
PMV BLD AUTO: 10.8 FL (ref 9.2–12.9)
PMV BLD AUTO: 11.7 FL (ref 9.2–12.9)
POTASSIUM SERPL-SCNC: 4.4 MMOL/L (ref 3.5–5.1)
POTASSIUM SERPL-SCNC: 4.5 MMOL/L (ref 3.5–5.1)
PROT SERPL-MCNC: 5.3 G/DL (ref 6–8.4)
RBC # BLD AUTO: 1.88 M/UL (ref 4.6–6.2)
RBC # BLD AUTO: 1.88 M/UL (ref 4.6–6.2)
RBC # BLD AUTO: 2.2 M/UL (ref 4.6–6.2)
RBC # BLD AUTO: 2.32 M/UL (ref 4.6–6.2)
SODIUM SERPL-SCNC: 141 MMOL/L (ref 136–145)
SODIUM SERPL-SCNC: 141 MMOL/L (ref 136–145)
TRANS ERYTHROCYTES VOL PATIENT: NORMAL ML
WBC # BLD AUTO: 10.97 K/UL (ref 3.9–12.7)
WBC # BLD AUTO: 12.45 K/UL (ref 3.9–12.7)
WBC # BLD AUTO: 12.45 K/UL (ref 3.9–12.7)
WBC # BLD AUTO: 9.76 K/UL (ref 3.9–12.7)

## 2021-05-16 PROCEDURE — 36430 TRANSFUSION BLD/BLD COMPNT: CPT

## 2021-05-16 PROCEDURE — 80053 COMPREHEN METABOLIC PANEL: CPT | Performed by: HOSPITALIST

## 2021-05-16 PROCEDURE — 99223 1ST HOSP IP/OBS HIGH 75: CPT | Mod: ,,, | Performed by: INTERNAL MEDICINE

## 2021-05-16 PROCEDURE — 36415 COLL VENOUS BLD VENIPUNCTURE: CPT | Performed by: PHYSICIAN ASSISTANT

## 2021-05-16 PROCEDURE — 25000003 PHARM REV CODE 250: Performed by: PHYSICIAN ASSISTANT

## 2021-05-16 PROCEDURE — 25000003 PHARM REV CODE 250: Performed by: INTERNAL MEDICINE

## 2021-05-16 PROCEDURE — 20000000 HC ICU ROOM

## 2021-05-16 PROCEDURE — 85025 COMPLETE CBC W/AUTO DIFF WBC: CPT | Mod: 91 | Performed by: INTERNAL MEDICINE

## 2021-05-16 PROCEDURE — 80069 RENAL FUNCTION PANEL: CPT | Performed by: INTERNAL MEDICINE

## 2021-05-16 PROCEDURE — 36415 COLL VENOUS BLD VENIPUNCTURE: CPT | Performed by: INTERNAL MEDICINE

## 2021-05-16 PROCEDURE — 63600175 PHARM REV CODE 636 W HCPCS: Performed by: PHYSICIAN ASSISTANT

## 2021-05-16 PROCEDURE — 25000003 PHARM REV CODE 250: Performed by: HOSPITALIST

## 2021-05-16 PROCEDURE — 85025 COMPLETE CBC W/AUTO DIFF WBC: CPT | Mod: 91 | Performed by: HOSPITALIST

## 2021-05-16 PROCEDURE — C9113 INJ PANTOPRAZOLE SODIUM, VIA: HCPCS | Performed by: PHYSICIAN ASSISTANT

## 2021-05-16 PROCEDURE — 85025 COMPLETE CBC W/AUTO DIFF WBC: CPT | Performed by: PHYSICIAN ASSISTANT

## 2021-05-16 PROCEDURE — P9021 RED BLOOD CELLS UNIT: HCPCS | Performed by: PHYSICIAN ASSISTANT

## 2021-05-16 PROCEDURE — 36415 COLL VENOUS BLD VENIPUNCTURE: CPT | Performed by: HOSPITALIST

## 2021-05-16 PROCEDURE — 27000221 HC OXYGEN, UP TO 24 HOURS

## 2021-05-16 PROCEDURE — 99223 PR INITIAL HOSPITAL CARE,LEVL III: ICD-10-PCS | Mod: ,,, | Performed by: INTERNAL MEDICINE

## 2021-05-16 PROCEDURE — 94761 N-INVAS EAR/PLS OXIMETRY MLT: CPT

## 2021-05-16 RX ORDER — ONDANSETRON 2 MG/ML
4 INJECTION INTRAMUSCULAR; INTRAVENOUS EVERY 6 HOURS PRN
Status: DISCONTINUED | OUTPATIENT
Start: 2021-05-16 | End: 2021-05-22 | Stop reason: HOSPADM

## 2021-05-16 RX ORDER — MUPIROCIN 20 MG/G
OINTMENT TOPICAL 2 TIMES DAILY
Status: COMPLETED | OUTPATIENT
Start: 2021-05-16 | End: 2021-05-20

## 2021-05-16 RX ORDER — POLYETHYLENE GLYCOL 3350, SODIUM SULFATE ANHYDROUS, SODIUM BICARBONATE, SODIUM CHLORIDE, POTASSIUM CHLORIDE 236; 22.74; 6.74; 5.86; 2.97 G/4L; G/4L; G/4L; G/4L; G/4L
2000 POWDER, FOR SOLUTION ORAL
Status: DISPENSED | OUTPATIENT
Start: 2021-05-16 | End: 2021-05-17

## 2021-05-16 RX ADMIN — PANTOPRAZOLE SODIUM 8 MG/HR: 40 INJECTION, POWDER, FOR SOLUTION INTRAVENOUS at 09:05

## 2021-05-16 RX ADMIN — MUPIROCIN: 20 OINTMENT TOPICAL at 08:05

## 2021-05-16 RX ADMIN — POLYETHYLENE GLYCOL 3350, SODIUM SULFATE ANHYDROUS, SODIUM BICARBONATE, SODIUM CHLORIDE, POTASSIUM CHLORIDE 2000 ML: 236; 22.74; 6.74; 5.86; 2.97 POWDER, FOR SOLUTION ORAL at 06:05

## 2021-05-16 RX ADMIN — PANTOPRAZOLE SODIUM 8 MG/HR: 40 INJECTION, POWDER, FOR SOLUTION INTRAVENOUS at 12:05

## 2021-05-16 RX ADMIN — MUPIROCIN: 20 OINTMENT TOPICAL at 09:05

## 2021-05-16 RX ADMIN — SODIUM CHLORIDE 100 ML/HR: 0.9 INJECTION, SOLUTION INTRAVENOUS at 04:05

## 2021-05-16 RX ADMIN — PANTOPRAZOLE SODIUM 8 MG/HR: 40 INJECTION, POWDER, FOR SOLUTION INTRAVENOUS at 04:05

## 2021-05-17 LAB
ALBUMIN SERPL BCP-MCNC: 2.9 G/DL (ref 3.5–5.2)
ALP SERPL-CCNC: 51 U/L (ref 55–135)
ALT SERPL W/O P-5'-P-CCNC: 10 U/L (ref 10–44)
ANION GAP SERPL CALC-SCNC: 8 MMOL/L (ref 8–16)
AST SERPL-CCNC: 21 U/L (ref 10–40)
BASOPHILS # BLD AUTO: 0.02 K/UL (ref 0–0.2)
BASOPHILS # BLD AUTO: 0.03 K/UL (ref 0–0.2)
BASOPHILS # BLD AUTO: 0.05 K/UL (ref 0–0.2)
BASOPHILS NFR BLD: 0.2 % (ref 0–1.9)
BASOPHILS NFR BLD: 0.3 % (ref 0–1.9)
BASOPHILS NFR BLD: 0.5 % (ref 0–1.9)
BILIRUB SERPL-MCNC: 0.8 MG/DL (ref 0.1–1)
BLD PROD TYP BPU: NORMAL
BLOOD UNIT EXPIRATION DATE: NORMAL
BLOOD UNIT TYPE CODE: 6200
BLOOD UNIT TYPE: NORMAL
BUN SERPL-MCNC: 25 MG/DL (ref 8–23)
CALCIUM SERPL-MCNC: 8.1 MG/DL (ref 8.7–10.5)
CHLORIDE SERPL-SCNC: 109 MMOL/L (ref 95–110)
CO2 SERPL-SCNC: 24 MMOL/L (ref 23–29)
CODING SYSTEM: NORMAL
CREAT SERPL-MCNC: 1.6 MG/DL (ref 0.5–1.4)
DIFFERENTIAL METHOD: ABNORMAL
DISPENSE STATUS: NORMAL
EOSINOPHIL # BLD AUTO: 0.5 K/UL (ref 0–0.5)
EOSINOPHIL # BLD AUTO: 0.6 K/UL (ref 0–0.5)
EOSINOPHIL # BLD AUTO: 0.6 K/UL (ref 0–0.5)
EOSINOPHIL NFR BLD: 4.8 % (ref 0–8)
EOSINOPHIL NFR BLD: 6.8 % (ref 0–8)
EOSINOPHIL NFR BLD: 7.1 % (ref 0–8)
ERYTHROCYTE [DISTWIDTH] IN BLOOD BY AUTOMATED COUNT: 14.8 % (ref 11.5–14.5)
ERYTHROCYTE [DISTWIDTH] IN BLOOD BY AUTOMATED COUNT: 15 % (ref 11.5–14.5)
ERYTHROCYTE [DISTWIDTH] IN BLOOD BY AUTOMATED COUNT: 15.1 % (ref 11.5–14.5)
EST. GFR  (AFRICAN AMERICAN): 45 ML/MIN/1.73 M^2
EST. GFR  (NON AFRICAN AMERICAN): 39 ML/MIN/1.73 M^2
GLUCOSE SERPL-MCNC: 106 MG/DL (ref 70–110)
HCT VFR BLD AUTO: 22.4 % (ref 40–54)
HCT VFR BLD AUTO: 22.6 % (ref 40–54)
HCT VFR BLD AUTO: 26 % (ref 40–54)
HGB BLD-MCNC: 7.4 G/DL (ref 14–18)
HGB BLD-MCNC: 7.4 G/DL (ref 14–18)
HGB BLD-MCNC: 8.5 G/DL (ref 14–18)
IMM GRANULOCYTES # BLD AUTO: 0.04 K/UL (ref 0–0.04)
IMM GRANULOCYTES NFR BLD AUTO: 0.4 % (ref 0–0.5)
LYMPHOCYTES # BLD AUTO: 1.6 K/UL (ref 1–4.8)
LYMPHOCYTES # BLD AUTO: 1.7 K/UL (ref 1–4.8)
LYMPHOCYTES # BLD AUTO: 1.7 K/UL (ref 1–4.8)
LYMPHOCYTES NFR BLD: 15 % (ref 18–48)
LYMPHOCYTES NFR BLD: 19.1 % (ref 18–48)
LYMPHOCYTES NFR BLD: 19.4 % (ref 18–48)
MCH RBC QN AUTO: 30.8 PG (ref 27–31)
MCH RBC QN AUTO: 31.4 PG (ref 27–31)
MCH RBC QN AUTO: 31.6 PG (ref 27–31)
MCHC RBC AUTO-ENTMCNC: 32.7 G/DL (ref 32–36)
MCHC RBC AUTO-ENTMCNC: 32.7 G/DL (ref 32–36)
MCHC RBC AUTO-ENTMCNC: 33 G/DL (ref 32–36)
MCV RBC AUTO: 94 FL (ref 82–98)
MCV RBC AUTO: 96 FL (ref 82–98)
MCV RBC AUTO: 96 FL (ref 82–98)
MONOCYTES # BLD AUTO: 0.9 K/UL (ref 0.3–1)
MONOCYTES # BLD AUTO: 1 K/UL (ref 0.3–1)
MONOCYTES # BLD AUTO: 1 K/UL (ref 0.3–1)
MONOCYTES NFR BLD: 11.3 % (ref 4–15)
MONOCYTES NFR BLD: 9.4 % (ref 4–15)
MONOCYTES NFR BLD: 9.7 % (ref 4–15)
NEUTROPHILS # BLD AUTO: 5.6 K/UL (ref 1.8–7.7)
NEUTROPHILS # BLD AUTO: 5.7 K/UL (ref 1.8–7.7)
NEUTROPHILS # BLD AUTO: 7.4 K/UL (ref 1.8–7.7)
NEUTROPHILS NFR BLD: 61.8 % (ref 38–73)
NEUTROPHILS NFR BLD: 63.5 % (ref 38–73)
NEUTROPHILS NFR BLD: 69.9 % (ref 38–73)
NRBC BLD-RTO: 1 /100 WBC
PLATELET # BLD AUTO: 163 K/UL (ref 150–450)
PLATELET # BLD AUTO: 178 K/UL (ref 150–450)
PLATELET # BLD AUTO: 183 K/UL (ref 150–450)
PMV BLD AUTO: 10 FL (ref 9.2–12.9)
PMV BLD AUTO: 10.1 FL (ref 9.2–12.9)
PMV BLD AUTO: 10.1 FL (ref 9.2–12.9)
POTASSIUM SERPL-SCNC: 4.3 MMOL/L (ref 3.5–5.1)
PROT SERPL-MCNC: 5.8 G/DL (ref 6–8.4)
RBC # BLD AUTO: 2.34 M/UL (ref 4.6–6.2)
RBC # BLD AUTO: 2.36 M/UL (ref 4.6–6.2)
RBC # BLD AUTO: 2.76 M/UL (ref 4.6–6.2)
SODIUM SERPL-SCNC: 141 MMOL/L (ref 136–145)
TRANS ERYTHROCYTES VOL PATIENT: NORMAL ML
WBC # BLD AUTO: 10.54 K/UL (ref 3.9–12.7)
WBC # BLD AUTO: 8.93 K/UL (ref 3.9–12.7)
WBC # BLD AUTO: 9 K/UL (ref 3.9–12.7)

## 2021-05-17 PROCEDURE — 99232 PR SUBSEQUENT HOSPITAL CARE,LEVL II: ICD-10-PCS | Mod: ,,, | Performed by: NURSE PRACTITIONER

## 2021-05-17 PROCEDURE — 25000003 PHARM REV CODE 250: Performed by: ANESTHESIOLOGY

## 2021-05-17 PROCEDURE — 85025 COMPLETE CBC W/AUTO DIFF WBC: CPT | Performed by: HOSPITALIST

## 2021-05-17 PROCEDURE — 36415 COLL VENOUS BLD VENIPUNCTURE: CPT | Performed by: HOSPITALIST

## 2021-05-17 PROCEDURE — P9021 RED BLOOD CELLS UNIT: HCPCS | Performed by: ANESTHESIOLOGY

## 2021-05-17 PROCEDURE — 36430 TRANSFUSION BLD/BLD COMPNT: CPT

## 2021-05-17 PROCEDURE — 80053 COMPREHEN METABOLIC PANEL: CPT | Performed by: HOSPITALIST

## 2021-05-17 PROCEDURE — 99232 SBSQ HOSP IP/OBS MODERATE 35: CPT | Mod: ,,, | Performed by: NURSE PRACTITIONER

## 2021-05-17 PROCEDURE — 21400001 HC TELEMETRY ROOM

## 2021-05-17 PROCEDURE — 25000003 PHARM REV CODE 250: Performed by: INTERNAL MEDICINE

## 2021-05-17 RX ORDER — AMLODIPINE BESYLATE 5 MG/1
5 TABLET ORAL DAILY
Status: DISCONTINUED | OUTPATIENT
Start: 2021-05-17 | End: 2021-05-22 | Stop reason: HOSPADM

## 2021-05-17 RX ORDER — HYDRALAZINE HYDROCHLORIDE 25 MG/1
25 TABLET, FILM COATED ORAL EVERY 6 HOURS PRN
Status: DISCONTINUED | OUTPATIENT
Start: 2021-05-17 | End: 2021-05-22 | Stop reason: HOSPADM

## 2021-05-17 RX ORDER — ACETAMINOPHEN 325 MG/1
650 TABLET ORAL EVERY 6 HOURS PRN
Status: DISCONTINUED | OUTPATIENT
Start: 2021-05-17 | End: 2021-05-22 | Stop reason: HOSPADM

## 2021-05-17 RX ORDER — HYDROCODONE BITARTRATE AND ACETAMINOPHEN 500; 5 MG/1; MG/1
TABLET ORAL
Status: DISCONTINUED | OUTPATIENT
Start: 2021-05-17 | End: 2021-05-17

## 2021-05-17 RX ADMIN — HYDRALAZINE HYDROCHLORIDE 25 MG: 25 TABLET, FILM COATED ORAL at 01:05

## 2021-05-17 RX ADMIN — AMLODIPINE BESYLATE 5 MG: 5 TABLET ORAL at 12:05

## 2021-05-17 RX ADMIN — MUPIROCIN: 20 OINTMENT TOPICAL at 08:05

## 2021-05-17 RX ADMIN — SODIUM CHLORIDE: 0.9 INJECTION, SOLUTION INTRAVENOUS at 10:05

## 2021-05-17 RX ADMIN — MUPIROCIN: 20 OINTMENT TOPICAL at 09:05

## 2021-05-17 RX ADMIN — AMLODIPINE BESYLATE 5 MG: 5 TABLET ORAL at 08:05

## 2021-05-18 LAB
ALBUMIN SERPL BCP-MCNC: 2.7 G/DL (ref 3.5–5.2)
ALP SERPL-CCNC: 52 U/L (ref 55–135)
ALT SERPL W/O P-5'-P-CCNC: 13 U/L (ref 10–44)
ANION GAP SERPL CALC-SCNC: 7 MMOL/L (ref 8–16)
AST SERPL-CCNC: 24 U/L (ref 10–40)
BASOPHILS # BLD AUTO: 0.02 K/UL (ref 0–0.2)
BASOPHILS # BLD AUTO: 0.03 K/UL (ref 0–0.2)
BASOPHILS # BLD AUTO: 0.04 K/UL (ref 0–0.2)
BASOPHILS NFR BLD: 0.2 % (ref 0–1.9)
BASOPHILS NFR BLD: 0.3 % (ref 0–1.9)
BASOPHILS NFR BLD: 0.4 % (ref 0–1.9)
BILIRUB SERPL-MCNC: 0.8 MG/DL (ref 0.1–1)
BUN SERPL-MCNC: 11 MG/DL (ref 8–23)
CALCIUM SERPL-MCNC: 8 MG/DL (ref 8.7–10.5)
CHLORIDE SERPL-SCNC: 104 MMOL/L (ref 95–110)
CO2 SERPL-SCNC: 27 MMOL/L (ref 23–29)
CREAT SERPL-MCNC: 1.4 MG/DL (ref 0.5–1.4)
DIFFERENTIAL METHOD: ABNORMAL
EOSINOPHIL # BLD AUTO: 0.4 K/UL (ref 0–0.5)
EOSINOPHIL # BLD AUTO: 0.5 K/UL (ref 0–0.5)
EOSINOPHIL # BLD AUTO: 0.5 K/UL (ref 0–0.5)
EOSINOPHIL NFR BLD: 4.7 % (ref 0–8)
EOSINOPHIL NFR BLD: 5 % (ref 0–8)
EOSINOPHIL NFR BLD: 6.1 % (ref 0–8)
ERYTHROCYTE [DISTWIDTH] IN BLOOD BY AUTOMATED COUNT: 14.9 % (ref 11.5–14.5)
ERYTHROCYTE [DISTWIDTH] IN BLOOD BY AUTOMATED COUNT: 15.1 % (ref 11.5–14.5)
ERYTHROCYTE [DISTWIDTH] IN BLOOD BY AUTOMATED COUNT: 15.2 % (ref 11.5–14.5)
EST. GFR  (AFRICAN AMERICAN): 53 ML/MIN/1.73 M^2
EST. GFR  (NON AFRICAN AMERICAN): 46 ML/MIN/1.73 M^2
GLUCOSE SERPL-MCNC: 102 MG/DL (ref 70–110)
HCT VFR BLD AUTO: 25.5 % (ref 40–54)
HCT VFR BLD AUTO: 25.9 % (ref 40–54)
HCT VFR BLD AUTO: 26.6 % (ref 40–54)
HGB BLD-MCNC: 8.3 G/DL (ref 14–18)
HGB BLD-MCNC: 8.7 G/DL (ref 14–18)
HGB BLD-MCNC: 8.8 G/DL (ref 14–18)
IMM GRANULOCYTES # BLD AUTO: 0.02 K/UL (ref 0–0.04)
IMM GRANULOCYTES # BLD AUTO: 0.04 K/UL (ref 0–0.04)
IMM GRANULOCYTES # BLD AUTO: 0.05 K/UL (ref 0–0.04)
IMM GRANULOCYTES NFR BLD AUTO: 0.2 % (ref 0–0.5)
IMM GRANULOCYTES NFR BLD AUTO: 0.4 % (ref 0–0.5)
IMM GRANULOCYTES NFR BLD AUTO: 0.5 % (ref 0–0.5)
LYMPHOCYTES # BLD AUTO: 1.3 K/UL (ref 1–4.8)
LYMPHOCYTES # BLD AUTO: 1.5 K/UL (ref 1–4.8)
LYMPHOCYTES # BLD AUTO: 1.6 K/UL (ref 1–4.8)
LYMPHOCYTES NFR BLD: 15.4 % (ref 18–48)
LYMPHOCYTES NFR BLD: 15.8 % (ref 18–48)
LYMPHOCYTES NFR BLD: 17.2 % (ref 18–48)
MCH RBC QN AUTO: 31 PG (ref 27–31)
MCH RBC QN AUTO: 31.2 PG (ref 27–31)
MCH RBC QN AUTO: 31.4 PG (ref 27–31)
MCHC RBC AUTO-ENTMCNC: 32.5 G/DL (ref 32–36)
MCHC RBC AUTO-ENTMCNC: 33.1 G/DL (ref 32–36)
MCHC RBC AUTO-ENTMCNC: 33.6 G/DL (ref 32–36)
MCV RBC AUTO: 94 FL (ref 82–98)
MCV RBC AUTO: 94 FL (ref 82–98)
MCV RBC AUTO: 95 FL (ref 82–98)
MONOCYTES # BLD AUTO: 0.9 K/UL (ref 0.3–1)
MONOCYTES # BLD AUTO: 0.9 K/UL (ref 0.3–1)
MONOCYTES # BLD AUTO: 1 K/UL (ref 0.3–1)
MONOCYTES NFR BLD: 10 % (ref 4–15)
MONOCYTES NFR BLD: 10.2 % (ref 4–15)
MONOCYTES NFR BLD: 10.2 % (ref 4–15)
NEUTROPHILS # BLD AUTO: 5.7 K/UL (ref 1.8–7.7)
NEUTROPHILS # BLD AUTO: 6.2 K/UL (ref 1.8–7.7)
NEUTROPHILS # BLD AUTO: 6.5 K/UL (ref 1.8–7.7)
NEUTROPHILS NFR BLD: 67.2 % (ref 38–73)
NEUTROPHILS NFR BLD: 67.7 % (ref 38–73)
NEUTROPHILS NFR BLD: 68.5 % (ref 38–73)
NRBC BLD-RTO: 1 /100 WBC
PLATELET # BLD AUTO: 187 K/UL (ref 150–450)
PLATELET # BLD AUTO: 188 K/UL (ref 150–450)
PLATELET # BLD AUTO: 198 K/UL (ref 150–450)
PMV BLD AUTO: 10.1 FL (ref 9.2–12.9)
PMV BLD AUTO: 9.7 FL (ref 9.2–12.9)
PMV BLD AUTO: 9.9 FL (ref 9.2–12.9)
POTASSIUM SERPL-SCNC: 3.7 MMOL/L (ref 3.5–5.1)
PROT SERPL-MCNC: 5.8 G/DL (ref 6–8.4)
RBC # BLD AUTO: 2.68 M/UL (ref 4.6–6.2)
RBC # BLD AUTO: 2.77 M/UL (ref 4.6–6.2)
RBC # BLD AUTO: 2.82 M/UL (ref 4.6–6.2)
SODIUM SERPL-SCNC: 138 MMOL/L (ref 136–145)
WBC # BLD AUTO: 8.47 K/UL (ref 3.9–12.7)
WBC # BLD AUTO: 9.23 K/UL (ref 3.9–12.7)
WBC # BLD AUTO: 9.55 K/UL (ref 3.9–12.7)

## 2021-05-18 PROCEDURE — 21400001 HC TELEMETRY ROOM

## 2021-05-18 PROCEDURE — 85025 COMPLETE CBC W/AUTO DIFF WBC: CPT | Mod: 91 | Performed by: HOSPITALIST

## 2021-05-18 PROCEDURE — 99232 SBSQ HOSP IP/OBS MODERATE 35: CPT | Mod: ,,, | Performed by: NURSE PRACTITIONER

## 2021-05-18 PROCEDURE — 25000003 PHARM REV CODE 250: Performed by: HOSPITALIST

## 2021-05-18 PROCEDURE — 99232 PR SUBSEQUENT HOSPITAL CARE,LEVL II: ICD-10-PCS | Mod: ,,, | Performed by: NURSE PRACTITIONER

## 2021-05-18 PROCEDURE — 25000003 PHARM REV CODE 250: Performed by: INTERNAL MEDICINE

## 2021-05-18 PROCEDURE — 36415 COLL VENOUS BLD VENIPUNCTURE: CPT | Performed by: HOSPITALIST

## 2021-05-18 PROCEDURE — 80053 COMPREHEN METABOLIC PANEL: CPT | Performed by: HOSPITALIST

## 2021-05-18 RX ORDER — POLYETHYLENE GLYCOL 3350, SODIUM SULFATE ANHYDROUS, SODIUM BICARBONATE, SODIUM CHLORIDE, POTASSIUM CHLORIDE 236; 22.74; 6.74; 5.86; 2.97 G/4L; G/4L; G/4L; G/4L; G/4L
4000 POWDER, FOR SOLUTION ORAL ONCE
Status: DISCONTINUED | OUTPATIENT
Start: 2021-05-18 | End: 2021-05-22 | Stop reason: HOSPADM

## 2021-05-18 RX ADMIN — MUPIROCIN: 20 OINTMENT TOPICAL at 08:05

## 2021-05-18 RX ADMIN — MUPIROCIN: 20 OINTMENT TOPICAL at 09:05

## 2021-05-18 RX ADMIN — AMLODIPINE BESYLATE 5 MG: 5 TABLET ORAL at 09:05

## 2021-05-18 RX ADMIN — ACETAMINOPHEN 650 MG: 325 TABLET ORAL at 02:05

## 2021-05-19 LAB
ALBUMIN SERPL BCP-MCNC: 2.6 G/DL (ref 3.5–5.2)
ALP SERPL-CCNC: 57 U/L (ref 55–135)
ALT SERPL W/O P-5'-P-CCNC: 21 U/L (ref 10–44)
ANION GAP SERPL CALC-SCNC: 7 MMOL/L (ref 8–16)
AST SERPL-CCNC: 34 U/L (ref 10–40)
BASOPHILS # BLD AUTO: 0.02 K/UL (ref 0–0.2)
BASOPHILS # BLD AUTO: 0.03 K/UL (ref 0–0.2)
BASOPHILS # BLD AUTO: 0.03 K/UL (ref 0–0.2)
BASOPHILS NFR BLD: 0.2 % (ref 0–1.9)
BASOPHILS NFR BLD: 0.4 % (ref 0–1.9)
BASOPHILS NFR BLD: 0.4 % (ref 0–1.9)
BILIRUB SERPL-MCNC: 0.6 MG/DL (ref 0.1–1)
BUN SERPL-MCNC: 11 MG/DL (ref 8–23)
CALCIUM SERPL-MCNC: 8.4 MG/DL (ref 8.7–10.5)
CHLORIDE SERPL-SCNC: 103 MMOL/L (ref 95–110)
CO2 SERPL-SCNC: 28 MMOL/L (ref 23–29)
CREAT SERPL-MCNC: 1.4 MG/DL (ref 0.5–1.4)
DIFFERENTIAL METHOD: ABNORMAL
EOSINOPHIL # BLD AUTO: 0.4 K/UL (ref 0–0.5)
EOSINOPHIL # BLD AUTO: 0.5 K/UL (ref 0–0.5)
EOSINOPHIL # BLD AUTO: 0.5 K/UL (ref 0–0.5)
EOSINOPHIL NFR BLD: 4.9 % (ref 0–8)
EOSINOPHIL NFR BLD: 5.9 % (ref 0–8)
EOSINOPHIL NFR BLD: 6 % (ref 0–8)
ERYTHROCYTE [DISTWIDTH] IN BLOOD BY AUTOMATED COUNT: 14.6 % (ref 11.5–14.5)
ERYTHROCYTE [DISTWIDTH] IN BLOOD BY AUTOMATED COUNT: 14.7 % (ref 11.5–14.5)
ERYTHROCYTE [DISTWIDTH] IN BLOOD BY AUTOMATED COUNT: 15.1 % (ref 11.5–14.5)
EST. GFR  (AFRICAN AMERICAN): 53 ML/MIN/1.73 M^2
EST. GFR  (NON AFRICAN AMERICAN): 46 ML/MIN/1.73 M^2
GLUCOSE SERPL-MCNC: 97 MG/DL (ref 70–110)
HCT VFR BLD AUTO: 25.5 % (ref 40–54)
HCT VFR BLD AUTO: 25.5 % (ref 40–54)
HCT VFR BLD AUTO: 25.8 % (ref 40–54)
HGB BLD-MCNC: 8.2 G/DL (ref 14–18)
HGB BLD-MCNC: 8.3 G/DL (ref 14–18)
HGB BLD-MCNC: 8.4 G/DL (ref 14–18)
IMM GRANULOCYTES # BLD AUTO: 0.03 K/UL (ref 0–0.04)
IMM GRANULOCYTES # BLD AUTO: 0.04 K/UL (ref 0–0.04)
IMM GRANULOCYTES # BLD AUTO: 0.04 K/UL (ref 0–0.04)
IMM GRANULOCYTES NFR BLD AUTO: 0.4 % (ref 0–0.5)
IMM GRANULOCYTES NFR BLD AUTO: 0.5 % (ref 0–0.5)
IMM GRANULOCYTES NFR BLD AUTO: 0.5 % (ref 0–0.5)
LYMPHOCYTES # BLD AUTO: 1 K/UL (ref 1–4.8)
LYMPHOCYTES # BLD AUTO: 1.4 K/UL (ref 1–4.8)
LYMPHOCYTES # BLD AUTO: 1.5 K/UL (ref 1–4.8)
LYMPHOCYTES NFR BLD: 13.6 % (ref 18–48)
LYMPHOCYTES NFR BLD: 16.2 % (ref 18–48)
LYMPHOCYTES NFR BLD: 18.5 % (ref 18–48)
MCH RBC QN AUTO: 30.3 PG (ref 27–31)
MCH RBC QN AUTO: 30.8 PG (ref 27–31)
MCH RBC QN AUTO: 31 PG (ref 27–31)
MCHC RBC AUTO-ENTMCNC: 32.2 G/DL (ref 32–36)
MCHC RBC AUTO-ENTMCNC: 32.5 G/DL (ref 32–36)
MCHC RBC AUTO-ENTMCNC: 32.6 G/DL (ref 32–36)
MCV RBC AUTO: 94 FL (ref 82–98)
MCV RBC AUTO: 95 FL (ref 82–98)
MCV RBC AUTO: 95 FL (ref 82–98)
MONOCYTES # BLD AUTO: 0.7 K/UL (ref 0.3–1)
MONOCYTES # BLD AUTO: 0.8 K/UL (ref 0.3–1)
MONOCYTES # BLD AUTO: 0.8 K/UL (ref 0.3–1)
MONOCYTES NFR BLD: 9 % (ref 4–15)
MONOCYTES NFR BLD: 9.1 % (ref 4–15)
MONOCYTES NFR BLD: 9.6 % (ref 4–15)
NEUTROPHILS # BLD AUTO: 5.2 K/UL (ref 1.8–7.7)
NEUTROPHILS # BLD AUTO: 5.4 K/UL (ref 1.8–7.7)
NEUTROPHILS # BLD AUTO: 5.9 K/UL (ref 1.8–7.7)
NEUTROPHILS NFR BLD: 65.1 % (ref 38–73)
NEUTROPHILS NFR BLD: 69.2 % (ref 38–73)
NEUTROPHILS NFR BLD: 70.5 % (ref 38–73)
NRBC BLD-RTO: 0 /100 WBC
NRBC BLD-RTO: 1 /100 WBC
NRBC BLD-RTO: 1 /100 WBC
PLATELET # BLD AUTO: 205 K/UL (ref 150–450)
PLATELET # BLD AUTO: 205 K/UL (ref 150–450)
PLATELET # BLD AUTO: 224 K/UL (ref 150–450)
PMV BLD AUTO: 10 FL (ref 9.2–12.9)
PMV BLD AUTO: 10.3 FL (ref 9.2–12.9)
PMV BLD AUTO: 9.8 FL (ref 9.2–12.9)
POTASSIUM SERPL-SCNC: 3.8 MMOL/L (ref 3.5–5.1)
PROCALCITONIN SERPL IA-MCNC: 0.12 NG/ML
PROT SERPL-MCNC: 5.8 G/DL (ref 6–8.4)
RBC # BLD AUTO: 2.68 M/UL (ref 4.6–6.2)
RBC # BLD AUTO: 2.71 M/UL (ref 4.6–6.2)
RBC # BLD AUTO: 2.73 M/UL (ref 4.6–6.2)
SODIUM SERPL-SCNC: 138 MMOL/L (ref 136–145)
WBC # BLD AUTO: 7.62 K/UL (ref 3.9–12.7)
WBC # BLD AUTO: 7.98 K/UL (ref 3.9–12.7)
WBC # BLD AUTO: 8.58 K/UL (ref 3.9–12.7)

## 2021-05-19 PROCEDURE — 97165 OT EVAL LOW COMPLEX 30 MIN: CPT

## 2021-05-19 PROCEDURE — 80053 COMPREHEN METABOLIC PANEL: CPT | Performed by: HOSPITALIST

## 2021-05-19 PROCEDURE — 99232 SBSQ HOSP IP/OBS MODERATE 35: CPT | Mod: ,,, | Performed by: NURSE PRACTITIONER

## 2021-05-19 PROCEDURE — 36415 COLL VENOUS BLD VENIPUNCTURE: CPT | Performed by: HOSPITALIST

## 2021-05-19 PROCEDURE — 94761 N-INVAS EAR/PLS OXIMETRY MLT: CPT

## 2021-05-19 PROCEDURE — 25000003 PHARM REV CODE 250: Performed by: HOSPITALIST

## 2021-05-19 PROCEDURE — 99232 PR SUBSEQUENT HOSPITAL CARE,LEVL II: ICD-10-PCS | Mod: ,,, | Performed by: NURSE PRACTITIONER

## 2021-05-19 PROCEDURE — 27000221 HC OXYGEN, UP TO 24 HOURS

## 2021-05-19 PROCEDURE — 85025 COMPLETE CBC W/AUTO DIFF WBC: CPT | Performed by: HOSPITALIST

## 2021-05-19 PROCEDURE — 25000003 PHARM REV CODE 250: Performed by: INTERNAL MEDICINE

## 2021-05-19 PROCEDURE — 87040 BLOOD CULTURE FOR BACTERIA: CPT | Mod: 59 | Performed by: INTERNAL MEDICINE

## 2021-05-19 PROCEDURE — 97161 PT EVAL LOW COMPLEX 20 MIN: CPT

## 2021-05-19 PROCEDURE — 97116 GAIT TRAINING THERAPY: CPT

## 2021-05-19 PROCEDURE — 63600175 PHARM REV CODE 636 W HCPCS: Performed by: INTERNAL MEDICINE

## 2021-05-19 PROCEDURE — 84145 PROCALCITONIN (PCT): CPT | Performed by: INTERNAL MEDICINE

## 2021-05-19 PROCEDURE — 36415 COLL VENOUS BLD VENIPUNCTURE: CPT | Performed by: INTERNAL MEDICINE

## 2021-05-19 PROCEDURE — 21400001 HC TELEMETRY ROOM

## 2021-05-19 RX ADMIN — ACETAMINOPHEN 650 MG: 325 TABLET ORAL at 04:05

## 2021-05-19 RX ADMIN — MUPIROCIN: 20 OINTMENT TOPICAL at 09:05

## 2021-05-19 RX ADMIN — AMLODIPINE BESYLATE 5 MG: 5 TABLET ORAL at 09:05

## 2021-05-19 RX ADMIN — PIPERACILLIN AND TAZOBACTAM 4.5 G: 4; .5 INJECTION, POWDER, LYOPHILIZED, FOR SOLUTION INTRAVENOUS; PARENTERAL at 06:05

## 2021-05-20 LAB
ALBUMIN SERPL BCP-MCNC: 2.6 G/DL (ref 3.5–5.2)
ALP SERPL-CCNC: 51 U/L (ref 55–135)
ALT SERPL W/O P-5'-P-CCNC: 28 U/L (ref 10–44)
ANION GAP SERPL CALC-SCNC: 7 MMOL/L (ref 8–16)
AST SERPL-CCNC: 39 U/L (ref 10–40)
BASOPHILS # BLD AUTO: 0.03 K/UL (ref 0–0.2)
BASOPHILS # BLD AUTO: 0.03 K/UL (ref 0–0.2)
BASOPHILS # BLD AUTO: 0.05 K/UL (ref 0–0.2)
BASOPHILS NFR BLD: 0.4 % (ref 0–1.9)
BASOPHILS NFR BLD: 0.4 % (ref 0–1.9)
BASOPHILS NFR BLD: 0.6 % (ref 0–1.9)
BILIRUB SERPL-MCNC: 0.4 MG/DL (ref 0.1–1)
BUN SERPL-MCNC: 16 MG/DL (ref 8–23)
CALCIUM SERPL-MCNC: 8.1 MG/DL (ref 8.7–10.5)
CHLORIDE SERPL-SCNC: 103 MMOL/L (ref 95–110)
CO2 SERPL-SCNC: 29 MMOL/L (ref 23–29)
CREAT SERPL-MCNC: 1.9 MG/DL (ref 0.5–1.4)
DIFFERENTIAL METHOD: ABNORMAL
EOSINOPHIL # BLD AUTO: 0.5 K/UL (ref 0–0.5)
EOSINOPHIL # BLD AUTO: 0.5 K/UL (ref 0–0.5)
EOSINOPHIL # BLD AUTO: 0.6 K/UL (ref 0–0.5)
EOSINOPHIL NFR BLD: 6.5 % (ref 0–8)
EOSINOPHIL NFR BLD: 6.5 % (ref 0–8)
EOSINOPHIL NFR BLD: 7 % (ref 0–8)
ERYTHROCYTE [DISTWIDTH] IN BLOOD BY AUTOMATED COUNT: 14.3 % (ref 11.5–14.5)
ERYTHROCYTE [DISTWIDTH] IN BLOOD BY AUTOMATED COUNT: 14.3 % (ref 11.5–14.5)
ERYTHROCYTE [DISTWIDTH] IN BLOOD BY AUTOMATED COUNT: 14.5 % (ref 11.5–14.5)
EST. GFR  (AFRICAN AMERICAN): 37 ML/MIN/1.73 M^2
EST. GFR  (NON AFRICAN AMERICAN): 32 ML/MIN/1.73 M^2
GLUCOSE SERPL-MCNC: 107 MG/DL (ref 70–110)
HCT VFR BLD AUTO: 24.4 % (ref 40–54)
HCT VFR BLD AUTO: 24.6 % (ref 40–54)
HCT VFR BLD AUTO: 25.2 % (ref 40–54)
HGB BLD-MCNC: 7.9 G/DL (ref 14–18)
HGB BLD-MCNC: 8.1 G/DL (ref 14–18)
HGB BLD-MCNC: 8.2 G/DL (ref 14–18)
IMM GRANULOCYTES # BLD AUTO: 0.03 K/UL (ref 0–0.04)
IMM GRANULOCYTES # BLD AUTO: 0.04 K/UL (ref 0–0.04)
IMM GRANULOCYTES # BLD AUTO: 0.04 K/UL (ref 0–0.04)
IMM GRANULOCYTES NFR BLD AUTO: 0.4 % (ref 0–0.5)
IMM GRANULOCYTES NFR BLD AUTO: 0.5 % (ref 0–0.5)
IMM GRANULOCYTES NFR BLD AUTO: 0.5 % (ref 0–0.5)
LYMPHOCYTES # BLD AUTO: 1.1 K/UL (ref 1–4.8)
LYMPHOCYTES # BLD AUTO: 1.4 K/UL (ref 1–4.8)
LYMPHOCYTES # BLD AUTO: 1.4 K/UL (ref 1–4.8)
LYMPHOCYTES NFR BLD: 15.3 % (ref 18–48)
LYMPHOCYTES NFR BLD: 16.4 % (ref 18–48)
LYMPHOCYTES NFR BLD: 18 % (ref 18–48)
MCH RBC QN AUTO: 30.5 PG (ref 27–31)
MCH RBC QN AUTO: 31.2 PG (ref 27–31)
MCH RBC QN AUTO: 31.2 PG (ref 27–31)
MCHC RBC AUTO-ENTMCNC: 32.1 G/DL (ref 32–36)
MCHC RBC AUTO-ENTMCNC: 32.5 G/DL (ref 32–36)
MCHC RBC AUTO-ENTMCNC: 33.2 G/DL (ref 32–36)
MCV RBC AUTO: 94 FL (ref 82–98)
MCV RBC AUTO: 95 FL (ref 82–98)
MCV RBC AUTO: 96 FL (ref 82–98)
MONOCYTES # BLD AUTO: 0.5 K/UL (ref 0.3–1)
MONOCYTES # BLD AUTO: 0.8 K/UL (ref 0.3–1)
MONOCYTES # BLD AUTO: 0.8 K/UL (ref 0.3–1)
MONOCYTES NFR BLD: 10.2 % (ref 4–15)
MONOCYTES NFR BLD: 7 % (ref 4–15)
MONOCYTES NFR BLD: 9.2 % (ref 4–15)
NEUTROPHILS # BLD AUTO: 5.1 K/UL (ref 1.8–7.7)
NEUTROPHILS # BLD AUTO: 5.2 K/UL (ref 1.8–7.7)
NEUTROPHILS # BLD AUTO: 5.6 K/UL (ref 1.8–7.7)
NEUTROPHILS NFR BLD: 64.4 % (ref 38–73)
NEUTROPHILS NFR BLD: 66.8 % (ref 38–73)
NEUTROPHILS NFR BLD: 69.9 % (ref 38–73)
NRBC BLD-RTO: 0 /100 WBC
PLATELET # BLD AUTO: 209 K/UL (ref 150–450)
PLATELET # BLD AUTO: 222 K/UL (ref 150–450)
PLATELET # BLD AUTO: 252 K/UL (ref 150–450)
PMV BLD AUTO: 9.6 FL (ref 9.2–12.9)
PMV BLD AUTO: 9.7 FL (ref 9.2–12.9)
PMV BLD AUTO: 9.8 FL (ref 9.2–12.9)
POTASSIUM SERPL-SCNC: 3.7 MMOL/L (ref 3.5–5.1)
PROT SERPL-MCNC: 5.8 G/DL (ref 6–8.4)
RBC # BLD AUTO: 2.59 M/UL (ref 4.6–6.2)
RBC # BLD AUTO: 2.6 M/UL (ref 4.6–6.2)
RBC # BLD AUTO: 2.63 M/UL (ref 4.6–6.2)
SODIUM SERPL-SCNC: 139 MMOL/L (ref 136–145)
WBC # BLD AUTO: 7.46 K/UL (ref 3.9–12.7)
WBC # BLD AUTO: 7.95 K/UL (ref 3.9–12.7)
WBC # BLD AUTO: 8.4 K/UL (ref 3.9–12.7)

## 2021-05-20 PROCEDURE — 80053 COMPREHEN METABOLIC PANEL: CPT | Performed by: HOSPITALIST

## 2021-05-20 PROCEDURE — 63600175 PHARM REV CODE 636 W HCPCS: Performed by: INTERNAL MEDICINE

## 2021-05-20 PROCEDURE — 25000003 PHARM REV CODE 250: Performed by: INTERNAL MEDICINE

## 2021-05-20 PROCEDURE — 85025 COMPLETE CBC W/AUTO DIFF WBC: CPT | Mod: 91 | Performed by: HOSPITALIST

## 2021-05-20 PROCEDURE — 21400001 HC TELEMETRY ROOM

## 2021-05-20 PROCEDURE — 97116 GAIT TRAINING THERAPY: CPT | Mod: CQ

## 2021-05-20 PROCEDURE — 36415 COLL VENOUS BLD VENIPUNCTURE: CPT | Performed by: HOSPITALIST

## 2021-05-20 PROCEDURE — 97535 SELF CARE MNGMENT TRAINING: CPT | Mod: CO

## 2021-05-20 PROCEDURE — 25000003 PHARM REV CODE 250: Performed by: HOSPITALIST

## 2021-05-20 PROCEDURE — 97110 THERAPEUTIC EXERCISES: CPT | Mod: CQ

## 2021-05-20 RX ORDER — TALC
6 POWDER (GRAM) TOPICAL NIGHTLY PRN
Status: DISCONTINUED | OUTPATIENT
Start: 2021-05-20 | End: 2021-05-22 | Stop reason: HOSPADM

## 2021-05-20 RX ORDER — SODIUM CHLORIDE 9 MG/ML
INJECTION, SOLUTION INTRAVENOUS CONTINUOUS
Status: DISCONTINUED | OUTPATIENT
Start: 2021-05-20 | End: 2021-05-22 | Stop reason: HOSPADM

## 2021-05-20 RX ADMIN — SODIUM CHLORIDE: 0.9 INJECTION, SOLUTION INTRAVENOUS at 11:05

## 2021-05-20 RX ADMIN — PIPERACILLIN AND TAZOBACTAM 4.5 G: 4; .5 INJECTION, POWDER, LYOPHILIZED, FOR SOLUTION INTRAVENOUS; PARENTERAL at 03:05

## 2021-05-20 RX ADMIN — AMLODIPINE BESYLATE 5 MG: 5 TABLET ORAL at 09:05

## 2021-05-20 RX ADMIN — MUPIROCIN: 20 OINTMENT TOPICAL at 09:05

## 2021-05-20 RX ADMIN — PIPERACILLIN AND TAZOBACTAM 4.5 G: 4; .5 INJECTION, POWDER, LYOPHILIZED, FOR SOLUTION INTRAVENOUS; PARENTERAL at 09:05

## 2021-05-20 RX ADMIN — Medication 6 MG: at 12:05

## 2021-05-21 LAB
ALBUMIN SERPL BCP-MCNC: 2.5 G/DL (ref 3.5–5.2)
ALP SERPL-CCNC: 53 U/L (ref 55–135)
ALT SERPL W/O P-5'-P-CCNC: 39 U/L (ref 10–44)
ANION GAP SERPL CALC-SCNC: 8 MMOL/L (ref 8–16)
AST SERPL-CCNC: 46 U/L (ref 10–40)
BASOPHILS # BLD AUTO: 0.03 K/UL (ref 0–0.2)
BASOPHILS # BLD AUTO: 0.04 K/UL (ref 0–0.2)
BASOPHILS # BLD AUTO: 0.04 K/UL (ref 0–0.2)
BASOPHILS NFR BLD: 0.4 % (ref 0–1.9)
BASOPHILS NFR BLD: 0.5 % (ref 0–1.9)
BASOPHILS NFR BLD: 0.5 % (ref 0–1.9)
BILIRUB SERPL-MCNC: 0.4 MG/DL (ref 0.1–1)
BUN SERPL-MCNC: 13 MG/DL (ref 8–23)
CALCIUM SERPL-MCNC: 8 MG/DL (ref 8.7–10.5)
CHLORIDE SERPL-SCNC: 105 MMOL/L (ref 95–110)
CO2 SERPL-SCNC: 27 MMOL/L (ref 23–29)
CREAT SERPL-MCNC: 1.4 MG/DL (ref 0.5–1.4)
DIFFERENTIAL METHOD: ABNORMAL
EOSINOPHIL # BLD AUTO: 0.6 K/UL (ref 0–0.5)
EOSINOPHIL # BLD AUTO: 0.6 K/UL (ref 0–0.5)
EOSINOPHIL # BLD AUTO: 0.7 K/UL (ref 0–0.5)
EOSINOPHIL NFR BLD: 6.7 % (ref 0–8)
EOSINOPHIL NFR BLD: 8.1 % (ref 0–8)
EOSINOPHIL NFR BLD: 8.2 % (ref 0–8)
ERYTHROCYTE [DISTWIDTH] IN BLOOD BY AUTOMATED COUNT: 14.1 % (ref 11.5–14.5)
ERYTHROCYTE [DISTWIDTH] IN BLOOD BY AUTOMATED COUNT: 14.2 % (ref 11.5–14.5)
ERYTHROCYTE [DISTWIDTH] IN BLOOD BY AUTOMATED COUNT: 14.3 % (ref 11.5–14.5)
EST. GFR  (AFRICAN AMERICAN): 53 ML/MIN/1.73 M^2
EST. GFR  (NON AFRICAN AMERICAN): 46 ML/MIN/1.73 M^2
GLUCOSE SERPL-MCNC: 83 MG/DL (ref 70–110)
HCT VFR BLD AUTO: 23.9 % (ref 40–54)
HCT VFR BLD AUTO: 25.7 % (ref 40–54)
HCT VFR BLD AUTO: 27 % (ref 40–54)
HGB BLD-MCNC: 7.7 G/DL (ref 14–18)
HGB BLD-MCNC: 8.2 G/DL (ref 14–18)
HGB BLD-MCNC: 8.4 G/DL (ref 14–18)
IMM GRANULOCYTES # BLD AUTO: 0.03 K/UL (ref 0–0.04)
IMM GRANULOCYTES # BLD AUTO: 0.03 K/UL (ref 0–0.04)
IMM GRANULOCYTES # BLD AUTO: 0.05 K/UL (ref 0–0.04)
IMM GRANULOCYTES NFR BLD AUTO: 0.4 % (ref 0–0.5)
IMM GRANULOCYTES NFR BLD AUTO: 0.4 % (ref 0–0.5)
IMM GRANULOCYTES NFR BLD AUTO: 0.6 % (ref 0–0.5)
LYMPHOCYTES # BLD AUTO: 1.3 K/UL (ref 1–4.8)
LYMPHOCYTES # BLD AUTO: 1.3 K/UL (ref 1–4.8)
LYMPHOCYTES # BLD AUTO: 1.4 K/UL (ref 1–4.8)
LYMPHOCYTES NFR BLD: 15.5 % (ref 18–48)
LYMPHOCYTES NFR BLD: 16.1 % (ref 18–48)
LYMPHOCYTES NFR BLD: 17.8 % (ref 18–48)
MCH RBC QN AUTO: 30.4 PG (ref 27–31)
MCH RBC QN AUTO: 30.7 PG (ref 27–31)
MCH RBC QN AUTO: 30.8 PG (ref 27–31)
MCHC RBC AUTO-ENTMCNC: 31.1 G/DL (ref 32–36)
MCHC RBC AUTO-ENTMCNC: 31.9 G/DL (ref 32–36)
MCHC RBC AUTO-ENTMCNC: 32.2 G/DL (ref 32–36)
MCV RBC AUTO: 96 FL (ref 82–98)
MCV RBC AUTO: 96 FL (ref 82–98)
MCV RBC AUTO: 98 FL (ref 82–98)
MONOCYTES # BLD AUTO: 0.8 K/UL (ref 0.3–1)
MONOCYTES # BLD AUTO: 0.8 K/UL (ref 0.3–1)
MONOCYTES # BLD AUTO: 0.9 K/UL (ref 0.3–1)
MONOCYTES NFR BLD: 10.1 % (ref 4–15)
MONOCYTES NFR BLD: 10.3 % (ref 4–15)
MONOCYTES NFR BLD: 9.3 % (ref 4–15)
NEUTROPHILS # BLD AUTO: 5 K/UL (ref 1.8–7.7)
NEUTROPHILS # BLD AUTO: 5.4 K/UL (ref 1.8–7.7)
NEUTROPHILS # BLD AUTO: 5.7 K/UL (ref 1.8–7.7)
NEUTROPHILS NFR BLD: 62.9 % (ref 38–73)
NEUTROPHILS NFR BLD: 65.6 % (ref 38–73)
NEUTROPHILS NFR BLD: 66.6 % (ref 38–73)
NRBC BLD-RTO: 0 /100 WBC
PLATELET # BLD AUTO: 260 K/UL (ref 150–450)
PLATELET # BLD AUTO: 271 K/UL (ref 150–450)
PLATELET # BLD AUTO: 298 K/UL (ref 150–450)
PMV BLD AUTO: 9.5 FL (ref 9.2–12.9)
PMV BLD AUTO: 9.6 FL (ref 9.2–12.9)
PMV BLD AUTO: 9.8 FL (ref 9.2–12.9)
POTASSIUM SERPL-SCNC: 3.9 MMOL/L (ref 3.5–5.1)
PROT SERPL-MCNC: 5.8 G/DL (ref 6–8.4)
RBC # BLD AUTO: 2.5 M/UL (ref 4.6–6.2)
RBC # BLD AUTO: 2.67 M/UL (ref 4.6–6.2)
RBC # BLD AUTO: 2.76 M/UL (ref 4.6–6.2)
SODIUM SERPL-SCNC: 140 MMOL/L (ref 136–145)
WBC # BLD AUTO: 7.91 K/UL (ref 3.9–12.7)
WBC # BLD AUTO: 8.2 K/UL (ref 3.9–12.7)
WBC # BLD AUTO: 8.57 K/UL (ref 3.9–12.7)

## 2021-05-21 PROCEDURE — 21400001 HC TELEMETRY ROOM

## 2021-05-21 PROCEDURE — 25000003 PHARM REV CODE 250: Performed by: INTERNAL MEDICINE

## 2021-05-21 PROCEDURE — 25000003 PHARM REV CODE 250: Performed by: HOSPITALIST

## 2021-05-21 PROCEDURE — 94761 N-INVAS EAR/PLS OXIMETRY MLT: CPT

## 2021-05-21 PROCEDURE — 97110 THERAPEUTIC EXERCISES: CPT | Mod: CQ

## 2021-05-21 PROCEDURE — 85025 COMPLETE CBC W/AUTO DIFF WBC: CPT | Mod: 91 | Performed by: HOSPITALIST

## 2021-05-21 PROCEDURE — 36415 COLL VENOUS BLD VENIPUNCTURE: CPT | Performed by: HOSPITALIST

## 2021-05-21 PROCEDURE — 80053 COMPREHEN METABOLIC PANEL: CPT | Performed by: HOSPITALIST

## 2021-05-21 PROCEDURE — 97116 GAIT TRAINING THERAPY: CPT | Mod: CQ

## 2021-05-21 RX ADMIN — SODIUM CHLORIDE: 0.9 INJECTION, SOLUTION INTRAVENOUS at 01:05

## 2021-05-21 RX ADMIN — Medication 6 MG: at 10:05

## 2021-05-21 RX ADMIN — AMLODIPINE BESYLATE 5 MG: 5 TABLET ORAL at 09:05

## 2021-05-22 VITALS
SYSTOLIC BLOOD PRESSURE: 166 MMHG | TEMPERATURE: 97 F | HEIGHT: 68 IN | WEIGHT: 230.19 LBS | HEART RATE: 82 BPM | DIASTOLIC BLOOD PRESSURE: 72 MMHG | RESPIRATION RATE: 18 BRPM | OXYGEN SATURATION: 93 % | BODY MASS INDEX: 34.89 KG/M2

## 2021-05-22 PROBLEM — N17.9 AKI (ACUTE KIDNEY INJURY): Status: RESOLVED | Noted: 2021-05-15 | Resolved: 2021-05-22

## 2021-05-22 PROBLEM — D64.9 SYMPTOMATIC ANEMIA: Status: RESOLVED | Noted: 2021-05-15 | Resolved: 2021-05-22

## 2021-05-22 PROBLEM — K92.1 BLOOD IN STOOL: Status: RESOLVED | Noted: 2021-05-15 | Resolved: 2021-05-22

## 2021-05-22 LAB
ALBUMIN SERPL BCP-MCNC: 2.5 G/DL (ref 3.5–5.2)
ALP SERPL-CCNC: 53 U/L (ref 55–135)
ALT SERPL W/O P-5'-P-CCNC: 44 U/L (ref 10–44)
ANION GAP SERPL CALC-SCNC: 9 MMOL/L (ref 8–16)
AST SERPL-CCNC: 44 U/L (ref 10–40)
BASOPHILS # BLD AUTO: 0.03 K/UL (ref 0–0.2)
BASOPHILS # BLD AUTO: 0.04 K/UL (ref 0–0.2)
BASOPHILS NFR BLD: 0.4 % (ref 0–1.9)
BASOPHILS NFR BLD: 0.5 % (ref 0–1.9)
BILIRUB SERPL-MCNC: 0.4 MG/DL (ref 0.1–1)
BUN SERPL-MCNC: 12 MG/DL (ref 8–23)
CALCIUM SERPL-MCNC: 8.2 MG/DL (ref 8.7–10.5)
CHLORIDE SERPL-SCNC: 107 MMOL/L (ref 95–110)
CO2 SERPL-SCNC: 26 MMOL/L (ref 23–29)
CREAT SERPL-MCNC: 1.3 MG/DL (ref 0.5–1.4)
DIFFERENTIAL METHOD: ABNORMAL
DIFFERENTIAL METHOD: ABNORMAL
EOSINOPHIL # BLD AUTO: 0.5 K/UL (ref 0–0.5)
EOSINOPHIL # BLD AUTO: 0.5 K/UL (ref 0–0.5)
EOSINOPHIL NFR BLD: 6.6 % (ref 0–8)
EOSINOPHIL NFR BLD: 7.3 % (ref 0–8)
ERYTHROCYTE [DISTWIDTH] IN BLOOD BY AUTOMATED COUNT: 13.9 % (ref 11.5–14.5)
ERYTHROCYTE [DISTWIDTH] IN BLOOD BY AUTOMATED COUNT: 14 % (ref 11.5–14.5)
EST. GFR  (AFRICAN AMERICAN): 58 ML/MIN/1.73 M^2
EST. GFR  (NON AFRICAN AMERICAN): 50 ML/MIN/1.73 M^2
GLUCOSE SERPL-MCNC: 81 MG/DL (ref 70–110)
HCT VFR BLD AUTO: 24.4 % (ref 40–54)
HCT VFR BLD AUTO: 25.9 % (ref 40–54)
HGB BLD-MCNC: 7.9 G/DL (ref 14–18)
HGB BLD-MCNC: 8.1 G/DL (ref 14–18)
IMM GRANULOCYTES # BLD AUTO: 0.02 K/UL (ref 0–0.04)
IMM GRANULOCYTES # BLD AUTO: 0.02 K/UL (ref 0–0.04)
IMM GRANULOCYTES NFR BLD AUTO: 0.3 % (ref 0–0.5)
IMM GRANULOCYTES NFR BLD AUTO: 0.3 % (ref 0–0.5)
LYMPHOCYTES # BLD AUTO: 1.3 K/UL (ref 1–4.8)
LYMPHOCYTES # BLD AUTO: 1.4 K/UL (ref 1–4.8)
LYMPHOCYTES NFR BLD: 16.9 % (ref 18–48)
LYMPHOCYTES NFR BLD: 18.6 % (ref 18–48)
MCH RBC QN AUTO: 30.1 PG (ref 27–31)
MCH RBC QN AUTO: 30.7 PG (ref 27–31)
MCHC RBC AUTO-ENTMCNC: 31.3 G/DL (ref 32–36)
MCHC RBC AUTO-ENTMCNC: 32.4 G/DL (ref 32–36)
MCV RBC AUTO: 95 FL (ref 82–98)
MCV RBC AUTO: 96 FL (ref 82–98)
MONOCYTES # BLD AUTO: 0.7 K/UL (ref 0.3–1)
MONOCYTES # BLD AUTO: 0.8 K/UL (ref 0.3–1)
MONOCYTES NFR BLD: 10.9 % (ref 4–15)
MONOCYTES NFR BLD: 9.9 % (ref 4–15)
NEUTROPHILS # BLD AUTO: 4.5 K/UL (ref 1.8–7.7)
NEUTROPHILS # BLD AUTO: 4.9 K/UL (ref 1.8–7.7)
NEUTROPHILS NFR BLD: 62.5 % (ref 38–73)
NEUTROPHILS NFR BLD: 65.8 % (ref 38–73)
NRBC BLD-RTO: 0 /100 WBC
NRBC BLD-RTO: 0 /100 WBC
PLATELET # BLD AUTO: 274 K/UL (ref 150–450)
PLATELET # BLD AUTO: 278 K/UL (ref 150–450)
PMV BLD AUTO: 9.4 FL (ref 9.2–12.9)
PMV BLD AUTO: 9.7 FL (ref 9.2–12.9)
POTASSIUM SERPL-SCNC: 4.1 MMOL/L (ref 3.5–5.1)
PROT SERPL-MCNC: 6.2 G/DL (ref 6–8.4)
RBC # BLD AUTO: 2.57 M/UL (ref 4.6–6.2)
RBC # BLD AUTO: 2.69 M/UL (ref 4.6–6.2)
SODIUM SERPL-SCNC: 142 MMOL/L (ref 136–145)
WBC # BLD AUTO: 7.26 K/UL (ref 3.9–12.7)
WBC # BLD AUTO: 7.47 K/UL (ref 3.9–12.7)

## 2021-05-22 PROCEDURE — 25000003 PHARM REV CODE 250: Performed by: HOSPITALIST

## 2021-05-22 PROCEDURE — 25000003 PHARM REV CODE 250: Performed by: INTERNAL MEDICINE

## 2021-05-22 PROCEDURE — 85025 COMPLETE CBC W/AUTO DIFF WBC: CPT | Performed by: HOSPITALIST

## 2021-05-22 PROCEDURE — 36415 COLL VENOUS BLD VENIPUNCTURE: CPT | Performed by: HOSPITALIST

## 2021-05-22 PROCEDURE — 80053 COMPREHEN METABOLIC PANEL: CPT | Performed by: HOSPITALIST

## 2021-05-22 RX ORDER — AMLODIPINE BESYLATE 5 MG/1
5 TABLET ORAL DAILY
Qty: 30 TABLET | Refills: 5 | Status: SHIPPED | OUTPATIENT
Start: 2021-05-23 | End: 2021-05-31 | Stop reason: SDUPTHER

## 2021-05-22 RX ADMIN — SODIUM CHLORIDE: 0.9 INJECTION, SOLUTION INTRAVENOUS at 03:05

## 2021-05-22 RX ADMIN — AMLODIPINE BESYLATE 5 MG: 5 TABLET ORAL at 09:05

## 2021-05-22 RX ADMIN — ACETAMINOPHEN 650 MG: 325 TABLET ORAL at 12:05

## 2021-05-24 LAB
BACTERIA BLD CULT: NORMAL
BACTERIA BLD CULT: NORMAL

## 2021-05-25 ENCOUNTER — PATIENT OUTREACH (OUTPATIENT)
Dept: ADMINISTRATIVE | Facility: CLINIC | Age: 81
End: 2021-05-25

## 2021-05-25 DIAGNOSIS — D62 ACUTE BLOOD LOSS ANEMIA: Primary | ICD-10-CM

## 2021-05-26 ENCOUNTER — TELEPHONE (OUTPATIENT)
Dept: ENDOSCOPY | Facility: HOSPITAL | Age: 81
End: 2021-05-26

## 2021-05-26 DIAGNOSIS — K59.01 SLOW TRANSIT CONSTIPATION: ICD-10-CM

## 2021-05-26 DIAGNOSIS — M15.9 PRIMARY OSTEOARTHRITIS INVOLVING MULTIPLE JOINTS: ICD-10-CM

## 2021-05-26 DIAGNOSIS — J41.8 MIXED SIMPLE AND MUCOPURULENT CHRONIC BRONCHITIS: ICD-10-CM

## 2021-05-26 DIAGNOSIS — I10 TACHYCARDIA WITH HYPERTENSION: ICD-10-CM

## 2021-05-26 DIAGNOSIS — R00.0 TACHYCARDIA WITH HYPERTENSION: ICD-10-CM

## 2021-05-26 RX ORDER — POLYETHYLENE GLYCOL 3350 17 G/17G
17 POWDER, FOR SOLUTION ORAL DAILY
Qty: 14 EACH | Refills: 2 | Status: SHIPPED | OUTPATIENT
Start: 2021-05-26 | End: 2021-12-07

## 2021-05-26 RX ORDER — ACETAMINOPHEN AND CODEINE PHOSPHATE 300; 60 MG/1; MG/1
1 TABLET ORAL EVERY 8 HOURS PRN
Qty: 90 TABLET | Refills: 2 | OUTPATIENT
Start: 2021-05-26

## 2021-05-26 RX ORDER — ALBUTEROL SULFATE 90 UG/1
AEROSOL, METERED RESPIRATORY (INHALATION)
Qty: 18 G | Refills: 5 | Status: SHIPPED | OUTPATIENT
Start: 2021-05-26 | End: 2021-05-31 | Stop reason: SDUPTHER

## 2021-05-26 RX ORDER — METOPROLOL TARTRATE 50 MG/1
50 TABLET ORAL DAILY
Qty: 90 TABLET | Refills: 1 | Status: SHIPPED | OUTPATIENT
Start: 2021-05-26 | End: 2021-05-31 | Stop reason: SDUPTHER

## 2021-05-26 RX ORDER — AMLODIPINE BESYLATE 5 MG/1
5 TABLET ORAL DAILY
Qty: 30 TABLET | Refills: 5 | OUTPATIENT
Start: 2021-05-26 | End: 2022-05-26

## 2021-05-27 ENCOUNTER — TELEPHONE (OUTPATIENT)
Dept: ENDOSCOPY | Facility: HOSPITAL | Age: 81
End: 2021-05-27

## 2021-05-31 ENCOUNTER — OFFICE VISIT (OUTPATIENT)
Dept: FAMILY MEDICINE | Facility: CLINIC | Age: 81
End: 2021-05-31
Payer: MEDICARE

## 2021-05-31 VITALS
OXYGEN SATURATION: 98 % | BODY MASS INDEX: 34.72 KG/M2 | SYSTOLIC BLOOD PRESSURE: 138 MMHG | TEMPERATURE: 99 F | DIASTOLIC BLOOD PRESSURE: 66 MMHG | HEART RATE: 86 BPM | HEIGHT: 68 IN | WEIGHT: 229.06 LBS | RESPIRATION RATE: 16 BRPM

## 2021-05-31 DIAGNOSIS — K92.0 GASTROINTESTINAL HEMORRHAGE WITH HEMATEMESIS: Primary | ICD-10-CM

## 2021-05-31 DIAGNOSIS — I10 ESSENTIAL HYPERTENSION: ICD-10-CM

## 2021-05-31 DIAGNOSIS — R00.0 TACHYCARDIA WITH HYPERTENSION: ICD-10-CM

## 2021-05-31 DIAGNOSIS — J41.8 MIXED SIMPLE AND MUCOPURULENT CHRONIC BRONCHITIS: ICD-10-CM

## 2021-05-31 DIAGNOSIS — I10 TACHYCARDIA WITH HYPERTENSION: ICD-10-CM

## 2021-05-31 DIAGNOSIS — F51.01 PRIMARY INSOMNIA: ICD-10-CM

## 2021-05-31 DIAGNOSIS — E78.2 MIXED HYPERLIPIDEMIA: ICD-10-CM

## 2021-05-31 PROCEDURE — 1159F MED LIST DOCD IN RCRD: CPT | Mod: S$GLB,,, | Performed by: FAMILY MEDICINE

## 2021-05-31 PROCEDURE — 1101F PT FALLS ASSESS-DOCD LE1/YR: CPT | Mod: CPTII,S$GLB,, | Performed by: FAMILY MEDICINE

## 2021-05-31 PROCEDURE — 3288F FALL RISK ASSESSMENT DOCD: CPT | Mod: CPTII,S$GLB,, | Performed by: FAMILY MEDICINE

## 2021-05-31 PROCEDURE — 99499 UNLISTED E&M SERVICE: CPT | Mod: S$GLB,,, | Performed by: FAMILY MEDICINE

## 2021-05-31 PROCEDURE — 3288F PR FALLS RISK ASSESSMENT DOCUMENTED: ICD-10-PCS | Mod: CPTII,S$GLB,, | Performed by: FAMILY MEDICINE

## 2021-05-31 PROCEDURE — 1111F PR DISCHARGE MEDS RECONCILED W/ CURRENT OUTPATIENT MED LIST: ICD-10-PCS | Mod: CPTII,S$GLB,, | Performed by: FAMILY MEDICINE

## 2021-05-31 PROCEDURE — 99999 PR PBB SHADOW E&M-EST. PATIENT-LVL IV: CPT | Mod: PBBFAC,,, | Performed by: FAMILY MEDICINE

## 2021-05-31 PROCEDURE — 1159F PR MEDICATION LIST DOCUMENTED IN MEDICAL RECORD: ICD-10-PCS | Mod: S$GLB,,, | Performed by: FAMILY MEDICINE

## 2021-05-31 PROCEDURE — 1125F PR PAIN SEVERITY QUANTIFIED, PAIN PRESENT: ICD-10-PCS | Mod: S$GLB,,, | Performed by: FAMILY MEDICINE

## 2021-05-31 PROCEDURE — 99999 PR PBB SHADOW E&M-EST. PATIENT-LVL IV: ICD-10-PCS | Mod: PBBFAC,,, | Performed by: FAMILY MEDICINE

## 2021-05-31 PROCEDURE — 99499 RISK ADDL DX/OHS AUDIT: ICD-10-PCS | Mod: S$GLB,,, | Performed by: FAMILY MEDICINE

## 2021-05-31 PROCEDURE — 1111F DSCHRG MED/CURRENT MED MERGE: CPT | Mod: CPTII,S$GLB,, | Performed by: FAMILY MEDICINE

## 2021-05-31 PROCEDURE — 1101F PR PT FALLS ASSESS DOC 0-1 FALLS W/OUT INJ PAST YR: ICD-10-PCS | Mod: CPTII,S$GLB,, | Performed by: FAMILY MEDICINE

## 2021-05-31 PROCEDURE — 99215 OFFICE O/P EST HI 40 MIN: CPT | Mod: S$GLB,,, | Performed by: FAMILY MEDICINE

## 2021-05-31 PROCEDURE — 1125F AMNT PAIN NOTED PAIN PRSNT: CPT | Mod: S$GLB,,, | Performed by: FAMILY MEDICINE

## 2021-05-31 PROCEDURE — 99215 PR OFFICE/OUTPT VISIT, EST, LEVL V, 40-54 MIN: ICD-10-PCS | Mod: S$GLB,,, | Performed by: FAMILY MEDICINE

## 2021-05-31 RX ORDER — AMLODIPINE BESYLATE 5 MG/1
5 TABLET ORAL DAILY
Qty: 90 TABLET | Refills: 1 | Status: SHIPPED | OUTPATIENT
Start: 2021-05-31 | End: 2021-12-07 | Stop reason: SDUPTHER

## 2021-05-31 RX ORDER — ALBUTEROL SULFATE 0.83 MG/ML
2.5 SOLUTION RESPIRATORY (INHALATION) EVERY 6 HOURS PRN
Qty: 90 ML | Refills: 5 | Status: SHIPPED | OUTPATIENT
Start: 2021-05-31 | End: 2022-05-31

## 2021-05-31 RX ORDER — LEVALBUTEROL INHALATION SOLUTION 1.25 MG/3ML
1 SOLUTION RESPIRATORY (INHALATION) EVERY 8 HOURS PRN
Qty: 90 ML | Refills: 5 | Status: CANCELLED | OUTPATIENT
Start: 2021-05-31 | End: 2022-05-31

## 2021-05-31 RX ORDER — ATORVASTATIN CALCIUM 40 MG/1
40 TABLET, FILM COATED ORAL NIGHTLY
Qty: 90 TABLET | Refills: 1 | Status: SHIPPED | OUTPATIENT
Start: 2021-05-31 | End: 2022-01-04

## 2021-05-31 RX ORDER — METOPROLOL TARTRATE 50 MG/1
50 TABLET ORAL DAILY
Qty: 90 TABLET | Refills: 1 | Status: SHIPPED | OUTPATIENT
Start: 2021-05-31 | End: 2021-12-07 | Stop reason: SDUPTHER

## 2021-05-31 RX ORDER — ALBUTEROL SULFATE 90 UG/1
AEROSOL, METERED RESPIRATORY (INHALATION)
Qty: 18 G | Refills: 5 | Status: SHIPPED | OUTPATIENT
Start: 2021-05-31 | End: 2024-02-19 | Stop reason: CLARIF

## 2021-05-31 RX ORDER — HYDRALAZINE HYDROCHLORIDE 50 MG/1
50 TABLET, FILM COATED ORAL EVERY 12 HOURS
Qty: 180 TABLET | Refills: 1 | Status: SHIPPED | OUTPATIENT
Start: 2021-05-31 | End: 2021-12-07 | Stop reason: SDUPTHER

## 2021-05-31 RX ORDER — TRAZODONE HYDROCHLORIDE 100 MG/1
100 TABLET ORAL NIGHTLY PRN
Qty: 90 TABLET | Refills: 1 | Status: SHIPPED | OUTPATIENT
Start: 2021-05-31 | End: 2021-12-07 | Stop reason: SDUPTHER

## 2021-06-01 ENCOUNTER — LAB VISIT (OUTPATIENT)
Dept: LAB | Facility: HOSPITAL | Age: 81
End: 2021-06-01
Attending: FAMILY MEDICINE
Payer: MEDICARE

## 2021-06-01 DIAGNOSIS — K92.0 GASTROINTESTINAL HEMORRHAGE WITH HEMATEMESIS: ICD-10-CM

## 2021-06-01 DIAGNOSIS — I10 ESSENTIAL HYPERTENSION: ICD-10-CM

## 2021-06-01 LAB
ANION GAP SERPL CALC-SCNC: 11 MMOL/L (ref 8–16)
BASOPHILS # BLD AUTO: 0.04 K/UL (ref 0–0.2)
BASOPHILS NFR BLD: 0.5 % (ref 0–1.9)
BUN SERPL-MCNC: 15 MG/DL (ref 8–23)
CALCIUM SERPL-MCNC: 8.8 MG/DL (ref 8.7–10.5)
CHLORIDE SERPL-SCNC: 104 MMOL/L (ref 95–110)
CO2 SERPL-SCNC: 24 MMOL/L (ref 23–29)
CREAT SERPL-MCNC: 1.5 MG/DL (ref 0.5–1.4)
DIFFERENTIAL METHOD: ABNORMAL
EOSINOPHIL # BLD AUTO: 0.4 K/UL (ref 0–0.5)
EOSINOPHIL NFR BLD: 5.1 % (ref 0–8)
ERYTHROCYTE [DISTWIDTH] IN BLOOD BY AUTOMATED COUNT: 14.4 % (ref 11.5–14.5)
EST. GFR  (AFRICAN AMERICAN): 49.1 ML/MIN/1.73 M^2
EST. GFR  (NON AFRICAN AMERICAN): 42.4 ML/MIN/1.73 M^2
GLUCOSE SERPL-MCNC: 107 MG/DL (ref 70–110)
HCT VFR BLD AUTO: 29.4 % (ref 40–54)
HGB BLD-MCNC: 9 G/DL (ref 14–18)
IMM GRANULOCYTES # BLD AUTO: 0.02 K/UL (ref 0–0.04)
IMM GRANULOCYTES NFR BLD AUTO: 0.3 % (ref 0–0.5)
LYMPHOCYTES # BLD AUTO: 2.2 K/UL (ref 1–4.8)
LYMPHOCYTES NFR BLD: 30.6 % (ref 18–48)
MCH RBC QN AUTO: 29.6 PG (ref 27–31)
MCHC RBC AUTO-ENTMCNC: 30.6 G/DL (ref 32–36)
MCV RBC AUTO: 97 FL (ref 82–98)
MONOCYTES # BLD AUTO: 0.6 K/UL (ref 0.3–1)
MONOCYTES NFR BLD: 7.5 % (ref 4–15)
NEUTROPHILS # BLD AUTO: 4.1 K/UL (ref 1.8–7.7)
NEUTROPHILS NFR BLD: 56 % (ref 38–73)
NRBC BLD-RTO: 0 /100 WBC
PLATELET # BLD AUTO: 428 K/UL (ref 150–450)
PMV BLD AUTO: 9.9 FL (ref 9.2–12.9)
POTASSIUM SERPL-SCNC: 4.7 MMOL/L (ref 3.5–5.1)
RBC # BLD AUTO: 3.04 M/UL (ref 4.6–6.2)
SODIUM SERPL-SCNC: 139 MMOL/L (ref 136–145)
WBC # BLD AUTO: 7.31 K/UL (ref 3.9–12.7)

## 2021-06-01 PROCEDURE — 85025 COMPLETE CBC W/AUTO DIFF WBC: CPT | Performed by: FAMILY MEDICINE

## 2021-06-01 PROCEDURE — 80048 BASIC METABOLIC PNL TOTAL CA: CPT | Performed by: FAMILY MEDICINE

## 2021-06-01 PROCEDURE — 36415 COLL VENOUS BLD VENIPUNCTURE: CPT | Mod: PO | Performed by: FAMILY MEDICINE

## 2021-08-09 ENCOUNTER — TELEPHONE (OUTPATIENT)
Dept: FAMILY MEDICINE | Facility: CLINIC | Age: 81
End: 2021-08-09

## 2021-10-06 ENCOUNTER — OFFICE VISIT (OUTPATIENT)
Dept: FAMILY MEDICINE | Facility: CLINIC | Age: 81
End: 2021-10-06
Payer: MEDICARE

## 2021-10-06 ENCOUNTER — TELEPHONE (OUTPATIENT)
Dept: FAMILY MEDICINE | Facility: CLINIC | Age: 81
End: 2021-10-06

## 2021-10-06 VITALS
RESPIRATION RATE: 17 BRPM | DIASTOLIC BLOOD PRESSURE: 64 MMHG | SYSTOLIC BLOOD PRESSURE: 120 MMHG | WEIGHT: 227.5 LBS | HEIGHT: 68 IN | HEART RATE: 72 BPM | TEMPERATURE: 98 F | OXYGEN SATURATION: 94 % | BODY MASS INDEX: 34.48 KG/M2

## 2021-10-06 DIAGNOSIS — M54.42 CHRONIC BILATERAL LOW BACK PAIN WITH LEFT-SIDED SCIATICA: Primary | ICD-10-CM

## 2021-10-06 DIAGNOSIS — Z23 FLU VACCINE NEED: ICD-10-CM

## 2021-10-06 DIAGNOSIS — I70.0 AORTIC ATHEROSCLEROSIS: ICD-10-CM

## 2021-10-06 DIAGNOSIS — I10 ESSENTIAL HYPERTENSION: ICD-10-CM

## 2021-10-06 DIAGNOSIS — B35.1 ONYCHOMYCOSIS OF NAIL OF DIGIT OF HAND: ICD-10-CM

## 2021-10-06 DIAGNOSIS — G89.29 CHRONIC LEFT SHOULDER PAIN: ICD-10-CM

## 2021-10-06 DIAGNOSIS — M25.512 CHRONIC LEFT SHOULDER PAIN: ICD-10-CM

## 2021-10-06 DIAGNOSIS — G89.29 CHRONIC BILATERAL LOW BACK PAIN WITH LEFT-SIDED SCIATICA: Primary | ICD-10-CM

## 2021-10-06 PROCEDURE — 3288F FALL RISK ASSESSMENT DOCD: CPT | Mod: CPTII,S$GLB,, | Performed by: PHYSICIAN ASSISTANT

## 2021-10-06 PROCEDURE — 1159F PR MEDICATION LIST DOCUMENTED IN MEDICAL RECORD: ICD-10-PCS | Mod: CPTII,S$GLB,, | Performed by: PHYSICIAN ASSISTANT

## 2021-10-06 PROCEDURE — 1160F RVW MEDS BY RX/DR IN RCRD: CPT | Mod: CPTII,S$GLB,, | Performed by: PHYSICIAN ASSISTANT

## 2021-10-06 PROCEDURE — 1101F PT FALLS ASSESS-DOCD LE1/YR: CPT | Mod: CPTII,S$GLB,, | Performed by: PHYSICIAN ASSISTANT

## 2021-10-06 PROCEDURE — 1160F PR REVIEW ALL MEDS BY PRESCRIBER/CLIN PHARMACIST DOCUMENTED: ICD-10-PCS | Mod: CPTII,S$GLB,, | Performed by: PHYSICIAN ASSISTANT

## 2021-10-06 PROCEDURE — 3078F PR MOST RECENT DIASTOLIC BLOOD PRESSURE < 80 MM HG: ICD-10-PCS | Mod: CPTII,S$GLB,, | Performed by: PHYSICIAN ASSISTANT

## 2021-10-06 PROCEDURE — 1101F PR PT FALLS ASSESS DOC 0-1 FALLS W/OUT INJ PAST YR: ICD-10-PCS | Mod: CPTII,S$GLB,, | Performed by: PHYSICIAN ASSISTANT

## 2021-10-06 PROCEDURE — 1126F AMNT PAIN NOTED NONE PRSNT: CPT | Mod: CPTII,S$GLB,, | Performed by: PHYSICIAN ASSISTANT

## 2021-10-06 PROCEDURE — 99214 OFFICE O/P EST MOD 30 MIN: CPT | Mod: S$GLB,,, | Performed by: PHYSICIAN ASSISTANT

## 2021-10-06 PROCEDURE — 99999 PR PBB SHADOW E&M-EST. PATIENT-LVL V: ICD-10-PCS | Mod: PBBFAC,,, | Performed by: PHYSICIAN ASSISTANT

## 2021-10-06 PROCEDURE — 1126F PR PAIN SEVERITY QUANTIFIED, NO PAIN PRESENT: ICD-10-PCS | Mod: CPTII,S$GLB,, | Performed by: PHYSICIAN ASSISTANT

## 2021-10-06 PROCEDURE — 99499 RISK ADDL DX/OHS AUDIT: ICD-10-PCS | Mod: S$GLB,,, | Performed by: PHYSICIAN ASSISTANT

## 2021-10-06 PROCEDURE — 99499 UNLISTED E&M SERVICE: CPT | Mod: S$GLB,,, | Performed by: PHYSICIAN ASSISTANT

## 2021-10-06 PROCEDURE — 1159F MED LIST DOCD IN RCRD: CPT | Mod: CPTII,S$GLB,, | Performed by: PHYSICIAN ASSISTANT

## 2021-10-06 PROCEDURE — 3288F PR FALLS RISK ASSESSMENT DOCUMENTED: ICD-10-PCS | Mod: CPTII,S$GLB,, | Performed by: PHYSICIAN ASSISTANT

## 2021-10-06 PROCEDURE — 3074F PR MOST RECENT SYSTOLIC BLOOD PRESSURE < 130 MM HG: ICD-10-PCS | Mod: CPTII,S$GLB,, | Performed by: PHYSICIAN ASSISTANT

## 2021-10-06 PROCEDURE — 99999 PR PBB SHADOW E&M-EST. PATIENT-LVL V: CPT | Mod: PBBFAC,,, | Performed by: PHYSICIAN ASSISTANT

## 2021-10-06 PROCEDURE — 99214 PR OFFICE/OUTPT VISIT, EST, LEVL IV, 30-39 MIN: ICD-10-PCS | Mod: S$GLB,,, | Performed by: PHYSICIAN ASSISTANT

## 2021-10-06 PROCEDURE — 3074F SYST BP LT 130 MM HG: CPT | Mod: CPTII,S$GLB,, | Performed by: PHYSICIAN ASSISTANT

## 2021-10-06 PROCEDURE — 3078F DIAST BP <80 MM HG: CPT | Mod: CPTII,S$GLB,, | Performed by: PHYSICIAN ASSISTANT

## 2021-10-06 RX ORDER — CICLOPIROX 80 MG/ML
SOLUTION TOPICAL NIGHTLY
Qty: 6.6 ML | Refills: 2 | Status: SHIPPED | OUTPATIENT
Start: 2021-10-06 | End: 2023-02-09 | Stop reason: SDUPTHER

## 2021-10-14 ENCOUNTER — TELEPHONE (OUTPATIENT)
Dept: FAMILY MEDICINE | Facility: CLINIC | Age: 81
End: 2021-10-14

## 2021-10-29 ENCOUNTER — OFFICE VISIT (OUTPATIENT)
Dept: FAMILY MEDICINE | Facility: CLINIC | Age: 81
End: 2021-10-29
Payer: MEDICARE

## 2021-10-29 ENCOUNTER — LAB VISIT (OUTPATIENT)
Dept: LAB | Facility: HOSPITAL | Age: 81
End: 2021-10-29
Payer: MEDICARE

## 2021-10-29 VITALS
RESPIRATION RATE: 20 BRPM | DIASTOLIC BLOOD PRESSURE: 68 MMHG | WEIGHT: 221.31 LBS | HEART RATE: 70 BPM | BODY MASS INDEX: 33.54 KG/M2 | OXYGEN SATURATION: 97 % | HEIGHT: 68 IN | TEMPERATURE: 99 F | SYSTOLIC BLOOD PRESSURE: 134 MMHG

## 2021-10-29 DIAGNOSIS — K92.1 HEMATOCHEZIA: ICD-10-CM

## 2021-10-29 DIAGNOSIS — K92.1 HEMATOCHEZIA: Primary | ICD-10-CM

## 2021-10-29 DIAGNOSIS — N18.32 STAGE 3B CHRONIC KIDNEY DISEASE: ICD-10-CM

## 2021-10-29 LAB
ALBUMIN SERPL BCP-MCNC: 3.4 G/DL (ref 3.5–5.2)
ALP SERPL-CCNC: 61 U/L (ref 55–135)
ALT SERPL W/O P-5'-P-CCNC: 13 U/L (ref 10–44)
ANION GAP SERPL CALC-SCNC: 7 MMOL/L (ref 8–16)
AST SERPL-CCNC: 22 U/L (ref 10–40)
BASOPHILS # BLD AUTO: 0.06 K/UL (ref 0–0.2)
BASOPHILS NFR BLD: 1.1 % (ref 0–1.9)
BILIRUB SERPL-MCNC: 0.4 MG/DL (ref 0.1–1)
BUN SERPL-MCNC: 21 MG/DL (ref 8–23)
CALCIUM SERPL-MCNC: 8.8 MG/DL (ref 8.7–10.5)
CHLORIDE SERPL-SCNC: 103 MMOL/L (ref 95–110)
CO2 SERPL-SCNC: 28 MMOL/L (ref 23–29)
CREAT SERPL-MCNC: 1.6 MG/DL (ref 0.5–1.4)
DIFFERENTIAL METHOD: ABNORMAL
EOSINOPHIL # BLD AUTO: 0.5 K/UL (ref 0–0.5)
EOSINOPHIL NFR BLD: 8.5 % (ref 0–8)
ERYTHROCYTE [DISTWIDTH] IN BLOOD BY AUTOMATED COUNT: 13.5 % (ref 11.5–14.5)
EST. GFR  (AFRICAN AMERICAN): 45.4 ML/MIN/1.73 M^2
EST. GFR  (NON AFRICAN AMERICAN): 39.3 ML/MIN/1.73 M^2
GLUCOSE SERPL-MCNC: 94 MG/DL (ref 70–110)
HCT VFR BLD AUTO: 29.1 % (ref 40–54)
HGB BLD-MCNC: 9 G/DL (ref 14–18)
IMM GRANULOCYTES # BLD AUTO: 0.01 K/UL (ref 0–0.04)
IMM GRANULOCYTES NFR BLD AUTO: 0.2 % (ref 0–0.5)
LYMPHOCYTES # BLD AUTO: 1.7 K/UL (ref 1–4.8)
LYMPHOCYTES NFR BLD: 30.3 % (ref 18–48)
MCH RBC QN AUTO: 29.5 PG (ref 27–31)
MCHC RBC AUTO-ENTMCNC: 30.9 G/DL (ref 32–36)
MCV RBC AUTO: 95 FL (ref 82–98)
MONOCYTES # BLD AUTO: 0.6 K/UL (ref 0.3–1)
MONOCYTES NFR BLD: 10.6 % (ref 4–15)
NEUTROPHILS # BLD AUTO: 2.8 K/UL (ref 1.8–7.7)
NEUTROPHILS NFR BLD: 49.3 % (ref 38–73)
NRBC BLD-RTO: 0 /100 WBC
PLATELET # BLD AUTO: 276 K/UL (ref 150–450)
PMV BLD AUTO: 10.9 FL (ref 9.2–12.9)
POTASSIUM SERPL-SCNC: 4.4 MMOL/L (ref 3.5–5.1)
PROT SERPL-MCNC: 7.2 G/DL (ref 6–8.4)
RBC # BLD AUTO: 3.05 M/UL (ref 4.6–6.2)
SODIUM SERPL-SCNC: 138 MMOL/L (ref 136–145)
WBC # BLD AUTO: 5.65 K/UL (ref 3.9–12.7)

## 2021-10-29 PROCEDURE — 36415 COLL VENOUS BLD VENIPUNCTURE: CPT | Mod: PO | Performed by: PHYSICIAN ASSISTANT

## 2021-10-29 PROCEDURE — 1101F PR PT FALLS ASSESS DOC 0-1 FALLS W/OUT INJ PAST YR: ICD-10-PCS | Mod: CPTII,S$GLB,, | Performed by: PHYSICIAN ASSISTANT

## 2021-10-29 PROCEDURE — 99499 RISK ADDL DX/OHS AUDIT: ICD-10-PCS | Mod: S$GLB,,, | Performed by: PHYSICIAN ASSISTANT

## 2021-10-29 PROCEDURE — 1126F PR PAIN SEVERITY QUANTIFIED, NO PAIN PRESENT: ICD-10-PCS | Mod: CPTII,S$GLB,, | Performed by: PHYSICIAN ASSISTANT

## 2021-10-29 PROCEDURE — 99499 UNLISTED E&M SERVICE: CPT | Mod: S$GLB,,, | Performed by: PHYSICIAN ASSISTANT

## 2021-10-29 PROCEDURE — 99999 PR PBB SHADOW E&M-EST. PATIENT-LVL V: CPT | Mod: PBBFAC,,, | Performed by: PHYSICIAN ASSISTANT

## 2021-10-29 PROCEDURE — 1126F AMNT PAIN NOTED NONE PRSNT: CPT | Mod: CPTII,S$GLB,, | Performed by: PHYSICIAN ASSISTANT

## 2021-10-29 PROCEDURE — 1160F RVW MEDS BY RX/DR IN RCRD: CPT | Mod: CPTII,S$GLB,, | Performed by: PHYSICIAN ASSISTANT

## 2021-10-29 PROCEDURE — 1159F PR MEDICATION LIST DOCUMENTED IN MEDICAL RECORD: ICD-10-PCS | Mod: CPTII,S$GLB,, | Performed by: PHYSICIAN ASSISTANT

## 2021-10-29 PROCEDURE — 3078F PR MOST RECENT DIASTOLIC BLOOD PRESSURE < 80 MM HG: ICD-10-PCS | Mod: CPTII,S$GLB,, | Performed by: PHYSICIAN ASSISTANT

## 2021-10-29 PROCEDURE — 85025 COMPLETE CBC W/AUTO DIFF WBC: CPT | Performed by: PHYSICIAN ASSISTANT

## 2021-10-29 PROCEDURE — 1159F MED LIST DOCD IN RCRD: CPT | Mod: CPTII,S$GLB,, | Performed by: PHYSICIAN ASSISTANT

## 2021-10-29 PROCEDURE — 3075F PR MOST RECENT SYSTOLIC BLOOD PRESS GE 130-139MM HG: ICD-10-PCS | Mod: CPTII,S$GLB,, | Performed by: PHYSICIAN ASSISTANT

## 2021-10-29 PROCEDURE — 3288F PR FALLS RISK ASSESSMENT DOCUMENTED: ICD-10-PCS | Mod: CPTII,S$GLB,, | Performed by: PHYSICIAN ASSISTANT

## 2021-10-29 PROCEDURE — 1101F PT FALLS ASSESS-DOCD LE1/YR: CPT | Mod: CPTII,S$GLB,, | Performed by: PHYSICIAN ASSISTANT

## 2021-10-29 PROCEDURE — 99213 OFFICE O/P EST LOW 20 MIN: CPT | Mod: S$GLB,,, | Performed by: PHYSICIAN ASSISTANT

## 2021-10-29 PROCEDURE — 3288F FALL RISK ASSESSMENT DOCD: CPT | Mod: CPTII,S$GLB,, | Performed by: PHYSICIAN ASSISTANT

## 2021-10-29 PROCEDURE — 99213 PR OFFICE/OUTPT VISIT, EST, LEVL III, 20-29 MIN: ICD-10-PCS | Mod: S$GLB,,, | Performed by: PHYSICIAN ASSISTANT

## 2021-10-29 PROCEDURE — 3078F DIAST BP <80 MM HG: CPT | Mod: CPTII,S$GLB,, | Performed by: PHYSICIAN ASSISTANT

## 2021-10-29 PROCEDURE — 3075F SYST BP GE 130 - 139MM HG: CPT | Mod: CPTII,S$GLB,, | Performed by: PHYSICIAN ASSISTANT

## 2021-10-29 PROCEDURE — 80053 COMPREHEN METABOLIC PANEL: CPT | Performed by: PHYSICIAN ASSISTANT

## 2021-10-29 PROCEDURE — 1160F PR REVIEW ALL MEDS BY PRESCRIBER/CLIN PHARMACIST DOCUMENTED: ICD-10-PCS | Mod: CPTII,S$GLB,, | Performed by: PHYSICIAN ASSISTANT

## 2021-10-29 PROCEDURE — 99999 PR PBB SHADOW E&M-EST. PATIENT-LVL V: ICD-10-PCS | Mod: PBBFAC,,, | Performed by: PHYSICIAN ASSISTANT

## 2021-11-29 ENCOUNTER — PES CALL (OUTPATIENT)
Dept: ADMINISTRATIVE | Facility: CLINIC | Age: 81
End: 2021-11-29
Payer: MEDICARE

## 2021-12-07 ENCOUNTER — OFFICE VISIT (OUTPATIENT)
Dept: FAMILY MEDICINE | Facility: CLINIC | Age: 81
End: 2021-12-07
Payer: MEDICARE

## 2021-12-07 VITALS
BODY MASS INDEX: 33.62 KG/M2 | DIASTOLIC BLOOD PRESSURE: 84 MMHG | WEIGHT: 221.81 LBS | RESPIRATION RATE: 16 BRPM | SYSTOLIC BLOOD PRESSURE: 150 MMHG | HEIGHT: 68 IN | TEMPERATURE: 98 F | OXYGEN SATURATION: 95 % | HEART RATE: 76 BPM

## 2021-12-07 DIAGNOSIS — I10 TACHYCARDIA WITH HYPERTENSION: ICD-10-CM

## 2021-12-07 DIAGNOSIS — I10 ESSENTIAL HYPERTENSION: Primary | ICD-10-CM

## 2021-12-07 DIAGNOSIS — M54.16 CHRONIC LUMBAR RADICULOPATHY: ICD-10-CM

## 2021-12-07 DIAGNOSIS — N32.81 OVERACTIVE BLADDER: ICD-10-CM

## 2021-12-07 DIAGNOSIS — R00.0 TACHYCARDIA WITH HYPERTENSION: ICD-10-CM

## 2021-12-07 DIAGNOSIS — F51.01 PRIMARY INSOMNIA: ICD-10-CM

## 2021-12-07 PROCEDURE — 99214 OFFICE O/P EST MOD 30 MIN: CPT | Mod: S$GLB,,, | Performed by: FAMILY MEDICINE

## 2021-12-07 PROCEDURE — 99999 PR PBB SHADOW E&M-EST. PATIENT-LVL IV: ICD-10-PCS | Mod: PBBFAC,,, | Performed by: FAMILY MEDICINE

## 2021-12-07 PROCEDURE — 99999 PR PBB SHADOW E&M-EST. PATIENT-LVL IV: CPT | Mod: PBBFAC,,, | Performed by: FAMILY MEDICINE

## 2021-12-07 PROCEDURE — 99214 PR OFFICE/OUTPT VISIT, EST, LEVL IV, 30-39 MIN: ICD-10-PCS | Mod: S$GLB,,, | Performed by: FAMILY MEDICINE

## 2021-12-07 RX ORDER — HYDRALAZINE HYDROCHLORIDE 50 MG/1
50 TABLET, FILM COATED ORAL EVERY 12 HOURS
Qty: 180 TABLET | Refills: 1 | Status: SHIPPED | OUTPATIENT
Start: 2021-12-07 | End: 2022-06-10

## 2021-12-07 RX ORDER — OXYBUTYNIN CHLORIDE 10 MG/1
10 TABLET, EXTENDED RELEASE ORAL
Qty: 90 TABLET | Refills: 0 | Status: SHIPPED | OUTPATIENT
Start: 2021-12-07 | End: 2022-11-03 | Stop reason: SDUPTHER

## 2021-12-07 RX ORDER — METOPROLOL TARTRATE 50 MG/1
50 TABLET ORAL DAILY
Qty: 90 TABLET | Refills: 1 | Status: SHIPPED | OUTPATIENT
Start: 2021-12-07 | End: 2023-05-22 | Stop reason: SDUPTHER

## 2021-12-07 RX ORDER — AMLODIPINE BESYLATE 5 MG/1
5 TABLET ORAL DAILY
Qty: 90 TABLET | Refills: 1 | Status: SHIPPED | OUTPATIENT
Start: 2021-12-07 | End: 2022-08-22

## 2021-12-07 RX ORDER — TRAZODONE HYDROCHLORIDE 100 MG/1
100 TABLET ORAL NIGHTLY PRN
Qty: 90 TABLET | Refills: 1 | Status: SHIPPED | OUTPATIENT
Start: 2021-12-07 | End: 2022-06-10

## 2021-12-07 RX ORDER — GABAPENTIN 300 MG/1
CAPSULE ORAL
Qty: 90 CAPSULE | Refills: 5 | Status: SHIPPED | OUTPATIENT
Start: 2021-12-07 | End: 2022-11-03 | Stop reason: SDUPTHER

## 2022-03-11 ENCOUNTER — TELEPHONE (OUTPATIENT)
Dept: FAMILY MEDICINE | Facility: CLINIC | Age: 82
End: 2022-03-11
Payer: MEDICARE

## 2022-03-11 NOTE — TELEPHONE ENCOUNTER
----- Message from Leena Esteban sent at 3/11/2022  9:36 AM CST -----  Contact: HUMA CADENA JR. [[id number]]  Type: Call Back    Who called: HUMA CADENA JR. [[id number]]    What is the request in detail: Patient is requesting a call back. He states that he would like to know if he can get a diet schedule. He states that he need to know what foods he can and can not eat. He states that he can come and  the list. Please advise.     Can the clinic reply by [facility]? No    Would the patient rather a call back or a response via [facility]? Call back     Best call back number: [phone] (mobile)    Additional Information:

## 2022-03-11 NOTE — TELEPHONE ENCOUNTER
Patient states that he had shrimps that caused diarrhea and would like to know if he should be on a certain diet. Please advise.

## 2022-06-10 DIAGNOSIS — F51.01 PRIMARY INSOMNIA: ICD-10-CM

## 2022-06-10 DIAGNOSIS — I10 ESSENTIAL HYPERTENSION: ICD-10-CM

## 2022-06-10 RX ORDER — TRAZODONE HYDROCHLORIDE 100 MG/1
TABLET ORAL
Qty: 90 TABLET | Refills: 0 | Status: SHIPPED | OUTPATIENT
Start: 2022-06-10 | End: 2022-09-08

## 2022-06-10 RX ORDER — HYDRALAZINE HYDROCHLORIDE 50 MG/1
TABLET, FILM COATED ORAL
Qty: 180 TABLET | Refills: 0 | Status: SHIPPED | OUTPATIENT
Start: 2022-06-10 | End: 2022-09-08

## 2022-06-10 NOTE — TELEPHONE ENCOUNTER
No new care gaps identified.  [facility] Embedded Care Gaps. Reference number: [id number]. 6/10/2022   12:11:47 AM CHERIET

## 2022-08-03 ENCOUNTER — PATIENT OUTREACH (OUTPATIENT)
Dept: ADMINISTRATIVE | Facility: HOSPITAL | Age: 82
End: 2022-08-03
Payer: MEDICARE

## 2022-08-21 DIAGNOSIS — I10 ESSENTIAL HYPERTENSION: ICD-10-CM

## 2022-08-21 NOTE — TELEPHONE ENCOUNTER
No new care gaps identified.  [facility] Embedded Care Gaps. Reference number: [id number]. 8/21/2022   12:22:32 AM CHERIET

## 2022-08-21 NOTE — TELEPHONE ENCOUNTER
I have reviewed discharge instructions with the patient. The patient verbalized understanding. Refill Routing Note   Medication(s) are not appropriate for processing by [facility] for the following reason(s):      - Required vitals are abnormal    [facility] action(s):  Defer          Medication reconciliation completed: No     Appointments  past 12m or future 3m with PCP    Date Provider   Last Visit   12/7/2021 Azikiwe K. Lombard, MD   Next Visit   Visit date not found Azikiwe K. Lombard, MD   ED visits in past 90 days: 0        Note composed:12:27 PM 08/21/2022

## 2022-08-22 RX ORDER — AMLODIPINE BESYLATE 5 MG/1
TABLET ORAL
Qty: 90 TABLET | Refills: 0 | Status: SHIPPED | OUTPATIENT
Start: 2022-08-22 | End: 2023-02-09 | Stop reason: SDUPTHER

## 2022-10-26 DIAGNOSIS — I10 TACHYCARDIA WITH HYPERTENSION: ICD-10-CM

## 2022-10-26 DIAGNOSIS — R00.0 TACHYCARDIA WITH HYPERTENSION: ICD-10-CM

## 2022-10-26 NOTE — TELEPHONE ENCOUNTER
No new care gaps identified.  [facility] Embedded Care Gaps. Reference number: [id number]. 10/26/2022   12:12:25 AM CDT

## 2022-10-27 RX ORDER — METOPROLOL TARTRATE 50 MG/1
TABLET ORAL
Qty: 90 TABLET | Refills: 1 | OUTPATIENT
Start: 2022-10-27

## 2022-10-27 NOTE — TELEPHONE ENCOUNTER
Refill Routing Note   Medication(s) are not appropriate for processing by [facility] for the following reason(s):      - Required vitals are abnormal    [facility] action(s):  Defer          Medication reconciliation completed: No     Appointments  past 12m or future 3m with PCP    Date Provider   Last Visit   12/7/2021 Azikiwe K. Lombard, MD   Next Visit   Visit date not found Azikiwe K. Lombard, MD   ED visits in past 90 days: 0        Note composed:7:03 AM 10/27/2022

## 2022-11-03 ENCOUNTER — OFFICE VISIT (OUTPATIENT)
Dept: FAMILY MEDICINE | Facility: CLINIC | Age: 82
End: 2022-11-03
Payer: MEDICARE

## 2022-11-03 ENCOUNTER — LAB VISIT (OUTPATIENT)
Dept: LAB | Facility: HOSPITAL | Age: 82
End: 2022-11-03
Attending: STUDENT IN AN ORGANIZED HEALTH CARE EDUCATION/TRAINING PROGRAM
Payer: MEDICARE

## 2022-11-03 VITALS
WEIGHT: 222.44 LBS | HEIGHT: 68 IN | TEMPERATURE: 98 F | BODY MASS INDEX: 33.71 KG/M2 | DIASTOLIC BLOOD PRESSURE: 72 MMHG | SYSTOLIC BLOOD PRESSURE: 136 MMHG | OXYGEN SATURATION: 95 % | RESPIRATION RATE: 16 BRPM | HEART RATE: 75 BPM

## 2022-11-03 DIAGNOSIS — R35.1 NOCTURIA MORE THAN TWICE PER NIGHT: ICD-10-CM

## 2022-11-03 DIAGNOSIS — R00.0 TACHYCARDIA WITH HYPERTENSION: ICD-10-CM

## 2022-11-03 DIAGNOSIS — F51.01 PRIMARY INSOMNIA: ICD-10-CM

## 2022-11-03 DIAGNOSIS — Z00.00 ANNUAL PHYSICAL EXAM: ICD-10-CM

## 2022-11-03 DIAGNOSIS — I10 TACHYCARDIA WITH HYPERTENSION: ICD-10-CM

## 2022-11-03 DIAGNOSIS — M54.16 CHRONIC LUMBAR RADICULOPATHY: ICD-10-CM

## 2022-11-03 DIAGNOSIS — N32.81 OVERACTIVE BLADDER: ICD-10-CM

## 2022-11-03 DIAGNOSIS — Z79.51 LONG TERM (CURRENT) USE OF INHALED STEROIDS: ICD-10-CM

## 2022-11-03 DIAGNOSIS — I10 ESSENTIAL HYPERTENSION: Primary | ICD-10-CM

## 2022-11-03 DIAGNOSIS — I10 ESSENTIAL HYPERTENSION: ICD-10-CM

## 2022-11-03 PROCEDURE — 99215 PR OFFICE/OUTPT VISIT, EST, LEVL V, 40-54 MIN: ICD-10-PCS | Mod: S$GLB,,, | Performed by: STUDENT IN AN ORGANIZED HEALTH CARE EDUCATION/TRAINING PROGRAM

## 2022-11-03 PROCEDURE — 1159F MED LIST DOCD IN RCRD: CPT | Mod: CPTII,S$GLB,, | Performed by: STUDENT IN AN ORGANIZED HEALTH CARE EDUCATION/TRAINING PROGRAM

## 2022-11-03 PROCEDURE — 3075F PR MOST RECENT SYSTOLIC BLOOD PRESS GE 130-139MM HG: ICD-10-PCS | Mod: CPTII,S$GLB,, | Performed by: STUDENT IN AN ORGANIZED HEALTH CARE EDUCATION/TRAINING PROGRAM

## 2022-11-03 PROCEDURE — 80061 LIPID PANEL: CPT | Performed by: STUDENT IN AN ORGANIZED HEALTH CARE EDUCATION/TRAINING PROGRAM

## 2022-11-03 PROCEDURE — 85025 COMPLETE CBC W/AUTO DIFF WBC: CPT | Performed by: STUDENT IN AN ORGANIZED HEALTH CARE EDUCATION/TRAINING PROGRAM

## 2022-11-03 PROCEDURE — 80053 COMPREHEN METABOLIC PANEL: CPT | Performed by: STUDENT IN AN ORGANIZED HEALTH CARE EDUCATION/TRAINING PROGRAM

## 2022-11-03 PROCEDURE — 99215 OFFICE O/P EST HI 40 MIN: CPT | Mod: S$GLB,,, | Performed by: STUDENT IN AN ORGANIZED HEALTH CARE EDUCATION/TRAINING PROGRAM

## 2022-11-03 PROCEDURE — 3075F SYST BP GE 130 - 139MM HG: CPT | Mod: CPTII,S$GLB,, | Performed by: STUDENT IN AN ORGANIZED HEALTH CARE EDUCATION/TRAINING PROGRAM

## 2022-11-03 PROCEDURE — 3078F DIAST BP <80 MM HG: CPT | Mod: CPTII,S$GLB,, | Performed by: STUDENT IN AN ORGANIZED HEALTH CARE EDUCATION/TRAINING PROGRAM

## 2022-11-03 PROCEDURE — 1101F PR PT FALLS ASSESS DOC 0-1 FALLS W/OUT INJ PAST YR: ICD-10-PCS | Mod: CPTII,S$GLB,, | Performed by: STUDENT IN AN ORGANIZED HEALTH CARE EDUCATION/TRAINING PROGRAM

## 2022-11-03 PROCEDURE — 3288F FALL RISK ASSESSMENT DOCD: CPT | Mod: CPTII,S$GLB,, | Performed by: STUDENT IN AN ORGANIZED HEALTH CARE EDUCATION/TRAINING PROGRAM

## 2022-11-03 PROCEDURE — 36415 COLL VENOUS BLD VENIPUNCTURE: CPT | Mod: PO | Performed by: STUDENT IN AN ORGANIZED HEALTH CARE EDUCATION/TRAINING PROGRAM

## 2022-11-03 PROCEDURE — 3288F PR FALLS RISK ASSESSMENT DOCUMENTED: ICD-10-PCS | Mod: CPTII,S$GLB,, | Performed by: STUDENT IN AN ORGANIZED HEALTH CARE EDUCATION/TRAINING PROGRAM

## 2022-11-03 PROCEDURE — 84153 ASSAY OF PSA TOTAL: CPT | Performed by: STUDENT IN AN ORGANIZED HEALTH CARE EDUCATION/TRAINING PROGRAM

## 2022-11-03 PROCEDURE — 1160F PR REVIEW ALL MEDS BY PRESCRIBER/CLIN PHARMACIST DOCUMENTED: ICD-10-PCS | Mod: CPTII,S$GLB,, | Performed by: STUDENT IN AN ORGANIZED HEALTH CARE EDUCATION/TRAINING PROGRAM

## 2022-11-03 PROCEDURE — 82306 VITAMIN D 25 HYDROXY: CPT | Performed by: STUDENT IN AN ORGANIZED HEALTH CARE EDUCATION/TRAINING PROGRAM

## 2022-11-03 PROCEDURE — 1101F PT FALLS ASSESS-DOCD LE1/YR: CPT | Mod: CPTII,S$GLB,, | Performed by: STUDENT IN AN ORGANIZED HEALTH CARE EDUCATION/TRAINING PROGRAM

## 2022-11-03 PROCEDURE — 99999 PR PBB SHADOW E&M-EST. PATIENT-LVL IV: CPT | Mod: PBBFAC,,, | Performed by: STUDENT IN AN ORGANIZED HEALTH CARE EDUCATION/TRAINING PROGRAM

## 2022-11-03 PROCEDURE — 3078F PR MOST RECENT DIASTOLIC BLOOD PRESSURE < 80 MM HG: ICD-10-PCS | Mod: CPTII,S$GLB,, | Performed by: STUDENT IN AN ORGANIZED HEALTH CARE EDUCATION/TRAINING PROGRAM

## 2022-11-03 PROCEDURE — 1159F PR MEDICATION LIST DOCUMENTED IN MEDICAL RECORD: ICD-10-PCS | Mod: CPTII,S$GLB,, | Performed by: STUDENT IN AN ORGANIZED HEALTH CARE EDUCATION/TRAINING PROGRAM

## 2022-11-03 PROCEDURE — 1160F RVW MEDS BY RX/DR IN RCRD: CPT | Mod: CPTII,S$GLB,, | Performed by: STUDENT IN AN ORGANIZED HEALTH CARE EDUCATION/TRAINING PROGRAM

## 2022-11-03 PROCEDURE — 99999 PR PBB SHADOW E&M-EST. PATIENT-LVL IV: ICD-10-PCS | Mod: PBBFAC,,, | Performed by: STUDENT IN AN ORGANIZED HEALTH CARE EDUCATION/TRAINING PROGRAM

## 2022-11-03 RX ORDER — OXYBUTYNIN CHLORIDE 10 MG/1
10 TABLET, EXTENDED RELEASE ORAL
Qty: 90 TABLET | Refills: 0 | Status: SHIPPED | OUTPATIENT
Start: 2022-11-03 | End: 2022-12-05 | Stop reason: SDUPTHER

## 2022-11-03 RX ORDER — GABAPENTIN 300 MG/1
CAPSULE ORAL
Qty: 90 CAPSULE | Refills: 5 | Status: SHIPPED | OUTPATIENT
Start: 2022-11-03 | End: 2024-02-19 | Stop reason: CLARIF

## 2022-11-03 NOTE — PROGRESS NOTES
Health Maintenance Due   Topic     Shingles Vaccine (1 of 2) hx chickenpox ; inform pt can get vaccine at pharmacy.      Colonoscopy      COVID-19 Vaccine (3 - Booster for Moderna series)     Lipid Panel      Influenza Vaccine (1) PATIENT DECLINE

## 2022-11-03 NOTE — PROGRESS NOTES
SUBJECTIVE     Chief Complaint   Patient presents with    Urinary Frequency    Medication Refill       HPI  Alexander Duong Jr. is a 82 y.o. male with  HTN, HLD, GI bleed, chronic bronchjitis, tachycardia, insomnia  that presents for follow-up of chronic medical conditions and complaining of nocturia.    Patient reports that he has had nocturia, increasingly worsening over the past year. He is getting up 4-5 times at night to urinate. No urgency, frequency. Stops drinking water at a little after 6pm with his PM meds. He is not taking his oxybutynin since he ran out.    HTN -   Currently prescribed amlodipine, metoprolol. Hydralazine..  Patient endorses taking medication as directed.  Denies side effects or concerns while taking medication.  Patient not currently checking BP at home.  Denies headaches, vision changes, CP, palpitations, or other concerning symptoms.  Due for microalbumin/creatinine ratio.     He stays alone and his sister lives nearby. He has many nieces and nephews.       PAST MEDICAL HISTORY:  Past Medical History:   Diagnosis Date    Allergy     Hypertension        PAST SURGICAL HISTORY:  Past Surgical History:   Procedure Laterality Date    APPENDECTOMY      COLONOSCOPY Left 10/19/2015    Procedure: COLONOSCOPY;  Surgeon: Randell Briceno MD;  Location: [facility];  Service: Endoscopy;  Laterality: Left;       FAMILY HISTORY:  History reviewed. No pertinent family history.    ALLERGIES AND MEDICATIONS: updated and reviewed.  Review of patient's allergies indicates:  No Known Allergies  Current Outpatient Medications   Medication Sig Dispense Refill    albuterol (PROVENTIL/VENTOLIN HFA) 90 mcg/actuation inhaler TAKE 2 PUFFS BY MOUTH EVERY 6 HOURS AS NEEDED FOR WHEEZE 18 g 5    amLODIPine (NORVASC) 5 MG tablet TAKE 1 TABLET BY MOUTH EVERY DAY 90 tablet 0    aspirin (ECOTRIN) 81 MG EC tablet TAKE 1 TABLET (81 MG TOTAL) BY MOUTH ONCE DAILY. 90 tablet 3    atorvastatin (LIPITOR) 40 MG tablet TAKE 1 TABLET  "BY MOUTH EVERY DAY IN THE EVENING 90 tablet 0    ciclopirox (PENLAC) 8 % Soln Apply topically nightly. To affected fingernails. After 1 week wipe off with alcohol rub 6.6 mL 2    hydrALAZINE (APRESOLINE) 50 MG tablet TAKE 1 TABLET BY MOUTH EVERY 12 HOURS 180 tablet 0    metoprolol tartrate (LOPRESSOR) 50 MG tablet Take 1 tablet (50 mg total) by mouth once daily. 90 tablet 1    traZODone (DESYREL) 100 MG tablet TAKE 1 TABLET BY MOUTH NIGHTLY AS NEEDED FOR INSOMNIA. 90 tablet 1    gabapentin (NEURONTIN) 300 MG capsule TAKE 1 CAPSULE (300 MG TOTAL) BY MOUTH THREE TIMES A DAY AS NEEDED 90 capsule 5    oxybutynin (DITROPAN-XL) 10 MG 24 hr tablet Take 1 tablet (10 mg total) by mouth after dinner. 90 tablet 0     No current facility-administered medications for this visit.       ROS  Review of Systems   Constitutional:  Negative for chills, fatigue and fever.   HENT:  Negative for rhinorrhea and sore throat.    Respiratory:  Negative for cough and shortness of breath.    Cardiovascular:  Negative for chest pain and palpitations.   Gastrointestinal:  Negative for constipation, diarrhea, nausea and vomiting.   Genitourinary:  Negative for dysuria.   Musculoskeletal:  Negative for joint swelling.   Skin:  Negative for rash and wound.   Neurological:  Negative for light-headedness and headaches.   Psychiatric/Behavioral:  Negative for dysphoric mood and sleep disturbance. The patient is not nervous/anxious.        OBJECTIVE     Physical Exam  Vitals:    11/03/22 1405   BP: 136/72   Pulse: 75   Resp:    Temp:     Body mass index is 33.82 kg/m².  Weight: 100.9 kg (222 lb 7.1 oz)   Height: 5' 8" (172.7 cm)     Physical Exam  Vitals reviewed.   Constitutional:       General: He is not in acute distress.  HENT:      Right Ear: External ear normal.      Left Ear: External ear normal.      Nose: Nose normal.      Mouth/Throat:      Mouth: Mucous membranes are moist.   Eyes:      Extraocular Movements: Extraocular movements intact.    "   Conjunctiva/sclera: Conjunctivae normal.      Pupils: Pupils are equal, round, and reactive to light.   Cardiovascular:      Rate and Rhythm: Normal rate and regular rhythm.      Pulses: Normal pulses.      Heart sounds: No murmur heard.  Pulmonary:      Effort: Pulmonary effort is normal.      Breath sounds: Normal breath sounds.   Abdominal:      General: Bowel sounds are normal. There is no distension.      Palpations: Abdomen is soft.      Tenderness: There is no abdominal tenderness.   Musculoskeletal:         General: No swelling or tenderness. Normal range of motion.      Cervical back: Normal range of motion and neck supple.   Skin:     General: Skin is warm and dry.      Findings: No rash.   Neurological:      General: No focal deficit present.      Mental Status: He is alert and oriented to person, place, and time.   Psychiatric:         Mood and Affect: Mood normal.         Behavior: Behavior normal.         Health Maintenance         Date Due Completion Date    Shingles Vaccine (1 of 2) Never done ---    Colonoscopy 02/26/2021 2/26/2016    COVID-19 Vaccine (3 - Booster for Moderna series) 07/06/2021 5/11/2021    Lipid Panel 12/08/2021 12/8/2020    Override on 4/8/2016: Done (future)    Influenza Vaccine (1) 09/01/2022 9/8/2020              ASSESSMENT     82 y.o. male with     1. Essential hypertension    2. Chronic lumbar radiculopathy    3. Primary insomnia    4. Tachycardia with hypertension    5. Overactive bladder    6. Annual physical exam    7. Long term (current) use of inhaled steroids    8. Nocturia more than twice per night        PLAN:     1. Essential hypertension  - Stable, continue current regimen. Follow up 3 months.  - CBC Auto Differential; Future  - Comprehensive Metabolic Panel; Future  - Lipid Panel; Future  - Vitamin D; Future    2. Chronic lumbar radiculopathy  - Stable, continue current regimen. Follow up 3 months.  - gabapentin (NEURONTIN) 300 MG capsule; TAKE 1 CAPSULE (300 MG  TOTAL) BY MOUTH THREE TIMES A DAY AS NEEDED  Dispense: 90 capsule; Refill: 5    3. Primary insomnia  - Stable, continue current regimen. Follow up 3 months.    4. Tachycardia with hypertension  - Stable, continue current regimen. Follow up 3 months.    5. Overactive bladder  - Uncontrolled, pt not taking medication. Restart. PSA ordered. Follow up 1 month.  - oxybutynin (DITROPAN-XL) 10 MG 24 hr tablet; Take 1 tablet (10 mg total) by mouth after dinner.  Dispense: 90 tablet; Refill: 0    6. Annual physical exam  - Discussed age and gender appropriate screenings at this visit and encouraged a healthy diet low in simple carbohydrates, and increased physical activity.  Counseled on medically appropriate vaccines based on age and current health condition.  Screening test reviewed and discussed with patient.   - CBC Auto Differential; Future  - Comprehensive Metabolic Panel; Future  - Lipid Panel; Future  - Vitamin D; Future    7. Long term (current) use of inhaled steroids  - Stable, continue current regimen. Follow up 3 months.  - Vitamin D; Future    8. Nocturia more than twice per night  - Uncontrolled, pt not taking medication. Restart. PSA ordered. Follow up 1 month.  - PROSTATE SPECIFIC ANTIGEN, DIAGNOSTIC; Future  - POCT URINALYSIS  - Urine culture    65 minutes were spent in chart review, documentation and review of results, and evaluation, treatment, and counseling of patient on the same day of service.    Naa Alcazar MD  11/03/2022 1:48 PM        Follow up in about 4 weeks (around 12/1/2022).

## 2022-11-04 DIAGNOSIS — R97.20 ELEVATED PSA: Primary | ICD-10-CM

## 2022-11-04 DIAGNOSIS — R35.1 NOCTURIA: ICD-10-CM

## 2022-11-04 LAB
25(OH)D3+25(OH)D2 SERPL-MCNC: 12 NG/ML (ref 30–96)
ALBUMIN SERPL BCP-MCNC: 3.8 G/DL (ref 3.5–5.2)
ALP SERPL-CCNC: 62 U/L (ref 55–135)
ALT SERPL W/O P-5'-P-CCNC: 18 U/L (ref 10–44)
ANION GAP SERPL CALC-SCNC: 10 MMOL/L (ref 8–16)
AST SERPL-CCNC: 32 U/L (ref 10–40)
BASOPHILS # BLD AUTO: 0.04 K/UL (ref 0–0.2)
BASOPHILS NFR BLD: 0.7 % (ref 0–1.9)
BILIRUB SERPL-MCNC: 0.6 MG/DL (ref 0.1–1)
BUN SERPL-MCNC: 20 MG/DL (ref 8–23)
CALCIUM SERPL-MCNC: 9.5 MG/DL (ref 8.7–10.5)
CHLORIDE SERPL-SCNC: 105 MMOL/L (ref 95–110)
CHOLEST SERPL-MCNC: 109 MG/DL (ref 120–199)
CHOLEST/HDLC SERPL: 3.8 {RATIO} (ref 2–5)
CO2 SERPL-SCNC: 26 MMOL/L (ref 23–29)
COMPLEXED PSA SERPL-MCNC: 7.6 NG/ML (ref 0–4)
CREAT SERPL-MCNC: 1.4 MG/DL (ref 0.5–1.4)
DIFFERENTIAL METHOD: ABNORMAL
EOSINOPHIL # BLD AUTO: 0.4 K/UL (ref 0–0.5)
EOSINOPHIL NFR BLD: 6.4 % (ref 0–8)
ERYTHROCYTE [DISTWIDTH] IN BLOOD BY AUTOMATED COUNT: 14.7 % (ref 11.5–14.5)
EST. GFR  (NO RACE VARIABLE): 49.6 ML/MIN/1.73 M^2
GLUCOSE SERPL-MCNC: 88 MG/DL (ref 70–110)
HCT VFR BLD AUTO: 35.5 % (ref 40–54)
HDLC SERPL-MCNC: 29 MG/DL (ref 40–75)
HDLC SERPL: 26.6 % (ref 20–50)
HGB BLD-MCNC: 10.8 G/DL (ref 14–18)
IMM GRANULOCYTES # BLD AUTO: 0.01 K/UL (ref 0–0.04)
IMM GRANULOCYTES NFR BLD AUTO: 0.2 % (ref 0–0.5)
LDLC SERPL CALC-MCNC: 67.6 MG/DL (ref 63–159)
LYMPHOCYTES # BLD AUTO: 1.8 K/UL (ref 1–4.8)
LYMPHOCYTES NFR BLD: 30.2 % (ref 18–48)
MCH RBC QN AUTO: 29.3 PG (ref 27–31)
MCHC RBC AUTO-ENTMCNC: 30.4 G/DL (ref 32–36)
MCV RBC AUTO: 96 FL (ref 82–98)
MONOCYTES # BLD AUTO: 0.6 K/UL (ref 0.3–1)
MONOCYTES NFR BLD: 10.3 % (ref 4–15)
NEUTROPHILS # BLD AUTO: 3 K/UL (ref 1.8–7.7)
NEUTROPHILS NFR BLD: 52.2 % (ref 38–73)
NONHDLC SERPL-MCNC: 80 MG/DL
NRBC BLD-RTO: 0 /100 WBC
PLATELET # BLD AUTO: 271 K/UL (ref 150–450)
PMV BLD AUTO: 10.5 FL (ref 9.2–12.9)
POTASSIUM SERPL-SCNC: 4.5 MMOL/L (ref 3.5–5.1)
PROT SERPL-MCNC: 7.7 G/DL (ref 6–8.4)
RBC # BLD AUTO: 3.69 M/UL (ref 4.6–6.2)
SODIUM SERPL-SCNC: 141 MMOL/L (ref 136–145)
TRIGL SERPL-MCNC: 62 MG/DL (ref 30–150)
WBC # BLD AUTO: 5.8 K/UL (ref 3.9–12.7)

## 2022-11-04 NOTE — PROGRESS NOTES
Patient, Alexander Duong Jr. (MRN #[id number]), presented with a recent Estimated PA Systolic Pressure greater than 40 mmHG consistent with the definition of pulmonary hypertension (ICD10 - I27.0).    Est. PA Systolic Pressure   Date Value Ref Range Status   07/15/2018 58.13 (A)       The patient's pulmonary hypertension was monitored, evaluated, addressed and/or treated. This addendum to the medical record is made on 11/03/2022.

## 2022-11-29 ENCOUNTER — OFFICE VISIT (OUTPATIENT)
Dept: UROLOGY | Facility: CLINIC | Age: 82
End: 2022-11-29
Payer: MEDICARE

## 2022-11-29 ENCOUNTER — PATIENT OUTREACH (OUTPATIENT)
Dept: ADMINISTRATIVE | Facility: HOSPITAL | Age: 82
End: 2022-11-29
Payer: MEDICARE

## 2022-11-29 VITALS — WEIGHT: 224.56 LBS | HEIGHT: 68 IN | BODY MASS INDEX: 34.03 KG/M2

## 2022-11-29 DIAGNOSIS — R97.20 ELEVATED PSA: ICD-10-CM

## 2022-11-29 DIAGNOSIS — R35.1 NOCTURIA: ICD-10-CM

## 2022-11-29 DIAGNOSIS — N40.1 BPH WITH URINARY OBSTRUCTION: Primary | ICD-10-CM

## 2022-11-29 DIAGNOSIS — N13.8 BPH WITH URINARY OBSTRUCTION: Primary | ICD-10-CM

## 2022-11-29 LAB
BILIRUB SERPL-MCNC: NORMAL MG/DL
BLOOD URINE, POC: NEGATIVE
COLOR, POC UA: YELLOW
GLUCOSE UR QL STRIP: NORMAL
KETONES UR QL STRIP: NEGATIVE
LEUKOCYTE ESTERASE URINE, POC: NEGATIVE
NITRITE, POC UA: NEGATIVE
PH, POC UA: 5
PROTEIN, POC: 30
SPECIFIC GRAVITY, POC UA: 1020
UROBILINOGEN, POC UA: NORMAL

## 2022-11-29 PROCEDURE — 1159F MED LIST DOCD IN RCRD: CPT | Mod: CPTII,S$GLB,, | Performed by: UROLOGY

## 2022-11-29 PROCEDURE — 99999 PR PBB SHADOW E&M-EST. PATIENT-LVL III: CPT | Mod: PBBFAC,,, | Performed by: UROLOGY

## 2022-11-29 PROCEDURE — 1126F AMNT PAIN NOTED NONE PRSNT: CPT | Mod: CPTII,S$GLB,, | Performed by: UROLOGY

## 2022-11-29 PROCEDURE — 1126F PR PAIN SEVERITY QUANTIFIED, NO PAIN PRESENT: ICD-10-PCS | Mod: CPTII,S$GLB,, | Performed by: UROLOGY

## 2022-11-29 PROCEDURE — 81002 POCT URINALYSIS, DIPSTICK OR TABLET REAGENT, AUTOMATED, WITH MICROSCOP: ICD-10-PCS | Mod: S$GLB,,, | Performed by: UROLOGY

## 2022-11-29 PROCEDURE — 99204 PR OFFICE/OUTPT VISIT, NEW, LEVL IV, 45-59 MIN: ICD-10-PCS | Mod: S$GLB,,, | Performed by: UROLOGY

## 2022-11-29 PROCEDURE — 1160F PR REVIEW ALL MEDS BY PRESCRIBER/CLIN PHARMACIST DOCUMENTED: ICD-10-PCS | Mod: CPTII,S$GLB,, | Performed by: UROLOGY

## 2022-11-29 PROCEDURE — 99204 OFFICE O/P NEW MOD 45 MIN: CPT | Mod: S$GLB,,, | Performed by: UROLOGY

## 2022-11-29 PROCEDURE — 81002 URINALYSIS NONAUTO W/O SCOPE: CPT | Mod: S$GLB,,, | Performed by: UROLOGY

## 2022-11-29 PROCEDURE — 1101F PT FALLS ASSESS-DOCD LE1/YR: CPT | Mod: CPTII,S$GLB,, | Performed by: UROLOGY

## 2022-11-29 PROCEDURE — 1101F PR PT FALLS ASSESS DOC 0-1 FALLS W/OUT INJ PAST YR: ICD-10-PCS | Mod: CPTII,S$GLB,, | Performed by: UROLOGY

## 2022-11-29 PROCEDURE — 99999 PR PBB SHADOW E&M-EST. PATIENT-LVL III: ICD-10-PCS | Mod: PBBFAC,,, | Performed by: UROLOGY

## 2022-11-29 PROCEDURE — 1160F RVW MEDS BY RX/DR IN RCRD: CPT | Mod: CPTII,S$GLB,, | Performed by: UROLOGY

## 2022-11-29 PROCEDURE — 3288F PR FALLS RISK ASSESSMENT DOCUMENTED: ICD-10-PCS | Mod: CPTII,S$GLB,, | Performed by: UROLOGY

## 2022-11-29 PROCEDURE — 3288F FALL RISK ASSESSMENT DOCD: CPT | Mod: CPTII,S$GLB,, | Performed by: UROLOGY

## 2022-11-29 PROCEDURE — 1159F PR MEDICATION LIST DOCUMENTED IN MEDICAL RECORD: ICD-10-PCS | Mod: CPTII,S$GLB,, | Performed by: UROLOGY

## 2022-11-29 RX ORDER — TAMSULOSIN HYDROCHLORIDE 0.4 MG/1
0.4 CAPSULE ORAL DAILY
Qty: 30 CAPSULE | Refills: 11 | Status: SHIPPED | OUTPATIENT
Start: 2022-11-29 | End: 2022-12-05 | Stop reason: SDUPTHER

## 2022-11-29 NOTE — PROGRESS NOTES
Subjective:       Patient ID: Alexander Duong Jr. is a 82 y.o. male who was referred by Naa Alcazar MD    Chief Complaint:   Chief Complaint   Patient presents with    Nocturia       Elevated PSA  Patient is here with an elevated PSA. He has no personal history and no family history of prostate cancer. He has a prior genitourinary history of nocturia .  Previous PSA values are :  Component Ref Range & Units 3 wk ago    PSA Diagnostic 0.00 - 4.00 ng/mL 7.6 High     Comment: The testing method is a chemiluminescent microparticle immunoassay   manufactured by [employer] and performed on the Codesion   or   Press-sense system. Values obtained with different assay manufacturers   for   methods may be different and cannot be used interchangeably.   PSA Expected levels:   Hormonal Therapy: <0.05 ng/ml   Prostatectomy: <0.01 ng/ml   Radiation Therapy: <1.00 ng/ml    Resulting Agency  [facility]              Specimen Collected: 11/03/22 14:30 Last Resulted: 11/04/22 01:19             ACTIVE MEDICAL ISSUES:  Patient Active Problem List   Diagnosis    Primary osteoarthritis involving multiple joints    Essential hypertension    Stage 3b chronic kidney disease    Mixed hyperlipidemia    Elevated glucose    Pulmonary hypertension    Mixed simple and mucopurulent chronic bronchitis    Chronic bronchitis with acute exacerbation    Class 1 obesity in adult    Chronic bilateral low back pain with left-sided sciatica    Diverticulosis of colon with hemorrhage of large intestine    Gastrointestinal hemorrhage with hematemesis    Aortic atherosclerosis       PAST MEDICAL HISTORY  Past Medical History:   Diagnosis Date    Allergy     Hypertension        PAST SURGICAL HISTORY:  Past Surgical History:   Procedure Laterality Date    APPENDECTOMY      COLONOSCOPY Left 10/19/2015    Procedure: COLONOSCOPY;  Surgeon: Randell Briceno MD;  Location: [facility];  Service: Endoscopy;  Laterality: Left;       SOCIAL HISTORY:  Social History      Tobacco Use    Smoking status: Former     Years: 15.00     Types: Cigarettes    Smokeless tobacco: Never    Tobacco comments:     Quit 15 years ago   Substance Use Topics    Alcohol use: Yes     Alcohol/week: 1.0 standard drink     Types: 1 Standard drinks or equivalent per week     Comment: sometimes beer or [place]    Drug use: No       FAMILY HISTORY:  History reviewed. No pertinent family history.    ALLERGIES AND MEDICATIONS: updated and reviewed.  Review of patient's allergies indicates:  No Known Allergies  Current Outpatient Medications   Medication Sig    albuterol (PROVENTIL/VENTOLIN HFA) 90 mcg/actuation inhaler TAKE 2 PUFFS BY MOUTH EVERY 6 HOURS AS NEEDED FOR WHEEZE    amLODIPine (NORVASC) 5 MG tablet TAKE 1 TABLET BY MOUTH EVERY DAY    atorvastatin (LIPITOR) 40 MG tablet TAKE 1 TABLET BY MOUTH EVERY DAY IN THE EVENING    gabapentin (NEURONTIN) 300 MG capsule TAKE 1 CAPSULE (300 MG TOTAL) BY MOUTH THREE TIMES A DAY AS NEEDED    hydrALAZINE (APRESOLINE) 50 MG tablet TAKE 1 TABLET BY MOUTH EVERY 12 HOURS    metoprolol tartrate (LOPRESSOR) 50 MG tablet Take 1 tablet (50 mg total) by mouth once daily.    oxybutynin (DITROPAN-XL) 10 MG 24 hr tablet Take 1 tablet (10 mg total) by mouth after dinner.    traZODone (DESYREL) 100 MG tablet TAKE 1 TABLET BY MOUTH NIGHTLY AS NEEDED FOR INSOMNIA.    aspirin (ECOTRIN) 81 MG EC tablet TAKE 1 TABLET (81 MG TOTAL) BY MOUTH ONCE DAILY.    ciclopirox (PENLAC) 8 % Soln Apply topically nightly. To affected fingernails. After 1 week wipe off with alcohol rub    tamsulosin (FLOMAX) 0.4 mg Cap Take 1 capsule (0.4 mg total) by mouth once daily.     No current facility-administered medications for this visit.       Review of Systems   Constitutional:  Negative for activity change, fatigue, fever and unexpected weight change.   HENT:  Negative for congestion.    Eyes:  Negative for redness.   Respiratory:  Negative for chest tightness and shortness of breath.   "  Cardiovascular:  Negative for chest pain and leg swelling.   Gastrointestinal:  Negative for abdominal pain, constipation, diarrhea, nausea and vomiting.   Genitourinary:  Negative for dysuria, flank pain, frequency, hematuria, penile pain, penile swelling, scrotal swelling, testicular pain and urgency.   Musculoskeletal:  Negative for arthralgias and back pain.   Neurological:  Negative for dizziness and light-headedness.   Psychiatric/Behavioral:  Negative for behavioral problems and confusion. The patient is not nervous/anxious.    All other systems reviewed and are negative.    Objective:      Vitals:    11/29/22 1015   Weight: 101.9 kg (224 lb 8.6 oz)   Height: 5' 8" (1.727 m)     Physical Exam  Vitals and nursing note reviewed.   Constitutional:       Appearance: He is well-developed.   HENT:      Head: Normocephalic.   Eyes:      Conjunctiva/sclera: Conjunctivae normal.   Neck:      Thyroid: No thyromegaly.      Trachea: No tracheal deviation.   Cardiovascular:      Rate and Rhythm: Normal rate.      Heart sounds: Normal heart sounds.   Pulmonary:      Effort: Pulmonary effort is normal. No respiratory distress.      Breath sounds: Normal breath sounds. No wheezing.   Abdominal:      General: Bowel sounds are normal.      Palpations: Abdomen is soft.      Tenderness: There is no abdominal tenderness. There is no rebound.      Hernia: No hernia is present.   Genitourinary:     Comments: Patient declined HERB  Musculoskeletal:         General: No tenderness. Normal range of motion.      Cervical back: Normal range of motion and neck supple.   Lymphadenopathy:      Cervical: No cervical adenopathy.   Skin:     General: Skin is warm and dry.      Findings: No erythema or rash.   Neurological:      Mental Status: He is alert and oriented to person, place, and time.   Psychiatric:         Behavior: Behavior normal.         Thought Content: Thought content normal.         Judgment: Judgment normal.       Urine " dipstick shows negative for all components.  Micro exam: negative for WBC's or RBC's.    Assessment:       1. BPH with urinary obstruction    2. Elevated PSA    3. Nocturia          Plan:       1. Elevated PSA  We discussed [facility]UA guidelines regarding PSA testing.  It is not recommend for routine testing in his age group.  He understands and does not want a biopsy at this time.  He does want to recheck and discuss further at the next visit.    - Ambulatory referral/consult to Urology  - PSA, Total and Free; Future    2. Nocturia  Limit evening fluids  Add Flomax    - Ambulatory referral/consult to Urology  - POCT urinalysis, dipstick or tablet reag    3. BPH with urinary obstruction    - tamsulosin (FLOMAX) 0.4 mg Cap; Take 1 capsule (0.4 mg total) by mouth once daily.  Dispense: 30 capsule; Refill: 11          Follow up for Follow up, Review PSA.

## 2022-12-05 ENCOUNTER — OFFICE VISIT (OUTPATIENT)
Dept: FAMILY MEDICINE | Facility: CLINIC | Age: 82
End: 2022-12-05
Payer: MEDICARE

## 2022-12-05 VITALS
WEIGHT: 226.44 LBS | HEART RATE: 69 BPM | HEIGHT: 68 IN | OXYGEN SATURATION: 94 % | BODY MASS INDEX: 34.32 KG/M2 | TEMPERATURE: 99 F | SYSTOLIC BLOOD PRESSURE: 128 MMHG | DIASTOLIC BLOOD PRESSURE: 82 MMHG | RESPIRATION RATE: 18 BRPM

## 2022-12-05 DIAGNOSIS — N32.81 OVERACTIVE BLADDER: Primary | ICD-10-CM

## 2022-12-05 DIAGNOSIS — N40.1 BPH WITH URINARY OBSTRUCTION: ICD-10-CM

## 2022-12-05 DIAGNOSIS — K59.04 CHRONIC IDIOPATHIC CONSTIPATION: ICD-10-CM

## 2022-12-05 DIAGNOSIS — N13.8 BPH WITH URINARY OBSTRUCTION: ICD-10-CM

## 2022-12-05 DIAGNOSIS — J18.9 COMMUNITY ACQUIRED PNEUMONIA, UNSPECIFIED LATERALITY: ICD-10-CM

## 2022-12-05 PROCEDURE — 99214 PR OFFICE/OUTPT VISIT, EST, LEVL IV, 30-39 MIN: ICD-10-PCS | Mod: S$GLB,,, | Performed by: STUDENT IN AN ORGANIZED HEALTH CARE EDUCATION/TRAINING PROGRAM

## 2022-12-05 PROCEDURE — 1126F AMNT PAIN NOTED NONE PRSNT: CPT | Mod: CPTII,S$GLB,, | Performed by: STUDENT IN AN ORGANIZED HEALTH CARE EDUCATION/TRAINING PROGRAM

## 2022-12-05 PROCEDURE — 3074F SYST BP LT 130 MM HG: CPT | Mod: CPTII,S$GLB,, | Performed by: STUDENT IN AN ORGANIZED HEALTH CARE EDUCATION/TRAINING PROGRAM

## 2022-12-05 PROCEDURE — 3288F FALL RISK ASSESSMENT DOCD: CPT | Mod: CPTII,S$GLB,, | Performed by: STUDENT IN AN ORGANIZED HEALTH CARE EDUCATION/TRAINING PROGRAM

## 2022-12-05 PROCEDURE — 1100F PR PT FALLS ASSESS DOC 2+ FALLS/FALL W/INJURY/YR: ICD-10-PCS | Mod: CPTII,S$GLB,, | Performed by: STUDENT IN AN ORGANIZED HEALTH CARE EDUCATION/TRAINING PROGRAM

## 2022-12-05 PROCEDURE — 99999 PR PBB SHADOW E&M-EST. PATIENT-LVL V: ICD-10-PCS | Mod: PBBFAC,,, | Performed by: STUDENT IN AN ORGANIZED HEALTH CARE EDUCATION/TRAINING PROGRAM

## 2022-12-05 PROCEDURE — 3074F PR MOST RECENT SYSTOLIC BLOOD PRESSURE < 130 MM HG: ICD-10-PCS | Mod: CPTII,S$GLB,, | Performed by: STUDENT IN AN ORGANIZED HEALTH CARE EDUCATION/TRAINING PROGRAM

## 2022-12-05 PROCEDURE — 99214 OFFICE O/P EST MOD 30 MIN: CPT | Mod: S$GLB,,, | Performed by: STUDENT IN AN ORGANIZED HEALTH CARE EDUCATION/TRAINING PROGRAM

## 2022-12-05 PROCEDURE — 3079F PR MOST RECENT DIASTOLIC BLOOD PRESSURE 80-89 MM HG: ICD-10-PCS | Mod: CPTII,S$GLB,, | Performed by: STUDENT IN AN ORGANIZED HEALTH CARE EDUCATION/TRAINING PROGRAM

## 2022-12-05 PROCEDURE — 3288F PR FALLS RISK ASSESSMENT DOCUMENTED: ICD-10-PCS | Mod: CPTII,S$GLB,, | Performed by: STUDENT IN AN ORGANIZED HEALTH CARE EDUCATION/TRAINING PROGRAM

## 2022-12-05 PROCEDURE — 3079F DIAST BP 80-89 MM HG: CPT | Mod: CPTII,S$GLB,, | Performed by: STUDENT IN AN ORGANIZED HEALTH CARE EDUCATION/TRAINING PROGRAM

## 2022-12-05 PROCEDURE — 99999 PR PBB SHADOW E&M-EST. PATIENT-LVL V: CPT | Mod: PBBFAC,,, | Performed by: STUDENT IN AN ORGANIZED HEALTH CARE EDUCATION/TRAINING PROGRAM

## 2022-12-05 PROCEDURE — 1160F RVW MEDS BY RX/DR IN RCRD: CPT | Mod: CPTII,S$GLB,, | Performed by: STUDENT IN AN ORGANIZED HEALTH CARE EDUCATION/TRAINING PROGRAM

## 2022-12-05 PROCEDURE — 1159F MED LIST DOCD IN RCRD: CPT | Mod: CPTII,S$GLB,, | Performed by: STUDENT IN AN ORGANIZED HEALTH CARE EDUCATION/TRAINING PROGRAM

## 2022-12-05 PROCEDURE — 1126F PR PAIN SEVERITY QUANTIFIED, NO PAIN PRESENT: ICD-10-PCS | Mod: CPTII,S$GLB,, | Performed by: STUDENT IN AN ORGANIZED HEALTH CARE EDUCATION/TRAINING PROGRAM

## 2022-12-05 PROCEDURE — 1160F PR REVIEW ALL MEDS BY PRESCRIBER/CLIN PHARMACIST DOCUMENTED: ICD-10-PCS | Mod: CPTII,S$GLB,, | Performed by: STUDENT IN AN ORGANIZED HEALTH CARE EDUCATION/TRAINING PROGRAM

## 2022-12-05 PROCEDURE — 1100F PTFALLS ASSESS-DOCD GE2>/YR: CPT | Mod: CPTII,S$GLB,, | Performed by: STUDENT IN AN ORGANIZED HEALTH CARE EDUCATION/TRAINING PROGRAM

## 2022-12-05 PROCEDURE — 1159F PR MEDICATION LIST DOCUMENTED IN MEDICAL RECORD: ICD-10-PCS | Mod: CPTII,S$GLB,, | Performed by: STUDENT IN AN ORGANIZED HEALTH CARE EDUCATION/TRAINING PROGRAM

## 2022-12-05 RX ORDER — POLYETHYLENE GLYCOL 3350 17 G/17G
17 POWDER, FOR SOLUTION ORAL DAILY
Qty: 30 EACH | Refills: 2 | Status: SHIPPED | OUTPATIENT
Start: 2022-12-05 | End: 2023-01-04

## 2022-12-05 RX ORDER — TAMSULOSIN HYDROCHLORIDE 0.4 MG/1
0.4 CAPSULE ORAL DAILY
Qty: 30 CAPSULE | Refills: 11 | Status: SHIPPED | OUTPATIENT
Start: 2022-12-05 | End: 2023-08-30 | Stop reason: SDUPTHER

## 2022-12-05 RX ORDER — OXYBUTYNIN CHLORIDE 10 MG/1
10 TABLET, EXTENDED RELEASE ORAL
Qty: 90 TABLET | Refills: 0 | Status: SHIPPED | OUTPATIENT
Start: 2022-12-05 | End: 2023-08-30

## 2022-12-05 RX ORDER — AMOXICILLIN AND CLAVULANATE POTASSIUM 875; 125 MG/1; MG/1
1 TABLET, FILM COATED ORAL 2 TIMES DAILY
Qty: 14 TABLET | Refills: 0 | Status: SHIPPED | OUTPATIENT
Start: 2022-12-05 | End: 2022-12-12

## 2022-12-05 NOTE — PROGRESS NOTES
Health Maintenance Due   Topic     Shingles Vaccine (1 of 2) Hx of chickenpox. Notified pt can get vaccine at pharmacy.      Colonoscopy  Pending order. Pt denied to schedule      COVID-19 Vaccine (3 - Booster for Moderna series) Not offered at this Facility

## 2022-12-05 NOTE — PROGRESS NOTES
SUBJECTIVE     Chief Complaint   Patient presents with    Follow-up       HPI  Alexander Duong Jr. is a 82 y.o. male with  HTN, HLD, GI bleed, chronic bronchjitis, tachycardia, insomnia  that presents for follow-up of chronic medical conditions and complaining of cough.    Upper Respiratory Infection: Patient complains of symptoms of a URI. Symptoms include congestion, cough, and sore throat. Onset of symptoms was 10 days ago, stable since that time. He denies fever, chills, N/V/D.  He is drinking plenty of fluids. Evaluation to date: none. Treatment to date: none.    He also complains today of constipation. Last BM was 3 days ago and hard.      Patient previously reported that he has had nocturia, increasingly worsening over the past year. He was getting up 4-5 times at night to urinate. No urgency, frequency. Stops drinking water at a little after 6pm with his PM meds. He was not taking his oxybutynin since he ran out. Since our prior visit he has been seen by urology and started on flomax. Pt reports that today, he is not yet started on his flomax nor ditropan.     HTN -   Currently prescribed amlodipine, metoprolol. Hydralazine..  Patient endorses taking medication as directed.  Denies side effects or concerns while taking medication.  Patient not currently checking BP at home.  Denies headaches, vision changes, CP, palpitations, or other concerning symptoms.  Due for microalbumin/creatinine ratio.      He stays alone and his sister lives nearby. He has many nieces and nephews.     PAST MEDICAL HISTORY:  Past Medical History:   Diagnosis Date    Allergy     Hypertension        PAST SURGICAL HISTORY:  Past Surgical History:   Procedure Laterality Date    APPENDECTOMY      COLONOSCOPY Left 10/19/2015    Procedure: COLONOSCOPY;  Surgeon: Randell Briceno MD;  Location: [facility];  Service: Endoscopy;  Laterality: Left;       FAMILY HISTORY:  History reviewed. No pertinent family history.    ALLERGIES AND MEDICATIONS:  updated and reviewed.  Review of patient's allergies indicates:  No Known Allergies  Current Outpatient Medications   Medication Sig Dispense Refill    albuterol (PROVENTIL/VENTOLIN HFA) 90 mcg/actuation inhaler TAKE 2 PUFFS BY MOUTH EVERY 6 HOURS AS NEEDED FOR WHEEZE 18 g 5    amLODIPine (NORVASC) 5 MG tablet TAKE 1 TABLET BY MOUTH EVERY DAY 90 tablet 0    atorvastatin (LIPITOR) 40 MG tablet TAKE 1 TABLET BY MOUTH EVERY DAY IN THE EVENING 90 tablet 0    gabapentin (NEURONTIN) 300 MG capsule TAKE 1 CAPSULE (300 MG TOTAL) BY MOUTH THREE TIMES A DAY AS NEEDED 90 capsule 5    hydrALAZINE (APRESOLINE) 50 MG tablet TAKE 1 TABLET BY MOUTH EVERY 12 HOURS 180 tablet 0    metoprolol tartrate (LOPRESSOR) 50 MG tablet Take 1 tablet (50 mg total) by mouth once daily. 90 tablet 1    traZODone (DESYREL) 100 MG tablet TAKE 1 TABLET BY MOUTH NIGHTLY AS NEEDED FOR INSOMNIA. 90 tablet 1    amoxicillin-clavulanate 875-125mg (AUGMENTIN) 875-125 mg per tablet Take 1 tablet by mouth 2 (two) times daily. for 7 days 14 tablet 0    aspirin (ECOTRIN) 81 MG EC tablet TAKE 1 TABLET (81 MG TOTAL) BY MOUTH ONCE DAILY. 90 tablet 3    ciclopirox (PENLAC) 8 % Soln Apply topically nightly. To affected fingernails. After 1 week wipe off with alcohol rub 6.6 mL 2    oxybutynin (DITROPAN-XL) 10 MG 24 hr tablet Take 1 tablet (10 mg total) by mouth after dinner. 90 tablet 0    polyethylene glycol (GLYCOLAX) 17 gram PwPk Take 17 g by mouth once daily. 30 each 2    tamsulosin (FLOMAX) 0.4 mg Cap Take 1 capsule (0.4 mg total) by mouth once daily. 30 capsule 11     No current facility-administered medications for this visit.       ROS  Review of Systems   Constitutional:  Negative for chills, fatigue and fever.   HENT:  Negative for rhinorrhea and sore throat.    Respiratory:  Negative for cough and shortness of breath.    Cardiovascular:  Negative for chest pain and palpitations.   Gastrointestinal:  Negative for constipation, diarrhea, nausea and vomiting.  "  Genitourinary:  Positive for frequency. Negative for dysuria.   Musculoskeletal:  Negative for joint swelling.   Skin:  Negative for rash and wound.   Neurological:  Negative for light-headedness and headaches.   Psychiatric/Behavioral:  Negative for dysphoric mood and sleep disturbance. The patient is not nervous/anxious.        OBJECTIVE     Physical Exam  Vitals:    12/05/22 1452   BP: 128/82   Pulse: 69   Resp:    Temp:     Body mass index is 34.43 kg/m².  Weight: 102.7 kg (226 lb 6.6 oz)   Height: 5' 8" (172.7 cm)     Physical Exam  Vitals reviewed.   Constitutional:       General: He is not in acute distress.  HENT:      Right Ear: External ear normal.      Left Ear: External ear normal.      Nose: Nose normal.      Mouth/Throat:      Mouth: Mucous membranes are moist.   Eyes:      Extraocular Movements: Extraocular movements intact.      Conjunctiva/sclera: Conjunctivae normal.      Pupils: Pupils are equal, round, and reactive to light.   Cardiovascular:      Rate and Rhythm: Normal rate and regular rhythm.      Pulses: Normal pulses.      Heart sounds: No murmur heard.  Pulmonary:      Effort: Pulmonary effort is normal. No respiratory distress.      Breath sounds: Rhonchi present. No wheezing or rales.   Abdominal:      General: There is no distension.      Palpations: Abdomen is soft.   Musculoskeletal:         General: No swelling. Normal range of motion.      Cervical back: Normal range of motion.   Skin:     General: Skin is warm and dry.      Findings: No rash.   Neurological:      General: No focal deficit present.      Mental Status: He is alert and oriented to person, place, and time.   Psychiatric:         Mood and Affect: Mood normal.         Behavior: Behavior normal.         Health Maintenance         Date Due Completion Date    Shingles Vaccine (1 of 2) Never done ---    Colonoscopy 02/26/2021 2/26/2016    COVID-19 Vaccine (3 - Booster for Moderna series) 07/06/2021 5/11/2021    Lipid Panel " 11/03/2023 11/3/2022    Override on 4/8/2016: Done (future)              ASSESSMENT     82 y.o. male with     1. Overactive bladder    2. BPH with urinary obstruction    3. Community acquired pneumonia, unspecified laterality    4. Chronic idiopathic constipation        PLAN:     1. Overactive bladder  - Uncontrolled. Start oxybutynin, tamsulosin. Follow up 1 month.  - oxybutynin (DITROPAN-XL) 10 MG 24 hr tablet; Take 1 tablet (10 mg total) by mouth after dinner.  Dispense: 90 tablet; Refill: 0    2. BPH with urinary obstruction  - Uncontrolled. Start oxybutynin, tamsulosin. Follow up 1 month.  - tamsulosin (FLOMAX) 0.4 mg Cap; Take 1 capsule (0.4 mg total) by mouth once daily.  Dispense: 30 capsule; Refill: 11    3. Community acquired pneumonia, unspecified laterality  - Worsening. Start augmentin. Patient counseled to self-monitor for symptom worsening, including weakness, SOB, fatigue, vomiting, diarrhea; and present to the ED/urgent care if worsened. Finally, patient was counseled to continue supportive care including rest, fluids, mucinex if needed. RTC if worsening, or if not improving 5-7 days.   - amoxicillin-clavulanate 875-125mg (AUGMENTIN) 875-125 mg per tablet; Take 1 tablet by mouth 2 (two) times daily. for 7 days  Dispense: 14 tablet; Refill: 0    4. Chronic idiopathic constipation  - Uncontrolled. Start miralax. Follow up 1 month.  - polyethylene glycol (GLYCOLAX) 17 gram PwPk; Take 17 g by mouth once daily.  Dispense: 30 each; Refill: 2        Naa Alcazar MD  12/05/2022 2:41 PM        Follow up in about 5 weeks (around 1/9/2023).

## 2023-02-09 ENCOUNTER — OFFICE VISIT (OUTPATIENT)
Dept: FAMILY MEDICINE | Facility: CLINIC | Age: 83
End: 2023-02-09
Payer: MEDICARE

## 2023-02-09 VITALS
TEMPERATURE: 98 F | HEART RATE: 94 BPM | SYSTOLIC BLOOD PRESSURE: 140 MMHG | DIASTOLIC BLOOD PRESSURE: 72 MMHG | WEIGHT: 222.69 LBS | BODY MASS INDEX: 33.75 KG/M2 | HEIGHT: 68 IN | RESPIRATION RATE: 16 BRPM | OXYGEN SATURATION: 99 %

## 2023-02-09 DIAGNOSIS — I10 ESSENTIAL HYPERTENSION: ICD-10-CM

## 2023-02-09 DIAGNOSIS — M54.42 CHRONIC BILATERAL LOW BACK PAIN WITH LEFT-SIDED SCIATICA: ICD-10-CM

## 2023-02-09 DIAGNOSIS — G89.29 CHRONIC BILATERAL LOW BACK PAIN WITH LEFT-SIDED SCIATICA: ICD-10-CM

## 2023-02-09 DIAGNOSIS — M15.9 PRIMARY OSTEOARTHRITIS INVOLVING MULTIPLE JOINTS: ICD-10-CM

## 2023-02-09 DIAGNOSIS — E66.01 MORBID (SEVERE) OBESITY DUE TO EXCESS CALORIES: ICD-10-CM

## 2023-02-09 DIAGNOSIS — B35.1 ONYCHOMYCOSIS OF NAIL OF DIGIT OF HAND: ICD-10-CM

## 2023-02-09 DIAGNOSIS — I70.0 AORTIC ATHEROSCLEROSIS: ICD-10-CM

## 2023-02-09 DIAGNOSIS — I27.20 PULMONARY HYPERTENSION: ICD-10-CM

## 2023-02-09 DIAGNOSIS — J41.0 SIMPLE CHRONIC BRONCHITIS: ICD-10-CM

## 2023-02-09 DIAGNOSIS — L60.8 TOENAIL DEFORMITY: Primary | ICD-10-CM

## 2023-02-09 PROCEDURE — 99214 OFFICE O/P EST MOD 30 MIN: CPT | Mod: S$GLB,,, | Performed by: STUDENT IN AN ORGANIZED HEALTH CARE EDUCATION/TRAINING PROGRAM

## 2023-02-09 PROCEDURE — 1101F PR PT FALLS ASSESS DOC 0-1 FALLS W/OUT INJ PAST YR: ICD-10-PCS | Mod: CPTII,S$GLB,, | Performed by: STUDENT IN AN ORGANIZED HEALTH CARE EDUCATION/TRAINING PROGRAM

## 2023-02-09 PROCEDURE — 1126F AMNT PAIN NOTED NONE PRSNT: CPT | Mod: CPTII,S$GLB,, | Performed by: STUDENT IN AN ORGANIZED HEALTH CARE EDUCATION/TRAINING PROGRAM

## 2023-02-09 PROCEDURE — 1126F PR PAIN SEVERITY QUANTIFIED, NO PAIN PRESENT: ICD-10-PCS | Mod: CPTII,S$GLB,, | Performed by: STUDENT IN AN ORGANIZED HEALTH CARE EDUCATION/TRAINING PROGRAM

## 2023-02-09 PROCEDURE — 3288F FALL RISK ASSESSMENT DOCD: CPT | Mod: CPTII,S$GLB,, | Performed by: STUDENT IN AN ORGANIZED HEALTH CARE EDUCATION/TRAINING PROGRAM

## 2023-02-09 PROCEDURE — 3078F PR MOST RECENT DIASTOLIC BLOOD PRESSURE < 80 MM HG: ICD-10-PCS | Mod: CPTII,S$GLB,, | Performed by: STUDENT IN AN ORGANIZED HEALTH CARE EDUCATION/TRAINING PROGRAM

## 2023-02-09 PROCEDURE — 3288F PR FALLS RISK ASSESSMENT DOCUMENTED: ICD-10-PCS | Mod: CPTII,S$GLB,, | Performed by: STUDENT IN AN ORGANIZED HEALTH CARE EDUCATION/TRAINING PROGRAM

## 2023-02-09 PROCEDURE — 3077F PR MOST RECENT SYSTOLIC BLOOD PRESSURE >= 140 MM HG: ICD-10-PCS | Mod: CPTII,S$GLB,, | Performed by: STUDENT IN AN ORGANIZED HEALTH CARE EDUCATION/TRAINING PROGRAM

## 2023-02-09 PROCEDURE — 99999 PR PBB SHADOW E&M-EST. PATIENT-LVL IV: CPT | Mod: PBBFAC,,, | Performed by: STUDENT IN AN ORGANIZED HEALTH CARE EDUCATION/TRAINING PROGRAM

## 2023-02-09 PROCEDURE — 3078F DIAST BP <80 MM HG: CPT | Mod: CPTII,S$GLB,, | Performed by: STUDENT IN AN ORGANIZED HEALTH CARE EDUCATION/TRAINING PROGRAM

## 2023-02-09 PROCEDURE — 1101F PT FALLS ASSESS-DOCD LE1/YR: CPT | Mod: CPTII,S$GLB,, | Performed by: STUDENT IN AN ORGANIZED HEALTH CARE EDUCATION/TRAINING PROGRAM

## 2023-02-09 PROCEDURE — 1159F PR MEDICATION LIST DOCUMENTED IN MEDICAL RECORD: ICD-10-PCS | Mod: CPTII,S$GLB,, | Performed by: STUDENT IN AN ORGANIZED HEALTH CARE EDUCATION/TRAINING PROGRAM

## 2023-02-09 PROCEDURE — 99214 PR OFFICE/OUTPT VISIT, EST, LEVL IV, 30-39 MIN: ICD-10-PCS | Mod: S$GLB,,, | Performed by: STUDENT IN AN ORGANIZED HEALTH CARE EDUCATION/TRAINING PROGRAM

## 2023-02-09 PROCEDURE — 99999 PR PBB SHADOW E&M-EST. PATIENT-LVL IV: ICD-10-PCS | Mod: PBBFAC,,, | Performed by: STUDENT IN AN ORGANIZED HEALTH CARE EDUCATION/TRAINING PROGRAM

## 2023-02-09 PROCEDURE — 1159F MED LIST DOCD IN RCRD: CPT | Mod: CPTII,S$GLB,, | Performed by: STUDENT IN AN ORGANIZED HEALTH CARE EDUCATION/TRAINING PROGRAM

## 2023-02-09 PROCEDURE — 3077F SYST BP >= 140 MM HG: CPT | Mod: CPTII,S$GLB,, | Performed by: STUDENT IN AN ORGANIZED HEALTH CARE EDUCATION/TRAINING PROGRAM

## 2023-02-09 RX ORDER — CICLOPIROX 80 MG/ML
SOLUTION TOPICAL NIGHTLY
Qty: 6.6 ML | Refills: 2 | Status: SHIPPED | OUTPATIENT
Start: 2023-02-09 | End: 2023-02-17

## 2023-02-09 RX ORDER — AMLODIPINE BESYLATE 5 MG/1
5 TABLET ORAL DAILY
Qty: 90 TABLET | Refills: 3 | Status: SHIPPED | OUTPATIENT
Start: 2023-02-09 | End: 2024-02-19 | Stop reason: CLARIF

## 2023-02-09 NOTE — PROGRESS NOTES
Health Maintenance Due   Topic     Shingles Vaccine (1 of 2) Hx of chickenpox. Notified pt can get vaccine at pharmacy.    COVID-19 Vaccine (3 - Booster for Moderna series) Not offered at this Facility

## 2023-02-09 NOTE — PROGRESS NOTES
SUBJECTIVE     Chief Complaint   Patient presents with    Follow-up       HPI  Alexander Duong Jr. is a 82 y.o. male with  HTN, HLD, GI bleed, chronic bronchjitis, tachycardia, insomnia, constipation  that presents for follow-up of chronic medical conditiosn.    Pt previously complained of constipation. Started on miralax.     Patient previously reported that he has had nocturia, increasingly worsening over the past year. He was getting up 4-5 times at night to urinate. No urgency, frequency. Stops drinking water at a little after 6pm with his PM meds. He was not taking his oxybutynin since he ran out. Since our prior visit he has been seen by urology and started on flomax. Pt reports that today, he has restarted on his ditropan and flomax and has been urinating only once at night.      HTN -   Currently prescribed amlodipine, metoprolol. Hydralazine..  Patient endorses taking medication as directed.  Denies side effects or concerns while taking medication.  Patient not currently checking BP at home.  Denies headaches, vision changes, CP, palpitations, or other concerning symptoms.  Due for microalbumin/creatinine ratio.      He stays alone and his sister lives nearby. He has many nieces and nephews. 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     PAST MEDICAL HISTORY:  Past Medical History:   Diagnosis Date    Allergy     Hypertension        ALLERGIES AND MEDICATIONS: updated and reviewed.  Review of patient's allergies indicates:  No Known Allergies  Current Outpatient Medications   Medication Sig Dispense Refill    albuterol (PROVENTIL/VENTOLIN HFA) 90 mcg/actuation inhaler TAKE 2 PUFFS BY MOUTH EVERY 6 HOURS AS NEEDED FOR WHEEZE 18 g 5    aspirin (ECOTRIN) 81 MG EC tablet TAKE 1 TABLET (81 MG TOTAL) BY MOUTH ONCE DAILY. 90 tablet 3    atorvastatin (LIPITOR) 40 MG tablet TAKE 1 TABLET BY MOUTH EVERY DAY IN THE EVENING 90 tablet 0    ciclopirox (PENLAC) 8 % Soln Apply topically nightly. To affected fingernails.  "After 1 week wipe off with alcohol rub 6.6 mL 2    gabapentin (NEURONTIN) 300 MG capsule TAKE 1 CAPSULE (300 MG TOTAL) BY MOUTH THREE TIMES A DAY AS NEEDED 90 capsule 5    hydrALAZINE (APRESOLINE) 50 MG tablet TAKE 1 TABLET BY MOUTH EVERY 12 HOURS 180 tablet 0    metoprolol tartrate (LOPRESSOR) 50 MG tablet Take 1 tablet (50 mg total) by mouth once daily. 90 tablet 1    oxybutynin (DITROPAN-XL) 10 MG 24 hr tablet Take 1 tablet (10 mg total) by mouth after dinner. 90 tablet 0    tamsulosin (FLOMAX) 0.4 mg Cap Take 1 capsule (0.4 mg total) by mouth once daily. 30 capsule 11    traZODone (DESYREL) 100 MG tablet TAKE 1 TABLET BY MOUTH NIGHTLY AS NEEDED FOR INSOMNIA. 90 tablet 1    amLODIPine (NORVASC) 5 MG tablet Take 1 tablet (5 mg total) by mouth once daily. 90 tablet 3     No current facility-administered medications for this visit.       ROS  Review of Systems   Constitutional:  Negative for chills, fatigue and fever.   HENT:  Negative for rhinorrhea and sore throat.    Respiratory:  Negative for cough and shortness of breath.    Cardiovascular:  Negative for chest pain and palpitations.   Gastrointestinal:  Negative for constipation, diarrhea, nausea and vomiting.   Genitourinary:  Negative for dysuria.   Musculoskeletal:  Negative for joint swelling.   Skin:  Negative for rash and wound.   Neurological:  Negative for light-headedness and headaches.   Psychiatric/Behavioral:  Negative for dysphoric mood and sleep disturbance. The patient is not nervous/anxious.        OBJECTIVE     Physical Exam  Vitals:    02/09/23 1525   BP: (!) 140/72   Pulse: 94   Resp: 16   Temp: 98 °F (36.7 °C)    Body mass index is 33.86 kg/m².  Weight: 101 kg (222 lb 10.6 oz)   Height: 5' 8" (172.7 cm)     Physical Exam  Vitals reviewed.   Constitutional:       General: He is not in acute distress.  HENT:      Right Ear: External ear normal.      Left Ear: External ear normal.      Nose: Nose normal.      Mouth/Throat:      Mouth: Mucous " membranes are moist.   Eyes:      Extraocular Movements: Extraocular movements intact.      Conjunctiva/sclera: Conjunctivae normal.      Pupils: Pupils are equal, round, and reactive to light.   Pulmonary:      Effort: Pulmonary effort is normal.   Abdominal:      General: There is no distension.      Palpations: Abdomen is soft.   Musculoskeletal:         General: No swelling. Normal range of motion.      Cervical back: Normal range of motion.   Skin:     General: Skin is warm and dry.      Findings: No rash.   Neurological:      General: No focal deficit present.      Mental Status: He is alert and oriented to person, place, and time.   Psychiatric:         Mood and Affect: Mood normal.         Behavior: Behavior normal.         Health Maintenance         Date Due Completion Date    Shingles Vaccine (1 of 2) Never done ---    COVID-19 Vaccine (3 - Booster for Moderna series) 07/06/2021 5/11/2021    Lipid Panel 11/03/2023 11/3/2022    Override on 4/8/2016: Done (future)              ASSESSMENT     82 y.o. male with     1. Toenail deformity    2. Pulmonary hypertension    3. Aortic atherosclerosis    4. Chronic bronchitis with acute exacerbation    5. Morbid (severe) obesity due to excess calories    6. Onychomycosis of nail of digit of hand    7. Essential hypertension    8. Chronic bilateral low back pain with left-sided sciatica    9. Primary osteoarthritis involving multiple joints        PLAN:     1. Pulmonary hypertension  - Stable, continue current regimen. Follow up 3 months.    2. Aortic atherosclerosis  - Stable, continue current regimen. Follow up 3 months.    3. Simple chronic bronchitis  - Stable, continue current regimen. Follow up 3 months.    4. Morbid (severe) obesity due to excess calories  - Noted; patient counseled on diet and exercise changes to maintain a healthy lifestyle.    5. Onychomycosis of nail of digit of hand  - New. Start penlac. Follow up 3 months.  - ciclopirox (PENLAC) 8 % Soln;  Apply topically nightly. To affected fingernails. After 1 week wipe off with alcohol rub  Dispense: 6.6 mL; Refill: 2    6. Toenail deformity  - New. Refer to podiatry. Follow up 3 months.  - Ambulatory referral/consult to Podiatry; Future    7. Essential hypertension  - Stable, continue current regimen. Follow up 3 months.  - amLODIPine (NORVASC) 5 MG tablet; Take 1 tablet (5 mg total) by mouth once daily.  Dispense: 90 tablet; Refill: 3    8. Chronic bilateral low back pain with left-sided sciatica  - Stable, continue current regimen. Follow up 3 months.    9. Primary osteoarthritis involving multiple joints  - Stable, continue current regimen. Follow up 3 months.        Naa Alcazar MD  02/10/2023 3:47 PM        Follow up in about 3 months (around 5/9/2023).

## 2023-02-17 ENCOUNTER — OFFICE VISIT (OUTPATIENT)
Dept: PODIATRY | Facility: CLINIC | Age: 83
End: 2023-02-17
Payer: MEDICARE

## 2023-02-17 VITALS — WEIGHT: 222.69 LBS | HEIGHT: 68 IN | BODY MASS INDEX: 33.75 KG/M2

## 2023-02-17 DIAGNOSIS — L60.8 TOENAIL DEFORMITY: ICD-10-CM

## 2023-02-17 DIAGNOSIS — B35.1 ONYCHOMYCOSIS DUE TO DERMATOPHYTE: Primary | ICD-10-CM

## 2023-02-17 PROCEDURE — 1160F PR REVIEW ALL MEDS BY PRESCRIBER/CLIN PHARMACIST DOCUMENTED: ICD-10-PCS | Mod: CPTII,,, | Performed by: PODIATRIST

## 2023-02-17 PROCEDURE — 1101F PR PT FALLS ASSESS DOC 0-1 FALLS W/OUT INJ PAST YR: ICD-10-PCS | Mod: CPTII,,, | Performed by: PODIATRIST

## 2023-02-17 PROCEDURE — 1126F AMNT PAIN NOTED NONE PRSNT: CPT | Mod: CPTII,,, | Performed by: PODIATRIST

## 2023-02-17 PROCEDURE — 3288F FALL RISK ASSESSMENT DOCD: CPT | Mod: CPTII,,, | Performed by: PODIATRIST

## 2023-02-17 PROCEDURE — 3288F PR FALLS RISK ASSESSMENT DOCUMENTED: ICD-10-PCS | Mod: CPTII,,, | Performed by: PODIATRIST

## 2023-02-17 PROCEDURE — 1159F PR MEDICATION LIST DOCUMENTED IN MEDICAL RECORD: ICD-10-PCS | Mod: CPTII,,, | Performed by: PODIATRIST

## 2023-02-17 PROCEDURE — 99202 PR OFFICE/OUTPT VISIT, NEW, LEVL II, 15-29 MIN: ICD-10-PCS | Mod: ,,, | Performed by: PODIATRIST

## 2023-02-17 PROCEDURE — 17999 UNLISTD PX SKN MUC MEMB SUBQ: CPT | Mod: CSM,S$GLB,, | Performed by: PODIATRIST

## 2023-02-17 PROCEDURE — 99202 OFFICE O/P NEW SF 15 MIN: CPT | Mod: ,,, | Performed by: PODIATRIST

## 2023-02-17 PROCEDURE — 99999 PR PBB SHADOW E&M-EST. PATIENT-LVL III: ICD-10-PCS | Mod: PBBFAC,,, | Performed by: PODIATRIST

## 2023-02-17 PROCEDURE — 99999 PR PBB SHADOW E&M-EST. PATIENT-LVL III: CPT | Mod: PBBFAC,,, | Performed by: PODIATRIST

## 2023-02-17 PROCEDURE — 1126F PR PAIN SEVERITY QUANTIFIED, NO PAIN PRESENT: ICD-10-PCS | Mod: CPTII,,, | Performed by: PODIATRIST

## 2023-02-17 PROCEDURE — 17999 PR NON-COVERED FOOT CARE: ICD-10-PCS | Mod: CSM,S$GLB,, | Performed by: PODIATRIST

## 2023-02-17 PROCEDURE — 1160F RVW MEDS BY RX/DR IN RCRD: CPT | Mod: CPTII,,, | Performed by: PODIATRIST

## 2023-02-17 PROCEDURE — 1101F PT FALLS ASSESS-DOCD LE1/YR: CPT | Mod: CPTII,,, | Performed by: PODIATRIST

## 2023-02-17 PROCEDURE — 1159F MED LIST DOCD IN RCRD: CPT | Mod: CPTII,,, | Performed by: PODIATRIST

## 2023-02-17 RX ORDER — CICLOPIROX 80 MG/ML
SOLUTION TOPICAL NIGHTLY
Qty: 6.6 ML | Refills: 11 | Status: SHIPPED | OUTPATIENT
Start: 2023-02-17 | End: 2024-02-19 | Stop reason: CLARIF

## 2023-02-17 NOTE — PROGRESS NOTES
Subjective:      Patient ID: Alexander Duong Jr. is a 83 y.o. male.    Chief Complaint: Nail Care    Thick discolored toenails all ten toes.  Gradual onset, worsening over past several weeks, aggravated by increased weight bearing, shoe gear, pressure.  No previous medical treatment.  OTC pain med not helping. Denies trauma, surgery.    Review of Systems   Constitutional: Negative for chills, diaphoresis, fever, malaise/fatigue and night sweats.   Cardiovascular:  Negative for claudication, cyanosis, leg swelling and syncope.   Skin:  Positive for nail changes. Negative for color change, dry skin, rash, suspicious lesions and unusual hair distribution.   Musculoskeletal:  Negative for falls, joint pain, joint swelling, muscle cramps, muscle weakness and stiffness.   Gastrointestinal:  Negative for constipation, diarrhea, nausea and vomiting.   Neurological:  Negative for brief paralysis, disturbances in coordination, focal weakness, numbness, paresthesias, sensory change and tremors.         Objective:      Physical Exam  Constitutional:       General: He is not in acute distress.     Appearance: He is well-developed. He is not diaphoretic.   Cardiovascular:      Pulses:           Popliteal pulses are 2+ on the right side and 2+ on the left side.        Dorsalis pedis pulses are 2+ on the right side and 2+ on the left side.        Posterior tibial pulses are 2+ on the right side and 2+ on the left side.      Comments: Capillary refill 3 seconds all toes/distal feet, all toes/both feet warm to touch.      Negative lymphadenopathy bilateral popliteal fossa and tarsal tunnel.      Negavie lower extremity edema bilateral.    Musculoskeletal:      Right ankle: No swelling, deformity, ecchymosis or lacerations. Normal range of motion. Normal pulse.      Right Achilles Tendon: Normal. No defects. Rodriguez's test negative.   Lymphadenopathy:      Lower Body: No right inguinal adenopathy. No left inguinal adenopathy.       Comments: Negative lymphadenopathy bilateral popliteal fossa and tarsal tunnel.    Negative lymphangitic streaking bilateral feet/ankles/legs.   Skin:     General: Skin is warm and dry.      Capillary Refill: Capillary refill takes 2 to 3 seconds.      Coloration: Skin is not pale.      Findings: No abrasion, bruising, burn, ecchymosis, erythema, laceration, lesion or rash.      Nails: There is no clubbing.      Comments:   Toenails 1st, 2nd, 3rd, 4th, 5th  bilateral are hypertrophic thickened 2-3 mm, dystrophic, discolored tanish brown with tan, gray crumbly subungual debris.  Tender to distal nail plate pressure, without periungual skin abnormality of each.      Otherwise, Skin is normal age and health appropriate color, turgor, texture, and temperature bilateral lower extremities without ulceration, hyperpigmentation, discoloration, masses nodules or cords palpated.  No ecchymosis, erythema, edema, or cardinal signs of infection bilateral lower extremities.     Neurological:      Mental Status: He is alert and oriented to person, place, and time.      Sensory: No sensory deficit.      Motor: No tremor, atrophy or abnormal muscle tone.      Gait: Gait normal.      Deep Tendon Reflexes:      Reflex Scores:       Patellar reflexes are 2+ on the right side and 2+ on the left side.       Achilles reflexes are 2+ on the right side and 2+ on the left side.  Psychiatric:         Behavior: Behavior is cooperative.           Assessment:       Encounter Diagnoses   Name Primary?    Toenail deformity     Onychomycosis due to dermatophyte Yes         Plan:       Alexander was seen today for nail care.    Diagnoses and all orders for this visit:    Onychomycosis due to dermatophyte    Toenail deformity  -     Ambulatory referral/consult to Podiatry    Other orders  -     ciclopirox (PENLAC) 8 % Soln; Apply topically nightly.      I counseled the patient on his conditions, their implications and medical management.    Non  covered foot care    With the patient's permission, I debrided all ten toenails with a sterile nipper and curette, removing all offending nail and debris.  Patient tolerated the procedure well and related significant relief.    Discussed conservative treatment with shoes of adequate dimensions, material, and style to alleviate symptoms and delay or prevent surgical intervention.    Penlac            Follow up if symptoms worsen or fail to improve.

## 2023-03-25 ENCOUNTER — HOSPITAL ENCOUNTER (EMERGENCY)
Facility: HOSPITAL | Age: 83
Discharge: HOME | End: 2023-03-25
Attending: EMERGENCY MEDICINE
Payer: MEDICARE

## 2023-03-25 VITALS
RESPIRATION RATE: 18 BRPM | TEMPERATURE: 98 F | SYSTOLIC BLOOD PRESSURE: 138 MMHG | DIASTOLIC BLOOD PRESSURE: 60 MMHG | OXYGEN SATURATION: 97 % | HEART RATE: 80 BPM | BODY MASS INDEX: 33.75 KG/M2 | WEIGHT: 222 LBS

## 2023-03-25 DIAGNOSIS — R07.89 LEFT-SIDED CHEST WALL PAIN: Primary | ICD-10-CM

## 2023-03-25 DIAGNOSIS — M19.90 ARTHRITIS: ICD-10-CM

## 2023-03-25 DIAGNOSIS — R07.9 CHEST PAIN: ICD-10-CM

## 2023-03-25 DIAGNOSIS — M25.552 LEFT HIP PAIN: ICD-10-CM

## 2023-03-25 DIAGNOSIS — R42 DIZZINESS: ICD-10-CM

## 2023-03-25 PROCEDURE — 63600175 PHARM REV CODE 636 W HCPCS: Performed by: EMERGENCY MEDICINE

## 2023-03-25 PROCEDURE — 99284 EMERGENCY DEPT VISIT MOD MDM: CPT | Mod: 25

## 2023-03-25 PROCEDURE — 93010 EKG 12-LEAD: ICD-10-PCS | Mod: ,,, | Performed by: INTERNAL MEDICINE

## 2023-03-25 PROCEDURE — 93010 ELECTROCARDIOGRAM REPORT: CPT | Mod: ,,, | Performed by: INTERNAL MEDICINE

## 2023-03-25 PROCEDURE — 93005 ELECTROCARDIOGRAM TRACING: CPT

## 2023-03-25 RX ORDER — PREDNISONE 20 MG/1
40 TABLET ORAL DAILY
Qty: 10 TABLET | Refills: 0 | Status: SHIPPED | OUTPATIENT
Start: 2023-03-26 | End: 2023-03-29

## 2023-03-25 RX ORDER — PREDNISONE 20 MG/1
60 TABLET ORAL
Status: COMPLETED | OUTPATIENT
Start: 2023-03-25 | End: 2023-03-25

## 2023-03-25 RX ORDER — ACETAMINOPHEN 500 MG
1000 TABLET ORAL EVERY 8 HOURS PRN
Qty: 40 TABLET | Refills: 0 | Status: SHIPPED | OUTPATIENT
Start: 2023-03-25 | End: 2023-07-17 | Stop reason: SDUPTHER

## 2023-03-25 RX ADMIN — PREDNISONE 60 MG: 20 TABLET ORAL at 11:03

## 2023-03-25 NOTE — ED TRIAGE NOTES
Patient presents to ED for complaints of pain to left side rib cage area that started 2 wks and comes and goes. Reports having some dizziness and comstipation, last good BM was some time last week.

## 2023-03-25 NOTE — ED PROVIDER NOTES
Encounter Date: 3/25/2023       History     Chief Complaint   Patient presents with    Chest Pain     Pt reports chest pain that has been intermittent for the last 2 weeks.  Pt repots nasal congestion and dizziness. Pt has not taken any medicaton     HPI  This 83-year-old white male presents emergency room complaining of a 3 day history of brief tight pain that occurs in 1 small area in the left anterior axillary line 6 cm above the costal margin.  The patient reports his pain comes and goes in episodes lasting seconds.  The 1st episode was about 3 days ago.  He had 1 episode in the shower yesterday.  Had 1 episode this morning.  He complains of a vague stiffness in the left hip radiating down the left lateral thigh to the left knee.  There is no swelling or tenderness in the distal legs.  He is never had pulmonary emboli deep venous thrombosis.  There is no history of recent surgery, prolonged immobilization.  He has no cough.  There is no shortness of breath.  Denies neurologic deficits.  There are no problems with vision hearing or speech.  Review of patient's allergies indicates:  No Known Allergies  Past Medical History:   Diagnosis Date    Allergy     Hypertension      Past Surgical History:   Procedure Laterality Date    APPENDECTOMY      COLONOSCOPY Left 10/19/2015    Procedure: COLONOSCOPY;  Surgeon: Randell Briceno MD;  Location: [facility];  Service: Endoscopy;  Laterality: Left;     History reviewed. No pertinent family history.  Social History     Tobacco Use    Smoking status: Former     Years: 15.00     Types: Cigarettes    Smokeless tobacco: Never    Tobacco comments:     Quit 15 years ago   Substance Use Topics    Alcohol use: Yes     Alcohol/week: 1.0 standard drink     Types: 1 Standard drinks or equivalent per week     Comment: sometimes beer or [place]    Drug use: No     Review of Systems  The patient was questioned specifically with regard to the following.  General: Fever, chills, sweats.  Neuro: Headache. Eyes: eye problems. ENT: Ear pain, sore throat. Cardiovascular: Chest pain. Respiratory: Cough, shortness of breath. Gastrointestinal: Abdominal pain, vomiting, diarrhea. Genitourinary: Painful urination.  Musculoskeletal: Arm and leg problems. Skin: Rash.  The review of systems was negative except for the following:  Brief dizziness episodes lasting seconds.  Brief chest pains lasting seconds.  Stiffness left hip left lateral thigh left knee.  There is no cough.  There is no shortness of breath.  There is no history of hemoptysis.  The patient has never had cancer.  Physical Exam     Initial Vitals [03/25/23 0941]   BP Pulse Resp Temp SpO2   (!) 174/82 94 20 98.2 °F (36.8 °C) 95 %      MAP       --         Physical Exam  The patient was examined specifically for the following:   General:No significant distress, Good color, Warm and dry. Head and neck:Scalp atraumatic, Neck supple. Neurological:Appropriate conversation, Gross motor deficits. Eyes:Conjugate gaze, Clear corneas. ENT: No epistaxis. Cardiac: Regular rate and rhythm, Grossly normal heart tones. Pulmonary: Wheezing, Rales. Gastrointestinal: Abdominal tenderness, Abdominal distention. Musculoskeletal: Extremity deformity, Apparent pain with range of motion of the joints. Skin: Rash.   The findings on examination were normal except for the following:  The patient has point tenderness in the left lateral thorax 3 in above the costal margin in the anterior axillary line.  Palpation there reproduces pain of the chief complaint.  Lungs are clear and free of wheezing rales rubs or rhonchi.  Heart tones are normal.  The patient has regular rate and rhythm.  The abdomen is completely nontender.  There is no significant tenderness swelling or pain with range of motion of the left hip in the left knee.   ED Course   Procedures  Labs Reviewed - No data to display  EKG Readings: (Independently Interpreted)   This patient is in a normal sinus rhythm  heart rate of 87.  There are no significant ST segment and T-wave changes.  There is no definite evidence of acute myocardial infarction or malignant arrhythmia.     Imaging Results              X-Ray Chest AP Portable (Final result)  Result time 03/25/23 10:47:13      Final result by Brandon Capellan MD (03/25/23 10:47:13)                   Impression:      Ill-defined opacities at the lung bases could relate to edema, atelectasis, aspiration, and/or pneumonia    Small pleural effusions not excluded.      Electronically signed by: Brandon Capellan  Date:    03/25/2023  Time:    10:47               Narrative:    EXAMINATION:  XR CHEST AP PORTABLE    CLINICAL HISTORY:  chest pain;    TECHNIQUE:  Single frontal view of the chest was performed.    COMPARISON:  Chest radiograph performed 02/04/2019.    FINDINGS:  Monitoring leads over the chest.  Grossly unchanged cardiomediastinal contours.  Suggested enlargement of the cardiac silhouette.  Prominence of central pulmonary vasculature.    Ill-defined opacities in the lung bases.    Small pleural effusions not excluded.    No definite pneumothorax.    No acute findings in the visualized abdomen.    Osseous and soft tissue structures appear without definite acute abnormality.                                      Medical decision making: Given the above this patient presents emergency room complaining of brief pains lasting seconds that come and go in a well-defined area in the left anterior axillary line 6 cm above the costal margin.  The patient is tender there.  I believe this is likely chest wall pain.  It comes and goes in brief episodes over the course of at least the last 3 days.  There is no pain during the physical examination.  The patient has a vague stiffness of the left hip left lateral thigh left knee.  The patient has no history of diabetes.  He is walking.  He reports brief episodes of dizziness where he has transient disequilibrium that last seconds.  This  sounds like a vestibular phenomenon.  I specifically doubt stroke.  Is in his comes and goes.  I considered myocardial infarction.  EKG shows no evidence of ischemia or malignant arrhythmia.  The patient has no precordial chest pressure tightness.  Pulmonary embolus seems unlikely the patient has Wells negative.  I will try treating with a brief course of steroids.  I will have him return if he gets worse or if new problems develop.          Medications   predniSONE tablet 60 mg (has no administration in time range)        Additional MDM:     Well's Criteria Score:  -Clinical symptoms of DVT (leg swelling, pain with palpation) = 0.0  -Other diagnosis less likely than pulmonary embolism =            0.0  -Heart Rate >100 =   0.0  -Immobilization (= or > than 3 days) or surgery in the previous 4 weeks = 0.0  -Previous DVT/PE = 0.0  -Hemoptysis =          0.0  -Malignancy =           0.0  Well's Probability Score =    0                         Clinical Impression:   Final diagnoses:  [R07.9] Chest pain  [R07.89] Left-sided chest wall pain (Primary)  [R42] Dizziness  [M19.90] Arthritis  [M25.552] Left hip pain        ED Disposition Condition    Discharge Stable          ED Prescriptions       Medication Sig Dispense Start Date End Date Auth. Provider    predniSONE (DELTASONE) 20 MG tablet Take 2 tablets (40 mg total) by mouth once daily. for 3 days 10 tablet 3/26/2023 3/29/2023 Armando Hughes MD    acetaminophen (TYLENOL) 500 MG tablet Take 2 tablets (1,000 mg total) by mouth every 8 (eight) hours as needed for Pain. 40 tablet 3/25/2023 -- Armando Hughes MD          Follow-up Information       Follow up With Specialties Details Why Contact Info    Naa Alcazar MD Family Medicine In 1 week  [address] [facility]  [address]  [facility] [address] [phone]      Isabel Flannery MD Otolaryngology In 1 week  [address] [place] [id number] [phone]               Armando Hughes MD  03/25/23 8536

## 2023-03-25 NOTE — DISCHARGE INSTRUCTIONS
Prednisone as directed.  Tylenol, 1000 mg by mouth every 8 hours as needed for pain.  Please follow-up with the Ear Nose and Throat doctor about her dizziness.  Return immediately if you get worse or if new problems develop.  Rest.

## 2023-03-29 ENCOUNTER — TELEPHONE (OUTPATIENT)
Dept: OTOLARYNGOLOGY | Facility: CLINIC | Age: 83
End: 2023-03-29
Payer: MEDICARE

## 2023-03-29 NOTE — TELEPHONE ENCOUNTER
----- Message from Nathalie Hawkins sent at 3/29/2023 12:09 PM CDT -----  Regarding: Sooner Appt Request  .Type:  Sooner Appointment Request    Patient is requesting a sooner appointment.  Patient declined first available appointment listed as well as another facility and provider .  Patient will not accept being placed on the waitlist and is requesting a message be sent to doctor.    Name of Caller:  self     When is the first available appointment?  Nothing in epic loads     Symptoms:  sore throat     Would the patient rather a call back or a response via [facility]? Call     Best Call Back Number:  [phone]

## 2023-03-29 NOTE — TELEPHONE ENCOUNTER
Spoke with patient and scheduled appt for friday 3/31 for hearing loss and throat pain.    Message sent to supervisor Tegan ALVARADO for booking

## 2023-04-05 DIAGNOSIS — E78.2 MIXED HYPERLIPIDEMIA: ICD-10-CM

## 2023-04-05 RX ORDER — ATORVASTATIN CALCIUM 40 MG/1
40 TABLET, FILM COATED ORAL NIGHTLY
Qty: 90 TABLET | Refills: 3 | Status: SHIPPED | OUTPATIENT
Start: 2023-04-05 | End: 2024-02-19 | Stop reason: CLARIF

## 2023-05-16 ENCOUNTER — PATIENT OUTREACH (OUTPATIENT)
Dept: ADMINISTRATIVE | Facility: HOSPITAL | Age: 83
End: 2023-05-16
Payer: MEDICARE

## 2023-05-22 ENCOUNTER — LAB VISIT (OUTPATIENT)
Dept: LAB | Facility: HOSPITAL | Age: 83
End: 2023-05-22
Attending: UROLOGY
Payer: MEDICARE

## 2023-05-22 ENCOUNTER — OFFICE VISIT (OUTPATIENT)
Dept: FAMILY MEDICINE | Facility: CLINIC | Age: 83
End: 2023-05-22
Payer: MEDICARE

## 2023-05-22 VITALS
WEIGHT: 227.06 LBS | BODY MASS INDEX: 34.41 KG/M2 | HEART RATE: 85 BPM | RESPIRATION RATE: 18 BRPM | DIASTOLIC BLOOD PRESSURE: 72 MMHG | TEMPERATURE: 98 F | SYSTOLIC BLOOD PRESSURE: 140 MMHG | OXYGEN SATURATION: 93 % | HEIGHT: 68 IN

## 2023-05-22 DIAGNOSIS — R00.0 TACHYCARDIA WITH HYPERTENSION: ICD-10-CM

## 2023-05-22 DIAGNOSIS — J32.9 BACTERIAL SINUSITIS: ICD-10-CM

## 2023-05-22 DIAGNOSIS — B96.89 BACTERIAL SINUSITIS: ICD-10-CM

## 2023-05-22 DIAGNOSIS — I10 TACHYCARDIA WITH HYPERTENSION: ICD-10-CM

## 2023-05-22 DIAGNOSIS — R97.20 ELEVATED PSA: ICD-10-CM

## 2023-05-22 DIAGNOSIS — N18.32 STAGE 3B CHRONIC KIDNEY DISEASE: ICD-10-CM

## 2023-05-22 DIAGNOSIS — R07.9 CHEST PAIN, UNSPECIFIED TYPE: Primary | ICD-10-CM

## 2023-05-22 PROCEDURE — 93005 EKG 12-LEAD: ICD-10-PCS | Mod: S$GLB,,, | Performed by: STUDENT IN AN ORGANIZED HEALTH CARE EDUCATION/TRAINING PROGRAM

## 2023-05-22 PROCEDURE — 3077F PR MOST RECENT SYSTOLIC BLOOD PRESSURE >= 140 MM HG: ICD-10-PCS | Mod: CPTII,S$GLB,, | Performed by: STUDENT IN AN ORGANIZED HEALTH CARE EDUCATION/TRAINING PROGRAM

## 2023-05-22 PROCEDURE — 1126F PR PAIN SEVERITY QUANTIFIED, NO PAIN PRESENT: ICD-10-PCS | Mod: CPTII,S$GLB,, | Performed by: STUDENT IN AN ORGANIZED HEALTH CARE EDUCATION/TRAINING PROGRAM

## 2023-05-22 PROCEDURE — 93010 ELECTROCARDIOGRAM REPORT: CPT | Mod: S$GLB,,, | Performed by: INTERNAL MEDICINE

## 2023-05-22 PROCEDURE — 1101F PR PT FALLS ASSESS DOC 0-1 FALLS W/OUT INJ PAST YR: ICD-10-PCS | Mod: CPTII,S$GLB,, | Performed by: STUDENT IN AN ORGANIZED HEALTH CARE EDUCATION/TRAINING PROGRAM

## 2023-05-22 PROCEDURE — 3078F PR MOST RECENT DIASTOLIC BLOOD PRESSURE < 80 MM HG: ICD-10-PCS | Mod: CPTII,S$GLB,, | Performed by: STUDENT IN AN ORGANIZED HEALTH CARE EDUCATION/TRAINING PROGRAM

## 2023-05-22 PROCEDURE — 99999 PR PBB SHADOW E&M-EST. PATIENT-LVL V: CPT | Mod: PBBFAC,,, | Performed by: STUDENT IN AN ORGANIZED HEALTH CARE EDUCATION/TRAINING PROGRAM

## 2023-05-22 PROCEDURE — 1101F PT FALLS ASSESS-DOCD LE1/YR: CPT | Mod: CPTII,S$GLB,, | Performed by: STUDENT IN AN ORGANIZED HEALTH CARE EDUCATION/TRAINING PROGRAM

## 2023-05-22 PROCEDURE — 3288F FALL RISK ASSESSMENT DOCD: CPT | Mod: CPTII,S$GLB,, | Performed by: STUDENT IN AN ORGANIZED HEALTH CARE EDUCATION/TRAINING PROGRAM

## 2023-05-22 PROCEDURE — 99214 OFFICE O/P EST MOD 30 MIN: CPT | Mod: S$GLB,,, | Performed by: STUDENT IN AN ORGANIZED HEALTH CARE EDUCATION/TRAINING PROGRAM

## 2023-05-22 PROCEDURE — 84153 ASSAY OF PSA TOTAL: CPT | Performed by: UROLOGY

## 2023-05-22 PROCEDURE — 93010 EKG 12-LEAD: ICD-10-PCS | Mod: S$GLB,,, | Performed by: INTERNAL MEDICINE

## 2023-05-22 PROCEDURE — 3077F SYST BP >= 140 MM HG: CPT | Mod: CPTII,S$GLB,, | Performed by: STUDENT IN AN ORGANIZED HEALTH CARE EDUCATION/TRAINING PROGRAM

## 2023-05-22 PROCEDURE — 3078F DIAST BP <80 MM HG: CPT | Mod: CPTII,S$GLB,, | Performed by: STUDENT IN AN ORGANIZED HEALTH CARE EDUCATION/TRAINING PROGRAM

## 2023-05-22 PROCEDURE — 36415 COLL VENOUS BLD VENIPUNCTURE: CPT | Mod: PO | Performed by: UROLOGY

## 2023-05-22 PROCEDURE — 93005 ELECTROCARDIOGRAM TRACING: CPT | Mod: S$GLB,,, | Performed by: STUDENT IN AN ORGANIZED HEALTH CARE EDUCATION/TRAINING PROGRAM

## 2023-05-22 PROCEDURE — 99215 OFFICE O/P EST HI 40 MIN: CPT | Mod: PO | Performed by: STUDENT IN AN ORGANIZED HEALTH CARE EDUCATION/TRAINING PROGRAM

## 2023-05-22 PROCEDURE — 99214 PR OFFICE/OUTPT VISIT, EST, LEVL IV, 30-39 MIN: ICD-10-PCS | Mod: S$GLB,,, | Performed by: STUDENT IN AN ORGANIZED HEALTH CARE EDUCATION/TRAINING PROGRAM

## 2023-05-22 PROCEDURE — 1126F AMNT PAIN NOTED NONE PRSNT: CPT | Mod: CPTII,S$GLB,, | Performed by: STUDENT IN AN ORGANIZED HEALTH CARE EDUCATION/TRAINING PROGRAM

## 2023-05-22 PROCEDURE — 1159F MED LIST DOCD IN RCRD: CPT | Mod: CPTII,S$GLB,, | Performed by: STUDENT IN AN ORGANIZED HEALTH CARE EDUCATION/TRAINING PROGRAM

## 2023-05-22 PROCEDURE — 3288F PR FALLS RISK ASSESSMENT DOCUMENTED: ICD-10-PCS | Mod: CPTII,S$GLB,, | Performed by: STUDENT IN AN ORGANIZED HEALTH CARE EDUCATION/TRAINING PROGRAM

## 2023-05-22 PROCEDURE — 1159F PR MEDICATION LIST DOCUMENTED IN MEDICAL RECORD: ICD-10-PCS | Mod: CPTII,S$GLB,, | Performed by: STUDENT IN AN ORGANIZED HEALTH CARE EDUCATION/TRAINING PROGRAM

## 2023-05-22 PROCEDURE — 99999 PR PBB SHADOW E&M-EST. PATIENT-LVL V: ICD-10-PCS | Mod: PBBFAC,,, | Performed by: STUDENT IN AN ORGANIZED HEALTH CARE EDUCATION/TRAINING PROGRAM

## 2023-05-22 RX ORDER — DOXYCYCLINE 100 MG/1
100 CAPSULE ORAL EVERY 12 HOURS
Qty: 14 CAPSULE | Refills: 0 | Status: SHIPPED | OUTPATIENT
Start: 2023-05-22 | End: 2023-05-29

## 2023-05-22 NOTE — PROGRESS NOTES
SUBJECTIVE     Chief Complaint   Patient presents with    Chest Pain       HPI  Alexander Duong Jr. is a 83 y.o. male with multiple medical diagnoses as listed in the medical history and problem list that presents for evaluation of l shoulder pain and tachycardia.    Pt reports intermittent tachycardia and l shoulder pain for the past week. This has come and gone and is not associated with dyspnea, exertion, nausea, diaphoresis. No fever/chills but pt has had bacterial sinus infection recently.     PAST MEDICAL HISTORY:  Past Medical History:   Diagnosis Date    Allergy     Hypertension        ALLERGIES AND MEDICATIONS: updated and reviewed.  Review of patient's allergies indicates:  No Known Allergies  Current Outpatient Medications   Medication Sig Dispense Refill    acetaminophen (TYLENOL) 500 MG tablet Take 2 tablets (1,000 mg total) by mouth every 8 (eight) hours as needed for Pain. 40 tablet 0    amLODIPine (NORVASC) 5 MG tablet Take 1 tablet (5 mg total) by mouth once daily. 90 tablet 3    aspirin (ECOTRIN) 81 MG EC tablet TAKE 1 TABLET (81 MG TOTAL) BY MOUTH ONCE DAILY. 90 tablet 3    atorvastatin (LIPITOR) 40 MG tablet Take 1 tablet (40 mg total) by mouth every evening. 90 tablet 3    ciclopirox (PENLAC) 8 % Soln Apply topically nightly. 6.6 mL 11    gabapentin (NEURONTIN) 300 MG capsule TAKE 1 CAPSULE (300 MG TOTAL) BY MOUTH THREE TIMES A DAY AS NEEDED 90 capsule 5    oxybutynin (DITROPAN-XL) 10 MG 24 hr tablet Take 1 tablet (10 mg total) by mouth after dinner. 90 tablet 0    tamsulosin (FLOMAX) 0.4 mg Cap Take 1 capsule (0.4 mg total) by mouth once daily. 30 capsule 11    traZODone (DESYREL) 100 MG tablet TAKE 1 TABLET BY MOUTH NIGHTLY AS NEEDED FOR INSOMNIA. 90 tablet 1    albuterol (PROVENTIL/VENTOLIN HFA) 90 mcg/actuation inhaler TAKE 2 PUFFS BY MOUTH EVERY 6 HOURS AS NEEDED FOR WHEEZE (Patient not taking: Reported on 5/22/2023) 18 g 5    doxycycline (VIBRAMYCIN) 100 MG Cap Take 1 capsule (100 mg total)  "by mouth every 12 (twelve) hours. for 7 days 14 capsule 0    metoprolol tartrate (LOPRESSOR) 100 MG Tab Take 1 tablet (100 mg total) by mouth 2 (two) times daily. 180 tablet 0     No current facility-administered medications for this visit.       ROS  Review of Systems   Constitutional:  Negative for chills, fatigue and fever.   HENT:  Negative for rhinorrhea and sore throat.    Respiratory:  Negative for cough and shortness of breath.    Cardiovascular:  Positive for chest pain and palpitations. Negative for leg swelling.   Gastrointestinal:  Negative for constipation, diarrhea, nausea and vomiting.   Genitourinary:  Negative for dysuria.   Musculoskeletal:  Negative for joint swelling.   Skin:  Negative for rash and wound.   Neurological:  Negative for light-headedness and headaches.   Psychiatric/Behavioral:  Negative for dysphoric mood and sleep disturbance. The patient is not nervous/anxious.        OBJECTIVE     Physical Exam  Vitals:    05/22/23 1454   BP: (!) 140/72   Pulse: 85   Resp:    Temp:     Body mass index is 34.53 kg/m².  Weight: 103 kg (227 lb 1.2 oz)   Height: 5' 8" (172.7 cm)     Physical Exam  Vitals reviewed.   Constitutional:       General: He is not in acute distress.  HENT:      Right Ear: External ear normal.      Left Ear: External ear normal.      Nose: Nose normal.      Mouth/Throat:      Mouth: Mucous membranes are moist.   Eyes:      Extraocular Movements: Extraocular movements intact.      Conjunctiva/sclera: Conjunctivae normal.      Pupils: Pupils are equal, round, and reactive to light.   Cardiovascular:      Rate and Rhythm: Tachycardia present.      Heart sounds: No murmur heard.  Pulmonary:      Effort: Pulmonary effort is normal.   Abdominal:      General: There is no distension.      Palpations: Abdomen is soft.   Musculoskeletal:         General: No swelling. Normal range of motion.      Cervical back: Normal range of motion.   Skin:     General: Skin is warm and dry.      " Findings: No rash.   Neurological:      General: No focal deficit present.      Mental Status: He is alert and oriented to person, place, and time.   Psychiatric:         Mood and Affect: Mood normal.         Behavior: Behavior normal.         Health Maintenance         Date Due Completion Date    Shingles Vaccine (1 of 2) Never done ---    COVID-19 Vaccine (3 - Booster for Moderna series) 07/06/2021 5/11/2021    Lipid Panel 11/03/2023 11/3/2022    Override on 4/8/2016: Done (future)              ASSESSMENT     83 y.o. male with     1. Chest pain, unspecified type    2. Tachycardia with hypertension    3. Bacterial sinusitis    4. Stage 3b chronic kidney disease        PLAN:     1. Chest pain, unspecified type  - EKG in office sinus tach. Will order labs, advised pt to drink water, and ED precautions given. F/u 1 week.  - IN OFFICE EKG 12-LEAD (to Muse)    2. Tachycardia with hypertension  - EKG in office sinus tach. Will order labs, advised pt to drink water, and ED precautions given. F/u 1 week.  - CBC Auto Differential; Future  - Comprehensive Metabolic Panel; Future  - B-TYPE NATRIURETIC PEPTIDE; Future  - metoprolol tartrate (LOPRESSOR) 100 MG Tab; Take 1 tablet (100 mg total) by mouth 2 (two) times daily.  Dispense: 180 tablet; Refill: 0  - Ambulatory referral/consult to Cardiology; Future    3. Bacterial sinusitis  - New, start abx. F/u 1 week.  - doxycycline (VIBRAMYCIN) 100 MG Cap; Take 1 capsule (100 mg total) by mouth every 12 (twelve) hours. for 7 days  Dispense: 14 capsule; Refill: 0    4. Stage 3b chronic kidney disease  - Due for labs, ordered.   - Comprehensive Metabolic Panel; Future        Naa Alcazar MD  05/22/2023 2:53 PM        Follow up in about 1 week (around 5/29/2023).

## 2023-05-23 LAB
PROSTATE SPECIFIC ANTIGEN, TOTAL: 5.7 NG/ML (ref 0–4)
PSA FREE MFR SERPL: 14.56 %
PSA FREE SERPL-MCNC: 0.83 NG/ML (ref 0–1.5)

## 2023-05-24 ENCOUNTER — OFFICE VISIT (OUTPATIENT)
Dept: CARDIOLOGY | Facility: CLINIC | Age: 83
End: 2023-05-24
Payer: MEDICARE

## 2023-05-24 VITALS
RESPIRATION RATE: 18 BRPM | SYSTOLIC BLOOD PRESSURE: 154 MMHG | DIASTOLIC BLOOD PRESSURE: 74 MMHG | WEIGHT: 222.31 LBS | HEIGHT: 68 IN | BODY MASS INDEX: 33.69 KG/M2 | HEART RATE: 100 BPM | OXYGEN SATURATION: 95 %

## 2023-05-24 DIAGNOSIS — E66.09 CLASS 1 OBESITY DUE TO EXCESS CALORIES WITH SERIOUS COMORBIDITY AND BODY MASS INDEX (BMI) OF 33.0 TO 33.9 IN ADULT: ICD-10-CM

## 2023-05-24 DIAGNOSIS — R00.2 PALPITATIONS: Primary | ICD-10-CM

## 2023-05-24 DIAGNOSIS — R00.0 TACHYCARDIA WITH HYPERTENSION: ICD-10-CM

## 2023-05-24 DIAGNOSIS — I10 TACHYCARDIA WITH HYPERTENSION: ICD-10-CM

## 2023-05-24 DIAGNOSIS — I27.20 PULMONARY HYPERTENSION: ICD-10-CM

## 2023-05-24 DIAGNOSIS — I10 PRIMARY HYPERTENSION: ICD-10-CM

## 2023-05-24 PROCEDURE — 1159F MED LIST DOCD IN RCRD: CPT | Mod: CPTII,S$GLB,, | Performed by: INTERNAL MEDICINE

## 2023-05-24 PROCEDURE — 93000 EKG 12-LEAD: ICD-10-PCS | Mod: S$GLB,,, | Performed by: INTERNAL MEDICINE

## 2023-05-24 PROCEDURE — 3078F PR MOST RECENT DIASTOLIC BLOOD PRESSURE < 80 MM HG: ICD-10-PCS | Mod: CPTII,S$GLB,, | Performed by: INTERNAL MEDICINE

## 2023-05-24 PROCEDURE — 1126F AMNT PAIN NOTED NONE PRSNT: CPT | Mod: CPTII,S$GLB,, | Performed by: INTERNAL MEDICINE

## 2023-05-24 PROCEDURE — 1160F RVW MEDS BY RX/DR IN RCRD: CPT | Mod: CPTII,S$GLB,, | Performed by: INTERNAL MEDICINE

## 2023-05-24 PROCEDURE — 3288F PR FALLS RISK ASSESSMENT DOCUMENTED: ICD-10-PCS | Mod: CPTII,S$GLB,, | Performed by: INTERNAL MEDICINE

## 2023-05-24 PROCEDURE — 99204 PR OFFICE/OUTPT VISIT, NEW, LEVL IV, 45-59 MIN: ICD-10-PCS | Mod: S$GLB,,, | Performed by: INTERNAL MEDICINE

## 2023-05-24 PROCEDURE — 99999 PR PBB SHADOW E&M-EST. PATIENT-LVL IV: ICD-10-PCS | Mod: PBBFAC,,, | Performed by: INTERNAL MEDICINE

## 2023-05-24 PROCEDURE — 3077F SYST BP >= 140 MM HG: CPT | Mod: CPTII,S$GLB,, | Performed by: INTERNAL MEDICINE

## 2023-05-24 PROCEDURE — 3077F PR MOST RECENT SYSTOLIC BLOOD PRESSURE >= 140 MM HG: ICD-10-PCS | Mod: CPTII,S$GLB,, | Performed by: INTERNAL MEDICINE

## 2023-05-24 PROCEDURE — 3078F DIAST BP <80 MM HG: CPT | Mod: CPTII,S$GLB,, | Performed by: INTERNAL MEDICINE

## 2023-05-24 PROCEDURE — 93000 ELECTROCARDIOGRAM COMPLETE: CPT | Mod: S$GLB,,, | Performed by: INTERNAL MEDICINE

## 2023-05-24 PROCEDURE — 1101F PR PT FALLS ASSESS DOC 0-1 FALLS W/OUT INJ PAST YR: ICD-10-PCS | Mod: CPTII,S$GLB,, | Performed by: INTERNAL MEDICINE

## 2023-05-24 PROCEDURE — 99999 PR PBB SHADOW E&M-EST. PATIENT-LVL IV: CPT | Mod: PBBFAC,,, | Performed by: INTERNAL MEDICINE

## 2023-05-24 PROCEDURE — 3288F FALL RISK ASSESSMENT DOCD: CPT | Mod: CPTII,S$GLB,, | Performed by: INTERNAL MEDICINE

## 2023-05-24 PROCEDURE — 1101F PT FALLS ASSESS-DOCD LE1/YR: CPT | Mod: CPTII,S$GLB,, | Performed by: INTERNAL MEDICINE

## 2023-05-24 PROCEDURE — 1126F PR PAIN SEVERITY QUANTIFIED, NO PAIN PRESENT: ICD-10-PCS | Mod: CPTII,S$GLB,, | Performed by: INTERNAL MEDICINE

## 2023-05-24 PROCEDURE — 1160F PR REVIEW ALL MEDS BY PRESCRIBER/CLIN PHARMACIST DOCUMENTED: ICD-10-PCS | Mod: CPTII,S$GLB,, | Performed by: INTERNAL MEDICINE

## 2023-05-24 PROCEDURE — 99204 OFFICE O/P NEW MOD 45 MIN: CPT | Mod: S$GLB,,, | Performed by: INTERNAL MEDICINE

## 2023-05-24 PROCEDURE — 1159F PR MEDICATION LIST DOCUMENTED IN MEDICAL RECORD: ICD-10-PCS | Mod: CPTII,S$GLB,, | Performed by: INTERNAL MEDICINE

## 2023-05-24 NOTE — PROGRESS NOTES
CARDIOLOGY CONSULTATION    REASON FOR CONSULT:   Alexander Duong Jr. is a 83 y.o. male who presents for evaluation of tachycardia.      HISTORY OF PRESENT ILLNESS:     Alexander Duong Jr presents for evaluation of tachycardia.  Recently seen in family Medicine by Dr. Alcazar.  Over the preceding week had noted elevated heart rate.  Episodes hourly.  Denies any associated symptoms.  EKG today shows normal sinus rhythm.    CARDIOVASCULAR HISTORY:     Pulmonary hypertension    PAST MEDICAL HISTORY:     Past Medical History:   Diagnosis Date    Allergy     Hypertension        PAST SURGICAL HISTORY:     Past Surgical History:   Procedure Laterality Date    APPENDECTOMY      COLONOSCOPY Left 10/19/2015    Procedure: COLONOSCOPY;  Surgeon: Randell Briceno MD;  Location: [facility];  Service: Endoscopy;  Laterality: Left;       ALLERGIES AND MEDICATION:   Review of patient's allergies indicates:  No Known Allergies     Medication List            Accurate as of May 24, 2023  1:39 PM. If you have any questions, ask your nurse or doctor.                CONTINUE taking these medications      acetaminophen 500 MG tablet  Commonly known as: TYLENOL  Take 2 tablets (1,000 mg total) by mouth every 8 (eight) hours as needed for Pain.     albuterol 90 mcg/actuation inhaler  Commonly known as: PROVENTIL/VENTOLIN HFA  TAKE 2 PUFFS BY MOUTH EVERY 6 HOURS AS NEEDED FOR WHEEZE     amLODIPine 5 MG tablet  Commonly known as: NORVASC  Take 1 tablet (5 mg total) by mouth once daily.     aspirin 81 MG EC tablet  Commonly known as: ECOTRIN  TAKE 1 TABLET (81 MG TOTAL) BY MOUTH ONCE DAILY.     atorvastatin 40 MG tablet  Commonly known as: LIPITOR  Take 1 tablet (40 mg total) by mouth every evening.     ciclopirox 8 % Soln  Commonly known as: PENLAC  Apply topically nightly.     doxycycline 100 MG Cap  Commonly known as: VIBRAMYCIN  Take 1 capsule (100 mg total) by mouth every 12 (twelve) hours. for 7 days     gabapentin 300 MG capsule  Commonly  known as: NEURONTIN  TAKE 1 CAPSULE (300 MG TOTAL) BY MOUTH THREE TIMES A DAY AS NEEDED     INV metoprolol tartrate 100 MG Tab  Commonly known as: LOPRESSOR  Take 1 tablet (100 mg total) by mouth 2 (two) times daily.     oxybutynin 10 MG 24 hr tablet  Commonly known as: DITROPAN-XL  Take 1 tablet (10 mg total) by mouth after dinner.     tamsulosin 0.4 mg Cap  Commonly known as: FLOMAX  Take 1 capsule (0.4 mg total) by mouth once daily.     traZODone 100 MG tablet  Commonly known as: DESYREL  TAKE 1 TABLET BY MOUTH NIGHTLY AS NEEDED FOR INSOMNIA.              SOCIAL HISTORY:     Social History     Socioeconomic History    Marital status: Single   Tobacco Use    Smoking status: Former     Years: 15.00     Types: Cigarettes    Smokeless tobacco: Never    Tobacco comments:     Quit 15 years ago   Substance and Sexual Activity    Alcohol use: Yes     Alcohol/week: 1.0 standard drink     Types: 1 Standard drinks or equivalent per week     Comment: sometimes beer or [employer]    Drug use: No    Sexual activity: Not Currently       FAMILY HISTORY:   History reviewed. No pertinent family history.    REVIEW OF SYSTEMS:   Review of Systems   Constitutional:  Negative for chills, diaphoresis, fever, malaise/fatigue and weight loss.   HENT:  Negative for congestion, hearing loss, sinus pain, sore throat and tinnitus.    Eyes:  Negative for blurred vision, double vision, photophobia and pain.   Respiratory:  Negative for cough, hemoptysis, sputum production, shortness of breath, wheezing and stridor.    Cardiovascular:  Positive for palpitations. Negative for chest pain, orthopnea, claudication, leg swelling and PND.   Gastrointestinal:  Negative for abdominal pain, blood in stool, heartburn, melena, nausea and vomiting.   Musculoskeletal:  Negative for back pain, falls, joint pain, myalgias and neck pain.   Neurological:  Negative for dizziness, tingling, tremors, sensory change, speech change, focal weakness, seizures, loss  "of consciousness, weakness and headaches.   Endo/Heme/Allergies:  Does not bruise/bleed easily.   Psychiatric/Behavioral:  Negative for depression, memory loss and substance abuse. The patient is not nervous/anxious.      PHYSICAL EXAM:     Vitals:    05/24/23 1320   BP: (!) 154/74   Pulse: 100   Resp: 18    Body mass index is 33.81 kg/m².  Weight: 100.8 kg (222 lb 5.3 oz)   Height: 5' 8" (172.7 cm)     Physical Exam  Vitals reviewed.   Constitutional:       General: He is not in acute distress.     Appearance: He is well-developed. He is obese. He is not diaphoretic.   Neck:      Vascular: No carotid bruit or JVD.   Cardiovascular:      Rate and Rhythm: Normal rate and regular rhythm.      Pulses: Normal pulses.      Heart sounds: Normal heart sounds.   Pulmonary:      Effort: Pulmonary effort is normal.      Breath sounds: Normal breath sounds.   Abdominal:      General: Bowel sounds are normal.      Palpations: Abdomen is soft.      Tenderness: There is no abdominal tenderness.   Musculoskeletal:      Right lower leg: No edema.      Left lower leg: No edema.   Skin:     General: Skin is warm and dry.   Neurological:      Mental Status: He is alert and oriented to person, place, and time.   Psychiatric:         Speech: Speech normal.         Behavior: Behavior normal.         Thought Content: Thought content normal.       DATA:   EKG: (personally reviewed tracing)  05/24/2023-normal sinus rhythm  Laboratory:  CBC:  Recent Labs   Lab 10/29/21  0827 11/03/22  1430 05/22/23  1458   WBC 5.65 5.80 5.53   Hemoglobin 9.0 L 10.8 L 10.7 L   Hematocrit 29.1 L 35.5 L 34.4 L   Platelets 276 271 227       CHEMISTRIES:  Recent Labs   Lab 05/22/21  0717 06/01/21  1104 10/29/21  0827 11/03/22  1430 05/22/23  1458   Glucose 81 107 94 88 96   Sodium 142 139 138 141 138   Potassium 4.1 4.7 4.4 4.5 4.3   BUN 12 15 21 20 15   Creatinine 1.3 1.5 H 1.6 H 1.4 1.4   eGFR if  58 A 49.1 A 45.4 A  --   --    eGFR if non "  50 A 42.4 A 39.3 A  --   --    Calcium 8.2 L 8.8 8.8 9.5 8.7       CARDIAC BIOMARKERS:        COAGS:  Recent Labs   Lab 05/15/21  1511   INR 1.1       LIPIDS/LFTS:  Recent Labs   Lab 05/29/20  0851 12/08/20  1025 05/15/21  1511 10/29/21  0827 11/03/22  1430 05/22/23  1458   Cholesterol 185 198  --   --  109 L  --    Triglycerides 110 164 H  --   --  62  --    HDL 29 L 24 L  --   --  29 L  --    LDL Cholesterol 134.0 141.2  --   --  67.6  --    Non-HDL Cholesterol 156 174  --   --  80  --    AST 20  --    < > 22 32 29   ALT 14  --    < > 13 18 21    < > = values in this interval not displayed.       Cardiovascular Testing:    Echocardiogram 07/15/2018:    CONCLUSIONS     1 - Normal left ventricular systolic function (EF 60-65%).     2 - No wall motion abnormalities.     3 - Concentric hypertrophy.     4 - Mild tricuspid regurgitation.     5 - Pulmonary hypertension. The estimated PA systolic pressure is 58 mmHg.     ASSESSMENT:     Palpitations  Tachycardia  Hypertension  Pulmonary hypertension  Obesity    PLAN:     Palpitations/Tachycardia: Holter.  Thyroid function test.  Hypertension: Monitor.  Continue current management.  Pulmonary hypertension: 2D echocardiogram.  Return to clinic 1 month.            Nilesh Alvarado MD, MPH, FACC, [facility]

## 2023-05-29 ENCOUNTER — OFFICE VISIT (OUTPATIENT)
Dept: FAMILY MEDICINE | Facility: CLINIC | Age: 83
End: 2023-05-29
Payer: MEDICARE

## 2023-05-29 VITALS
BODY MASS INDEX: 34.75 KG/M2 | HEART RATE: 70 BPM | OXYGEN SATURATION: 93 % | WEIGHT: 229.25 LBS | TEMPERATURE: 98 F | RESPIRATION RATE: 18 BRPM | HEIGHT: 68 IN | SYSTOLIC BLOOD PRESSURE: 130 MMHG | DIASTOLIC BLOOD PRESSURE: 75 MMHG

## 2023-05-29 DIAGNOSIS — I10 TACHYCARDIA WITH HYPERTENSION: Primary | ICD-10-CM

## 2023-05-29 DIAGNOSIS — E78.2 MIXED HYPERLIPIDEMIA: ICD-10-CM

## 2023-05-29 DIAGNOSIS — I10 ESSENTIAL HYPERTENSION: ICD-10-CM

## 2023-05-29 DIAGNOSIS — R00.0 TACHYCARDIA WITH HYPERTENSION: Primary | ICD-10-CM

## 2023-05-29 PROCEDURE — 99999 PR PBB SHADOW E&M-EST. PATIENT-LVL IV: ICD-10-PCS | Mod: PBBFAC,,, | Performed by: STUDENT IN AN ORGANIZED HEALTH CARE EDUCATION/TRAINING PROGRAM

## 2023-05-29 PROCEDURE — 3078F PR MOST RECENT DIASTOLIC BLOOD PRESSURE < 80 MM HG: ICD-10-PCS | Mod: CPTII,S$GLB,, | Performed by: STUDENT IN AN ORGANIZED HEALTH CARE EDUCATION/TRAINING PROGRAM

## 2023-05-29 PROCEDURE — 3075F PR MOST RECENT SYSTOLIC BLOOD PRESS GE 130-139MM HG: ICD-10-PCS | Mod: CPTII,S$GLB,, | Performed by: STUDENT IN AN ORGANIZED HEALTH CARE EDUCATION/TRAINING PROGRAM

## 2023-05-29 PROCEDURE — 3078F DIAST BP <80 MM HG: CPT | Mod: CPTII,S$GLB,, | Performed by: STUDENT IN AN ORGANIZED HEALTH CARE EDUCATION/TRAINING PROGRAM

## 2023-05-29 PROCEDURE — 1159F PR MEDICATION LIST DOCUMENTED IN MEDICAL RECORD: ICD-10-PCS | Mod: CPTII,S$GLB,, | Performed by: STUDENT IN AN ORGANIZED HEALTH CARE EDUCATION/TRAINING PROGRAM

## 2023-05-29 PROCEDURE — 1101F PR PT FALLS ASSESS DOC 0-1 FALLS W/OUT INJ PAST YR: ICD-10-PCS | Mod: CPTII,S$GLB,, | Performed by: STUDENT IN AN ORGANIZED HEALTH CARE EDUCATION/TRAINING PROGRAM

## 2023-05-29 PROCEDURE — 99999 PR PBB SHADOW E&M-EST. PATIENT-LVL IV: CPT | Mod: PBBFAC,,, | Performed by: STUDENT IN AN ORGANIZED HEALTH CARE EDUCATION/TRAINING PROGRAM

## 2023-05-29 PROCEDURE — 99214 PR OFFICE/OUTPT VISIT, EST, LEVL IV, 30-39 MIN: ICD-10-PCS | Mod: S$GLB,,, | Performed by: STUDENT IN AN ORGANIZED HEALTH CARE EDUCATION/TRAINING PROGRAM

## 2023-05-29 PROCEDURE — 99214 OFFICE O/P EST MOD 30 MIN: CPT | Mod: S$GLB,,, | Performed by: STUDENT IN AN ORGANIZED HEALTH CARE EDUCATION/TRAINING PROGRAM

## 2023-05-29 PROCEDURE — 3288F FALL RISK ASSESSMENT DOCD: CPT | Mod: CPTII,S$GLB,, | Performed by: STUDENT IN AN ORGANIZED HEALTH CARE EDUCATION/TRAINING PROGRAM

## 2023-05-29 PROCEDURE — 1159F MED LIST DOCD IN RCRD: CPT | Mod: CPTII,S$GLB,, | Performed by: STUDENT IN AN ORGANIZED HEALTH CARE EDUCATION/TRAINING PROGRAM

## 2023-05-29 PROCEDURE — 1101F PT FALLS ASSESS-DOCD LE1/YR: CPT | Mod: CPTII,S$GLB,, | Performed by: STUDENT IN AN ORGANIZED HEALTH CARE EDUCATION/TRAINING PROGRAM

## 2023-05-29 PROCEDURE — 1126F AMNT PAIN NOTED NONE PRSNT: CPT | Mod: CPTII,S$GLB,, | Performed by: STUDENT IN AN ORGANIZED HEALTH CARE EDUCATION/TRAINING PROGRAM

## 2023-05-29 PROCEDURE — 3288F PR FALLS RISK ASSESSMENT DOCUMENTED: ICD-10-PCS | Mod: CPTII,S$GLB,, | Performed by: STUDENT IN AN ORGANIZED HEALTH CARE EDUCATION/TRAINING PROGRAM

## 2023-05-29 PROCEDURE — 1126F PR PAIN SEVERITY QUANTIFIED, NO PAIN PRESENT: ICD-10-PCS | Mod: CPTII,S$GLB,, | Performed by: STUDENT IN AN ORGANIZED HEALTH CARE EDUCATION/TRAINING PROGRAM

## 2023-05-29 PROCEDURE — 3075F SYST BP GE 130 - 139MM HG: CPT | Mod: CPTII,S$GLB,, | Performed by: STUDENT IN AN ORGANIZED HEALTH CARE EDUCATION/TRAINING PROGRAM

## 2023-05-29 NOTE — PROGRESS NOTES
Health Maintenance Due   Topic     Shingles Vaccine (1 of 2)  hx chicken pox. Notified pt can get vaccine at pharmacy    COVID-19 Vaccine (3 - Moderna series) Not offered at this office

## 2023-05-29 NOTE — PROGRESS NOTES
SUBJECTIVE     Chief Complaint   Patient presents with    Follow-up       HPI  Alexander Duong Jr. is a 83 y.o. male with multiple medical diagnoses as listed in the medical history and problem list that presents for follow up of l shoulder pain and tachycardia.     Pt previously reported intermittent tachycardia and l shoulder pain for the previous week. This has come and gone and is not associated with dyspnea, exertion, nausea, diaphoresis. Today he reports that his shoulder pain and tachycardia are both decreased in frequency; about once daily. Scheduled for echo, holter, and TSH 6/7.     Scheduled for urology in 2 days.    PAST MEDICAL HISTORY:  Past Medical History:   Diagnosis Date    Allergy     Hypertension        ALLERGIES AND MEDICATIONS: updated and reviewed.  Review of patient's allergies indicates:  No Known Allergies  Current Outpatient Medications   Medication Sig Dispense Refill    acetaminophen (TYLENOL) 500 MG tablet Take 2 tablets (1,000 mg total) by mouth every 8 (eight) hours as needed for Pain. 40 tablet 0    albuterol (PROVENTIL/VENTOLIN HFA) 90 mcg/actuation inhaler TAKE 2 PUFFS BY MOUTH EVERY 6 HOURS AS NEEDED FOR WHEEZE 18 g 5    amLODIPine (NORVASC) 5 MG tablet Take 1 tablet (5 mg total) by mouth once daily. 90 tablet 3    aspirin (ECOTRIN) 81 MG EC tablet TAKE 1 TABLET (81 MG TOTAL) BY MOUTH ONCE DAILY. 90 tablet 3    atorvastatin (LIPITOR) 40 MG tablet Take 1 tablet (40 mg total) by mouth every evening. 90 tablet 3    ciclopirox (PENLAC) 8 % Soln Apply topically nightly. 6.6 mL 11    doxycycline (VIBRAMYCIN) 100 MG Cap Take 1 capsule (100 mg total) by mouth every 12 (twelve) hours. for 7 days 14 capsule 0    gabapentin (NEURONTIN) 300 MG capsule TAKE 1 CAPSULE (300 MG TOTAL) BY MOUTH THREE TIMES A DAY AS NEEDED 90 capsule 5    metoprolol tartrate (LOPRESSOR) 100 MG Tab Take 1 tablet (100 mg total) by mouth 2 (two) times daily. 180 tablet 0    oxybutynin (DITROPAN-XL) 10 MG 24 hr tablet  "Take 1 tablet (10 mg total) by mouth after dinner. 90 tablet 0    tamsulosin (FLOMAX) 0.4 mg Cap Take 1 capsule (0.4 mg total) by mouth once daily. 30 capsule 11    traZODone (DESYREL) 100 MG tablet TAKE 1 TABLET BY MOUTH NIGHTLY AS NEEDED FOR INSOMNIA. 90 tablet 1     No current facility-administered medications for this visit.       ROS  Review of Systems   Constitutional:  Negative for chills, fatigue and fever.   HENT:  Negative for rhinorrhea and sore throat.    Respiratory:  Negative for cough and shortness of breath.    Cardiovascular:  Positive for palpitations. Negative for chest pain.   Gastrointestinal:  Negative for constipation, diarrhea, nausea and vomiting.   Genitourinary:  Negative for dysuria.   Musculoskeletal:  Negative for joint swelling.   Skin:  Negative for rash and wound.   Neurological:  Negative for light-headedness and headaches.   Psychiatric/Behavioral:  Negative for dysphoric mood and sleep disturbance. The patient is not nervous/anxious.        OBJECTIVE     Physical Exam  Vitals:    05/29/23 1355   BP: 130/75   Pulse: 70   Resp:    Temp:     Body mass index is 34.86 kg/m².  Weight: 104 kg (229 lb 4.5 oz)   Height: 5' 8" (172.7 cm)     Physical Exam  Vitals reviewed.   Constitutional:       General: He is not in acute distress.  HENT:      Right Ear: External ear normal.      Left Ear: External ear normal.      Nose: Nose normal.      Mouth/Throat:      Mouth: Mucous membranes are moist.   Eyes:      Extraocular Movements: Extraocular movements intact.      Conjunctiva/sclera: Conjunctivae normal.      Pupils: Pupils are equal, round, and reactive to light.   Cardiovascular:      Rate and Rhythm: Normal rate and regular rhythm.   Pulmonary:      Effort: Pulmonary effort is normal.   Abdominal:      General: There is no distension.      Palpations: Abdomen is soft.   Musculoskeletal:         General: No swelling. Normal range of motion.      Cervical back: Normal range of motion. "   Skin:     General: Skin is warm and dry.      Findings: No rash.   Neurological:      General: No focal deficit present.      Mental Status: He is alert and oriented to person, place, and time.   Psychiatric:         Mood and Affect: Mood normal.         Behavior: Behavior normal.         Health Maintenance         Date Due Completion Date    Shingles Vaccine (1 of 2) Never done ---    COVID-19 Vaccine (3 - Moderna series) 07/06/2021 5/11/2021    Lipid Panel 11/03/2023 11/3/2022    Override on 4/8/2016: Done (future)              ASSESSMENT     83 y.o. male with     1. Tachycardia with hypertension    2. Essential hypertension    3. Mixed hyperlipidemia        PLAN:     1. Tachycardia with hypertension  - Stable. Pt scheduled for testing, encouraged to attend. F/u 2 months.    2. Essential hypertension  - Stable. Patient was counseled and encouraged to maintain a low sodium diet, as well as increasing physical activity.  Recommend random BP checks at home on a regular basis. Repeat BP at end of visit was improved. Will continue medication at this time, and follow up in 2 months.    3. Mixed hyperlipidemia  - Stable, continue current regimen. Follow up 2 months.        Naa Alcazar MD  05/29/2023 1:43 PM        Follow up in about 9 weeks (around 7/31/2023).

## 2023-06-07 ENCOUNTER — HOSPITAL ENCOUNTER (OUTPATIENT)
Dept: CARDIOLOGY | Facility: HOSPITAL | Age: 83
Discharge: HOME | End: 2023-06-07
Attending: INTERNAL MEDICINE
Payer: MEDICARE

## 2023-06-07 DIAGNOSIS — R00.2 PALPITATIONS: ICD-10-CM

## 2023-06-07 DIAGNOSIS — I10 TACHYCARDIA WITH HYPERTENSION: ICD-10-CM

## 2023-06-07 DIAGNOSIS — R00.0 TACHYCARDIA WITH HYPERTENSION: ICD-10-CM

## 2023-06-07 DIAGNOSIS — I10 PRIMARY HYPERTENSION: ICD-10-CM

## 2023-06-07 DIAGNOSIS — I27.20 PULMONARY HYPERTENSION: ICD-10-CM

## 2023-06-07 LAB
ASCENDING AORTA: 3.25 CM
AV INDEX (PROSTH): 0.71
AV MEAN GRADIENT: 7 MMHG
AV PEAK GRADIENT: 11 MMHG
AV VALVE AREA: 2.4 CM2
AV VELOCITY RATIO: 0.7
CV ECHO LV RWT: 0.58 CM
DOP CALC AO PEAK VEL: 1.69 M/S
DOP CALC AO VTI: 38.3 CM
DOP CALC LVOT AREA: 3.4 CM2
DOP CALC LVOT DIAMETER: 2.07 CM
DOP CALC LVOT PEAK VEL: 1.18 M/S
DOP CALC LVOT STROKE VOLUME: 91.83 CM3
DOP CALC MV VTI: 34.6 CM
DOP CALCLVOT PEAK VEL VTI: 27.3 CM
E WAVE DECELERATION TIME: 226.61 MSEC
E/A RATIO: 0.89
E/E' RATIO: 13.6 M/S
ECHO LV POSTERIOR WALL: 1.44 CM (ref 0.6–1.1)
EJECTION FRACTION: 65 %
FRACTIONAL SHORTENING: 29 % (ref 28–44)
INTERVENTRICULAR SEPTUM: 1.39 CM (ref 0.6–1.1)
IVC DIAMETER: 1.72 CM
IVRT: 102.76 MSEC
LA MAJOR: 5.7 CM
LA MINOR: 5.66 CM
LA WIDTH: 4.9 CM
LEFT ATRIUM SIZE: 4.4 CM
LEFT ATRIUM VOLUME: 104.09 CM3
LEFT INTERNAL DIMENSION IN SYSTOLE: 3.54 CM (ref 2.1–4)
LEFT VENTRICLE DIASTOLIC VOLUME: 118.09 ML
LEFT VENTRICLE SYSTOLIC VOLUME: 52.19 ML
LEFT VENTRICULAR INTERNAL DIMENSION IN DIASTOLE: 5 CM (ref 3.5–6)
LEFT VENTRICULAR MASS: 296 G
LV LATERAL E/E' RATIO: 11.33 M/S
LV SEPTAL E/E' RATIO: 17 M/S
LVOT MG: 2.94 MMHG
LVOT MV: 0.79 CM/S
MV MEAN GRADIENT: 3 MMHG
MV PEAK A VEL: 1.15 M/S
MV PEAK E VEL: 1.02 M/S
MV PEAK GRADIENT: 8 MMHG
MV STENOSIS PRESSURE HALF TIME: 65.72 MS
MV VALVE AREA BY CONTINUITY EQUATION: 2.65 CM2
MV VALVE AREA P 1/2 METHOD: 3.35 CM2
PISA TR MAX VEL: 2.5 M/S
PULM VEIN S/D RATIO: 1.3
PV PEAK D VEL: 0.56 M/S
PV PEAK S VEL: 0.73 M/S
PV PEAK VELOCITY: 1.03 CM/S
RA MAJOR: 6.03 CM
RA PRESSURE: 3 MMHG
RA WIDTH: 4.2 CM
RIGHT VENTRICULAR END-DIASTOLIC DIMENSION: 4.23 CM
RV TISSUE DOPPLER FREE WALL SYSTOLIC VELOCITY 1 (APICAL 4 CHAMBER VIEW): 0.02 CM/S
SINUS: 3.21 CM
STJ: 2.68 CM
TDI LATERAL: 0.09 M/S
TDI SEPTAL: 0.06 M/S
TDI: 0.08 M/S
TR MAX PG: 25 MMHG
TRICUSPID ANNULAR PLANE SYSTOLIC EXCURSION: 2.21 CM
TV REST PULMONARY ARTERY PRESSURE: 28 MMHG

## 2023-06-07 PROCEDURE — 93227 XTRNL ECG REC<48 HR R&I: CPT | Mod: ,,, | Performed by: INTERNAL MEDICINE

## 2023-06-07 PROCEDURE — 93306 TTE W/DOPPLER COMPLETE: CPT | Mod: 26,,, | Performed by: INTERNAL MEDICINE

## 2023-06-07 PROCEDURE — 93227 HOLTER MONITOR - 24 HOUR (CUPID ONLY): ICD-10-PCS | Mod: ,,, | Performed by: INTERNAL MEDICINE

## 2023-06-07 PROCEDURE — 93225 XTRNL ECG REC<48 HRS REC: CPT

## 2023-06-07 PROCEDURE — 93306 ECHO (CUPID ONLY): ICD-10-PCS | Mod: 26,,, | Performed by: INTERNAL MEDICINE

## 2023-06-07 PROCEDURE — 93306 TTE W/DOPPLER COMPLETE: CPT

## 2023-06-09 LAB
OHS CV EVENT MONITOR DAY: 1
OHS CV HOLTER LENGTH DECIMAL HOURS: 48
OHS CV HOLTER LENGTH HOURS: 24
OHS CV HOLTER LENGTH MINUTES: 0
OHS CV HOLTER SINUS AVERAGE HR: 65
OHS CV HOLTER SINUS MAX HR: 92
OHS CV HOLTER SINUS MIN HR: 49

## 2023-07-17 ENCOUNTER — HOSPITAL ENCOUNTER (EMERGENCY)
Facility: HOSPITAL | Age: 83
Discharge: HOME | End: 2023-07-17
Attending: EMERGENCY MEDICINE
Payer: MEDICARE

## 2023-07-17 VITALS
HEART RATE: 80 BPM | BODY MASS INDEX: 33.65 KG/M2 | DIASTOLIC BLOOD PRESSURE: 72 MMHG | SYSTOLIC BLOOD PRESSURE: 184 MMHG | OXYGEN SATURATION: 100 % | HEIGHT: 68 IN | TEMPERATURE: 99 F | RESPIRATION RATE: 18 BRPM | WEIGHT: 222 LBS

## 2023-07-17 DIAGNOSIS — R79.89 ELEVATED TROPONIN: ICD-10-CM

## 2023-07-17 DIAGNOSIS — U07.1 COVID-19: Primary | ICD-10-CM

## 2023-07-17 DIAGNOSIS — R93.89 ABNORMAL CHEST X-RAY: ICD-10-CM

## 2023-07-17 DIAGNOSIS — I10 HYPERTENSION, UNSPECIFIED TYPE: ICD-10-CM

## 2023-07-17 DIAGNOSIS — R05.9 COUGH: ICD-10-CM

## 2023-07-17 LAB
ALBUMIN SERPL BCP-MCNC: 3.8 G/DL (ref 3.5–5.2)
ALP SERPL-CCNC: 59 U/L (ref 55–135)
ALT SERPL W/O P-5'-P-CCNC: 33 U/L (ref 10–44)
ANION GAP SERPL CALC-SCNC: 10 MMOL/L (ref 8–16)
AST SERPL-CCNC: 53 U/L (ref 10–40)
BACTERIA #/AREA URNS HPF: ABNORMAL /HPF
BASOPHILS # BLD AUTO: 0.02 K/UL (ref 0–0.2)
BASOPHILS NFR BLD: 0.4 % (ref 0–1.9)
BILIRUB SERPL-MCNC: 1 MG/DL (ref 0.1–1)
BILIRUB UR QL STRIP: NEGATIVE
BNP SERPL-MCNC: 33 PG/ML (ref 0–99)
BUN SERPL-MCNC: 27 MG/DL (ref 8–23)
CALCIUM SERPL-MCNC: 9.3 MG/DL (ref 8.7–10.5)
CHLORIDE SERPL-SCNC: 102 MMOL/L (ref 95–110)
CLARITY UR: CLEAR
CO2 SERPL-SCNC: 28 MMOL/L (ref 23–29)
COLOR UR: YELLOW
CREAT SERPL-MCNC: 1.6 MG/DL (ref 0.5–1.4)
CTP QC/QA: YES
CTP QC/QA: YES
DIFFERENTIAL METHOD: ABNORMAL
EOSINOPHIL # BLD AUTO: 0.1 K/UL (ref 0–0.5)
EOSINOPHIL NFR BLD: 2.6 % (ref 0–8)
ERYTHROCYTE [DISTWIDTH] IN BLOOD BY AUTOMATED COUNT: 11.9 % (ref 11.5–14.5)
EST. GFR  (NO RACE VARIABLE): 42 ML/MIN/1.73 M^2
GLUCOSE SERPL-MCNC: 126 MG/DL (ref 70–110)
GLUCOSE UR QL STRIP: NEGATIVE
HCT VFR BLD AUTO: 35 % (ref 40–54)
HGB BLD-MCNC: 11.2 G/DL (ref 14–18)
HGB UR QL STRIP: NEGATIVE
HYALINE CASTS #/AREA URNS LPF: 3 /LPF
IMM GRANULOCYTES # BLD AUTO: 0.03 K/UL (ref 0–0.04)
IMM GRANULOCYTES NFR BLD AUTO: 0.6 % (ref 0–0.5)
KETONES UR QL STRIP: NEGATIVE
LEUKOCYTE ESTERASE UR QL STRIP: NEGATIVE
LIPASE SERPL-CCNC: 67 U/L (ref 4–60)
LYMPHOCYTES # BLD AUTO: 2.2 K/UL (ref 1–4.8)
LYMPHOCYTES NFR BLD: 43.4 % (ref 18–48)
MCH RBC QN AUTO: 30.3 PG (ref 27–31)
MCHC RBC AUTO-ENTMCNC: 32 G/DL (ref 32–36)
MCV RBC AUTO: 95 FL (ref 82–98)
MICROSCOPIC COMMENT: ABNORMAL
MONOCYTES # BLD AUTO: 0.5 K/UL (ref 0.3–1)
MONOCYTES NFR BLD: 10.3 % (ref 4–15)
NEUTROPHILS # BLD AUTO: 2.2 K/UL (ref 1.8–7.7)
NEUTROPHILS NFR BLD: 42.7 % (ref 38–73)
NITRITE UR QL STRIP: NEGATIVE
NRBC BLD-RTO: 0 /100 WBC
PH UR STRIP: 7 [PH] (ref 5–8)
PLATELET # BLD AUTO: 255 K/UL (ref 150–450)
PMV BLD AUTO: 10.9 FL (ref 9.2–12.9)
POC MOLECULAR INFLUENZA A AGN: NEGATIVE
POC MOLECULAR INFLUENZA B AGN: NEGATIVE
POTASSIUM SERPL-SCNC: 4.3 MMOL/L (ref 3.5–5.1)
PROT SERPL-MCNC: 7.8 G/DL (ref 6–8.4)
PROT UR QL STRIP: ABNORMAL
RBC # BLD AUTO: 3.7 M/UL (ref 4.6–6.2)
RBC #/AREA URNS HPF: 1 /HPF (ref 0–4)
SARS-COV-2 RDRP RESP QL NAA+PROBE: POSITIVE
SODIUM SERPL-SCNC: 140 MMOL/L (ref 136–145)
SP GR UR STRIP: 1.02 (ref 1–1.03)
TROPONIN I SERPL DL<=0.01 NG/ML-MCNC: 0.04 NG/ML (ref 0–0.03)
TROPONIN I SERPL DL<=0.01 NG/ML-MCNC: 0.05 NG/ML (ref 0–0.03)
URN SPEC COLLECT METH UR: ABNORMAL
UROBILINOGEN UR STRIP-ACNC: ABNORMAL EU/DL
WBC # BLD AUTO: 5.05 K/UL (ref 3.9–12.7)
WBC #/AREA URNS HPF: 2 /HPF (ref 0–5)
WBC CLUMPS URNS QL MICRO: ABNORMAL

## 2023-07-17 PROCEDURE — 80053 COMPREHEN METABOLIC PANEL: CPT | Performed by: EMERGENCY MEDICINE

## 2023-07-17 PROCEDURE — 93010 EKG 12-LEAD: ICD-10-PCS | Mod: ,,, | Performed by: INTERNAL MEDICINE

## 2023-07-17 PROCEDURE — 99285 EMERGENCY DEPT VISIT HI MDM: CPT | Mod: 25

## 2023-07-17 PROCEDURE — 81000 URINALYSIS NONAUTO W/SCOPE: CPT | Performed by: EMERGENCY MEDICINE

## 2023-07-17 PROCEDURE — 87635 SARS-COV-2 COVID-19 AMP PRB: CPT | Performed by: EMERGENCY MEDICINE

## 2023-07-17 PROCEDURE — 83880 ASSAY OF NATRIURETIC PEPTIDE: CPT | Performed by: EMERGENCY MEDICINE

## 2023-07-17 PROCEDURE — 83690 ASSAY OF LIPASE: CPT | Performed by: EMERGENCY MEDICINE

## 2023-07-17 PROCEDURE — 87502 INFLUENZA DNA AMP PROBE: CPT

## 2023-07-17 PROCEDURE — 93005 ELECTROCARDIOGRAM TRACING: CPT

## 2023-07-17 PROCEDURE — 25000003 PHARM REV CODE 250: Performed by: EMERGENCY MEDICINE

## 2023-07-17 PROCEDURE — 84484 ASSAY OF TROPONIN QUANT: CPT | Mod: 91 | Performed by: EMERGENCY MEDICINE

## 2023-07-17 PROCEDURE — 85025 COMPLETE CBC W/AUTO DIFF WBC: CPT | Performed by: EMERGENCY MEDICINE

## 2023-07-17 PROCEDURE — 84484 ASSAY OF TROPONIN QUANT: CPT | Performed by: EMERGENCY MEDICINE

## 2023-07-17 PROCEDURE — 93010 ELECTROCARDIOGRAM REPORT: CPT | Mod: ,,, | Performed by: INTERNAL MEDICINE

## 2023-07-17 RX ORDER — ASPIRIN 325 MG
325 TABLET ORAL
Status: COMPLETED | OUTPATIENT
Start: 2023-07-17 | End: 2023-07-17

## 2023-07-17 RX ORDER — METOPROLOL TARTRATE 50 MG/1
100 TABLET ORAL
Status: COMPLETED | OUTPATIENT
Start: 2023-07-17 | End: 2023-07-17

## 2023-07-17 RX ORDER — ACETAMINOPHEN 500 MG
1000 TABLET ORAL EVERY 8 HOURS PRN
Qty: 40 TABLET | Refills: 0 | Status: SHIPPED | OUTPATIENT
Start: 2023-07-17 | End: 2024-02-19 | Stop reason: CLARIF

## 2023-07-17 RX ORDER — GUAIFENESIN 100 MG/5ML
100-200 SOLUTION ORAL EVERY 4 HOURS PRN
Qty: 60 ML | Refills: 0 | Status: SHIPPED | OUTPATIENT
Start: 2023-07-17 | End: 2023-07-27

## 2023-07-17 RX ADMIN — ASPIRIN 325 MG ORAL TABLET 325 MG: 325 PILL ORAL at 03:07

## 2023-07-17 RX ADMIN — METOPROLOL TARTRATE 100 MG: 50 TABLET, FILM COATED ORAL at 06:07

## 2023-07-17 NOTE — DISCHARGE INSTRUCTIONS
Thank you for coming to our Emergency Department today. It is important to remember that some problems are difficult to diagnose and may not be found during your Emergency Department visit. Be sure to follow up with your primary care doctor and review all labs/imaging/tests that were performed during this visit with them. Some labs/tests may be outside of the normal range and require non-emergent follow-up and further investigation to help diagnose/exclude/prevent complications or other medical conditions.    If you do not have a primary care doctor, you may contact the one listed on your discharge paperwork or you may also call the [facility] Appointment Desk at [phone] to schedule an appointment and establish care with one. It is important to your health that you have a primary care doctor.    Medicaid Escalation Line:   [phone] - Please contact this number if you are having difficulty getting follow up with a Primary Care Provider or Speciality Provider.     Please take all medications as directed. All medications may potentially have side-effects and it is impossible to predict which medications may give you side-effects or what side-effects (if any) they will give you.. If you feel that you are having a negative effect or side-effect of any medication you should immediately stop taking them and seek medical attention. If you feel that you are having a life-threatening reaction call [phone].    Return to the ER with any questions/concerns, new/concerning symptoms, worsening or failure to improve.     Do not drive, swim, climb to height, take a bath or make any important decisions for 24 hours if you have received any pain medications, sedatives or mood altering drugs during your ER visit.        BELOW THIS LINE ONLY APPLIES IF YOU HAVE A COVID TEST PENDING OR IF YOU HAVE BEEN DIAGNOSED WITH COVID:  Please access [facility] to review the results of your test. Until the results of your COVID test  return, you should isolate yourself so as not to potentially spread illness to others.   If your COVID test returns positive, you should isolate yourself so as not to spread illness to others. After five full days, if you are feeling better and you have not had fever for 24 hours, you can return to your typical daily activities, but you must wear a mask around others for an additional 5 days.   If your COVID test returns negative and you are either unvaccinated or more than six months out from your two-dose vaccine and are not yet boosted, you should still quarantine for 5 full days followed by strict mask use for an additional 5 full days.   If your COVID test returns negative and you have received your 2-dose initial vaccine as well as a booster, you should continue strict mask use for 10 full days after the exposure.  For all those exposed, best practice includes a test at day 5 after the exposure. This can be a home test or a test through one of the many testing centers throughout our community.   Masking is always advised to limit the spread of COVID. Cdc.gov is an excellent site to obtain the latest up to date recommendations regarding COVID and COVID testing.     CDC Testing and Quarantine Guidelines for patients with exposure to a known-positive COVID-19 person:  A close exposure is defined as anyone who has had an exposure (masked or unmasked) to a known COVID -19 positive person within 6 feet of someone for a cumulative total of 15 minutes or more over a 24-hour period.   Vaccinated and/or if you recently had a positive covid test within 90 days do NOT need to quarantine after contact with someone who had COVID-19 unless you develop symptoms.   Fully vaccinated people who have not had a positive test within 90 days, should get tested 3-5 days after their exposure, even if they don't have symptoms and wear a mask indoors in public for 14 days following exposure or until their test result is  negative.      Unvaccinated and/or NOT had a positive test within 90 days and meet close exposure  You are required by CDC guidelines to quarantine for at least 5 days from time of exposure followed by 5 days of strict masking. It is recommended, but not required to test after 5 days, unless you develop symptoms, in which case you should test at that time.  If you get tested after 5 days and your test is positive, your 5 day period of isolation starts the day of the positive test.    If your exposure does not meet the above definition, you can return to your normal daily activities to include social distancing, wearing a mask and frequent handwashing.      Here is a link to guidance from the CDC:  https://www.cdc.gov/media/releases/2021/s1227-isolation-quarantine-guidance.html      [facility]  Health Testing Sites:  https://ldh.la.gov/page/3934      [facility] website with testing locations and guidance:  [email or web address]

## 2023-07-17 NOTE — ED PROVIDER NOTES
"Encounter Date: 7/17/2023       History     Chief Complaint   Patient presents with    Cough     Pt reports cough x 1 week. Reports chest "aches" with cough. Reports mild sob. States, "I just dont feel well." Denies chest pain. Reports fatigue.      82 yo M with history of hypertension presents to ER complaining he hasn't felt well x 1 week. He reports body aches and cough, He denies CP, shortness of breath, vomiting, diarrhea. He denies being vaccinated for COVID (although on chart review it appears he had first two shots in 2021). He has tried some nasal spray for his symptoms.     The history is provided by the patient.   Review of patient's allergies indicates:  No Known Allergies  Past Medical History:   Diagnosis Date    Allergy     Hypertension      Past Surgical History:   Procedure Laterality Date    APPENDECTOMY      COLONOSCOPY Left 10/19/2015    Procedure: COLONOSCOPY;  Surgeon: Randell Briceno MD;  Location: [facility];  Service: Endoscopy;  Laterality: Left;     No family history on file.  Social History     Tobacco Use    Smoking status: Former     Years: 15.00     Types: Cigarettes    Smokeless tobacco: Never    Tobacco comments:     Quit 15 years ago   Substance Use Topics    Alcohol use: Yes     Alcohol/week: 1.0 standard drink     Types: 1 Standard drinks or equivalent per week     Comment: sometimes beer or crown royal    Drug use: No     Review of Systems   Constitutional:  Positive for fatigue. Negative for chills and fever.   HENT:  Negative for congestion and sore throat.    Eyes:  Negative for visual disturbance.   Respiratory:  Positive for cough. Negative for shortness of breath.    Cardiovascular:  Negative for chest pain.   Gastrointestinal:  Negative for abdominal pain, nausea and vomiting.   Genitourinary:  Negative for dysuria.   Musculoskeletal:  Positive for myalgias.   Skin:  Negative for rash.   Neurological:  Negative for headaches.   Psychiatric/Behavioral:  Negative for " confusion.      Physical Exam     Initial Vitals [07/17/23 1444]   BP Pulse Resp Temp SpO2   (!) 121/57 78 16 97.5 °F (36.4 °C) 97 %      MAP       --         Physical Exam    Nursing note and vitals reviewed.  Constitutional: He appears well-developed and well-nourished. He is not diaphoretic. No distress.   HENT:   Head: Normocephalic and atraumatic.   Mouth/Throat: Oropharynx is clear and moist.   Eyes: EOM are normal. Pupils are equal, round, and reactive to light.   Neck: Neck supple.   Cardiovascular:  Normal rate and regular rhythm.           Pulmonary/Chest: Breath sounds normal. No respiratory distress.   Abdominal: Abdomen is soft. Bowel sounds are normal.   Musculoskeletal:         General: No edema.      Cervical back: Neck supple.     Neurological: He is alert and oriented to person, place, and time. GCS score is 15. GCS eye subscore is 4. GCS verbal subscore is 5. GCS motor subscore is 6.   Ambulates with cane with steady gait   Skin: Skin is warm and dry.   Psychiatric: He has a normal mood and affect.       ED Course   Procedures  Labs Reviewed   COMPREHENSIVE METABOLIC PANEL - Abnormal; Notable for the following components:       Result Value    Glucose 126 (*)     BUN 27 (*)     Creatinine 1.6 (*)     AST 53 (*)     eGFR 42 (*)     All other components within normal limits   CBC W/ AUTO DIFFERENTIAL - Abnormal; Notable for the following components:    RBC 3.70 (*)     Hemoglobin 11.2 (*)     Hematocrit 35.0 (*)     Immature Granulocytes 0.6 (*)     All other components within normal limits   URINALYSIS - Abnormal; Notable for the following components:    Protein, UA 1+ (*)     Urobilinogen, UA 2.0-3.0 (*)     All other components within normal limits   TROPONIN I - Abnormal; Notable for the following components:    Troponin I 0.044 (*)     All other components within normal limits   LIPASE - Abnormal; Notable for the following components:    Lipase 67 (*)     All other components within normal  limits   TROPONIN I - Abnormal; Notable for the following components:    Troponin I 0.049 (*)     All other components within normal limits   URINALYSIS MICROSCOPIC - Abnormal; Notable for the following components:    Hyaline Casts, UA 3 (*)     All other components within normal limits   SARS-COV-2 RDRP GENE - Abnormal; Notable for the following components:    POC Rapid COVID Positive (*)     All other components within normal limits   B-TYPE NATRIURETIC PEPTIDE   POCT INFLUENZA A/B MOLECULAR     EKG Readings: (Independently Interpreted)   NSR, rate 70 bpm, normal NH interval, QTc 464 ms, no STEMI   ECG Results              EKG 12-lead (Preliminary result)  Result time 07/17/23 15:45:11      Wet Read by Ivette Aguilar MD (07/17/23 15:45:11, [facility], Emergency Medicine)    NSR, rate 70 bpm, normal NH interval, QTc 464 ms, no STEMI                                  Imaging Results              X-Ray Chest 1 View (Final result)  Result time 07/17/23 15:40:46   Procedure changed from X-Ray Chest PA And Lateral     Final result by Josh Gonsales MD (07/17/23 15:40:46)                   Impression:      No significant change in cardiopulmonary status with persistent left basilar pleural and/or parenchymal airspace disease at the left lung base.      Electronically signed by: Josh Gonsales  Date:    07/17/2023  Time:    15:40               Narrative:    EXAMINATION:  XR CHEST 1 VIEW    CLINICAL HISTORY:  Cough; Cough, unspecified    TECHNIQUE:  Single frontal view of the chest was performed.    COMPARISON:  03/25/2023    FINDINGS:  Cardiac silhouette is minimally enlarged.    Increased density at the left lung base retrocardiac region may be associated with small effusion and or airspace disease.  No significant change.    No evidence of pneumothorax.  No acute osseous abnormality.                                       Medications   aspirin tablet 325 mg (325 mg Oral Given 7/17/23 0896)    metoprolol tartrate (LOPRESSOR) tablet 100 mg (100 mg Oral Given 7/17/23 1835)     Medical Decision Making:   Initial Assessment:   82 yo M with HTN presents with cough, fatigue, myalgias x 1 week. Denies CP, shortness of breath, vomiting, diarrhea. On exam, well appearing in NAD, normal heart rate and rhythm, breath sounds CTAB. Patient has tested positive for COVID. Checking labs/CXR to ensure no other evidence of ACS or bacterial pneumonia.            ED Course as of 07/17/23 1959 Mon Jul 17, 2023   1600 Patient updated regarding test results- elevated troponin, covid +. CXR shows persistent L sided pleural/parenchymal abnormality but does not appear new today. Patient asked me to call his sisters to let them know what is going on. Called sister Lurdes to give update.  They also asked if I could call Roxana, sister who was a nurse, voice mailbox full and unable to LVM. [LH]   1619 Case reviewed with Ayden Dodson who does not recommend hospital admission for this patient, attributes elevated troponin to covid infection. Case discussed with Dr. Rodriguez, states can repeat troponin in a few hours to determine disposition.  [LH]   1847 Troponin unchanged. Will refer to cardiology for follow up. Reviewed strict return precautions with patient including any worsening symptoms. Patient is > 5 days out from symptom onset. Encouraged PO hydration, rest, symptomatic care.  [LH]      ED Course User Index  [LH] Ivette Aguilar MD                   Clinical Impression:   Final diagnoses:  [R05.9] Cough  [U07.1] COVID-19 (Primary)  [R77.8] Elevated troponin  [R93.89] Abnormal chest x-ray  [I10] Hypertension, unspecified type        ED Disposition Condition    Discharge Stable          ED Prescriptions       Medication Sig Dispense Start Date End Date Auth. Provider    acetaminophen (TYLENOL) 500 MG tablet Take 2 tablets (1,000 mg total) by mouth every 8 (eight) hours as needed for Pain or Temperature greater than (100.4).  40 tablet 7/17/2023 -- Ivette Aguilar MD    guaiFENesin 100 mg/5 ml (ROBITUSSIN) 100 mg/5 mL syrup Take 5-10 mLs (100-200 mg total) by mouth every 4 (four) hours as needed for Cough. 60 mL 7/17/2023 7/27/2023 Ivette Aguilar MD          Follow-up Information       Follow up With Specialties Details Why Contact Info    Naa Alcazar MD Family Medicine Schedule an appointment as soon as possible for a visit on 7/20/2023 To recheck your symptoms [address] [facility]  [address]  [facility] [id number] [phone]      [facility] [place] [facility] [place] [facility] Emergency Medicine  As needed, If symptoms worsen [address] [facility] [address] [phone]    Partha Rodriguez MD Cardiology, Interventional Cardiology Schedule an appointment as soon as possible for a visit   [address]  [address]  [place] [id number] [phone]               Ivette Aguilar MD  07/17/23 1959

## 2023-07-17 NOTE — FIRST PROVIDER EVALUATION
"Medical screening examination initiated.  I have conducted a focused provider triage encounter, findings are as follows:    Brief history of present illness:  This is an 83-year-old gentleman who presents the emergency department with a complaint of cough, shortness of breath, generalized weakness and just feeling bad.  Symptoms have been present for approximately a week.  They attempted to see his primary care doctor today but were not able to get an appointment.    Vitals:    07/17/23 1444   BP: (!) 121/57   BP Location: Right arm   Patient Position: Sitting   Pulse: 78   Resp: 16   Temp: 97.5 °F (36.4 °C)   TempSrc: Oral   SpO2: 97%   Weight: 100.7 kg (222 lb)   Height: 5' 8" (1.727 m)       Pertinent physical exam:  Uncomfortable appearing.  Regular rate and rhythm.  Unlabored respirations.  Walking with the aid of a cane.    Brief workup plan:  Labs, EKG, chest x-ray, COVID and flu swabs    Preliminary workup initiated; this workup will be continued and followed by the physician or advanced practice provider that is assigned to the patient when roomed.  "

## 2023-07-18 ENCOUNTER — PATIENT OUTREACH (OUTPATIENT)
Dept: EMERGENCY MEDICINE | Facility: HOSPITAL | Age: 83
End: 2023-07-18
Payer: MEDICARE

## 2023-07-18 ENCOUNTER — TELEPHONE (OUTPATIENT)
Dept: FAMILY MEDICINE | Facility: CLINIC | Age: 83
End: 2023-07-18
Payer: MEDICARE

## 2023-07-18 NOTE — TELEPHONE ENCOUNTER
----- Message from Isabela Mancera sent at 7/18/2023  8:51 AM CDT -----  Regarding: Post ED visit follow up appt within 7 days of d/c date 7/17/23  Good morning: Pt was seen in ED on 7/17/23 for Cough, COVID-19, Elevated troponin, Abnormal chest x-ray,Hypertension, unspecified type and requires a Post ED visit follow up appt within 7 days of d/c date. Please contact pt to schedule a fu appt by 7/24/23 if possible.     Thank you  Isabela Mancera

## 2023-07-18 NOTE — TELEPHONE ENCOUNTER
Scheduled appt with rin for Monday; called to inform pt but no answer on home number and mobile number not in service

## 2023-07-18 NOTE — PROGRESS NOTES
I contacted pt regarding ED visit on 7/17/23 for Covid19, cough, chest pain. Pt was provided a referral to Cardiology. I contacted Cardiology and requested a Post ED visit follow up appt within 7 days of d/c. Pt was scheduled an appt on 7/21/23 @ 3:15 with Dr. Kim. Pt does not have transportation issues and has no additional needs at this time. Pt was scheduled for an appt reminder.  Pt's sister will provide transportation to his appt.     Isabela Mancera

## 2023-07-20 ENCOUNTER — PATIENT OUTREACH (OUTPATIENT)
Dept: EMERGENCY MEDICINE | Facility: HOSPITAL | Age: 83
End: 2023-07-20
Payer: MEDICARE

## 2023-07-20 NOTE — PROGRESS NOTES
I spoke with pt to provide an appointment reminder for his Post ED visit follow up appt with Cardiology, Dr. Kim, on 7/21/23 @ 3:00 p.m. Pt is planning to attend and has transportation provided by his sister.     Isabela Mancera

## 2023-08-17 ENCOUNTER — OFFICE VISIT (OUTPATIENT)
Dept: CARDIOLOGY | Facility: CLINIC | Age: 83
End: 2023-08-17
Payer: MEDICARE

## 2023-08-17 ENCOUNTER — TELEPHONE (OUTPATIENT)
Dept: CARDIOLOGY | Facility: CLINIC | Age: 83
End: 2023-08-17
Payer: MEDICARE

## 2023-08-17 VITALS
OXYGEN SATURATION: 93 % | BODY MASS INDEX: 33.65 KG/M2 | DIASTOLIC BLOOD PRESSURE: 76 MMHG | SYSTOLIC BLOOD PRESSURE: 148 MMHG | HEIGHT: 68 IN | HEART RATE: 82 BPM | WEIGHT: 222 LBS

## 2023-08-17 DIAGNOSIS — I10 ESSENTIAL HYPERTENSION: ICD-10-CM

## 2023-08-17 DIAGNOSIS — I70.0 AORTIC ATHEROSCLEROSIS: Primary | ICD-10-CM

## 2023-08-17 DIAGNOSIS — E78.2 MIXED HYPERLIPIDEMIA: ICD-10-CM

## 2023-08-17 DIAGNOSIS — I27.20 PULMONARY HYPERTENSION: ICD-10-CM

## 2023-08-17 DIAGNOSIS — R79.89 ELEVATED TROPONIN: ICD-10-CM

## 2023-08-17 PROCEDURE — 3288F FALL RISK ASSESSMENT DOCD: CPT | Mod: CPTII,S$GLB,, | Performed by: INTERNAL MEDICINE

## 2023-08-17 PROCEDURE — 99214 OFFICE O/P EST MOD 30 MIN: CPT | Mod: S$GLB,,, | Performed by: INTERNAL MEDICINE

## 2023-08-17 PROCEDURE — 3078F DIAST BP <80 MM HG: CPT | Mod: CPTII,S$GLB,, | Performed by: INTERNAL MEDICINE

## 2023-08-17 PROCEDURE — 99999 PR PBB SHADOW E&M-EST. PATIENT-LVL III: ICD-10-PCS | Mod: PBBFAC,,, | Performed by: INTERNAL MEDICINE

## 2023-08-17 PROCEDURE — 99999 PR PBB SHADOW E&M-EST. PATIENT-LVL III: CPT | Mod: PBBFAC,,, | Performed by: INTERNAL MEDICINE

## 2023-08-17 PROCEDURE — 99214 PR OFFICE/OUTPT VISIT, EST, LEVL IV, 30-39 MIN: ICD-10-PCS | Mod: S$GLB,,, | Performed by: INTERNAL MEDICINE

## 2023-08-17 PROCEDURE — 1126F AMNT PAIN NOTED NONE PRSNT: CPT | Mod: CPTII,S$GLB,, | Performed by: INTERNAL MEDICINE

## 2023-08-17 PROCEDURE — 3288F PR FALLS RISK ASSESSMENT DOCUMENTED: ICD-10-PCS | Mod: CPTII,S$GLB,, | Performed by: INTERNAL MEDICINE

## 2023-08-17 PROCEDURE — 3078F PR MOST RECENT DIASTOLIC BLOOD PRESSURE < 80 MM HG: ICD-10-PCS | Mod: CPTII,S$GLB,, | Performed by: INTERNAL MEDICINE

## 2023-08-17 PROCEDURE — 1126F PR PAIN SEVERITY QUANTIFIED, NO PAIN PRESENT: ICD-10-PCS | Mod: CPTII,S$GLB,, | Performed by: INTERNAL MEDICINE

## 2023-08-17 PROCEDURE — 3077F SYST BP >= 140 MM HG: CPT | Mod: CPTII,S$GLB,, | Performed by: INTERNAL MEDICINE

## 2023-08-17 PROCEDURE — 3077F PR MOST RECENT SYSTOLIC BLOOD PRESSURE >= 140 MM HG: ICD-10-PCS | Mod: CPTII,S$GLB,, | Performed by: INTERNAL MEDICINE

## 2023-08-17 PROCEDURE — 1159F MED LIST DOCD IN RCRD: CPT | Mod: CPTII,S$GLB,, | Performed by: INTERNAL MEDICINE

## 2023-08-17 PROCEDURE — 1101F PR PT FALLS ASSESS DOC 0-1 FALLS W/OUT INJ PAST YR: ICD-10-PCS | Mod: CPTII,S$GLB,, | Performed by: INTERNAL MEDICINE

## 2023-08-17 PROCEDURE — 1159F PR MEDICATION LIST DOCUMENTED IN MEDICAL RECORD: ICD-10-PCS | Mod: CPTII,S$GLB,, | Performed by: INTERNAL MEDICINE

## 2023-08-17 PROCEDURE — 1101F PT FALLS ASSESS-DOCD LE1/YR: CPT | Mod: CPTII,S$GLB,, | Performed by: INTERNAL MEDICINE

## 2023-08-17 NOTE — PROGRESS NOTES
Subjective:   Patient ID:  Alexander Duong Jr. is a 83 y.o. male who presents for follow-up of No chief complaint on file.      HPI    Followed by Dr Alvarado  Palpitations/Tachycardia: Holter.  Thyroid function test.  Hypertension: Monitor.  Continue current management.  Pulmonary hypertension: 2D echocardiogram.  Return to clinic 1 month.    Echo 6/7/23  The left ventricle is normal in size with mild concentric hypertrophy and normal systolic function.  The estimated ejection fraction is 65%.  Normal left ventricular diastolic function.  Normal right ventricular size with normal right ventricular systolic function.  Normal central venous pressure (3 mmHg).  The estimated PA systolic pressure is 28 mmHg.  There is no pulmonary hypertension.     Holter 6/7/23  Sinus rhythm with heart rates varying between 49 and 92 BPM with an average of 65 BPM.  There were very rare PACs totalling 9 and averaging 0.19 per hour.    Went to the ER 7/17/23  82 yo M with history of hypertension presents to ER complaining he hasn't felt well x 1 week. He reports body aches and cough, He denies CP, shortness of breath, vomiting, diarrhea. He denies being vaccinated for COVID (although on chart review it appears he had first two shots in 2021). He has tried some nasal spray for his symptoms.      Patient updated regarding test results- elevated troponin, covid +. CXR shows persistent L sided pleural/parenchymal abnormality but does not appear new today. Patient asked me to call his sisters to let them know what is going on. Called sister Lurdes to give update.  They also asked if I could call Roxana, sister who was a nurse, voice mailbox full and unable to LVM. [LH]   1619 Case reviewed with Ayden Dodson who does not recommend hospital admission for this patient, attributes elevated troponin to covid infection. Case discussed with Dr. Rodriguez, states can repeat troponin in a few hours to determine disposition.  [LH]   1847 Troponin unchanged.  Will refer to cardiology for follow up. Reviewed strict return precautions with patient including any worsening symptoms. Patient is > 5 days out from symptom onset. Encouraged PO hydration, rest, symptomatic care.  [LH]     8/17/23 Denies CP, SOB improved since the ER  BP controlled by outside readings    Review of Systems   Constitutional: Negative for decreased appetite.   HENT:  Negative for ear discharge.    Eyes:  Negative for blurred vision.   Endocrine: Negative for polyphagia.   Skin:  Negative for nail changes.   Genitourinary:  Negative for bladder incontinence.   Neurological:  Negative for aphonia.   Psychiatric/Behavioral:  Negative for hallucinations.    Allergic/Immunologic: Negative for hives.       Objective:   Physical Exam  Constitutional:       Appearance: He is well-developed.   HENT:      Head: Normocephalic and atraumatic.   Eyes:      Conjunctiva/sclera: Conjunctivae normal.      Pupils: Pupils are equal, round, and reactive to light.   Cardiovascular:      Rate and Rhythm: Normal rate.      Pulses: Intact distal pulses.      Heart sounds: Normal heart sounds.   Pulmonary:      Effort: Pulmonary effort is normal.      Breath sounds: Normal breath sounds.   Abdominal:      General: Bowel sounds are normal.      Palpations: Abdomen is soft.   Musculoskeletal:         General: Normal range of motion.      Cervical back: Normal range of motion and neck supple.   Skin:     General: Skin is warm and dry.   Neurological:      Mental Status: He is alert and oriented to person, place, and time.         Assessment:      1. Aortic atherosclerosis    2. Essential hypertension    3. Mixed hyperlipidemia    4. Pulmonary hypertension        Plan:     Lexiscan myoview for recent elevated troponin during a COVID infection  OV with results with Dr Alvarado  Continue Rx for HTN, HLD, aortic atherosclerosis

## 2023-08-17 NOTE — TELEPHONE ENCOUNTER
----- Message from Aye Joseph MA sent at 8/17/2023 12:47 PM CDT -----  Regarding: FW: Lurdes Grullon -[phone]  Please advise, patient forgot name of otc sleep med.   ----- Message -----  From: Matlide Navarrete  Sent: 8/17/2023  12:23 PM CDT  To: Julio Pradhan Staff  Subject: Lurdes Grullon -[phone]                         Type: Patient Call Back     What is the request in detail: Pt was told to buy an otc medication to return to sleep at night, pt cant remember what medication it was, please call back with medication name     Can the clinic reply by [facility]? No     Would the patient rather a call back or a response via [facility]? Call back    Best call back number: [phone]      Additional Information:    Thank you.

## 2023-08-30 ENCOUNTER — OFFICE VISIT (OUTPATIENT)
Dept: UROLOGY | Facility: CLINIC | Age: 83
End: 2023-08-30
Payer: MEDICARE

## 2023-08-30 ENCOUNTER — TELEPHONE (OUTPATIENT)
Dept: UROLOGY | Facility: CLINIC | Age: 83
End: 2023-08-30

## 2023-08-30 ENCOUNTER — TELEPHONE (OUTPATIENT)
Dept: FAMILY MEDICINE | Facility: CLINIC | Age: 83
End: 2023-08-30
Payer: MEDICARE

## 2023-08-30 VITALS — WEIGHT: 220.69 LBS | BODY MASS INDEX: 33.55 KG/M2

## 2023-08-30 DIAGNOSIS — R97.20 ELEVATED PSA: Primary | ICD-10-CM

## 2023-08-30 DIAGNOSIS — R35.1 NOCTURIA: ICD-10-CM

## 2023-08-30 DIAGNOSIS — N40.1 BPH WITH URINARY OBSTRUCTION: ICD-10-CM

## 2023-08-30 DIAGNOSIS — N13.8 BPH WITH URINARY OBSTRUCTION: ICD-10-CM

## 2023-08-30 DIAGNOSIS — Z13.89 SCREENING FOR BLOOD OR PROTEIN IN URINE: ICD-10-CM

## 2023-08-30 PROCEDURE — 99999 PR PBB SHADOW E&M-EST. PATIENT-LVL III: ICD-10-PCS | Mod: PBBFAC,,, | Performed by: STUDENT IN AN ORGANIZED HEALTH CARE EDUCATION/TRAINING PROGRAM

## 2023-08-30 PROCEDURE — 1101F PT FALLS ASSESS-DOCD LE1/YR: CPT | Mod: CPTII,S$GLB,, | Performed by: STUDENT IN AN ORGANIZED HEALTH CARE EDUCATION/TRAINING PROGRAM

## 2023-08-30 PROCEDURE — 1159F PR MEDICATION LIST DOCUMENTED IN MEDICAL RECORD: ICD-10-PCS | Mod: CPTII,S$GLB,, | Performed by: STUDENT IN AN ORGANIZED HEALTH CARE EDUCATION/TRAINING PROGRAM

## 2023-08-30 PROCEDURE — 99214 PR OFFICE/OUTPT VISIT, EST, LEVL IV, 30-39 MIN: ICD-10-PCS | Mod: S$GLB,,, | Performed by: STUDENT IN AN ORGANIZED HEALTH CARE EDUCATION/TRAINING PROGRAM

## 2023-08-30 PROCEDURE — 99214 OFFICE O/P EST MOD 30 MIN: CPT | Mod: S$GLB,,, | Performed by: STUDENT IN AN ORGANIZED HEALTH CARE EDUCATION/TRAINING PROGRAM

## 2023-08-30 PROCEDURE — 1160F PR REVIEW ALL MEDS BY PRESCRIBER/CLIN PHARMACIST DOCUMENTED: ICD-10-PCS | Mod: CPTII,S$GLB,, | Performed by: STUDENT IN AN ORGANIZED HEALTH CARE EDUCATION/TRAINING PROGRAM

## 2023-08-30 PROCEDURE — 99999 PR PBB SHADOW E&M-EST. PATIENT-LVL III: CPT | Mod: PBBFAC,,, | Performed by: STUDENT IN AN ORGANIZED HEALTH CARE EDUCATION/TRAINING PROGRAM

## 2023-08-30 PROCEDURE — 3288F FALL RISK ASSESSMENT DOCD: CPT | Mod: CPTII,S$GLB,, | Performed by: STUDENT IN AN ORGANIZED HEALTH CARE EDUCATION/TRAINING PROGRAM

## 2023-08-30 PROCEDURE — 1126F AMNT PAIN NOTED NONE PRSNT: CPT | Mod: CPTII,S$GLB,, | Performed by: STUDENT IN AN ORGANIZED HEALTH CARE EDUCATION/TRAINING PROGRAM

## 2023-08-30 PROCEDURE — 3288F PR FALLS RISK ASSESSMENT DOCUMENTED: ICD-10-PCS | Mod: CPTII,S$GLB,, | Performed by: STUDENT IN AN ORGANIZED HEALTH CARE EDUCATION/TRAINING PROGRAM

## 2023-08-30 PROCEDURE — 1160F RVW MEDS BY RX/DR IN RCRD: CPT | Mod: CPTII,S$GLB,, | Performed by: STUDENT IN AN ORGANIZED HEALTH CARE EDUCATION/TRAINING PROGRAM

## 2023-08-30 PROCEDURE — 1101F PR PT FALLS ASSESS DOC 0-1 FALLS W/OUT INJ PAST YR: ICD-10-PCS | Mod: CPTII,S$GLB,, | Performed by: STUDENT IN AN ORGANIZED HEALTH CARE EDUCATION/TRAINING PROGRAM

## 2023-08-30 PROCEDURE — 1159F MED LIST DOCD IN RCRD: CPT | Mod: CPTII,S$GLB,, | Performed by: STUDENT IN AN ORGANIZED HEALTH CARE EDUCATION/TRAINING PROGRAM

## 2023-08-30 PROCEDURE — 1126F PR PAIN SEVERITY QUANTIFIED, NO PAIN PRESENT: ICD-10-PCS | Mod: CPTII,S$GLB,, | Performed by: STUDENT IN AN ORGANIZED HEALTH CARE EDUCATION/TRAINING PROGRAM

## 2023-08-30 RX ORDER — TAMSULOSIN HYDROCHLORIDE 0.4 MG/1
0.4 CAPSULE ORAL DAILY
Qty: 30 CAPSULE | Refills: 11 | Status: SHIPPED | OUTPATIENT
Start: 2023-08-30 | End: 2024-08-24

## 2023-08-30 NOTE — LETTER
August 30, 2023        Naa Alcazar MD  [address] [facility]  [address]  [place] [address]             [facility] [place] - Urology  [address]  [place] [address]  Phone: [phone]  Fax: [phone]   Patient: Alexander Duong Jr.   MR Number: [id number]   YOB: 1940   Date of Visit: 8/30/2023       Dear Dr. Alcazar:    Thank you for referring Alexander Duong to me for evaluation. Below are the relevant portions of my assessment and plan of care.    1. Elevated PSA   BPH with nocturia  Concern for sleep Apnea     PSA downtrending, reassuring for what it's worth    Do not recommend continued screening for prostate cancer       Patient needs work up of possible sleep apnea, per PCP, message sent to Dr. Alcazar    Stop oxybutynin, contraindicated with age,use of walker  RTC as needed        CC  No Recipients

## 2023-08-30 NOTE — TELEPHONE ENCOUNTER
----- Message from Naa Alcazar MD sent at 8/30/2023  3:14 PM CDT -----  Thanks, Dr. Andersen! Will get him in to al for sleep apnea.   Have a great day,  Naa  ----- Message -----  From: Fatmata Andersen MD  Sent: 8/30/2023   1:49 PM CDT  To: Naa Alcazar MD

## 2023-08-30 NOTE — PROGRESS NOTES
Patient ID: Alexander Duong Jr. is a 83 y.o. male.    Chief Complaint: No chief complaint on file.    Referral: No referring provider defined for this encounter.     HPI  83 y.o. who presents to the Urology clinic for evaluation of LUTS/elevated PSA. Patient not with nocturia, previously on oxybutynin. Notes he wakes 3-4 x per night. He has leg swelling bilaterally. He sleeps alone, unsure if he snores. He takes naps during the day.   In 2015 there was concern for possible sleep apnea while admitted, O2 sats decreased to 85% at that time.       No prior sleep testing  He wears compression stockings  He is unsure if he produces more fluid during the evening vs daytime.     Denies weak stream ,retention, concern for infection     Medically Necessary ROS documented in HPI    Past Medical History  Active Ambulatory Problems     Diagnosis Date Noted    Primary osteoarthritis involving multiple joints 03/19/2015    Essential hypertension 10/17/2015    Stage 3b chronic kidney disease 10/17/2015    Mixed hyperlipidemia 07/15/2018    Elevated glucose 07/15/2018    Pulmonary hypertension 08/02/2018    Mixed simple and mucopurulent chronic bronchitis 02/04/2019    Chronic bronchitis with acute exacerbation 02/04/2019    Morbid (severe) obesity due to excess calories     Chronic bilateral low back pain with left-sided sciatica 09/08/2020    Diverticulosis of colon with hemorrhage of large intestine 05/15/2021    Gastrointestinal hemorrhage with hematemesis 05/31/2021    Aortic atherosclerosis 10/06/2021    Elevated troponin 08/17/2023     Resolved Ambulatory Problems     Diagnosis Date Noted    HTN (hypertension) 03/19/2015    Pure hypercholesterolemia 04/20/2015    Diverticulosis of colon with hemorrhage 10/16/2015    Acute blood loss anemia 10/17/2015    Weakness 10/17/2015    Thrombophlebitis arm 10/19/2015    Chest pain 07/14/2018    Slow transit constipation 09/08/2020    Blood in stool 05/15/2021    Symptomatic anemia  05/15/2021    RAFAEL (acute kidney injury) 05/15/2021     Past Medical History:   Diagnosis Date    Allergy     Hypertension          Past Surgical History  Past Surgical History:   Procedure Laterality Date    APPENDECTOMY      COLONOSCOPY Left 10/19/2015    Procedure: COLONOSCOPY;  Surgeon: Randell Briceno MD;  Location: [facility];  Service: Endoscopy;  Laterality: Left;       Social History  Social Connections: Not on file       Medications    Current Outpatient Medications:     acetaminophen (TYLENOL) 500 MG tablet, Take 2 tablets (1,000 mg total) by mouth every 8 (eight) hours as needed for Pain or Temperature greater than (100.4)., Disp: 40 tablet, Rfl: 0    albuterol (PROVENTIL/VENTOLIN HFA) 90 mcg/actuation inhaler, TAKE 2 PUFFS BY MOUTH EVERY 6 HOURS AS NEEDED FOR WHEEZE (Patient not taking: Reported on 8/17/2023), Disp: 18 g, Rfl: 5    amLODIPine (NORVASC) 5 MG tablet, Take 1 tablet (5 mg total) by mouth once daily., Disp: 90 tablet, Rfl: 3    aspirin (ECOTRIN) 81 MG EC tablet, TAKE 1 TABLET (81 MG TOTAL) BY MOUTH ONCE DAILY., Disp: 90 tablet, Rfl: 3    atorvastatin (LIPITOR) 40 MG tablet, Take 1 tablet (40 mg total) by mouth every evening., Disp: 90 tablet, Rfl: 3    ciclopirox (PENLAC) 8 % Soln, Apply topically nightly., Disp: 6.6 mL, Rfl: 11    gabapentin (NEURONTIN) 300 MG capsule, TAKE 1 CAPSULE (300 MG TOTAL) BY MOUTH THREE TIMES A DAY AS NEEDED, Disp: 90 capsule, Rfl: 5    metoprolol tartrate (LOPRESSOR) 100 MG Tab, Take 1 tablet (100 mg total) by mouth 2 (two) times daily., Disp: 180 tablet, Rfl: 0    oxybutynin (DITROPAN-XL) 10 MG 24 hr tablet, Take 1 tablet (10 mg total) by mouth after dinner., Disp: 90 tablet, Rfl: 0    tamsulosin (FLOMAX) 0.4 mg Cap, Take 1 capsule (0.4 mg total) by mouth once daily., Disp: 30 capsule, Rfl: 11    traZODone (DESYREL) 100 MG tablet, TAKE 1 TABLET BY MOUTH NIGHTLY AS NEEDED FOR INSOMNIA., Disp: 90 tablet, Rfl: 1    Allergies  Review of patient's allergies  indicates:  No Known Allergies    Patient's PMH, FH, Social hx, Medications, allergies reviewed and updated as pertinent to today's visit    Objective:      Physical Exam  Constitutional:       General: He is not in acute distress.     Appearance: He is well-developed. He is not ill-appearing, toxic-appearing or diaphoretic.   HENT:      Head: Normocephalic and atraumatic.      Mouth/Throat:      Mouth: Mucous membranes are moist.   Eyes:      Conjunctiva/sclera: Conjunctivae normal.   Pulmonary:      Effort: Pulmonary effort is normal. No respiratory distress.   Abdominal:      General: Abdomen is flat. There is no distension.      Palpations: Abdomen is soft. There is no mass.      Tenderness: There is no abdominal tenderness. There is no guarding.   Musculoskeletal:         General: No swelling or deformity.      Cervical back: Neck supple.   Skin:     General: Skin is warm.      Capillary Refill: Capillary refill takes less than 2 seconds.      Findings: No rash.   Neurological:      Mental Status: He is alert and oriented to person, place, and time.      Gait: Gait normal.   Psychiatric:         Mood and Affect: Mood normal.         Thought Content: Thought content normal.         Judgment: Judgment normal.             Lab Results   Component Value Date    PSADIAG 7.6 (H) 11/03/2022        Assessment:       1. Elevated PSA    2. BPH with urinary obstruction    3. Nocturia    4. Screening for blood or protein in urine        Plan:       PSA downtrending, reassuring for what it's worth    Do not recommend continued screening for prostate cancer   POCT UA without suggestion of infection      Patient needs work up of possible sleep apnea, per PCP, message sent to Dr. Alcazar    Stop oxybutynin, contraindicated with age,use of walker  RTC as needed

## 2023-08-30 NOTE — TELEPHONE ENCOUNTER
----- Message from Naa Alcazar MD sent at 8/30/2023  3:14 PM CDT -----  Please call pt to schedule for follow up visit at his convenience to discuss sleep apnea per Dr. Andersen. Thanks!

## 2023-09-07 ENCOUNTER — HOSPITAL ENCOUNTER (OUTPATIENT)
Dept: RADIOLOGY | Facility: HOSPITAL | Age: 83
Discharge: HOME | End: 2023-09-07
Attending: INTERNAL MEDICINE
Payer: MEDICARE

## 2023-09-07 ENCOUNTER — HOSPITAL ENCOUNTER (OUTPATIENT)
Dept: CARDIOLOGY | Facility: HOSPITAL | Age: 83
Discharge: HOME | End: 2023-09-07
Attending: INTERNAL MEDICINE
Payer: MEDICARE

## 2023-09-07 DIAGNOSIS — I70.0 AORTIC ATHEROSCLEROSIS: ICD-10-CM

## 2023-09-07 DIAGNOSIS — I27.20 PULMONARY HYPERTENSION: ICD-10-CM

## 2023-09-07 DIAGNOSIS — I10 ESSENTIAL HYPERTENSION: ICD-10-CM

## 2023-09-07 DIAGNOSIS — E78.2 MIXED HYPERLIPIDEMIA: ICD-10-CM

## 2023-09-07 DIAGNOSIS — R79.89 ELEVATED TROPONIN: ICD-10-CM

## 2023-09-07 LAB
CV STRESS BASE HR: 62 BPM
DIASTOLIC BLOOD PRESSURE: 70 MMHG
NUC STRESS EJECTION FRACTION: 57 %
OHS CV CPX 85 PERCENT MAX PREDICTED HEART RATE MALE: 115
OHS CV CPX MAX PREDICTED HEART RATE: 135
OHS CV CPX PATIENT IS FEMALE: 0
OHS CV CPX PATIENT IS MALE: 1
OHS CV CPX PEAK DIASTOLIC BLOOD PRESSURE: 53 MMHG
OHS CV CPX PEAK HEAR RATE: 99 BPM
OHS CV CPX PEAK RATE PRESSURE PRODUCT: NORMAL
OHS CV CPX PEAK SYSTOLIC BLOOD PRESSURE: 133 MMHG
OHS CV CPX PERCENT MAX PREDICTED HEART RATE ACHIEVED: 73
OHS CV CPX RATE PRESSURE PRODUCT PRESENTING: 9610
SYSTOLIC BLOOD PRESSURE: 155 MMHG

## 2023-09-07 PROCEDURE — 93018 NUCLEAR STRESS - CARDIOLOGY INTERPRETED (CUPID ONLY): ICD-10-PCS | Mod: ,,, | Performed by: INTERNAL MEDICINE

## 2023-09-07 PROCEDURE — 93016 CV STRESS TEST SUPVJ ONLY: CPT | Mod: ,,, | Performed by: INTERNAL MEDICINE

## 2023-09-07 PROCEDURE — 63600175 PHARM REV CODE 636 W HCPCS: Performed by: INTERNAL MEDICINE

## 2023-09-07 PROCEDURE — 78452 HT MUSCLE IMAGE SPECT MULT: CPT

## 2023-09-07 PROCEDURE — 93018 CV STRESS TEST I&R ONLY: CPT | Mod: ,,, | Performed by: INTERNAL MEDICINE

## 2023-09-07 PROCEDURE — 93016 NUCLEAR STRESS - CARDIOLOGY INTERPRETED (CUPID ONLY): ICD-10-PCS | Mod: ,,, | Performed by: INTERNAL MEDICINE

## 2023-09-07 PROCEDURE — 78452 NUCLEAR STRESS - CARDIOLOGY INTERPRETED (CUPID ONLY): ICD-10-PCS | Mod: 26,,, | Performed by: INTERNAL MEDICINE

## 2023-09-07 PROCEDURE — 93017 CV STRESS TEST TRACING ONLY: CPT

## 2023-09-07 PROCEDURE — 78452 HT MUSCLE IMAGE SPECT MULT: CPT | Mod: 26,,, | Performed by: INTERNAL MEDICINE

## 2023-09-07 RX ORDER — REGADENOSON 0.08 MG/ML
0.4 INJECTION, SOLUTION INTRAVENOUS ONCE
Status: COMPLETED | OUTPATIENT
Start: 2023-09-07 | End: 2023-09-07

## 2023-09-07 RX ADMIN — REGADENOSON 0.4 MG: 0.08 INJECTION, SOLUTION INTRAVENOUS at 09:09

## 2023-09-14 ENCOUNTER — OFFICE VISIT (OUTPATIENT)
Dept: FAMILY MEDICINE | Facility: CLINIC | Age: 83
End: 2023-09-14
Payer: MEDICARE

## 2023-09-14 VITALS
BODY MASS INDEX: 32.88 KG/M2 | DIASTOLIC BLOOD PRESSURE: 64 MMHG | TEMPERATURE: 98 F | WEIGHT: 216.25 LBS | OXYGEN SATURATION: 96 % | SYSTOLIC BLOOD PRESSURE: 132 MMHG | HEART RATE: 95 BPM

## 2023-09-14 DIAGNOSIS — G47.19 EXCESSIVE DAYTIME SLEEPINESS: Primary | ICD-10-CM

## 2023-09-14 DIAGNOSIS — J30.89 SEASONAL ALLERGIC RHINITIS DUE TO OTHER ALLERGIC TRIGGER: ICD-10-CM

## 2023-09-14 PROCEDURE — 99999 PR PBB SHADOW E&M-EST. PATIENT-LVL V: CPT | Mod: PBBFAC,,, | Performed by: STUDENT IN AN ORGANIZED HEALTH CARE EDUCATION/TRAINING PROGRAM

## 2023-09-14 PROCEDURE — 1101F PT FALLS ASSESS-DOCD LE1/YR: CPT | Mod: CPTII,S$GLB,, | Performed by: STUDENT IN AN ORGANIZED HEALTH CARE EDUCATION/TRAINING PROGRAM

## 2023-09-14 PROCEDURE — 3075F SYST BP GE 130 - 139MM HG: CPT | Mod: CPTII,S$GLB,, | Performed by: STUDENT IN AN ORGANIZED HEALTH CARE EDUCATION/TRAINING PROGRAM

## 2023-09-14 PROCEDURE — 1160F PR REVIEW ALL MEDS BY PRESCRIBER/CLIN PHARMACIST DOCUMENTED: ICD-10-PCS | Mod: CPTII,S$GLB,, | Performed by: STUDENT IN AN ORGANIZED HEALTH CARE EDUCATION/TRAINING PROGRAM

## 2023-09-14 PROCEDURE — 3288F PR FALLS RISK ASSESSMENT DOCUMENTED: ICD-10-PCS | Mod: CPTII,S$GLB,, | Performed by: STUDENT IN AN ORGANIZED HEALTH CARE EDUCATION/TRAINING PROGRAM

## 2023-09-14 PROCEDURE — 99214 OFFICE O/P EST MOD 30 MIN: CPT | Mod: S$GLB,,, | Performed by: STUDENT IN AN ORGANIZED HEALTH CARE EDUCATION/TRAINING PROGRAM

## 2023-09-14 PROCEDURE — 3075F PR MOST RECENT SYSTOLIC BLOOD PRESS GE 130-139MM HG: ICD-10-PCS | Mod: CPTII,S$GLB,, | Performed by: STUDENT IN AN ORGANIZED HEALTH CARE EDUCATION/TRAINING PROGRAM

## 2023-09-14 PROCEDURE — 3288F FALL RISK ASSESSMENT DOCD: CPT | Mod: CPTII,S$GLB,, | Performed by: STUDENT IN AN ORGANIZED HEALTH CARE EDUCATION/TRAINING PROGRAM

## 2023-09-14 PROCEDURE — 3078F PR MOST RECENT DIASTOLIC BLOOD PRESSURE < 80 MM HG: ICD-10-PCS | Mod: CPTII,S$GLB,, | Performed by: STUDENT IN AN ORGANIZED HEALTH CARE EDUCATION/TRAINING PROGRAM

## 2023-09-14 PROCEDURE — 1160F RVW MEDS BY RX/DR IN RCRD: CPT | Mod: CPTII,S$GLB,, | Performed by: STUDENT IN AN ORGANIZED HEALTH CARE EDUCATION/TRAINING PROGRAM

## 2023-09-14 PROCEDURE — 3078F DIAST BP <80 MM HG: CPT | Mod: CPTII,S$GLB,, | Performed by: STUDENT IN AN ORGANIZED HEALTH CARE EDUCATION/TRAINING PROGRAM

## 2023-09-14 PROCEDURE — 99214 PR OFFICE/OUTPT VISIT, EST, LEVL IV, 30-39 MIN: ICD-10-PCS | Mod: S$GLB,,, | Performed by: STUDENT IN AN ORGANIZED HEALTH CARE EDUCATION/TRAINING PROGRAM

## 2023-09-14 PROCEDURE — 1159F MED LIST DOCD IN RCRD: CPT | Mod: CPTII,S$GLB,, | Performed by: STUDENT IN AN ORGANIZED HEALTH CARE EDUCATION/TRAINING PROGRAM

## 2023-09-14 PROCEDURE — 1159F PR MEDICATION LIST DOCUMENTED IN MEDICAL RECORD: ICD-10-PCS | Mod: CPTII,S$GLB,, | Performed by: STUDENT IN AN ORGANIZED HEALTH CARE EDUCATION/TRAINING PROGRAM

## 2023-09-14 PROCEDURE — 99999 PR PBB SHADOW E&M-EST. PATIENT-LVL V: ICD-10-PCS | Mod: PBBFAC,,, | Performed by: STUDENT IN AN ORGANIZED HEALTH CARE EDUCATION/TRAINING PROGRAM

## 2023-09-14 PROCEDURE — 1101F PR PT FALLS ASSESS DOC 0-1 FALLS W/OUT INJ PAST YR: ICD-10-PCS | Mod: CPTII,S$GLB,, | Performed by: STUDENT IN AN ORGANIZED HEALTH CARE EDUCATION/TRAINING PROGRAM

## 2023-09-14 RX ORDER — CETIRIZINE HYDROCHLORIDE 10 MG/1
10 TABLET ORAL DAILY
Qty: 90 TABLET | Refills: 3 | Status: SHIPPED | OUTPATIENT
Start: 2023-09-14 | End: 2024-09-13

## 2023-09-14 RX ORDER — AZELASTINE 1 MG/ML
1 SPRAY, METERED NASAL 2 TIMES DAILY
Qty: 30 ML | Refills: 5 | Status: SHIPPED | OUTPATIENT
Start: 2023-09-14 | End: 2024-09-13

## 2023-09-14 NOTE — PROGRESS NOTES
SUBJECTIVE     Chief Complaint   Patient presents with    Insomnia     Pt states that it has been ongoing for about 3-4 months    Nasal Congestion     Been ongoing for a while now, has wheezing and uses inhaler       HPI  Alexander Duong Jr. is a 83 y.o. male with multiple medical diagnoses as listed in the medical history and problem list that presents for evaluation of insomnia and nasal congestion.    Pt c/o nasal congestion. This occurs daily. Using flonase with moderate relief that wears off quickly.    Pt scheduled for follow up w/ Dr. Alvarado in November. Hospitalized with covid in July.     CLAY- While admitted in 2015, O2 sats noted to drop to 85. Pt now reports daytime fatigue and insomnia. Discussed at length the possibility of sleep apnea and wearing cpap if diagnosed.     HTN -   Currently prescribed amlodipine, metoprolol. Hydralazine..  Patient endorses taking medication as directed.  Denies side effects or concerns while taking medication.  Patient not currently checking BP at home.  Denies headaches, vision changes, CP, palpitations, or other concerning symptoms.  Due for microalbumin/creatinine ratio.     He stays alone and his sister lives nearby. He has many nieces and nephews. 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     PAST MEDICAL HISTORY:  Past Medical History:   Diagnosis Date    Allergy     Hypertension        ALLERGIES AND MEDICATIONS: updated and reviewed.  Review of patient's allergies indicates:  No Known Allergies  Current Outpatient Medications   Medication Sig Dispense Refill    acetaminophen (TYLENOL) 500 MG tablet Take 2 tablets (1,000 mg total) by mouth every 8 (eight) hours as needed for Pain or Temperature greater than (100.4). 40 tablet 0    albuterol (PROVENTIL/VENTOLIN HFA) 90 mcg/actuation inhaler TAKE 2 PUFFS BY MOUTH EVERY 6 HOURS AS NEEDED FOR WHEEZE 18 g 5    amLODIPine (NORVASC) 5 MG tablet Take 1 tablet (5 mg total) by mouth once daily. 90 tablet 3    aspirin (ECOTRIN)  81 MG EC tablet TAKE 1 TABLET (81 MG TOTAL) BY MOUTH ONCE DAILY. 90 tablet 3    atorvastatin (LIPITOR) 40 MG tablet Take 1 tablet (40 mg total) by mouth every evening. 90 tablet 3    ciclopirox (PENLAC) 8 % Soln Apply topically nightly. 6.6 mL 11    gabapentin (NEURONTIN) 300 MG capsule TAKE 1 CAPSULE (300 MG TOTAL) BY MOUTH THREE TIMES A DAY AS NEEDED 90 capsule 5    metoprolol tartrate (LOPRESSOR) 100 MG Tab Take 1 tablet (100 mg total) by mouth 2 (two) times daily. 180 tablet 0    tamsulosin (FLOMAX) 0.4 mg Cap Take 1 capsule (0.4 mg total) by mouth once daily. 30 capsule 11    traZODone (DESYREL) 100 MG tablet TAKE 1 TABLET BY MOUTH NIGHTLY AS NEEDED FOR INSOMNIA. 90 tablet 1    azelastine (ASTELIN) 137 mcg (0.1 %) nasal spray 1 spray (137 mcg total) by Nasal route 2 (two) times daily. 30 mL 5    cetirizine (ZYRTEC) 10 MG tablet Take 1 tablet (10 mg total) by mouth once daily. 90 tablet 3     No current facility-administered medications for this visit.       ROS  Review of Systems   Constitutional:  Negative for chills, fatigue and fever.   HENT:  Negative for rhinorrhea and sore throat.    Respiratory:  Negative for cough and shortness of breath.    Cardiovascular:  Negative for chest pain and palpitations.   Gastrointestinal:  Negative for constipation, diarrhea, nausea and vomiting.   Genitourinary:  Negative for dysuria.   Musculoskeletal:  Negative for joint swelling.   Skin:  Negative for rash and wound.   Neurological:  Negative for light-headedness and headaches.   Psychiatric/Behavioral:  Negative for dysphoric mood and sleep disturbance. The patient is not nervous/anxious.          OBJECTIVE     Physical Exam  Vitals:    09/14/23 0929   BP: 132/64   Pulse: 95   Temp:     Body mass index is 32.88 kg/m².  Weight: 98.1 kg (216 lb 4.3 oz)         Physical Exam  Vitals reviewed.   Constitutional:       General: He is not in acute distress.  HENT:      Right Ear: External ear normal.      Left Ear: External  ear normal.      Nose: Nose normal.      Mouth/Throat:      Mouth: Mucous membranes are moist.   Eyes:      Extraocular Movements: Extraocular movements intact.      Conjunctiva/sclera: Conjunctivae normal.      Pupils: Pupils are equal, round, and reactive to light.   Pulmonary:      Effort: Pulmonary effort is normal.   Abdominal:      General: There is no distension.      Palpations: Abdomen is soft.   Musculoskeletal:         General: No swelling. Normal range of motion.      Cervical back: Normal range of motion.   Skin:     General: Skin is warm and dry.      Findings: No rash.   Neurological:      General: No focal deficit present.      Mental Status: He is alert and oriented to person, place, and time.   Psychiatric:         Mood and Affect: Mood normal.         Behavior: Behavior normal.           Health Maintenance         Date Due Completion Date    Shingles Vaccine (1 of 2) Never done ---    COVID-19 Vaccine (3 - Moderna series) 07/06/2021 5/11/2021    Influenza Vaccine (1) 09/01/2023 11/29/2022 (Declined)    Override on 11/29/2022: Declined (Declined for season)    Lipid Panel 11/03/2023 11/3/2022    Override on 4/8/2016: Done (future)              ASSESSMENT     83 y.o. male with     1. Excessive daytime sleepiness    2. Seasonal allergic rhinitis due to other allergic trigger        PLAN:     1. Excessive daytime sleepiness  - Worsening, discussed w/ pt at length. He is uncertain whether he would like to have a CPAP if he does get diagnosed with CLAY. Will refer to sleep med for further evaluation.  - Ambulatory referral/consult to Sleep Disorders; Future    2. Seasonal allergic rhinitis due to other allergic trigger  - Worsening. Start zyrtec and azelastine, follow up 3 months.  - cetirizine (ZYRTEC) 10 MG tablet; Take 1 tablet (10 mg total) by mouth once daily.  Dispense: 90 tablet; Refill: 3  - azelastine (ASTELIN) 137 mcg (0.1 %) nasal spray; 1 spray (137 mcg total) by Nasal route 2 (two) times  daily.  Dispense: 30 mL; Refill: 5        Naa Alcazar MD  09/14/2023 9:15 AM        Follow up in about 3 months (around 12/14/2023).

## 2023-09-14 NOTE — PROGRESS NOTES
Health Maintenance Due   Topic     Shingles Vaccine (1 of 2)  hx chicken pox. Notified pt can get vaccine at pharmacy    COVID-19 Vaccine (3 - Moderna series) Not offered at this office    Influenza Vaccine (1)

## 2023-09-14 NOTE — PATIENT INSTRUCTIONS
"Discussed sleep hygiene:  Establish a sleep schedule, going to bed and waking up at the same time even on days off.   Having a "wind down" period two hours before bed to help your body relax.  Avoid screens and blue light two hours before going to sleep. No TV or phone in the bedroom.  Try to stop eating at least two hours prior to bedtime.   Do not exercise within two hours of bedtime, try to exercise in the morning or afternoon if possible.  Limit fluid intake after dinner time. Try to obtain most of your fluid intake in the morning and afternoon to reduce the possibility of needing to wake up to use the restroom.   Bed is for sleep and intimacy only.    Please try these steps for at least 1 month as it can take time for your body to adjust. Please call me with any questions or concerns.     "

## 2023-11-08 ENCOUNTER — OFFICE VISIT (OUTPATIENT)
Dept: CARDIOLOGY | Facility: CLINIC | Age: 83
End: 2023-11-08
Payer: MEDICARE

## 2023-11-08 VITALS
HEIGHT: 68 IN | SYSTOLIC BLOOD PRESSURE: 118 MMHG | DIASTOLIC BLOOD PRESSURE: 60 MMHG | HEART RATE: 51 BPM | WEIGHT: 216.25 LBS | BODY MASS INDEX: 32.77 KG/M2 | OXYGEN SATURATION: 96 %

## 2023-11-08 DIAGNOSIS — E78.2 MIXED HYPERLIPIDEMIA: ICD-10-CM

## 2023-11-08 DIAGNOSIS — I27.20 PULMONARY HYPERTENSION: ICD-10-CM

## 2023-11-08 DIAGNOSIS — I70.0 AORTIC ATHEROSCLEROSIS: Primary | ICD-10-CM

## 2023-11-08 DIAGNOSIS — I10 ESSENTIAL HYPERTENSION: ICD-10-CM

## 2023-11-08 PROCEDURE — 3074F PR MOST RECENT SYSTOLIC BLOOD PRESSURE < 130 MM HG: ICD-10-PCS | Mod: CPTII,S$GLB,, | Performed by: INTERNAL MEDICINE

## 2023-11-08 PROCEDURE — 3288F PR FALLS RISK ASSESSMENT DOCUMENTED: ICD-10-PCS | Mod: CPTII,S$GLB,, | Performed by: INTERNAL MEDICINE

## 2023-11-08 PROCEDURE — 3078F PR MOST RECENT DIASTOLIC BLOOD PRESSURE < 80 MM HG: ICD-10-PCS | Mod: CPTII,S$GLB,, | Performed by: INTERNAL MEDICINE

## 2023-11-08 PROCEDURE — 93000 EKG 12-LEAD: ICD-10-PCS | Mod: S$GLB,,, | Performed by: INTERNAL MEDICINE

## 2023-11-08 PROCEDURE — 93000 ELECTROCARDIOGRAM COMPLETE: CPT | Mod: S$GLB,,, | Performed by: INTERNAL MEDICINE

## 2023-11-08 PROCEDURE — 99214 PR OFFICE/OUTPT VISIT, EST, LEVL IV, 30-39 MIN: ICD-10-PCS | Mod: S$GLB,,, | Performed by: INTERNAL MEDICINE

## 2023-11-08 PROCEDURE — 99214 OFFICE O/P EST MOD 30 MIN: CPT | Mod: S$GLB,,, | Performed by: INTERNAL MEDICINE

## 2023-11-08 PROCEDURE — 3288F FALL RISK ASSESSMENT DOCD: CPT | Mod: CPTII,S$GLB,, | Performed by: INTERNAL MEDICINE

## 2023-11-08 PROCEDURE — 3074F SYST BP LT 130 MM HG: CPT | Mod: CPTII,S$GLB,, | Performed by: INTERNAL MEDICINE

## 2023-11-08 PROCEDURE — 99999 PR PBB SHADOW E&M-EST. PATIENT-LVL III: CPT | Mod: PBBFAC,,, | Performed by: INTERNAL MEDICINE

## 2023-11-08 PROCEDURE — 1126F AMNT PAIN NOTED NONE PRSNT: CPT | Mod: CPTII,S$GLB,, | Performed by: INTERNAL MEDICINE

## 2023-11-08 PROCEDURE — 1159F PR MEDICATION LIST DOCUMENTED IN MEDICAL RECORD: ICD-10-PCS | Mod: CPTII,S$GLB,, | Performed by: INTERNAL MEDICINE

## 2023-11-08 PROCEDURE — 3078F DIAST BP <80 MM HG: CPT | Mod: CPTII,S$GLB,, | Performed by: INTERNAL MEDICINE

## 2023-11-08 PROCEDURE — 1101F PR PT FALLS ASSESS DOC 0-1 FALLS W/OUT INJ PAST YR: ICD-10-PCS | Mod: CPTII,S$GLB,, | Performed by: INTERNAL MEDICINE

## 2023-11-08 PROCEDURE — 1126F PR PAIN SEVERITY QUANTIFIED, NO PAIN PRESENT: ICD-10-PCS | Mod: CPTII,S$GLB,, | Performed by: INTERNAL MEDICINE

## 2023-11-08 PROCEDURE — 99999 PR PBB SHADOW E&M-EST. PATIENT-LVL III: ICD-10-PCS | Mod: PBBFAC,,, | Performed by: INTERNAL MEDICINE

## 2023-11-08 PROCEDURE — 1159F MED LIST DOCD IN RCRD: CPT | Mod: CPTII,S$GLB,, | Performed by: INTERNAL MEDICINE

## 2023-11-08 PROCEDURE — 1101F PT FALLS ASSESS-DOCD LE1/YR: CPT | Mod: CPTII,S$GLB,, | Performed by: INTERNAL MEDICINE

## 2023-11-08 NOTE — PROGRESS NOTES
Subjective:   Patient ID:  Alexander Duong Jr. is a 83 y.o. male who presents for follow-up of No chief complaint on file.      HPI    Followed by Dr Alvarado  Palpitations/Tachycardia: Holter.  Thyroid function test.  Hypertension: Monitor.  Continue current management.  Pulmonary hypertension: 2D echocardiogram.  Return to clinic 1 month.    Stress test 9/7/23    Normal myocardial perfusion scan. There is no evidence of myocardial ischemia or infarction.    There is a  mild intensity fixed perfusion abnormality in the inferior wall of the left ventricle secondary to diaphragm attenuation.    The gated perfusion images showed an ejection fraction of 57% post stress.    The ECG portion of the study is negative for ischemia.    The patient reported no chest pain during the stress test.    There were no arrhythmias during stress.     Echo 6/7/23  The left ventricle is normal in size with mild concentric hypertrophy and normal systolic function.  The estimated ejection fraction is 65%.  Normal left ventricular diastolic function.  Normal right ventricular size with normal right ventricular systolic function.  Normal central venous pressure (3 mmHg).  The estimated PA systolic pressure is 28 mmHg.  There is no pulmonary hypertension.     Holter 6/7/23  Sinus rhythm with heart rates varying between 49 and 92 BPM with an average of 65 BPM.  There were very rare PACs totalling 9 and averaging 0.19 per hour.     Went to the ER 7/17/23  84 yo M with history of hypertension presents to ER complaining he hasn't felt well x 1 week. He reports body aches and cough, He denies CP, shortness of breath, vomiting, diarrhea. He denies being vaccinated for COVID (although on chart review it appears he had first two shots in 2021). He has tried some nasal spray for his symptoms.        Patient updated regarding test results- elevated troponin, covid +. CXR shows persistent L sided pleural/parenchymal abnormality but does not appear new  today. Patient asked me to call his sisters to let them know what is going on. Called sister Lurdes to give update.  They also asked if I could call Roxana, sister who was a nurse, voice mailbox full and unable to LVM. [LH]   1619 Case reviewed with Ayden Dodson who does not recommend hospital admission for this patient, attributes elevated troponin to covid infection. Case discussed with Dr. Rodriguez, states can repeat troponin in a few hours to determine disposition.  [LH]   1847 Troponin unchanged. Will refer to cardiology for follow up. Reviewed strict return precautions with patient including any worsening symptoms. Patient is > 5 days out from symptom onset. Encouraged PO hydration, rest, symptomatic care.  [LH]      8/17/23 Denies CP, SOB improved since the ER  BP controlled by outside readings   Lexiscan myoview for recent elevated troponin during a COVID infection  OV with results with Dr Alvarado  Continue Rx for HTN, HLD, aortic atherosclerosis     11/8/23 Denies CP or SOB  BP controlled  EKG NSR - ok    Review of Systems   Constitutional: Negative for decreased appetite.   HENT:  Negative for ear discharge.    Eyes:  Negative for blurred vision.   Endocrine: Negative for polyphagia.   Skin:  Negative for nail changes.   Genitourinary:  Negative for bladder incontinence.   Neurological:  Negative for aphonia.   Psychiatric/Behavioral:  Negative for hallucinations.    Allergic/Immunologic: Negative for hives.       Objective:   Physical Exam  Constitutional:       Appearance: He is well-developed.   HENT:      Head: Normocephalic and atraumatic.   Eyes:      Conjunctiva/sclera: Conjunctivae normal.      Pupils: Pupils are equal, round, and reactive to light.   Cardiovascular:      Rate and Rhythm: Normal rate.      Pulses: Intact distal pulses.      Heart sounds: Normal heart sounds.   Pulmonary:      Effort: Pulmonary effort is normal.      Breath sounds: Normal breath sounds.   Abdominal:      General: Bowel  sounds are normal.      Palpations: Abdomen is soft.   Musculoskeletal:         General: Normal range of motion.      Cervical back: Normal range of motion and neck supple.   Skin:     General: Skin is warm and dry.   Neurological:      Mental Status: He is alert and oriented to person, place, and time.         Assessment:      1. Aortic atherosclerosis    2. Essential hypertension    3. Mixed hyperlipidemia    4. Pulmonary hypertension        Plan:     Stress test negative for ischemia  Continue Rx for HTN, HLD, aortic atherosclerosis  OV 6 months

## 2023-11-15 ENCOUNTER — TELEPHONE (OUTPATIENT)
Dept: FAMILY MEDICINE | Facility: CLINIC | Age: 83
End: 2023-11-15
Payer: MEDICARE

## 2023-11-22 ENCOUNTER — TELEPHONE (OUTPATIENT)
Dept: FAMILY MEDICINE | Facility: CLINIC | Age: 83
End: 2023-11-22
Payer: MEDICARE

## 2023-12-01 ENCOUNTER — TELEPHONE (OUTPATIENT)
Dept: FAMILY MEDICINE | Facility: CLINIC | Age: 83
End: 2023-12-01
Payer: MEDICARE

## 2023-12-13 NOTE — TELEPHONE ENCOUNTER
Mobile number not in service and home number with busy signal   Ena Galicia, PhD    Provider located at [address] [place] [address]  [address]  [address] [place] [address]  [phone]    [facility] Pramod Gonzalez is a 53 12/3/2020)    [facility]     [address])     Von Willebrand disease ([address])        Past Surgical History:   Procedure Laterality Date    BREAST IMPLANT Bilateral     CYSTOCELE REPAIR      KIDNEY SURGERY      REPAIR RECTOCELE         Current Outpatient Medications   Medication Sig Dispense Refill    acetaminophen (TYLENOL) 325 mg tablet Take 3 tablets (975 mg total) by mouth every 8 (eight) hours 30 tablet 0    albuterol (ProAir HFA) 90 mcg/act inhaler Inhale 2 puffs every 4 (four) hours as needed for wheezing 1 Inhaler 1    buPROPion (WELLBUTRIN XL) 300 mg 24 hr tablet Take 300 mg by mouth every morning      ibuprofen (MOTRIN) 600 mg tablet       lisdexamfetamine (Vyvanse) 60 MG capsule Take 1 capsule (60 mg total) by mouth every morning 30 capsule 0    Insulin Pen Needle (BD Pen Needle Micro U/F) 32G X 6 MM MISC Use once a week 100 each 1    lubiprostone (AMITIZA) 24 mcg capsule TAKE 1 CAPSULE (24 MCG TOTAL) BY MOUTH TWO (TWO) TIMES A DAY WITH MEALS 30 capsule 3    ondansetron (Zofran ODT) 8 mg disintegrating tablet Take 1 tablet (8 mg total) by mouth every 8 (eight) hours as needed for nausea or vomiting 20 tablet 0    rizatriptan (MAXALT-MLT) 10 mg disintegrating tablet Take 1 tablet (10 mg total) by mouth once as needed for migraine may repeat in 2 hours if necessary 10 tablet 5    Tranexamic Acid 650 MG TABS Take 2 tablets (1,300 mg total) by mouth 3 (three) times a day 30 tablet 3    traZODone (DESYREL) 50 mg tablet take 1 tablet by mouth daily at bedtime 30 tablet 3     No current facility-administered medications for this visit. No Known Allergies    The following portions of the patient's history were reviewed and updated as appropriate: allergies, current medications, past family history, past medical history, past social history, past surgical history, and problem list.    OBJECTIVE:     Mental Status Examination:    Appearance calm and cooperative , adequate hygiene and grooming, and good eye contact    Mood euthymic   Affect affect appropriate    Speech a normal rate   Thought Processes normal thought processes   Hallucinations no hallucinations present    Thought Content no delusions   Abnormal Thoughts no suicidal thoughts  and no homicidal thoughts    Orientation  oriented to person and place and time   Remote Memory short term memory intact and long term memory intact   Attention Span concentration intact   Intellect Appears to be of Average Intelligence   Insight Insight intact   Judgement judgment was intact   Muscle Strength Muscle strength and tone were normal   Language no difficulty naming common objects   Fund of Knowledge displays adequate knowledge of current events   Pain moderate to severe   Pain Scale 5       Laboratory Results: No results found for this or any previous visit. Assessment/Plan:       Diagnoses and all orders for this visit:    Anxiety    Attention deficit hyperactivity disorder (ADHD), combined type  -     lisdexamfetamine (Vyvanse) 60 MG capsule;  Take 1 capsule (60 mg total) by mouth every morning    Generalized anxiety disorder    Insomnia, unspecified type    Post traumatic stress disorder (PTSD)    Major depressive disorder, recurrent, moderate (HCC)    Attention deficit disorder (ADD) without hyperactivity          Treatment Recommendations- Risks Benefits      Immediate Medical/Psychiatric/Psychotherapy Treatments and Any Precautions: Continue with treatment plan    Risks, Benefits And Possible Side Effects Of Medications:  {PSYCH RISK, BENEFITS AND POSSIBLE SIDE EFFECTS (Optional):65368    Controlled Medication Discussion: She is aware of safe use and storage of medication    Psychotherapy Provided: 30 minutes  Supportive therapy  Medication evaluation  Mood assessment  Coping skills  Treatment plan update  Goals discussed in session: Maintain stable mood       Treatment Plan:    Completed and signed during the session: Yes - Treatment Plan done but not signed at time of office visit due to:  Plan reviewed by video and verbal consent given due to virtual visit. Treatment Plan sent to patient via [employer] for signature.       Catrina Menjivar, PhD 12/13/23

## 2024-02-09 ENCOUNTER — PATIENT OUTREACH (OUTPATIENT)
Dept: ADMINISTRATIVE | Facility: HOSPITAL | Age: 84
End: 2024-02-09
Payer: MEDICARE

## 2024-02-09 NOTE — PROGRESS NOTES
Population Health Chart Review & Patient Outreach Details   Dr. Alcazar no longer with [facility],need establish care with another provider - need to schedule follow up/physical. If want to follow Dr Alcazar can go to [facility] at [address] [place] [address] [id number] - phone: [phone].   Patient will get sister to call back to manage who to see.    Further Action Needed If Patient Returns Outreach:            Updates Requested / Reviewed:     [x]  Care Everywhere    []     []  External Sources ([facility], [facility], [facility], etc.)    [] [facility]   [] [facility]   [] Other:    []  Care Team Updated   []  Removed  or Duplicate Orders   []  Immunization Reconciliation Completed / Queried    [] [place]   [] [place]   [] [place]   [] [place]      Health Maintenance Topics Addressed and Outreach Outcomes / Actions Taken:             Breast Cancer Screening []  Mammogram Order Placed    []  Mammogram Screening Scheduled    []  External Records Requested & Care Team Updated if Applicable    []  External Records Uploaded & Care Team Updated if Applicable    []  Pt Declined Scheduling Mammogram    []  Pt Will Schedule with External Provider / Order Routed & Care Team Updated if Applicable              Cervical Cancer Screening []  Pap Smear Scheduled in Primary Care or OBGYN    []  External Records Requested & Care Team Updated if Applicable       []  External Records Uploaded, Care Team Updated, & History Updated if Applicable    []  Patient Declined Scheduling Pap Smear    []  Patient Will Schedule with External Provider & Care Team Updated if Applicable                  Colorectal Cancer Screening []  Colonoscopy Case Request / Referral / Home Test Order Placed    []  External Records Requested & Care Team Updated if Applicable    []  External Records Uploaded, Care Team Updated, & History Updated if Applicable    []  Patient Declined Completing Colon Cancer Screening    []  Patient  Will Schedule with External Provider & Care Team Updated if Applicable    []  Fit Kit Mailed (add the SmartPhrase under additional notes)    []  Reminded Patient to Complete Home Test                Diabetic Eye Exam []  Eye Exam Screening Order Placed    []  Eye Camera Scheduled or Optometry/Ophthalmology Referral Placed    []  External Records Requested & Care Team Updated if Applicable    []  External Records Uploaded, Care Team Updated, & History Updated if Applicable    []  Patient Declined Scheduling Eye Exam    []  Patient Will Schedule with External Provider & Care Team Updated if Applicable             Blood Pressure Control []  Primary Care Follow Up Visit Scheduled     []  Remote Blood Pressure Reading Captured    []  Patient Declined Remote Reading or Scheduling Appt - Escalated to PCP    []  Patient Will Call Back or Send Portal Message with Reading                 HbA1c & Other Labs []  Overdue Lab(s) Ordered    []  Overdue Lab(s) Scheduled    []  External Records Uploaded & Care Team Updated if Applicable    []  Primary Care Follow Up Visit Scheduled     []  Reminded Patient to Complete A1c Home Test    []  Patient Declined Scheduling Labs or Will Call Back to Schedule    []  Patient Will Schedule with External Provider / Order Routed, & Care Team Updated if Applicable           Primary Care Appointment []  Primary Care Appt Scheduled    []  Patient Declined Scheduling or Will Call Back to Schedule    []  Pt Established with External Provider, Updated Care Team, & Informed Pt to Notify Payor if Applicable           Medication Adherence /    Statin Use []  Primary Care Appointment Scheduled    []  Patient Reminded to  Prescription    []  Patient Declined, Provider Notified if Needed    []  Sent Provider Message to Review to Evaluate Pt for Statin, Add Exclusion Dx Codes, Document   Exclusion in Problem List, Change Statin Intensity Level to Moderate or High Intensity if Applicable                 Osteoporosis Screening []  Dexa Order Placed    []  Dexa Appointment Scheduled    []  External Records Requested & Care Team Updated    []  External Records Uploaded, Care Team Updated, & History Updated if Applicable    []  Patient Declined Scheduling Dexa or Will Call Back to Schedule    []  Patient Will Schedule with External Provider / Order Routed & Care Team Updated if Applicable       Additional Notes:

## 2024-02-19 ENCOUNTER — HOSPITAL ENCOUNTER (INPATIENT)
Facility: HOSPITAL | Age: 84
LOS: 4 days | Discharge: HOME HEALTH | DRG: 378 | End: 2024-02-23
Attending: EMERGENCY MEDICINE | Admitting: STUDENT IN AN ORGANIZED HEALTH CARE EDUCATION/TRAINING PROGRAM
Payer: MEDICARE

## 2024-02-19 DIAGNOSIS — R53.1 WEAKNESS: ICD-10-CM

## 2024-02-19 DIAGNOSIS — K62.5 BRBPR (BRIGHT RED BLOOD PER RECTUM): ICD-10-CM

## 2024-02-19 DIAGNOSIS — K92.2 LOWER GI BLEED: Primary | ICD-10-CM

## 2024-02-19 DIAGNOSIS — D62 ACUTE BLOOD LOSS ANEMIA: ICD-10-CM

## 2024-02-19 DIAGNOSIS — D64.9 SYMPTOMATIC ANEMIA: ICD-10-CM

## 2024-02-19 LAB
ABO + RH BLD: NORMAL
ALBUMIN SERPL BCP-MCNC: 3.3 G/DL (ref 3.5–5.2)
ALP SERPL-CCNC: 60 U/L (ref 55–135)
ALT SERPL W/O P-5'-P-CCNC: 16 U/L (ref 10–44)
ANION GAP SERPL CALC-SCNC: 5 MMOL/L (ref 8–16)
AST SERPL-CCNC: 28 U/L (ref 10–40)
BASOPHILS # BLD AUTO: 0.03 K/UL (ref 0–0.2)
BASOPHILS NFR BLD: 0.5 % (ref 0–1.9)
BILIRUB SERPL-MCNC: 0.6 MG/DL (ref 0.1–1)
BLD GP AB SCN CELLS X3 SERPL QL: NORMAL
BLD PROD TYP BPU: NORMAL
BLD PROD TYP BPU: NORMAL
BLOOD UNIT EXPIRATION DATE: NORMAL
BLOOD UNIT EXPIRATION DATE: NORMAL
BLOOD UNIT TYPE CODE: 6200
BLOOD UNIT TYPE CODE: 6200
BLOOD UNIT TYPE: NORMAL
BLOOD UNIT TYPE: NORMAL
BNP SERPL-MCNC: 33 PG/ML (ref 0–99)
BUN SERPL-MCNC: 17 MG/DL (ref 8–23)
CALCIUM SERPL-MCNC: 8.5 MG/DL (ref 8.7–10.5)
CHLORIDE SERPL-SCNC: 105 MMOL/L (ref 95–110)
CO2 SERPL-SCNC: 26 MMOL/L (ref 23–29)
CODING SYSTEM: NORMAL
CODING SYSTEM: NORMAL
CREAT SERPL-MCNC: 1.6 MG/DL (ref 0.5–1.4)
CROSSMATCH INTERPRETATION: NORMAL
CROSSMATCH INTERPRETATION: NORMAL
DIFFERENTIAL METHOD BLD: ABNORMAL
DISPENSE STATUS: NORMAL
DISPENSE STATUS: NORMAL
EOSINOPHIL # BLD AUTO: 0.1 K/UL (ref 0–0.5)
EOSINOPHIL NFR BLD: 1.9 % (ref 0–8)
ERYTHROCYTE [DISTWIDTH] IN BLOOD BY AUTOMATED COUNT: 15.2 % (ref 11.5–14.5)
EST. GFR  (NO RACE VARIABLE): 42 ML/MIN/1.73 M^2
GLUCOSE SERPL-MCNC: 99 MG/DL (ref 70–110)
HCT VFR BLD AUTO: 15.6 % (ref 40–54)
HGB BLD-MCNC: 4.6 G/DL (ref 14–18)
IMM GRANULOCYTES # BLD AUTO: 0.02 K/UL (ref 0–0.04)
IMM GRANULOCYTES NFR BLD AUTO: 0.3 % (ref 0–0.5)
INR PPP: 1.1 (ref 0.8–1.2)
LYMPHOCYTES # BLD AUTO: 1.1 K/UL (ref 1–4.8)
LYMPHOCYTES NFR BLD: 19.4 % (ref 18–48)
MCH RBC QN AUTO: 27.5 PG (ref 27–31)
MCHC RBC AUTO-ENTMCNC: 29.5 G/DL (ref 32–36)
MCV RBC AUTO: 93 FL (ref 82–98)
MONOCYTES # BLD AUTO: 0.6 K/UL (ref 0.3–1)
MONOCYTES NFR BLD: 10.2 % (ref 4–15)
NEUTROPHILS # BLD AUTO: 3.9 K/UL (ref 1.8–7.7)
NEUTROPHILS NFR BLD: 67.7 % (ref 38–73)
NRBC BLD-RTO: 1 /100 WBC
PLATELET # BLD AUTO: 275 K/UL (ref 150–450)
PMV BLD AUTO: 9.7 FL (ref 9.2–12.9)
POTASSIUM SERPL-SCNC: 4.3 MMOL/L (ref 3.5–5.1)
PROT SERPL-MCNC: 6.5 G/DL (ref 6–8.4)
PROTHROMBIN TIME: 12 SEC (ref 9–12.5)
RBC # BLD AUTO: 1.67 M/UL (ref 4.6–6.2)
SODIUM SERPL-SCNC: 136 MMOL/L (ref 136–145)
SPECIMEN OUTDATE: NORMAL
TRANS ERYTHROCYTES VOL PATIENT: NORMAL ML
TRANS ERYTHROCYTES VOL PATIENT: NORMAL ML
TROPONIN I SERPL DL<=0.01 NG/ML-MCNC: 0.01 NG/ML (ref 0–0.03)
WBC # BLD AUTO: 5.81 K/UL (ref 3.9–12.7)

## 2024-02-19 PROCEDURE — 85025 COMPLETE CBC W/AUTO DIFF WBC: CPT

## 2024-02-19 PROCEDURE — 93010 ELECTROCARDIOGRAM REPORT: CPT | Mod: ,,, | Performed by: INTERNAL MEDICINE

## 2024-02-19 PROCEDURE — 25000003 PHARM REV CODE 250: Performed by: STUDENT IN AN ORGANIZED HEALTH CARE EDUCATION/TRAINING PROGRAM

## 2024-02-19 PROCEDURE — 30233N1 TRANSFUSION OF NONAUTOLOGOUS RED BLOOD CELLS INTO PERIPHERAL VEIN, PERCUTANEOUS APPROACH: ICD-10-PCS | Performed by: EMERGENCY MEDICINE

## 2024-02-19 PROCEDURE — 36430 TRANSFUSION BLD/BLD COMPNT: CPT

## 2024-02-19 PROCEDURE — 25500020 PHARM REV CODE 255: Performed by: HOSPITALIST

## 2024-02-19 PROCEDURE — 86920 COMPATIBILITY TEST SPIN: CPT | Performed by: EMERGENCY MEDICINE

## 2024-02-19 PROCEDURE — 86850 RBC ANTIBODY SCREEN: CPT

## 2024-02-19 PROCEDURE — 84484 ASSAY OF TROPONIN QUANT: CPT

## 2024-02-19 PROCEDURE — 80053 COMPREHEN METABOLIC PANEL: CPT

## 2024-02-19 PROCEDURE — C9113 INJ PANTOPRAZOLE SODIUM, VIA: HCPCS | Performed by: EMERGENCY MEDICINE

## 2024-02-19 PROCEDURE — 99291 CRITICAL CARE FIRST HOUR: CPT | Mod: 25

## 2024-02-19 PROCEDURE — 21400001 HC TELEMETRY ROOM

## 2024-02-19 PROCEDURE — 85610 PROTHROMBIN TIME: CPT

## 2024-02-19 PROCEDURE — 83880 ASSAY OF NATRIURETIC PEPTIDE: CPT

## 2024-02-19 PROCEDURE — P9021 RED BLOOD CELLS UNIT: HCPCS | Performed by: EMERGENCY MEDICINE

## 2024-02-19 PROCEDURE — 63600175 PHARM REV CODE 636 W HCPCS: Performed by: EMERGENCY MEDICINE

## 2024-02-19 PROCEDURE — 93005 ELECTROCARDIOGRAM TRACING: CPT

## 2024-02-19 RX ORDER — AMLODIPINE BESYLATE 5 MG/1
5 TABLET ORAL DAILY
COMMUNITY

## 2024-02-19 RX ORDER — AMLODIPINE BESYLATE 5 MG/1
5 TABLET ORAL DAILY
Status: DISCONTINUED | OUTPATIENT
Start: 2024-02-20 | End: 2024-02-23 | Stop reason: HOSPADM

## 2024-02-19 RX ORDER — AZELASTINE 1 MG/ML
1 SPRAY, METERED NASAL 2 TIMES DAILY
Status: DISCONTINUED | OUTPATIENT
Start: 2024-02-19 | End: 2024-02-23 | Stop reason: HOSPADM

## 2024-02-19 RX ORDER — POLYETHYLENE GLYCOL 3350 17 G/17G
17 POWDER, FOR SOLUTION ORAL DAILY
Status: DISCONTINUED | OUTPATIENT
Start: 2024-02-20 | End: 2024-02-20

## 2024-02-19 RX ORDER — TRAZODONE HYDROCHLORIDE 50 MG/1
100 TABLET ORAL NIGHTLY
Status: DISCONTINUED | OUTPATIENT
Start: 2024-02-19 | End: 2024-02-23 | Stop reason: HOSPADM

## 2024-02-19 RX ORDER — TAMSULOSIN HYDROCHLORIDE 0.4 MG/1
0.4 CAPSULE ORAL DAILY
Status: DISCONTINUED | OUTPATIENT
Start: 2024-02-20 | End: 2024-02-23 | Stop reason: HOSPADM

## 2024-02-19 RX ORDER — HYDROCODONE BITARTRATE AND ACETAMINOPHEN 500; 5 MG/1; MG/1
TABLET ORAL
Status: DISCONTINUED | OUTPATIENT
Start: 2024-02-19 | End: 2024-02-23 | Stop reason: HOSPADM

## 2024-02-19 RX ORDER — PANTOPRAZOLE SODIUM 40 MG/10ML
40 INJECTION, POWDER, LYOPHILIZED, FOR SOLUTION INTRAVENOUS
Status: COMPLETED | OUTPATIENT
Start: 2024-02-19 | End: 2024-02-19

## 2024-02-19 RX ORDER — SODIUM CHLORIDE, SODIUM LACTATE, POTASSIUM CHLORIDE, CALCIUM CHLORIDE 600; 310; 30; 20 MG/100ML; MG/100ML; MG/100ML; MG/100ML
INJECTION, SOLUTION INTRAVENOUS CONTINUOUS
Status: ACTIVE | OUTPATIENT
Start: 2024-02-19 | End: 2024-02-20

## 2024-02-19 RX ADMIN — TRAZODONE HYDROCHLORIDE 100 MG: 50 TABLET ORAL at 09:02

## 2024-02-19 RX ADMIN — IOHEXOL 100 ML: 350 INJECTION, SOLUTION INTRAVENOUS at 06:02

## 2024-02-19 RX ADMIN — PANTOPRAZOLE SODIUM 40 MG: 40 INJECTION, POWDER, FOR SOLUTION INTRAVENOUS at 07:02

## 2024-02-19 NOTE — ED PROVIDER NOTES
Encounter Date: 2/19/2024    SCRIBE #1 NOTE: I, Bess Blank, am scribing for, and in the presence of,  Rod Diaz MD. I have scribed the following portions of the note - Other sections scribed: HPI, ROS, PE.       History     Chief Complaint   Patient presents with    Rectal Bleeding     Pt reports rectal bleeding since yesterday with weakness. Pt with hx of colon cancer. Pt denies pain.      84 y.o. male, with a PMHx of HTN, HLD, CKD stage 3b, aortic athersclerosis, chronic bronchitis, diverticulosis of colon with hemorrhage of large intestine, appendectomy, who presents to the ED with blood in stool for 1 week. Patient reports he sees blood every time he has a bowel movement, which he states is once per day. Endorses some mild lower abdominal pain. Additional history is provided by independent historian: pt's niece, who states pt was complaining of generalized weakness, and has a history of colon cancer. No other exacerbating or alleviating factors. Denies chest pain, SOB, nausea, vomiting, fever, or other associated symptoms.       The history is provided by the patient and a relative. No  was used.     Review of patient's allergies indicates:  No Known Allergies  Past Medical History:   Diagnosis Date    Allergy     Hypertension      Past Surgical History:   Procedure Laterality Date    APPENDECTOMY      COLONOSCOPY Left 10/19/2015    Procedure: COLONOSCOPY;  Surgeon: Randell Briceno MD;  Location: [facility];  Service: Endoscopy;  Laterality: Left;     No family history on file.  Social History     Tobacco Use    Smoking status: Former     Types: Cigarettes    Smokeless tobacco: Never    Tobacco comments:     Quit 15 years ago   Substance Use Topics    Alcohol use: Yes     Alcohol/week: 1.0 standard drink of alcohol     Types: 1 Standard drinks or equivalent per week     Comment: sometimes beer or [employer] [place]    Drug use: No     Review of Systems   Constitutional:  Negative  for fever.   HENT:  Negative for sore throat.    Eyes:  Negative for visual disturbance.   Respiratory:  Negative for cough and shortness of breath.    Cardiovascular:  Negative for chest pain and leg swelling.   Gastrointestinal:  Positive for abdominal pain (mild, lower) and blood in stool. Negative for diarrhea, nausea and vomiting.   Genitourinary:  Negative for dysuria and hematuria.   Musculoskeletal:  Negative for back pain and neck pain.   Skin:  Negative for rash.   Neurological:  Negative for syncope.       Physical Exam     Initial Vitals [02/19/24 1540]   BP Pulse Resp Temp SpO2   125/60 98 18 98.4 °F (36.9 °C) 98 %      MAP       --         Physical Exam    Nursing note and vitals reviewed.  Constitutional: He appears well-developed and well-nourished.   HENT:   Head: Atraumatic.   Eyes: EOM are normal. Pupils are equal, round, and reactive to light.   Neck: Neck supple. No JVD present.   Normal range of motion.  Cardiovascular:  Normal rate, regular rhythm, normal heart sounds and intact distal pulses.     Exam reveals no gallop and no friction rub.       No murmur heard.  Pulmonary/Chest: Breath sounds normal. No respiratory distress.   Abdominal: Abdomen is soft. Bowel sounds are normal.   Musculoskeletal:         General: Normal range of motion.      Cervical back: Normal range of motion and neck supple.     Lymphadenopathy:     He has no cervical adenopathy.   Neurological: He is alert and oriented to person, place, and time. He has normal strength.   Skin: Skin is warm and dry.   Psychiatric: He has a normal mood and affect. Thought content normal.         ED Course   Critical Care    Date/Time: 2/19/2024 10:47 PM    Performed by: Rod Diaz MD  Authorized by: Rod Diaz MD  Direct patient critical care time: 20 minutes  Additional history critical care time: 5 minutes  Ordering / reviewing critical care time: 10 minutes  Documentation critical care time: 10  minutes  Consulting other physicians critical care time: 10 minutes  Total critical care time (exclusive of procedural time) : 55 minutes  Critical care time was exclusive of teaching time and separately billable procedures and treating other patients.  Critical care was necessary to treat or prevent imminent or life-threatening deterioration of the following conditions: shock.  Critical care was time spent personally by me on the following activities: development of treatment plan with patient or surrogate, discussions with consultants, evaluation of patient's response to treatment, examination of patient, obtaining history from patient or surrogate, ordering and performing treatments and interventions, ordering and review of laboratory studies, ordering and review of radiographic studies, pulse oximetry, re-evaluation of patient's condition, review of old charts and interpretation of cardiac output measurements.        Labs Reviewed   CBC W/ AUTO DIFFERENTIAL - Abnormal; Notable for the following components:       Result Value    RBC 1.67 (*)     Hemoglobin 4.6 (*)     Hematocrit 15.6 (*)     MCHC 29.5 (*)     RDW 15.2 (*)     nRBC 1 (*)     All other components within normal limits    Narrative:     HGB HCT  critical result(s) called and verbal readback obtained from   Ce ANAYA 02/19/24 by JULIEN 02/19/2024 17:40   COMPREHENSIVE METABOLIC PANEL - Abnormal; Notable for the following components:    Creatinine 1.6 (*)     Calcium 8.5 (*)     Albumin 3.3 (*)     eGFR 42 (*)     Anion Gap 5 (*)     All other components within normal limits   PROTIME-INR   TROPONIN I   B-TYPE NATRIURETIC PEPTIDE   B-TYPE NATRIURETIC PEPTIDE   TROPONIN I   PROTIME-INR   TYPE & SCREEN     EKG Readings: (Independently Interpreted)   Initial Reading: No STEMI. Rhythm: Normal Sinus Rhythm. Heart Rate: 84. Ectopy: No Ectopy. Conduction: Normal.   Age-indeterminate anterior infarct.     ECG Results              EKG 12-lead (Final result)         Collection Time Result Time QRS Duration OHS QTC Calculation    02/19/24 17:28:01 02/20/24 19:49:54 86 432                     Final result by Interface, Lab In [facility] (02/20/24 19:49:58)                   Narrative:    Test Reason : R53.1,    Vent. Rate : 084 BPM     Atrial Rate : 084 BPM     P-R Int : 162 ms          QRS Dur : 086 ms      QT Int : 366 ms       P-R-T Axes : 070 022 030 degrees     QTc Int : 432 ms    Normal sinus rhythm  Cannot rule out Anterior infarct ,age undetermined  Abnormal ECG  When compared with ECG of 08-NOV-2023 12:02,  No significant change was found  Confirmed by Lorne Kim MD ([id number]) on 2/20/2024 7:49:49 PM    Referred By: [email or web address]   SELF           Confirmed By:Lorne Kim MD                                  Imaging Results              CTA Acute GI [place], Abdomen and Pelvis (Final result)  Result time 02/19/24 19:10:05      Final result by Fabian Perez MD (02/19/24 19:10:05)                   Impression:      No active GI bleed.    Diverticulosis without diverticulitis, diego colonic.    LAD and circumflex coronary artery calcifications.      Electronically signed by: Fabian Perez  Date:    02/19/2024  Time:    19:10               Narrative:    EXAMINATION:  CTA ACUTE GI BLEED, ABDOMEN AND PELVIS    CLINICAL HISTORY:  GI bleed;    TECHNIQUE:  Using 75 cc of  Omnipaque 350 IV, and multi-detector helical CT technique, axial CT angiogram images of the abdomen were obtained from the lung bases through the pelvis. Precontrast and portal venous phase images of the abdomen and pelvis also done. 2D post-processing coronal and sagittal reconstructions of the abdominal aorta and visceral arteries performed.    COMPARISON:  None    FINDINGS:  The lung bases are well aerated.  No consolidation, suspicious nodules, or pleural effusion.  The visualized portions of the heart appear intact with coronary artery calcifications in the LAD and circumflex coronary artery.  Small  sliding-type hiatal hernia..    The esophagus, stomach, spleen, pancreas, and adrenal glands are unremarkable.    The liver is normal in size and attenuation without focal abnormality.  The portal veins appear patent.  The gallbladder shows no evidence of stones or cholecystitis.  No intra-or extrahepatic biliary ductal dilatation.    The kidneys demonstrate normal enhancement.  No renal mass, nephrolithiasis or hydronephrosis.  The ureters are normal in course and caliber. The urinary bladder appears unremarkable    Pan colonic diverticular disease.  No evidence of diverticulitis.  No evidence of gastrointestinal hemorrhage, bowel obstruction, or inflammation.  There is no ascites, free fluid, or intraperitoneal free air. No significant peritoneal or retroperitoneal adenopathy.    The abdominal aorta is normal in course and caliber without significant atherosclerotic calcifications.    The osseous structures and extraperitoneal soft tissues are intact with marked spondylosis and multilevel stenosis throughout the spine..  Fat containing inguinal hernia on the left.                                       Medications   0.9%  NaCl infusion (for blood administration) (has no administration in time range)   amLODIPine tablet 5 mg (5 mg Oral Given 2/20/24 0848)   azelastine 137 mcg (0.1 %) nasal spray 137 mcg (137 mcg Nasal Not Given 2/20/24 2100)   tamsulosin 24 hr capsule 0.4 mg (0.4 mg Oral Given 2/20/24 0848)   traZODone tablet 100 mg (100 mg Oral Given 2/20/24 2215)   lactated ringers infusion (0 mL/hr Intravenous Stopped 2/20/24 1214)   furosemide tablet 40 mg (40 mg Oral Given 2/20/24 0916)   polyethylene glycol (GoLYTELY) solution (has no administration in time range)   pantoprazole injection 40 mg (40 mg Intravenous Given 2/20/24 2215)   pantoprazole injection 40 mg (40 mg Intravenous Given 2/19/24 1907)   iohexoL (OMNIPAQUE 350) injection 100 mL (100 mLs Intravenous Given 2/19/24 1858)   polyethylene glycol  (GoLYTELY) solution (2,000 mLs Oral Given 2/20/24 2002)     Medical Decision Making  Amount and/or Complexity of Data Reviewed  Labs: ordered.  Radiology: ordered.    Risk  Prescription drug management.  Decision regarding hospitalization.    Patient presents with rectal bleeding for 1 week.  States he has had 1 bloody bowel movement daily for 8 days.  Hemoglobin is 4.6.  Will admit, blood transfusion, GI consultation.        Scribe Attestation:   Scribe #1: I performed the above scribed service and the documentation accurately describes the services I performed. I attest to the accuracy of the note.                               Clinical Impression:  Final diagnoses:  [R53.1] Weakness  [K92.2] Lower GI bleed  [D64.9] Symptomatic anemia          ED Disposition Condition    Admit              I, Rod Diaz, personally performed the services described in this documentation. All medical record entries made by the scribe were at my direction and in my presence.  I have reviewed the chart and agree that the record reflects my personal performance and is accurate and complete.      Rod Diaz MD  02/20/24 0041

## 2024-02-19 NOTE — ED NOTES
Alexander Rhoda Flowres, a 84 y.o. male presents to the ED w/ complaint of rectal bleeding and abdominal discomfort starting around February 12. Patient describes stools as dark red to black. Patient also c/o of generalized weakness. Patient is AAOx3, VSS, NAD. Denies CP, SOB, N/V/D/C, cough, fever, numbness, or tingling. Bed locked, in lowest position, bed rails up x2, call light in reach, all monitoring attached.

## 2024-02-20 ENCOUNTER — ANESTHESIA EVENT (OUTPATIENT)
Dept: ENDOSCOPY | Facility: HOSPITAL | Age: 84
DRG: 378 | End: 2024-02-20
Payer: MEDICARE

## 2024-02-20 LAB
BASOPHILS # BLD AUTO: 0.02 K/UL (ref 0–0.2)
BASOPHILS # BLD AUTO: 0.03 K/UL (ref 0–0.2)
BASOPHILS # BLD AUTO: 0.04 K/UL (ref 0–0.2)
BASOPHILS NFR BLD: 0.2 % (ref 0–1.9)
BASOPHILS NFR BLD: 0.4 % (ref 0–1.9)
BASOPHILS NFR BLD: 0.5 % (ref 0–1.9)
DIFFERENTIAL METHOD BLD: ABNORMAL
EOSINOPHIL # BLD AUTO: 0.2 K/UL (ref 0–0.5)
EOSINOPHIL # BLD AUTO: 0.3 K/UL (ref 0–0.5)
EOSINOPHIL # BLD AUTO: 0.3 K/UL (ref 0–0.5)
EOSINOPHIL NFR BLD: 2.4 % (ref 0–8)
EOSINOPHIL NFR BLD: 3.5 % (ref 0–8)
EOSINOPHIL NFR BLD: 3.6 % (ref 0–8)
ERYTHROCYTE [DISTWIDTH] IN BLOOD BY AUTOMATED COUNT: 15.9 % (ref 11.5–14.5)
ERYTHROCYTE [DISTWIDTH] IN BLOOD BY AUTOMATED COUNT: 16 % (ref 11.5–14.5)
ERYTHROCYTE [DISTWIDTH] IN BLOOD BY AUTOMATED COUNT: 16.1 % (ref 11.5–14.5)
HCT VFR BLD AUTO: 23.3 % (ref 40–54)
HCT VFR BLD AUTO: 23.6 % (ref 40–54)
HCT VFR BLD AUTO: 24.3 % (ref 40–54)
HGB BLD-MCNC: 7.2 G/DL (ref 14–18)
HGB BLD-MCNC: 7.3 G/DL (ref 14–18)
HGB BLD-MCNC: 7.6 G/DL (ref 14–18)
IMM GRANULOCYTES # BLD AUTO: 0.03 K/UL (ref 0–0.04)
IMM GRANULOCYTES # BLD AUTO: 0.03 K/UL (ref 0–0.04)
IMM GRANULOCYTES # BLD AUTO: 0.04 K/UL (ref 0–0.04)
IMM GRANULOCYTES NFR BLD AUTO: 0.4 % (ref 0–0.5)
IMM GRANULOCYTES NFR BLD AUTO: 0.4 % (ref 0–0.5)
IMM GRANULOCYTES NFR BLD AUTO: 0.5 % (ref 0–0.5)
LYMPHOCYTES # BLD AUTO: 1.1 K/UL (ref 1–4.8)
LYMPHOCYTES # BLD AUTO: 1.5 K/UL (ref 1–4.8)
LYMPHOCYTES # BLD AUTO: 1.7 K/UL (ref 1–4.8)
LYMPHOCYTES NFR BLD: 13.9 % (ref 18–48)
LYMPHOCYTES NFR BLD: 20.2 % (ref 18–48)
LYMPHOCYTES NFR BLD: 20.3 % (ref 18–48)
MCH RBC QN AUTO: 28.2 PG (ref 27–31)
MCH RBC QN AUTO: 28.3 PG (ref 27–31)
MCH RBC QN AUTO: 28.8 PG (ref 27–31)
MCHC RBC AUTO-ENTMCNC: 30.9 G/DL (ref 32–36)
MCHC RBC AUTO-ENTMCNC: 30.9 G/DL (ref 32–36)
MCHC RBC AUTO-ENTMCNC: 31.3 G/DL (ref 32–36)
MCV RBC AUTO: 91 FL (ref 82–98)
MCV RBC AUTO: 92 FL (ref 82–98)
MCV RBC AUTO: 92 FL (ref 82–98)
MONOCYTES # BLD AUTO: 0.8 K/UL (ref 0.3–1)
MONOCYTES # BLD AUTO: 0.8 K/UL (ref 0.3–1)
MONOCYTES # BLD AUTO: 0.9 K/UL (ref 0.3–1)
MONOCYTES NFR BLD: 11.4 % (ref 4–15)
MONOCYTES NFR BLD: 12.1 % (ref 4–15)
MONOCYTES NFR BLD: 9.5 % (ref 4–15)
NEUTROPHILS # BLD AUTO: 4.7 K/UL (ref 1.8–7.7)
NEUTROPHILS # BLD AUTO: 5.4 K/UL (ref 1.8–7.7)
NEUTROPHILS # BLD AUTO: 5.7 K/UL (ref 1.8–7.7)
NEUTROPHILS NFR BLD: 64.1 % (ref 38–73)
NEUTROPHILS NFR BLD: 67.2 % (ref 38–73)
NEUTROPHILS NFR BLD: 69.4 % (ref 38–73)
NRBC BLD-RTO: 1 /100 WBC
OHS QRS DURATION: 86 MS
OHS QTC CALCULATION: 432 MS
PLATELET # BLD AUTO: 251 K/UL (ref 150–450)
PLATELET # BLD AUTO: 267 K/UL (ref 150–450)
PLATELET # BLD AUTO: 273 K/UL (ref 150–450)
PMV BLD AUTO: 9.5 FL (ref 9.2–12.9)
PMV BLD AUTO: 9.8 FL (ref 9.2–12.9)
PMV BLD AUTO: 9.8 FL (ref 9.2–12.9)
RBC # BLD AUTO: 2.54 M/UL (ref 4.6–6.2)
RBC # BLD AUTO: 2.59 M/UL (ref 4.6–6.2)
RBC # BLD AUTO: 2.64 M/UL (ref 4.6–6.2)
WBC # BLD AUTO: 7.37 K/UL (ref 3.9–12.7)
WBC # BLD AUTO: 7.76 K/UL (ref 3.9–12.7)
WBC # BLD AUTO: 8.41 K/UL (ref 3.9–12.7)

## 2024-02-20 PROCEDURE — 21400001 HC TELEMETRY ROOM

## 2024-02-20 PROCEDURE — C9113 INJ PANTOPRAZOLE SODIUM, VIA: HCPCS | Performed by: NURSE PRACTITIONER

## 2024-02-20 PROCEDURE — 25000003 PHARM REV CODE 250: Performed by: HOSPITALIST

## 2024-02-20 PROCEDURE — 99223 1ST HOSP IP/OBS HIGH 75: CPT | Mod: ,,, | Performed by: NURSE PRACTITIONER

## 2024-02-20 PROCEDURE — 25000003 PHARM REV CODE 250: Performed by: NURSE PRACTITIONER

## 2024-02-20 PROCEDURE — 36415 COLL VENOUS BLD VENIPUNCTURE: CPT | Mod: XB | Performed by: STUDENT IN AN ORGANIZED HEALTH CARE EDUCATION/TRAINING PROGRAM

## 2024-02-20 PROCEDURE — 63600175 PHARM REV CODE 636 W HCPCS: Performed by: NURSE PRACTITIONER

## 2024-02-20 PROCEDURE — 85025 COMPLETE CBC W/AUTO DIFF WBC: CPT | Mod: 91 | Performed by: STUDENT IN AN ORGANIZED HEALTH CARE EDUCATION/TRAINING PROGRAM

## 2024-02-20 PROCEDURE — 25000003 PHARM REV CODE 250: Performed by: STUDENT IN AN ORGANIZED HEALTH CARE EDUCATION/TRAINING PROGRAM

## 2024-02-20 PROCEDURE — 63600175 PHARM REV CODE 636 W HCPCS: Performed by: STUDENT IN AN ORGANIZED HEALTH CARE EDUCATION/TRAINING PROGRAM

## 2024-02-20 RX ORDER — FUROSEMIDE 40 MG/1
40 TABLET ORAL DAILY
Status: DISCONTINUED | OUTPATIENT
Start: 2024-02-20 | End: 2024-02-23 | Stop reason: HOSPADM

## 2024-02-20 RX ORDER — POLYETHYLENE GLYCOL 3350, SODIUM SULFATE ANHYDROUS, SODIUM BICARBONATE, SODIUM CHLORIDE, POTASSIUM CHLORIDE 236; 22.74; 6.74; 5.86; 2.97 G/4L; G/4L; G/4L; G/4L; G/4L
2000 POWDER, FOR SOLUTION ORAL ONCE
Status: COMPLETED | OUTPATIENT
Start: 2024-02-20 | End: 2024-02-20

## 2024-02-20 RX ORDER — POLYETHYLENE GLYCOL 3350, SODIUM SULFATE ANHYDROUS, SODIUM BICARBONATE, SODIUM CHLORIDE, POTASSIUM CHLORIDE 236; 22.74; 6.74; 5.86; 2.97 G/4L; G/4L; G/4L; G/4L; G/4L
2000 POWDER, FOR SOLUTION ORAL ONCE
Status: COMPLETED | OUTPATIENT
Start: 2024-02-21 | End: 2024-02-21

## 2024-02-20 RX ORDER — PANTOPRAZOLE SODIUM 40 MG/10ML
40 INJECTION, POWDER, LYOPHILIZED, FOR SOLUTION INTRAVENOUS 2 TIMES DAILY
Status: DISCONTINUED | OUTPATIENT
Start: 2024-02-20 | End: 2024-02-23 | Stop reason: HOSPADM

## 2024-02-20 RX ADMIN — AMLODIPINE BESYLATE 5 MG: 5 TABLET ORAL at 08:02

## 2024-02-20 RX ADMIN — SODIUM CHLORIDE, POTASSIUM CHLORIDE, SODIUM LACTATE AND CALCIUM CHLORIDE: 600; 310; 30; 20 INJECTION, SOLUTION INTRAVENOUS at 01:02

## 2024-02-20 RX ADMIN — AZELASTINE HYDROCHLORIDE 137 MCG: 137 SPRAY, METERED NASAL at 05:02

## 2024-02-20 RX ADMIN — TRAZODONE HYDROCHLORIDE 100 MG: 50 TABLET ORAL at 10:02

## 2024-02-20 RX ADMIN — PANTOPRAZOLE SODIUM 40 MG: 40 INJECTION, POWDER, FOR SOLUTION INTRAVENOUS at 01:02

## 2024-02-20 RX ADMIN — POLYETHYLENE GLYCOL 3350, SODIUM SULFATE ANHYDROUS, SODIUM BICARBONATE, SODIUM CHLORIDE, POTASSIUM CHLORIDE 2000 ML: 236; 22.74; 6.74; 5.86; 2.97 POWDER, FOR SOLUTION ORAL at 08:02

## 2024-02-20 RX ADMIN — PANTOPRAZOLE SODIUM 40 MG: 40 INJECTION, POWDER, FOR SOLUTION INTRAVENOUS at 10:02

## 2024-02-20 RX ADMIN — FUROSEMIDE 40 MG: 40 TABLET ORAL at 09:02

## 2024-02-20 RX ADMIN — TAMSULOSIN HYDROCHLORIDE 0.4 MG: 0.4 CAPSULE ORAL at 08:02

## 2024-02-20 NOTE — ASSESSMENT & PLAN NOTE
Patient's anemia is currently uncontrolled. Has received 2 units of PRBCs on 2/19/2024 . Etiology likely d/t acute blood loss which was from lower GI  We will crossmatch 2 units of PRBC  H&H Q 8 hourly  Consult GI for colonoscopy.  Current CBC reviewed-   Lab Results   Component Value Date    HGB 4.6 (LL) 02/19/2024    HCT 15.6 (LL) 02/19/2024     Monitor serial CBC and transfuse if patient becomes hemodynamically unstable, symptomatic or H/H drops below 7/21.    Patient received 2 pack RBC and HH is pending,GI is consulted.

## 2024-02-20 NOTE — ASSESSMENT & PLAN NOTE
Body mass index is 34.36 kg/m². Morbid obesity complicates all aspects of disease management from diagnostic modalities to treatment.   Weight loss encouraged and health benefits explained to patient.

## 2024-02-20 NOTE — ASSESSMENT & PLAN NOTE
Patient with acute kidney injury/acute renal failure likely due to pre-renal azotemia due to IVVD RAFAEL is currently stable.   Presented with lower GI x 7 days ago  Baseline creatinine  1.4  - Labs reviewed- Renal function/electrolytes with Estimated Creatinine Clearance: 38.4 mL/min (A) (based on SCr of 1.6 mg/dL (H)). according to latest data. Monitor urine output and serial BMP and adjust therapy as needed. Avoid nephrotoxins and renally dose meds for GFR listed above.   No

## 2024-02-20 NOTE — SUBJECTIVE & OBJECTIVE
Past Medical History:   Diagnosis Date    Allergy     Hypertension        Past Surgical History:   Procedure Laterality Date    APPENDECTOMY      COLONOSCOPY Left 10/19/2015    Procedure: COLONOSCOPY;  Surgeon: Randell Briceno MD;  Location: [facility];  Service: Endoscopy;  Laterality: Left;       Review of patient's allergies indicates:  No Known Allergies    No current facility-administered medications on file prior to encounter.     Current Outpatient Medications on File Prior to Encounter   Medication Sig    amLODIPine (NORVASC) 5 MG tablet Take 5 mg by mouth once daily.    azelastine (ASTELIN) 137 mcg (0.1 %) nasal spray 1 spray (137 mcg total) by Nasal route 2 (two) times daily.    cetirizine (ZYRTEC) 10 MG tablet Take 1 tablet (10 mg total) by mouth once daily.    tamsulosin (FLOMAX) 0.4 mg Cap Take 1 capsule (0.4 mg total) by mouth once daily.    traZODone (DESYREL) 100 MG tablet TAKE 1 TABLET BY MOUTH NIGHTLY AS NEEDED FOR INSOMNIA.     Family History    None       Tobacco Use    Smoking status: Former     Types: Cigarettes    Smokeless tobacco: Never    Tobacco comments:     Quit 15 years ago   Substance and Sexual Activity    Alcohol use: Yes     Alcohol/week: 1.0 standard drink of alcohol     Types: 1 Standard drinks or equivalent per week     Comment: sometimes beer or [place] royal    Drug use: No    Sexual activity: Not Currently     Review of Systems   Constitutional:  Positive for fatigue. Negative for appetite change, chills and diaphoresis.   HENT:  Negative for congestion, sore throat and tinnitus.    Eyes:  Negative for pain, discharge and itching.   Respiratory:  Negative for cough, chest tightness, shortness of breath and wheezing.    Cardiovascular:  Negative for chest pain, palpitations and leg swelling.   Gastrointestinal:  Positive for abdominal pain and blood in stool. Negative for abdominal distention, constipation, diarrhea, nausea, rectal pain and vomiting.   Endocrine: Negative for  cold intolerance, heat intolerance and polydipsia.   Genitourinary:  Negative for difficulty urinating, dysuria, flank pain, frequency and hematuria.   Musculoskeletal:  Negative for arthralgias and back pain.   Neurological:  Negative for dizziness, seizures, facial asymmetry, light-headedness, numbness and headaches.   Psychiatric/Behavioral:  Negative for agitation, confusion and hallucinations.      Objective:     Vital Signs (Most Recent):  Temp: 98.5 °F (36.9 °C) (02/20/24 0729)  Pulse: 87 (02/20/24 0729)  Resp: 20 (02/20/24 0729)  BP: 135/64 (02/20/24 0729)  SpO2: 95 % (02/20/24 0729) Vital Signs (24h Range):  Temp:  [98.1 °F (36.7 °C)-98.9 °F (37.2 °C)] 98.5 °F (36.9 °C)  Pulse:  [74-98] 87  Resp:  [16-20] 20  SpO2:  [94 %-100 %] 95 %  BP: (116-167)/(53-84) 135/64     Weight: 102.5 kg (225 lb 15.5 oz)  Body mass index is 34.36 kg/m².     Physical Exam  Vitals reviewed.   Constitutional:       General: He is not in acute distress.     Appearance: Normal appearance. He is obese. He is not ill-appearing.   HENT:      Head: Normocephalic and atraumatic.      Nose: Nose normal. No congestion or rhinorrhea.      Mouth/Throat:      Mouth: Mucous membranes are dry.   Eyes:      Extraocular Movements: Extraocular movements intact.      Conjunctiva/sclera: Conjunctivae normal.      Pupils: Pupils are equal, round, and reactive to light.   Cardiovascular:      Rate and Rhythm: Normal rate and regular rhythm.      Pulses: Normal pulses.      Heart sounds: Normal heart sounds.   Pulmonary:      Effort: Pulmonary effort is normal. No respiratory distress.      Breath sounds: Normal breath sounds. No stridor. No wheezing, rhonchi or rales.   Chest:      Chest wall: No tenderness.   Abdominal:      General: Abdomen is flat. Bowel sounds are normal. There is no distension.      Palpations: Abdomen is soft.      Tenderness: There is abdominal tenderness (left lower quadrant). There is no right CVA tenderness, left CVA  tenderness, guarding or rebound.   Musculoskeletal:         General: No swelling or tenderness. Normal range of motion.      Cervical back: Normal range of motion and neck supple. No tenderness.      Right lower leg: No edema.      Left lower leg: No edema.   Skin:     General: Skin is warm and dry.   Neurological:      General: No focal deficit present.      Mental Status: He is alert and oriented to person, place, and time. Mental status is at baseline.      Cranial Nerves: No cranial nerve deficit.      Sensory: No sensory deficit.      Motor: Weakness (generalized) present.   Psychiatric:         Mood and Affect: Mood normal.         Behavior: Behavior normal.         Thought Content: Thought content normal.         Judgment: Judgment normal.              CRANIAL NERVES     CN III, IV, VI   Pupils are equal, round, and reactive to light.       Significant Labs: All pertinent labs within the past 24 hours have been reviewed.  Bilirubin:   Recent Labs   Lab 02/19/24  1659   BILITOT 0.6       CBC:   Recent Labs   Lab 02/19/24  1659   WBC 5.81   HGB 4.6*   HCT 15.6*          CMP:   Recent Labs   Lab 02/19/24  1659      K 4.3      CO2 26   GLU 99   BUN 17   CREATININE 1.6*   CALCIUM 8.5*   PROT 6.5   ALBUMIN 3.3*   BILITOT 0.6   ALKPHOS 60   AST 28   ALT 16   ANIONGAP 5*       Cardiac Markers:   Recent Labs   Lab 02/19/24  1659   BNP 33       Coagulation:   Recent Labs   Lab 02/19/24  1733   INR 1.1       Troponin:   Recent Labs   Lab 02/19/24  1659   TROPONINI 0.006         Significant Imaging: I have reviewed all pertinent imaging results/findings within the past 24 hours.  Imaging Results              CTA Acute GI [place], Abdomen and Pelvis (Final result)  Result time 02/19/24 19:10:05      Final result by Fabian Perez MD (02/19/24 19:10:05)                   Impression:      No active GI bleed.    Diverticulosis without diverticulitis, diego colonic.    LAD and circumflex coronary artery  calcifications.      Electronically signed by: Fabian Perez  Date:    02/19/2024  Time:    19:10               Narrative:    EXAMINATION:  CTA ACUTE GI BLEED, ABDOMEN AND PELVIS    CLINICAL HISTORY:  GI bleed;    TECHNIQUE:  Using 75 cc of  Omnipaque 350 IV, and multi-detector helical CT technique, axial CT angiogram images of the abdomen were obtained from the lung bases through the pelvis. Precontrast and portal venous phase images of the abdomen and pelvis also done. 2D post-processing coronal and sagittal reconstructions of the abdominal aorta and visceral arteries performed.    COMPARISON:  None    FINDINGS:  The lung bases are well aerated.  No consolidation, suspicious nodules, or pleural effusion.  The visualized portions of the heart appear intact with coronary artery calcifications in the LAD and circumflex coronary artery.  Small sliding-type hiatal hernia..    The esophagus, stomach, spleen, pancreas, and adrenal glands are unremarkable.    The liver is normal in size and attenuation without focal abnormality.  The portal veins appear patent.  The gallbladder shows no evidence of stones or cholecystitis.  No intra-or extrahepatic biliary ductal dilatation.    The kidneys demonstrate normal enhancement.  No renal mass, nephrolithiasis or hydronephrosis.  The ureters are normal in course and caliber. The urinary bladder appears unremarkable    Pan colonic diverticular disease.  No evidence of diverticulitis.  No evidence of gastrointestinal hemorrhage, bowel obstruction, or inflammation.  There is no ascites, free fluid, or intraperitoneal free air. No significant peritoneal or retroperitoneal adenopathy.    The abdominal aorta is normal in course and caliber without significant atherosclerotic calcifications.    The osseous structures and extraperitoneal soft tissues are intact with marked spondylosis and multilevel stenosis throughout the spine..  Fat containing inguinal hernia on the left.

## 2024-02-20 NOTE — NURSING
[facility] [place]  Nurses Note -- 4 Eyes      2/20/2024       Skin assessed on: Q Shift      [x] No Pressure Injuries Present    []Prevention Measures Documented    [] Yes LDA  for Pressure Injury Previously documented     [] Yes New Pressure Injury Discovered   [] LDA for New Pressure Injury Added      Attending RN:  Demi Baron RN     Second RN:  RAMILA Joy

## 2024-02-20 NOTE — ASSESSMENT & PLAN NOTE
He presented with hematochezia x1 week  Noticed blood with wiping each time, bright red blood on stool at times  Had similar history of lower GI bleed in the past   NM GI bleed study in 2015 showed no acute GI bleed after presenting with similar symptoms.  CTA acute GI bleed, abdomen and pelvis showed no acute GI bleed at this time, with diverticulosis without diverticulitis said to be pan colonic.  Previous colonoscopy noticed, patient denied previous history of colon cancer   Only previous GI surgery was for ruptured appendicitis at the age of 12.  Hemoglobin was 4.6 at presentation, had urgent typed, grouped and crossmatched of 2 units of PRBC  We will consult GI to review  We will transfuse to hemoglobin of 7.    Patient received 2 pack RBC and HH is pending,GI is consulted.

## 2024-02-20 NOTE — PLAN OF CARE
Case Management Assessment     PCP: Dr. Alcazar  Pharmacy: [facility] [place]    Patient Arrived From: Home  Existing Help at Home: Samantha hyatt    Barriers to Discharge: None    Discharge Plan:    A. Home with family   B. Other- tbd      CM discussed discharge planning with pt at bedside. Pt is independent and uses a quad cane. Pt's family will provide transportation home when discharged.      02/20/24 1125   Discharge Assessment   Assessment Type Discharge Planning Assessment   Confirmed/corrected address, phone number and insurance Yes   Confirmed Demographics Correct on Facesheet   Source of Information patient   When was your last doctors appointment? 11/08/23   Reason For Admission Acute blood loss anemia   People in Home alone   Facility Arrived From: home   Do you expect to return to your current living situation? Yes   Do you have help at home or someone to help you manage your care at home? Yes   Who are your caregiver(s) and their phone number(s)? Samantha hyatt [phone]   Prior to hospitilization cognitive status: Alert/Oriented   Current cognitive status: Alert/Oriented   Walking or Climbing Stairs Difficulty yes   Walking or Climbing Stairs ambulation difficulty, requires equipment;stair climbing difficulty, requires equipment   Mobility Management quad cane   Dressing/Bathing Difficulty no   Equipment Currently Used at Home none   Readmission within 30 days? No   Patient currently being followed by outpatient case management? No   Do you currently have service(s) that help you manage your care at home? No   Do you take prescription medications? Yes   Do you have prescription coverage? Yes   [facility]   Do you have any problems affording any of your prescribed medications? No   Who is going to help you get home at discharge? FAMILY   How do you get to doctors appointments? family or friend will provide   Are you on dialysis? No   Do you take coumadin? No   Discharge Plan A Home    Discharge Plan B Other  (TBD)   DME Needed Upon Discharge  other (see comments)  (TBD)   Discharge Plan discussed with: Patient   Transition of Care Barriers None   SDOH   (TBD)   Physical Activity   On average, how many days per week do you engage in moderate to strenuous exercise (like a brisk walk)? 0 days   On average, how many minutes do you engage in exercise at this level? 0 min   Financial Resource Strain   How hard is it for you to pay for the very basics like food, housing, medical care, and heating? Not very   Housing Stability   In the last 12 months, was there a time when you were not able to pay the mortgage or rent on time? N   In the last 12 months, how many places have you lived? 1   In the last 12 months, was there a time when you did not have a steady place to sleep or slept in a shelter (including now)? N   Transportation Needs   In the past 12 months, has lack of transportation kept you from medical appointments or from getting medications? no   In the past 12 months, has lack of transportation kept you from meetings, work, or from getting things needed for daily living? No   Food Insecurity   Within the past 12 months, you worried that your food would run out before you got the money to buy more. Never true   Within the past 12 months, the food you bought just didn't last and you didn't have money to get more. Never true   Stress   Do you feel stress - tense, restless, nervous, or anxious, or unable to sleep at night because your mind is troubled all the time - these days? Only a littl   Social Connections   In a typical week, how many times do you talk on the phone with family, friends, or neighbors? Three   How often do you get together with friends or relatives? Three times   How often do you attend Moravian or Yazidism services? Never   Do you belong to any clubs or organizations such as Moravian groups, unions, fraternal or athletic groups, or school groups? No   How often do you attend  meetings of the clubs or organizations you belong to? Never   Are you , , , , never , or living with a partner? Never marrie   Alcohol Use   Q1: How often do you have a drink containing alcohol? Never   Q2: How many drinks containing alcohol do you have on a typical day when you are drinking? None   Q3: How often do you have six or more drinks on one occasion? Never

## 2024-02-20 NOTE — CONSULTS
[facility] Consultation Note    Patient Complaint: bloody stool    PCP:   Naa Alcazar       LOS: 1        Initial History of Present Illness (HPI):  This is a 84 y.o. male consulted to GI service for GI bleed. PMH hypertension, benign prostatic hyperplasia, diffuse colonic diverticulosis with previous GI bleed, tobacco use disorder with 40 pack-year cigarette smoking, quit 15 years ago. Patient complaint of acute onset of rectal bleeding with intermittent lower abdominal pain that began on last Monday. Denies sob, weakness, lightheadedness, n/v, hematemesis, diarrhea or constipation. Denies blood thinners, NSAIDs, smoking or drinking. Past colonoscopy in 2015, report below.      Admit lab hgb 4.6, bun 27        Medical History:  has a past medical history of Allergy and Hypertension.    Surgical History:  has a past surgical history that includes Appendectomy and Colonoscopy (Left, 10/19/2015).      Objective Findings:    Vital Signs:  Temp:  [97.8 °F (36.6 °C)-98.9 °F (37.2 °C)]   Pulse:  [74-98]   Resp:  [16-20]   BP: (116-167)/(53-84)   SpO2:  [94 %-100 %]   Body mass index is 34.36 kg/m².      Physical Exam  Vitals and nursing note reviewed.   Constitutional:       Appearance: Normal appearance.   HENT:      Head: Normocephalic.   Pulmonary:      Effort: Pulmonary effort is normal.   Abdominal:      General: Bowel sounds are normal.      Palpations: Abdomen is soft.   Skin:     General: Skin is warm and dry.   Neurological:      Mental Status: He is alert and oriented to person, place, and time.   Psychiatric:         Mood and Affect: Mood normal.         Behavior: Behavior normal.         Thought Content: Thought content normal.         Judgment: Judgment normal.               Labs:  Lab Results   Component Value Date    WBC 8.41 02/20/2024    HGB 7.6 (L) 02/20/2024    HCT 24.3 (L) 02/20/2024     02/20/2024    CHOL 109 (L) 11/03/2022    TRIG 62 11/03/2022    HDL 29 (L) 11/03/2022     ALT 16 2024    AST 28 2024     2024    K 4.3 2024     2024    CREATININE 1.6 (H) 2024    BUN 17 2024    CO2 26 2024    TSH 1.785 2023    INR 1.1 2024    HGBA1C 4.9 2021             Imagin/19 CTA Acute GI Bleed abdomen and pelvis- The esophagus, stomach, spleen, pancreas, and adrenal glands are unremarkable.     The liver is normal in size and attenuation without focal abnormality.  The portal veins appear patent.  The gallbladder shows no evidence of stones or cholecystitis.  No intra-or extrahepatic biliary ductal dilatation.   Pan colonic diverticular disease.  No evidence of diverticulitis.  No evidence of gastrointestinal hemorrhage, bowel obstruction, or inflammation.  There is no ascites, free fluid, or intraperitoneal free air. No significant peritoneal or retroperitoneal adenopathy.     The abdominal aorta is normal in course and caliber without significant atherosclerotic calcifications.         Endoscopy: 10/2015 Colonoscopy- Non-bleeding internal hemorrhoids.                        - Severe diverticulosis in the sigmoid colon, in the                        descending colon, in the transverse colon and in the                        ascending colon. There was no evidence of                        diverticular bleeding.                        - Many 2 to 4 mm polyps in the sigmoid colon. A                        representative sample was resected and retrieved.     I have independently reviewed and interpreted the imaging above           Acute blood loss anemia. GI bleed. BRBPR.  Plan/ Recommendations:  1. S/p 2 units prbc hgb now 7.6. Denies any more overt bleeding. Rec start ppi bid. Plan for dual endoscopy on tomorrow. Ok for clear liquids, bowel prep tonight and npo except for prep at midnight.        Thank you so much for allowing us to participate in the care of Alexander Rhoda Flowers . Please contact us if you have any  additional questions.    Maye Rollins NP  Gastroenterology  [facility]

## 2024-02-20 NOTE — ASSESSMENT & PLAN NOTE
Creatine stable for now. BMP reviewed- noted Estimated Creatinine Clearance: 38.4 mL/min (A) (based on SCr of 1.6 mg/dL (H)). according to latest data. Based on current GFR, CKD stage is stage 3 - GFR 30-59.    Acute kidney injury likely due to volume depletion in the setting of GI bleed and reduced oral intake.  Monitor UOP and serial BMP and adjust therapy as needed. Renally dose meds.   Avoid nephrotoxic medications and procedures.

## 2024-02-20 NOTE — ASSESSMENT & PLAN NOTE
Diverticulosis with no diverticulitis  Although complained of mild left lower quadrant abdominal pain  We will consult GI for possible colonoscopy  Out of caution we will give pantoprazole at this time for GI bleed.  No evidence of infection at this time, we will hold off on antibiotics

## 2024-02-20 NOTE — H&P
"  [facility] Medicine  History & Physical    Patient Name: Alexander Duong Jr.  MRN: [id number]  Patient Class: IP- Inpatient  Admission Date: 2/19/2024  Attending Physician: Moi Cole, *   Primary Care Provider: Naa Alcazar MD         Patient information was obtained from patient, past medical records, and ER records.     Subjective:     Principal Problem:Acute blood loss anemia    Chief Complaint:   Chief Complaint   Patient presents with    Rectal Bleeding     Pt reports rectal bleeding since yesterday with weakness. Pt with hx of colon cancer. Pt denies pain.         HPI: 84-year-old  male with medical history significant for hypertension, benign prostatic hyperplasia, diffuse colonic diverticulosis with previous GI bleed, tobacco use disorder with 40 pack-year cigarette smoking, quit 15 years ago who presented to the emergency department on account bleeding per rectum of a week duration, he noticed blood when he wipes since super bowl day, denied previous constipation, denied abdominal pain, no nausea or vomiting reported.  However voiced left lower quadrant abdominal pain, said to be mild, nonradiating, no known relieving or aggravating factor.  He voiced previous history of lower GI bleed, he denied history of colon cancer, previous colonoscopy, NM GI bleed study were unrevealing.  He voiced previous history of abdominal surgery at age of 12 years for ruptured appendicitis.  Prior to presentation today he noticed lightheadedness and generalized weakness but no fall, denied fever, chills or rigors, no headache, neck pain, no upper respiratory tract symptoms.  Lab obtained in the emergency room at presentation showed H&H of 4.4/15.6 for which 2 units of blood was urgently typed, group and crossmatch for him, BUN creatinine 17/1.6, with baseline creatinine of 1.4.  CTA acute GI bleed, abdomen and pelvis showed "no active GI bleed.  Diverticulosis without " "diverticulitis, pan colonic"    Past Medical History:   Diagnosis Date    Allergy     Hypertension        Past Surgical History:   Procedure Laterality Date    APPENDECTOMY      COLONOSCOPY Left 10/19/2015    Procedure: COLONOSCOPY;  Surgeon: Randell Briceno MD;  Location: [facility];  Service: Endoscopy;  Laterality: Left;       Review of patient's allergies indicates:  No Known Allergies    No current facility-administered medications on file prior to encounter.     Current Outpatient Medications on File Prior to Encounter   Medication Sig    amLODIPine (NORVASC) 5 MG tablet Take 5 mg by mouth once daily.    azelastine (ASTELIN) 137 mcg (0.1 %) nasal spray 1 spray (137 mcg total) by Nasal route 2 (two) times daily.    cetirizine (ZYRTEC) 10 MG tablet Take 1 tablet (10 mg total) by mouth once daily.    tamsulosin (FLOMAX) 0.4 mg Cap Take 1 capsule (0.4 mg total) by mouth once daily.    traZODone (DESYREL) 100 MG tablet TAKE 1 TABLET BY MOUTH NIGHTLY AS NEEDED FOR INSOMNIA.    [DISCONTINUED] acetaminophen (TYLENOL) 500 MG tablet Take 2 tablets (1,000 mg total) by mouth every 8 (eight) hours as needed for Pain or Temperature greater than (100.4).    [DISCONTINUED] albuterol (PROVENTIL/VENTOLIN HFA) 90 mcg/actuation inhaler TAKE 2 PUFFS BY MOUTH EVERY 6 HOURS AS NEEDED FOR WHEEZE    [DISCONTINUED] amLODIPine (NORVASC) 5 MG tablet Take 1 tablet (5 mg total) by mouth once daily.    [DISCONTINUED] aspirin (ECOTRIN) 81 MG EC tablet TAKE 1 TABLET (81 MG TOTAL) BY MOUTH ONCE DAILY.    [DISCONTINUED] atorvastatin (LIPITOR) 40 MG tablet Take 1 tablet (40 mg total) by mouth every evening.    [DISCONTINUED] ciclopirox (PENLAC) 8 % Soln Apply topically nightly.    [DISCONTINUED] gabapentin (NEURONTIN) 300 MG capsule TAKE 1 CAPSULE (300 MG TOTAL) BY MOUTH THREE TIMES A DAY AS NEEDED    [DISCONTINUED] metoprolol tartrate (LOPRESSOR) 100 MG Tab Take 1 tablet (100 mg total) by mouth 2 (two) times daily.     Family History    None   "     Tobacco Use    Smoking status: Former     Types: Cigarettes    Smokeless tobacco: Never    Tobacco comments:     Quit 15 years ago   Substance and Sexual Activity    Alcohol use: Yes     Alcohol/week: 1.0 standard drink of alcohol     Types: 1 Standard drinks or equivalent per week     Comment: sometimes beer or [employer] [place]    Drug use: No    Sexual activity: Not Currently     Review of Systems   Constitutional:  Positive for fatigue. Negative for appetite change, chills and diaphoresis.   HENT:  Negative for congestion, sore throat and tinnitus.    Eyes:  Negative for pain, discharge and itching.   Respiratory:  Negative for cough, chest tightness, shortness of breath and wheezing.    Cardiovascular:  Negative for chest pain, palpitations and leg swelling.   Gastrointestinal:  Positive for abdominal pain and blood in stool. Negative for abdominal distention, constipation, diarrhea, nausea, rectal pain and vomiting.   Endocrine: Negative for cold intolerance, heat intolerance and polydipsia.   Genitourinary:  Negative for difficulty urinating, dysuria, flank pain, frequency and hematuria.   Musculoskeletal:  Negative for arthralgias and back pain.   Neurological:  Negative for dizziness, seizures, facial asymmetry, light-headedness, numbness and headaches.   Psychiatric/Behavioral:  Negative for agitation, confusion and hallucinations.      Objective:     Vital Signs (Most Recent):  Temp: 98.2 °F (36.8 °C) (02/19/24 2044)  Pulse: 87 (02/19/24 2044)  Resp: 20 (02/19/24 2044)  BP: (!) 167/74 (02/19/24 2044)  SpO2: 96 % (02/19/24 2044) Vital Signs (24h Range):  Temp:  [98.1 °F (36.7 °C)-98.6 °F (37 °C)] 98.2 °F (36.8 °C)  Pulse:  [82-98] 87  Resp:  [17-20] 20  SpO2:  [96 %-98 %] 96 %  BP: (125-167)/(60-84) 167/74     Weight: 102.5 kg (225 lb 15.5 oz)  Body mass index is 34.36 kg/m².     Physical Exam  Vitals reviewed.   Constitutional:       General: He is not in acute distress.     Appearance: Normal appearance.  He is obese. He is not ill-appearing.   HENT:      Head: Normocephalic and atraumatic.      Nose: Nose normal. No congestion or rhinorrhea.      Mouth/Throat:      Mouth: Mucous membranes are dry.   Eyes:      Extraocular Movements: Extraocular movements intact.      Conjunctiva/sclera: Conjunctivae normal.      Pupils: Pupils are equal, round, and reactive to light.   Cardiovascular:      Rate and Rhythm: Normal rate and regular rhythm.      Pulses: Normal pulses.      Heart sounds: Normal heart sounds.   Pulmonary:      Effort: Pulmonary effort is normal. No respiratory distress.      Breath sounds: Normal breath sounds. No stridor. No wheezing, rhonchi or rales.   Chest:      Chest wall: No tenderness.   Abdominal:      General: Abdomen is flat. Bowel sounds are normal. There is no distension.      Palpations: Abdomen is soft.      Tenderness: There is abdominal tenderness (left lower quadrant). There is no right CVA tenderness, left CVA tenderness, guarding or rebound.   Musculoskeletal:         General: No swelling or tenderness. Normal range of motion.      Cervical back: Normal range of motion and neck supple. No tenderness.      Right lower leg: No edema.      Left lower leg: No edema.   Skin:     General: Skin is warm and dry.   Neurological:      General: No focal deficit present.      Mental Status: He is alert and oriented to person, place, and time. Mental status is at baseline.      Cranial Nerves: No cranial nerve deficit.      Sensory: No sensory deficit.      Motor: Weakness (generalized) present.   Psychiatric:         Mood and Affect: Mood normal.         Behavior: Behavior normal.         Thought Content: Thought content normal.         Judgment: Judgment normal.              CRANIAL NERVES     CN III, IV, VI   Pupils are equal, round, and reactive to light.       Significant Labs: All pertinent labs within the past 24 hours have been reviewed.  Bilirubin:   Recent Labs   Lab 02/19/24  2606    BILITOT 0.6     CBC:   Recent Labs   Lab 02/19/24  1659   WBC 5.81   HGB 4.6*   HCT 15.6*        CMP:   Recent Labs   Lab 02/19/24  1659      K 4.3      CO2 26   GLU 99   BUN 17   CREATININE 1.6*   CALCIUM 8.5*   PROT 6.5   ALBUMIN 3.3*   BILITOT 0.6   ALKPHOS 60   AST 28   ALT 16   ANIONGAP 5*     Cardiac Markers:   Recent Labs   Lab 02/19/24  1659   BNP 33     Coagulation:   Recent Labs   Lab 02/19/24  1733   INR 1.1     Troponin:   Recent Labs   Lab 02/19/24  1659   TROPONINI 0.006       Significant Imaging: I have reviewed all pertinent imaging results/findings within the past 24 hours.  Imaging Results              CTA Acute GI [place], Abdomen and Pelvis (Final result)  Result time 02/19/24 19:10:05      Final result by Fabian Perez MD (02/19/24 19:10:05)                   Impression:      No active GI bleed.    Diverticulosis without diverticulitis, diego colonic.    LAD and circumflex coronary artery calcifications.      Electronically signed by: Fabian Perez  Date:    02/19/2024  Time:    19:10               Narrative:    EXAMINATION:  CTA ACUTE GI BLEED, ABDOMEN AND PELVIS    CLINICAL HISTORY:  GI bleed;    TECHNIQUE:  Using 75 cc of  Omnipaque 350 IV, and multi-detector helical CT technique, axial CT angiogram images of the abdomen were obtained from the lung bases through the pelvis. Precontrast and portal venous phase images of the abdomen and pelvis also done. 2D post-processing coronal and sagittal reconstructions of the abdominal aorta and visceral arteries performed.    COMPARISON:  None    FINDINGS:  The lung bases are well aerated.  No consolidation, suspicious nodules, or pleural effusion.  The visualized portions of the heart appear intact with coronary artery calcifications in the LAD and circumflex coronary artery.  Small sliding-type hiatal hernia..    The esophagus, stomach, spleen, pancreas, and adrenal glands are unremarkable.    The liver is normal in size and  attenuation without focal abnormality.  The portal veins appear patent.  The gallbladder shows no evidence of stones or cholecystitis.  No intra-or extrahepatic biliary ductal dilatation.    The kidneys demonstrate normal enhancement.  No renal mass, nephrolithiasis or hydronephrosis.  The ureters are normal in course and caliber. The urinary bladder appears unremarkable    Pan colonic diverticular disease.  No evidence of diverticulitis.  No evidence of gastrointestinal hemorrhage, bowel obstruction, or inflammation.  There is no ascites, free fluid, or intraperitoneal free air. No significant peritoneal or retroperitoneal adenopathy.    The abdominal aorta is normal in course and caliber without significant atherosclerotic calcifications.    The osseous structures and extraperitoneal soft tissues are intact with marked spondylosis and multilevel stenosis throughout the spine..  Fat containing inguinal hernia on the left.                                      Assessment/Plan:     * Acute blood loss anemia  Patient's anemia is currently uncontrolled. Has received 2 units of PRBCs on 2/19/2024 . Etiology likely d/t acute blood loss which was from lower GI  We will crossmatch 2 units of PRBC  H&H Q 8 hourly  Consult GI for colonoscopy.  Current CBC reviewed-   Lab Results   Component Value Date    HGB 4.6 (LL) 02/19/2024    HCT 15.6 (LL) 02/19/2024     Monitor serial CBC and transfuse if patient becomes hemodynamically unstable, symptomatic or H/H drops below 7/21.    GI bleed  He presented with hematochezia x1 week  Noticed blood with wiping each time, bright red blood on stool at times  Had similar history of lower GI bleed in the past   NM GI bleed study in 2015 showed no acute GI bleed after presenting with similar symptoms.  CTA acute GI bleed, abdomen and pelvis showed no acute GI bleed at this time, with diverticulosis without diverticulitis said to be pan colonic.  Previous colonoscopy noticed, patient denied  previous history of colon cancer   Only previous GI surgery was for ruptured appendicitis at the age of 12.  Hemoglobin was 4.6 at presentation, had urgent typed, grouped and crossmatched of 2 units of PRBC  We will consult GI to review  We will transfuse to hemoglobin of 7.    Diverticulosis of colon with hemorrhage of large intestine  Diverticulosis with no diverticulitis  Although complained of mild left lower quadrant abdominal pain  We will consult GI for possible colonoscopy  Out of caution we will give pantoprazole at this time for GI bleed.  No evidence of infection at this time, we will hold off on antibiotics      RAFAEL (acute kidney injury)  Patient with acute kidney injury/acute renal failure likely due to pre-renal azotemia due to IVVD RAFAEL is currently stable.   Presented with lower GI x 7 days ago  Baseline creatinine  1.4  - Labs reviewed- Renal function/electrolytes with Estimated Creatinine Clearance: 38.4 mL/min (A) (based on SCr of 1.6 mg/dL (H)). according to latest data. Monitor urine output and serial BMP and adjust therapy as needed. Avoid nephrotoxins and renally dose meds for GFR listed above.    Morbid (severe) obesity due to excess calories  Body mass index is 34.36 kg/m². Morbid obesity complicates all aspects of disease management from diagnostic modalities to treatment.   Weight loss encouraged and health benefits explained to patient.         Mixed hyperlipidemia  Continue lifestyle modification   Continue statin.    Stage 3b chronic kidney disease  Creatine stable for now. BMP reviewed- noted Estimated Creatinine Clearance: 38.4 mL/min (A) (based on SCr of 1.6 mg/dL (H)). according to latest data. Based on current GFR, CKD stage is stage 3 - GFR 30-59.    Acute kidney injury likely due to volume depletion in the setting of GI bleed and reduced oral intake.  Monitor UOP and serial BMP and adjust therapy as needed. Renally dose meds.   Avoid nephrotoxic medications and  procedures.    Essential hypertension  Chronic, uncontrolled. Latest blood pressure and vitals reviewed-     Temp:  [98.1 °F (36.7 °C)-98.6 °F (37 °C)]   Pulse:  [82-98]   Resp:  [17-20]   BP: (125-167)/(60-84)   SpO2:  [96 %-98 %] .   Home meds for hypertension were reviewed and noted below.   Hypertension Medications               amLODIPine (NORVASC) 5 MG tablet Take 5 mg by mouth once daily.            While in the hospital, will manage blood pressure as follows; Continue home antihypertensive regimen    Will utilize p.r.n. blood pressure medication only if patient's blood pressure greater than 180/110 and he develops symptoms such as worsening chest pain or shortness of breath.      VTE Risk Mitigation (From admission, onward)           Ordered     Reason for No Pharmacological VTE Prophylaxis  Once        Question:  Reasons:  Answer:  Active Bleeding    02/19/24 2117     IP VTE HIGH RISK PATIENT  Once         02/19/24 2117     Place sequential compression device  Until discontinued         02/19/24 2117                       Patient admitted under my care to hospital medicine service 2/19/2024          AdmissionCare    Guideline: Gastrointestinal Bleeding - INPT, Inpatient    Based on the indications selected for the patient, the bed status of Inpatient was determined to be MET    The following indications were selected as present at the time of evaluation of the patient:      - Anemia requiring inpatient admission, as indicated by ALL of the following:    - Presence of significant clinical finding, as indicated by 1 or more of the following:     - Exertional dyspnea    - Observation care treatment with transfusion or volume replacement is judged inappropriate (due to severity of finding) or has been ineffective.    AdmissionCare documentation entered by: Kathleen Garcia    [facility], 27th edition, Copyright © 2023 [facility], [facility] All Rights Reserved.  [id number]    Claus Lala MD  Department  of [facility] Medicine  [facility] - Telemetry

## 2024-02-20 NOTE — H&P (VIEW-ONLY)
[facility] Consultation Note    Patient Complaint: bloody stool    PCP:   Naa Alcazar       LOS: 1        Initial History of Present Illness (HPI):  This is a 84 y.o. male consulted to GI service for GI bleed. PMH hypertension, benign prostatic hyperplasia, diffuse colonic diverticulosis with previous GI bleed, tobacco use disorder with 40 pack-year cigarette smoking, quit 15 years ago. Patient complaint of acute onset of rectal bleeding with intermittent lower abdominal pain that began on last Monday. Denies sob, weakness, lightheadedness, n/v, hematemesis, diarrhea or constipation. Denies blood thinners, NSAIDs, smoking or drinking. Past colonoscopy in 2015, report below.      Admit lab hgb 4.6, bun 27        Medical History:  has a past medical history of Allergy and Hypertension.    Surgical History:  has a past surgical history that includes Appendectomy and Colonoscopy (Left, 10/19/2015).      Objective Findings:    Vital Signs:  Temp:  [97.8 °F (36.6 °C)-98.9 °F (37.2 °C)]   Pulse:  [74-98]   Resp:  [16-20]   BP: (116-167)/(53-84)   SpO2:  [94 %-100 %]   Body mass index is 34.36 kg/m².      Physical Exam  Vitals and nursing note reviewed.   Constitutional:       Appearance: Normal appearance.   HENT:      Head: Normocephalic.   Pulmonary:      Effort: Pulmonary effort is normal.   Abdominal:      General: Bowel sounds are normal.      Palpations: Abdomen is soft.   Skin:     General: Skin is warm and dry.   Neurological:      Mental Status: He is alert and oriented to person, place, and time.   Psychiatric:         Mood and Affect: Mood normal.         Behavior: Behavior normal.         Thought Content: Thought content normal.         Judgment: Judgment normal.               Labs:  Lab Results   Component Value Date    WBC 8.41 02/20/2024    HGB 7.6 (L) 02/20/2024    HCT 24.3 (L) 02/20/2024     02/20/2024    CHOL 109 (L) 11/03/2022    TRIG 62 11/03/2022    HDL 29 (L) 11/03/2022     ALT 16 2024    AST 28 2024     2024    K 4.3 2024     2024    CREATININE 1.6 (H) 2024    BUN 17 2024    CO2 26 2024    TSH 1.785 2023    INR 1.1 2024    HGBA1C 4.9 2021             Imagin/19 CTA Acute GI Bleed abdomen and pelvis- The esophagus, stomach, spleen, pancreas, and adrenal glands are unremarkable.     The liver is normal in size and attenuation without focal abnormality.  The portal veins appear patent.  The gallbladder shows no evidence of stones or cholecystitis.  No intra-or extrahepatic biliary ductal dilatation.   Pan colonic diverticular disease.  No evidence of diverticulitis.  No evidence of gastrointestinal hemorrhage, bowel obstruction, or inflammation.  There is no ascites, free fluid, or intraperitoneal free air. No significant peritoneal or retroperitoneal adenopathy.     The abdominal aorta is normal in course and caliber without significant atherosclerotic calcifications.         Endoscopy: 10/2015 Colonoscopy- Non-bleeding internal hemorrhoids.                        - Severe diverticulosis in the sigmoid colon, in the                        descending colon, in the transverse colon and in the                        ascending colon. There was no evidence of                        diverticular bleeding.                        - Many 2 to 4 mm polyps in the sigmoid colon. A                        representative sample was resected and retrieved.     I have independently reviewed and interpreted the imaging above           Acute blood loss anemia. GI bleed. BRBPR.  Plan/ Recommendations:  1. S/p 2 units prbc hgb now 7.6. Denies any more overt bleeding. Rec start ppi bid. Plan for dual endoscopy on tomorrow. Ok for clear liquids, bowel prep tonight and npo except for prep at midnight.        Thank you so much for allowing us to participate in the care of Alexanedr Rhoda Flowers . Please contact us if you have any  additional questions.    Maye Rollins NP  Gastroenterology  [facility]

## 2024-02-20 NOTE — HPI
"84-year-old  male with medical history significant for hypertension, benign prostatic hyperplasia, diffuse colonic diverticulosis with previous GI bleed, tobacco use disorder with 40 pack-year cigarette smoking, quit 15 years ago who presented to the emergency department on account bleeding per rectum of a week duration, he noticed blood when he wipes since super bowl day, denied previous constipation, denied abdominal pain, no nausea or vomiting reported.  However voiced left lower quadrant abdominal pain, said to be mild, nonradiating, no known relieving or aggravating factor.  He voiced previous history of lower GI bleed, he denied history of colon cancer, previous colonoscopy, NM GI bleed study were unrevealing.  He voiced previous history of abdominal surgery at age of 12 years for ruptured appendicitis.  Prior to presentation today he noticed lightheadedness and generalized weakness but no fall, denied fever, chills or rigors, no headache, neck pain, no upper respiratory tract symptoms.  Lab obtained in the emergency room at presentation showed H&H of 4.4/15.6 for which 2 units of blood was urgently typed, group and crossmatch for him, BUN creatinine 17/1.6, with baseline creatinine of 1.4.  CTA acute GI bleed, abdomen and pelvis showed "no active GI bleed.  Diverticulosis without diverticulitis, pan colonic"  "

## 2024-02-20 NOTE — ADMISSIONCARE
AdmissionCare    Guideline: Gastrointestinal Bleeding - INPT, Inpatient    Based on the indications selected for the patient, the bed status of Inpatient was determined to be MET    The following indications were selected as present at the time of evaluation of the patient:      - Anemia requiring inpatient admission, as indicated by ALL of the following:    - Presence of significant clinical finding, as indicated by 1 or more of the following:     - Exertional dyspnea    - Observation care treatment with transfusion or volume replacement is judged inappropriate (due to severity of finding) or has been ineffective.    AdmissionCare documentation entered by: Kathleen Garcia    [facility], 27th edition, Copyright © 2023 [facility], [facility] All Rights Reserved.  [id number]

## 2024-02-20 NOTE — PLAN OF CARE
Problem: Adult Inpatient Plan of Care  Goal: Optimal Comfort and Wellbeing  Outcome: Ongoing, Progressing  Intervention: Provide Person-Centered Care  Flowsheets (Taken 2/20/2024 1749)  Trust Relationship/Rapport:   care explained   questions encouraged   questions answered     Problem: Fall Injury Risk  Goal: Absence of Fall and Fall-Related Injury  Outcome: Ongoing, Progressing  Intervention: Promote Injury-Free Environment  Flowsheets (Taken 2/20/2024 1749)  Safety Promotion/Fall Prevention:   assistive device/personal item within reach   side rails raised x 2

## 2024-02-20 NOTE — ASSESSMENT & PLAN NOTE
Chronic, uncontrolled. Latest blood pressure and vitals reviewed-     Temp:  [98.1 °F (36.7 °C)-98.6 °F (37 °C)]   Pulse:  [82-98]   Resp:  [17-20]   BP: (125-167)/(60-84)   SpO2:  [96 %-98 %] .   Home meds for hypertension were reviewed and noted below.   Hypertension Medications               amLODIPine (NORVASC) 5 MG tablet Take 5 mg by mouth once daily.            While in the hospital, will manage blood pressure as follows; Continue home antihypertensive regimen    Will utilize p.r.n. blood pressure medication only if patient's blood pressure greater than 180/110 and he develops symptoms such as worsening chest pain or shortness of breath.

## 2024-02-20 NOTE — SUBJECTIVE & OBJECTIVE
Past Medical History:   Diagnosis Date    Allergy     Hypertension        Past Surgical History:   Procedure Laterality Date    APPENDECTOMY      COLONOSCOPY Left 10/19/2015    Procedure: COLONOSCOPY;  Surgeon: Randell Briceno MD;  Location: [facility];  Service: Endoscopy;  Laterality: Left;       Review of patient's allergies indicates:  No Known Allergies    No current facility-administered medications on file prior to encounter.     Current Outpatient Medications on File Prior to Encounter   Medication Sig    amLODIPine (NORVASC) 5 MG tablet Take 5 mg by mouth once daily.    azelastine (ASTELIN) 137 mcg (0.1 %) nasal spray 1 spray (137 mcg total) by Nasal route 2 (two) times daily.    cetirizine (ZYRTEC) 10 MG tablet Take 1 tablet (10 mg total) by mouth once daily.    tamsulosin (FLOMAX) 0.4 mg Cap Take 1 capsule (0.4 mg total) by mouth once daily.    traZODone (DESYREL) 100 MG tablet TAKE 1 TABLET BY MOUTH NIGHTLY AS NEEDED FOR INSOMNIA.    [DISCONTINUED] acetaminophen (TYLENOL) 500 MG tablet Take 2 tablets (1,000 mg total) by mouth every 8 (eight) hours as needed for Pain or Temperature greater than (100.4).    [DISCONTINUED] albuterol (PROVENTIL/VENTOLIN HFA) 90 mcg/actuation inhaler TAKE 2 PUFFS BY MOUTH EVERY 6 HOURS AS NEEDED FOR WHEEZE    [DISCONTINUED] amLODIPine (NORVASC) 5 MG tablet Take 1 tablet (5 mg total) by mouth once daily.    [DISCONTINUED] aspirin (ECOTRIN) 81 MG EC tablet TAKE 1 TABLET (81 MG TOTAL) BY MOUTH ONCE DAILY.    [DISCONTINUED] atorvastatin (LIPITOR) 40 MG tablet Take 1 tablet (40 mg total) by mouth every evening.    [DISCONTINUED] ciclopirox (PENLAC) 8 % Soln Apply topically nightly.    [DISCONTINUED] gabapentin (NEURONTIN) 300 MG capsule TAKE 1 CAPSULE (300 MG TOTAL) BY MOUTH THREE TIMES A DAY AS NEEDED    [DISCONTINUED] metoprolol tartrate (LOPRESSOR) 100 MG Tab Take 1 tablet (100 mg total) by mouth 2 (two) times daily.     Family History    None       Tobacco Use    Smoking  status: Former     Types: Cigarettes    Smokeless tobacco: Never    Tobacco comments:     Quit 15 years ago   Substance and Sexual Activity    Alcohol use: Yes     Alcohol/week: 1.0 standard drink of alcohol     Types: 1 Standard drinks or equivalent per week     Comment: sometimes beer or [place]    Drug use: No    Sexual activity: Not Currently     Review of Systems   Constitutional:  Positive for fatigue. Negative for appetite change, chills and diaphoresis.   HENT:  Negative for congestion, sore throat and tinnitus.    Eyes:  Negative for pain, discharge and itching.   Respiratory:  Negative for cough, chest tightness, shortness of breath and wheezing.    Cardiovascular:  Negative for chest pain, palpitations and leg swelling.   Gastrointestinal:  Positive for abdominal pain and blood in stool. Negative for abdominal distention, constipation, diarrhea, nausea, rectal pain and vomiting.   Endocrine: Negative for cold intolerance, heat intolerance and polydipsia.   Genitourinary:  Negative for difficulty urinating, dysuria, flank pain, frequency and hematuria.   Musculoskeletal:  Negative for arthralgias and back pain.   Neurological:  Negative for dizziness, seizures, facial asymmetry, light-headedness, numbness and headaches.   Psychiatric/Behavioral:  Negative for agitation, confusion and hallucinations.      Objective:     Vital Signs (Most Recent):  Temp: 98.2 °F (36.8 °C) (02/19/24 2044)  Pulse: 87 (02/19/24 2044)  Resp: 20 (02/19/24 2044)  BP: (!) 167/74 (02/19/24 2044)  SpO2: 96 % (02/19/24 2044) Vital Signs (24h Range):  Temp:  [98.1 °F (36.7 °C)-98.6 °F (37 °C)] 98.2 °F (36.8 °C)  Pulse:  [82-98] 87  Resp:  [17-20] 20  SpO2:  [96 %-98 %] 96 %  BP: (125-167)/(60-84) 167/74     Weight: 102.5 kg (225 lb 15.5 oz)  Body mass index is 34.36 kg/m².     Physical Exam  Vitals reviewed.   Constitutional:       General: He is not in acute distress.     Appearance: Normal appearance. He is obese. He is not  ill-appearing.   HENT:      Head: Normocephalic and atraumatic.      Nose: Nose normal. No congestion or rhinorrhea.      Mouth/Throat:      Mouth: Mucous membranes are dry.   Eyes:      Extraocular Movements: Extraocular movements intact.      Conjunctiva/sclera: Conjunctivae normal.      Pupils: Pupils are equal, round, and reactive to light.   Cardiovascular:      Rate and Rhythm: Normal rate and regular rhythm.      Pulses: Normal pulses.      Heart sounds: Normal heart sounds.   Pulmonary:      Effort: Pulmonary effort is normal. No respiratory distress.      Breath sounds: Normal breath sounds. No stridor. No wheezing, rhonchi or rales.   Chest:      Chest wall: No tenderness.   Abdominal:      General: Abdomen is flat. Bowel sounds are normal. There is no distension.      Palpations: Abdomen is soft.      Tenderness: There is abdominal tenderness (left lower quadrant). There is no right CVA tenderness, left CVA tenderness, guarding or rebound.   Musculoskeletal:         General: No swelling or tenderness. Normal range of motion.      Cervical back: Normal range of motion and neck supple. No tenderness.      Right lower leg: No edema.      Left lower leg: No edema.   Skin:     General: Skin is warm and dry.   Neurological:      General: No focal deficit present.      Mental Status: He is alert and oriented to person, place, and time. Mental status is at baseline.      Cranial Nerves: No cranial nerve deficit.      Sensory: No sensory deficit.      Motor: Weakness (generalized) present.   Psychiatric:         Mood and Affect: Mood normal.         Behavior: Behavior normal.         Thought Content: Thought content normal.         Judgment: Judgment normal.              CRANIAL NERVES     CN III, IV, VI   Pupils are equal, round, and reactive to light.       Significant Labs: All pertinent labs within the past 24 hours have been reviewed.  Bilirubin:   Recent Labs   Lab 02/19/24  1659   BILITOT 0.6     CBC:    Recent Labs   Lab 02/19/24  1659   WBC 5.81   HGB 4.6*   HCT 15.6*        CMP:   Recent Labs   Lab 02/19/24  1659      K 4.3      CO2 26   GLU 99   BUN 17   CREATININE 1.6*   CALCIUM 8.5*   PROT 6.5   ALBUMIN 3.3*   BILITOT 0.6   ALKPHOS 60   AST 28   ALT 16   ANIONGAP 5*     Cardiac Markers:   Recent Labs   Lab 02/19/24  1659   BNP 33     Coagulation:   Recent Labs   Lab 02/19/24  1733   INR 1.1     Troponin:   Recent Labs   Lab 02/19/24  1659   TROPONINI 0.006       Significant Imaging: I have reviewed all pertinent imaging results/findings within the past 24 hours.  Imaging Results              CTA Acute GI Iron Post, Abdomen and Pelvis (Final result)  Result time 02/19/24 19:10:05      Final result by Fabian Perez MD (02/19/24 19:10:05)                   Impression:      No active GI bleed.    Diverticulosis without diverticulitis, diego colonic.    LAD and circumflex coronary artery calcifications.      Electronically signed by: Fabian Perez  Date:    02/19/2024  Time:    19:10               Narrative:    EXAMINATION:  CTA ACUTE GI BLEED, ABDOMEN AND PELVIS    CLINICAL HISTORY:  GI bleed;    TECHNIQUE:  Using 75 cc of  Omnipaque 350 IV, and multi-detector helical CT technique, axial CT angiogram images of the abdomen were obtained from the lung bases through the pelvis. Precontrast and portal venous phase images of the abdomen and pelvis also done. 2D post-processing coronal and sagittal reconstructions of the abdominal aorta and visceral arteries performed.    COMPARISON:  None    FINDINGS:  The lung bases are well aerated.  No consolidation, suspicious nodules, or pleural effusion.  The visualized portions of the heart appear intact with coronary artery calcifications in the LAD and circumflex coronary artery.  Small sliding-type hiatal hernia..    The esophagus, stomach, spleen, pancreas, and adrenal glands are unremarkable.    The liver is normal in size and attenuation without  focal abnormality.  The portal veins appear patent.  The gallbladder shows no evidence of stones or cholecystitis.  No intra-or extrahepatic biliary ductal dilatation.    The kidneys demonstrate normal enhancement.  No renal mass, nephrolithiasis or hydronephrosis.  The ureters are normal in course and caliber. The urinary bladder appears unremarkable    Pan colonic diverticular disease.  No evidence of diverticulitis.  No evidence of gastrointestinal hemorrhage, bowel obstruction, or inflammation.  There is no ascites, free fluid, or intraperitoneal free air. No significant peritoneal or retroperitoneal adenopathy.    The abdominal aorta is normal in course and caliber without significant atherosclerotic calcifications.    The osseous structures and extraperitoneal soft tissues are intact with marked spondylosis and multilevel stenosis throughout the spine..  Fat containing inguinal hernia on the left.

## 2024-02-20 NOTE — ASSESSMENT & PLAN NOTE
Patient's anemia is currently uncontrolled. Has received 2 units of PRBCs on 2/19/2024 . Etiology likely d/t acute blood loss which was from lower GI  We will crossmatch 2 units of PRBC  H&H Q 8 hourly  Consult GI for colonoscopy.  Current CBC reviewed-   Lab Results   Component Value Date    HGB 4.6 (LL) 02/19/2024    HCT 15.6 (LL) 02/19/2024     Monitor serial CBC and transfuse if patient becomes hemodynamically unstable, symptomatic or H/H drops below 7/21.

## 2024-02-20 NOTE — ASSESSMENT & PLAN NOTE
He presented with hematochezia x1 week  Noticed blood with wiping each time, bright red blood on stool at times  Had similar history of lower GI bleed in the past   NM GI bleed study in 2015 showed no acute GI bleed after presenting with similar symptoms.  CTA acute GI bleed, abdomen and pelvis showed no acute GI bleed at this time, with diverticulosis without diverticulitis said to be pan colonic.  Previous colonoscopy noticed, patient denied previous history of colon cancer   Only previous GI surgery was for ruptured appendicitis at the age of 12.  Hemoglobin was 4.6 at presentation, had urgent typed, grouped and crossmatched of 2 units of PRBC  We will consult GI to review  We will transfuse to hemoglobin of 7.

## 2024-02-20 NOTE — PROGRESS NOTES
"[facility] Medicine  Progress Note    Patient Name: Alexander Duong Jr.  MRN: [id number]  Patient Class: IP- Inpatient   Admission Date: 2/19/2024  Length of Stay: 1 days  Attending Physician: Moi Cole, *  Primary Care Provider: Naa Alcazar MD        Subjective:     Principal Problem:Acute blood loss anemia        HPI:  84-year-old  male with medical history significant for hypertension, benign prostatic hyperplasia, diffuse colonic diverticulosis with previous GI bleed, tobacco use disorder with 40 pack-year cigarette smoking, quit 15 years ago who presented to the emergency department on account bleeding per rectum of a week duration, he noticed blood when he wipes since super bowl day, denied previous constipation, denied abdominal pain, no nausea or vomiting reported.  However voiced left lower quadrant abdominal pain, said to be mild, nonradiating, no known relieving or aggravating factor.  He voiced previous history of lower GI bleed, he denied history of colon cancer, previous colonoscopy, NM GI bleed study were unrevealing.  He voiced previous history of abdominal surgery at age of 12 years for ruptured appendicitis.  Prior to presentation today he noticed lightheadedness and generalized weakness but no fall, denied fever, chills or rigors, no headache, neck pain, no upper respiratory tract symptoms.  Lab obtained in the emergency room at presentation showed H&H of 4.4/15.6 for which 2 units of blood was urgently typed, group and crossmatch for him, BUN creatinine 17/1.6, with baseline creatinine of 1.4.  CTA acute GI bleed, abdomen and pelvis showed "no active GI bleed.  Diverticulosis without diverticulitis, pan colonic"    Overview/Hospital Course:  84-year-old  male with medical history significant for hypertension, benign prostatic hyperplasia, diffuse colonic diverticulosis with previous GI bleed, tobacco use disorder with 40 pack-year " "cigarette smoking, quit 15 years ago who presented to the emergency department on account bleeding per rectum of a week duration, .  He voiced previous history of lower GI bleed, he denied history of colon cancer, previous colonoscopy, NM GI bleed study were unrevealing.  He voiced previous history of abdominal surgery at age of 12 years for ruptured appendicitis.  Prior to presentation today he noticed lightheadedness and generalized weakness but no fall, denied fever, chills or rigors, no headache, neck pain, no upper respiratory tract symptoms.  Lab obtained in the emergency room at presentation showed H&H of 4.4/15.6 for which 2 units of blood was urgently typed, group and crossmatch for him, BUN creatinine 17/1.6, with baseline creatinine of 1.4.  CTA acute GI bleed, abdomen and pelvis showed "no active GI bleed.  Diverticulosis without diverticulitis, pan colonic"  Patient received 2 pack RBC and HH is pending,GI is consulted.    Past Medical History:   Diagnosis Date    Allergy     Hypertension        Past Surgical History:   Procedure Laterality Date    APPENDECTOMY      COLONOSCOPY Left 10/19/2015    Procedure: COLONOSCOPY;  Surgeon: Randell Briceno MD;  Location: [facility];  Service: Endoscopy;  Laterality: Left;       Review of patient's allergies indicates:  No Known Allergies    No current facility-administered medications on file prior to encounter.     Current Outpatient Medications on File Prior to Encounter   Medication Sig    amLODIPine (NORVASC) 5 MG tablet Take 5 mg by mouth once daily.    azelastine (ASTELIN) 137 mcg (0.1 %) nasal spray 1 spray (137 mcg total) by Nasal route 2 (two) times daily.    cetirizine (ZYRTEC) 10 MG tablet Take 1 tablet (10 mg total) by mouth once daily.    tamsulosin (FLOMAX) 0.4 mg Cap Take 1 capsule (0.4 mg total) by mouth once daily.    traZODone (DESYREL) 100 MG tablet TAKE 1 TABLET BY MOUTH NIGHTLY AS NEEDED FOR INSOMNIA.     Family History    None       Tobacco " Use    Smoking status: Former     Types: Cigarettes    Smokeless tobacco: Never    Tobacco comments:     Quit 15 years ago   Substance and Sexual Activity    Alcohol use: Yes     Alcohol/week: 1.0 standard drink of alcohol     Types: 1 Standard drinks or equivalent per week     Comment: sometimes beer or [employer] [place]    Drug use: No    Sexual activity: Not Currently     Review of Systems   Constitutional:  Positive for fatigue. Negative for appetite change, chills and diaphoresis.   HENT:  Negative for congestion, sore throat and tinnitus.    Eyes:  Negative for pain, discharge and itching.   Respiratory:  Negative for cough, chest tightness, shortness of breath and wheezing.    Cardiovascular:  Negative for chest pain, palpitations and leg swelling.   Gastrointestinal:  Positive for abdominal pain and blood in stool. Negative for abdominal distention, constipation, diarrhea, nausea, rectal pain and vomiting.   Endocrine: Negative for cold intolerance, heat intolerance and polydipsia.   Genitourinary:  Negative for difficulty urinating, dysuria, flank pain, frequency and hematuria.   Musculoskeletal:  Negative for arthralgias and back pain.   Neurological:  Negative for dizziness, seizures, facial asymmetry, light-headedness, numbness and headaches.   Psychiatric/Behavioral:  Negative for agitation, confusion and hallucinations.      Objective:     Vital Signs (Most Recent):  Temp: 98.5 °F (36.9 °C) (02/20/24 0729)  Pulse: 87 (02/20/24 0729)  Resp: 20 (02/20/24 0729)  BP: 135/64 (02/20/24 0729)  SpO2: 95 % (02/20/24 0729) Vital Signs (24h Range):  Temp:  [98.1 °F (36.7 °C)-98.9 °F (37.2 °C)] 98.5 °F (36.9 °C)  Pulse:  [74-98] 87  Resp:  [16-20] 20  SpO2:  [94 %-100 %] 95 %  BP: (116-167)/(53-84) 135/64     Weight: 102.5 kg (225 lb 15.5 oz)  Body mass index is 34.36 kg/m².     Physical Exam  Vitals reviewed.   Constitutional:       General: He is not in acute distress.     Appearance: Normal appearance. He is obese.  He is not ill-appearing.   HENT:      Head: Normocephalic and atraumatic.      Nose: Nose normal. No congestion or rhinorrhea.      Mouth/Throat:      Mouth: Mucous membranes are dry.   Eyes:      Extraocular Movements: Extraocular movements intact.      Conjunctiva/sclera: Conjunctivae normal.      Pupils: Pupils are equal, round, and reactive to light.   Cardiovascular:      Rate and Rhythm: Normal rate and regular rhythm.      Pulses: Normal pulses.      Heart sounds: Normal heart sounds.   Pulmonary:      Effort: Pulmonary effort is normal. No respiratory distress.      Breath sounds: Normal breath sounds. No stridor. No wheezing, rhonchi or rales.   Chest:      Chest wall: No tenderness.   Abdominal:      General: Abdomen is flat. Bowel sounds are normal. There is no distension.      Palpations: Abdomen is soft.      Tenderness: There is abdominal tenderness (left lower quadrant). There is no right CVA tenderness, left CVA tenderness, guarding or rebound.   Musculoskeletal:         General: No swelling or tenderness. Normal range of motion.      Cervical back: Normal range of motion and neck supple. No tenderness.      Right lower leg: No edema.      Left lower leg: No edema.   Skin:     General: Skin is warm and dry.   Neurological:      General: No focal deficit present.      Mental Status: He is alert and oriented to person, place, and time. Mental status is at baseline.      Cranial Nerves: No cranial nerve deficit.      Sensory: No sensory deficit.      Motor: Weakness (generalized) present.   Psychiatric:         Mood and Affect: Mood normal.         Behavior: Behavior normal.         Thought Content: Thought content normal.         Judgment: Judgment normal.              CRANIAL NERVES     CN III, IV, VI   Pupils are equal, round, and reactive to light.       Significant Labs: All pertinent labs within the past 24 hours have been reviewed.  Bilirubin:   Recent Labs   Lab 02/19/24  1659   BILITOT 0.6        CBC:   Recent Labs   Lab 02/19/24  1659   WBC 5.81   HGB 4.6*   HCT 15.6*          CMP:   Recent Labs   Lab 02/19/24  1659      K 4.3      CO2 26   GLU 99   BUN 17   CREATININE 1.6*   CALCIUM 8.5*   PROT 6.5   ALBUMIN 3.3*   BILITOT 0.6   ALKPHOS 60   AST 28   ALT 16   ANIONGAP 5*       Cardiac Markers:   Recent Labs   Lab 02/19/24  1659   BNP 33       Coagulation:   Recent Labs   Lab 02/19/24  1733   INR 1.1       Troponin:   Recent Labs   Lab 02/19/24  1659   TROPONINI 0.006         Significant Imaging: I have reviewed all pertinent imaging results/findings within the past 24 hours.  Imaging Results              CTA Acute GI [place], Abdomen and Pelvis (Final result)  Result time 02/19/24 19:10:05      Final result by Fabian Perez MD (02/19/24 19:10:05)                   Impression:      No active GI bleed.    Diverticulosis without diverticulitis, diego colonic.    LAD and circumflex coronary artery calcifications.      Electronically signed by: Fabian Perez  Date:    02/19/2024  Time:    19:10               Narrative:    EXAMINATION:  CTA ACUTE GI BLEED, ABDOMEN AND PELVIS    CLINICAL HISTORY:  GI bleed;    TECHNIQUE:  Using 75 cc of  Omnipaque 350 IV, and multi-detector helical CT technique, axial CT angiogram images of the abdomen were obtained from the lung bases through the pelvis. Precontrast and portal venous phase images of the abdomen and pelvis also done. 2D post-processing coronal and sagittal reconstructions of the abdominal aorta and visceral arteries performed.    COMPARISON:  None    FINDINGS:  The lung bases are well aerated.  No consolidation, suspicious nodules, or pleural effusion.  The visualized portions of the heart appear intact with coronary artery calcifications in the LAD and circumflex coronary artery.  Small sliding-type hiatal hernia..    The esophagus, stomach, spleen, pancreas, and adrenal glands are unremarkable.    The liver is normal in size and  attenuation without focal abnormality.  The portal veins appear patent.  The gallbladder shows no evidence of stones or cholecystitis.  No intra-or extrahepatic biliary ductal dilatation.    The kidneys demonstrate normal enhancement.  No renal mass, nephrolithiasis or hydronephrosis.  The ureters are normal in course and caliber. The urinary bladder appears unremarkable    Pan colonic diverticular disease.  No evidence of diverticulitis.  No evidence of gastrointestinal hemorrhage, bowel obstruction, or inflammation.  There is no ascites, free fluid, or intraperitoneal free air. No significant peritoneal or retroperitoneal adenopathy.    The abdominal aorta is normal in course and caliber without significant atherosclerotic calcifications.    The osseous structures and extraperitoneal soft tissues are intact with marked spondylosis and multilevel stenosis throughout the spine..  Fat containing inguinal hernia on the left.                                        Assessment/Plan:      * Acute blood loss anemia  Patient's anemia is currently uncontrolled. Has received 2 units of PRBCs on 2/19/2024 . Etiology likely d/t acute blood loss which was from lower GI  We will crossmatch 2 units of PRBC  H&H Q 8 hourly  Consult GI for colonoscopy.  Current CBC reviewed-   Lab Results   Component Value Date    HGB 4.6 (LL) 02/19/2024    HCT 15.6 (LL) 02/19/2024     Monitor serial CBC and transfuse if patient becomes hemodynamically unstable, symptomatic or H/H drops below 7/21.    Patient received 2 pack RBC and HH is pending,GI is consulted.    Aortic atherosclerosis  On medical treatments.      GI bleed  He presented with hematochezia x1 week  Noticed blood with wiping each time, bright red blood on stool at times  Had similar history of lower GI bleed in the past   NM GI bleed study in 2015 showed no acute GI bleed after presenting with similar symptoms.  CTA acute GI bleed, abdomen and pelvis showed no acute GI bleed at this  time, with diverticulosis without diverticulitis said to be pan colonic.  Previous colonoscopy noticed, patient denied previous history of colon cancer   Only previous GI surgery was for ruptured appendicitis at the age of 12.  Hemoglobin was 4.6 at presentation, had urgent typed, grouped and crossmatched of 2 units of PRBC  We will consult GI to review  We will transfuse to hemoglobin of 7.    Patient received 2 pack RBC and HH is pending,GI is consulted.    Diverticulosis of colon with hemorrhage of large intestine  Diverticulosis with no diverticulitis  Although complained of mild left lower quadrant abdominal pain  We will consult GI for possible colonoscopy  Out of caution we will give pantoprazole at this time for GI bleed.  No evidence of infection at this time, we will hold off on antibiotics      RAFAEL (acute kidney injury)  Patient with acute kidney injury/acute renal failure likely due to pre-renal azotemia due to IVVD RAFAEL is currently stable.   Presented with lower GI x 7 days ago  Baseline creatinine  1.4  - Labs reviewed- Renal function/electrolytes with Estimated Creatinine Clearance: 38.4 mL/min (A) (based on SCr of 1.6 mg/dL (H)). according to latest data. Monitor urine output and serial BMP and adjust therapy as needed. Avoid nephrotoxins and renally dose meds for GFR listed above.    Morbid (severe) obesity due to excess calories  Body mass index is 34.36 kg/m². Morbid obesity complicates all aspects of disease management from diagnostic modalities to treatment.   Weight loss encouraged and health benefits explained to patient.         Pulmonary hypertension  Per record,on lasix.      Mixed hyperlipidemia  Continue lifestyle modification   Continue statin.    Stage 3b chronic kidney disease  Creatine stable for now. BMP reviewed- noted Estimated Creatinine Clearance: 38.4 mL/min (A) (based on SCr of 1.6 mg/dL (H)). according to latest data. Based on current GFR, CKD stage is stage 3 - GFR 30-59.     Acute kidney injury likely due to volume depletion in the setting of GI bleed and reduced oral intake.  Monitor UOP and serial BMP and adjust therapy as needed. Renally dose meds.   Avoid nephrotoxic medications and procedures.    Essential hypertension  Chronic, uncontrolled. Latest blood pressure and vitals reviewed-     Temp:  [98.1 °F (36.7 °C)-98.6 °F (37 °C)]   Pulse:  [82-98]   Resp:  [17-20]   BP: (125-167)/(60-84)   SpO2:  [96 %-98 %] .   Home meds for hypertension were reviewed and noted below.   Hypertension Medications               amLODIPine (NORVASC) 5 MG tablet Take 5 mg by mouth once daily.            While in the hospital, will manage blood pressure as follows; Continue home antihypertensive regimen    Will utilize p.r.n. blood pressure medication only if patient's blood pressure greater than 180/110 and he develops symptoms such as worsening chest pain or shortness of breath.      VTE Risk Mitigation (From admission, onward)           Ordered     Reason for No Pharmacological VTE Prophylaxis  Once        Question:  Reasons:  Answer:  Active Bleeding    02/19/24 2117     IP VTE HIGH RISK PATIENT  Once         02/19/24 2117     Place sequential compression device  Until discontinued         02/19/24 2117                    Discharge Planning   TERRELL:      Code Status: Full Code   Is the patient medically ready for discharge?:     Reason for patient still in hospital (select all that apply): Patient trending condition                     Moi Cole MD  Department of Hospital Medicine   [facility]

## 2024-02-21 ENCOUNTER — ANESTHESIA (OUTPATIENT)
Dept: ENDOSCOPY | Facility: HOSPITAL | Age: 84
DRG: 378 | End: 2024-02-21
Payer: MEDICARE

## 2024-02-21 LAB
BASOPHILS # BLD AUTO: 0.02 K/UL (ref 0–0.2)
BASOPHILS NFR BLD: 0.3 % (ref 0–1.9)
DIFFERENTIAL METHOD BLD: ABNORMAL
EOSINOPHIL # BLD AUTO: 0.2 K/UL (ref 0–0.5)
EOSINOPHIL NFR BLD: 3 % (ref 0–8)
ERYTHROCYTE [DISTWIDTH] IN BLOOD BY AUTOMATED COUNT: 16 % (ref 11.5–14.5)
HCT VFR BLD AUTO: 22.8 % (ref 40–54)
HGB BLD-MCNC: 7.3 G/DL (ref 14–18)
IMM GRANULOCYTES # BLD AUTO: 0.03 K/UL (ref 0–0.04)
IMM GRANULOCYTES NFR BLD AUTO: 0.4 % (ref 0–0.5)
LYMPHOCYTES # BLD AUTO: 1.5 K/UL (ref 1–4.8)
LYMPHOCYTES NFR BLD: 19.9 % (ref 18–48)
MCH RBC QN AUTO: 28.7 PG (ref 27–31)
MCHC RBC AUTO-ENTMCNC: 32 G/DL (ref 32–36)
MCV RBC AUTO: 90 FL (ref 82–98)
MONOCYTES # BLD AUTO: 0.8 K/UL (ref 0.3–1)
MONOCYTES NFR BLD: 11.6 % (ref 4–15)
NEUTROPHILS # BLD AUTO: 4.7 K/UL (ref 1.8–7.7)
NEUTROPHILS NFR BLD: 64.8 % (ref 38–73)
NRBC BLD-RTO: 1 /100 WBC
PLATELET # BLD AUTO: 213 K/UL (ref 150–450)
PLATELET BLD QL SMEAR: ABNORMAL
PMV BLD AUTO: 10.8 FL (ref 9.2–12.9)
RBC # BLD AUTO: 2.54 M/UL (ref 4.6–6.2)
WBC # BLD AUTO: 7.27 K/UL (ref 3.9–12.7)

## 2024-02-21 PROCEDURE — 25000003 PHARM REV CODE 250: Performed by: STUDENT IN AN ORGANIZED HEALTH CARE EDUCATION/TRAINING PROGRAM

## 2024-02-21 PROCEDURE — 85025 COMPLETE CBC W/AUTO DIFF WBC: CPT | Performed by: NURSE PRACTITIONER

## 2024-02-21 PROCEDURE — C9113 INJ PANTOPRAZOLE SODIUM, VIA: HCPCS | Performed by: NURSE PRACTITIONER

## 2024-02-21 PROCEDURE — 97165 OT EVAL LOW COMPLEX 30 MIN: CPT

## 2024-02-21 PROCEDURE — 99232 SBSQ HOSP IP/OBS MODERATE 35: CPT | Mod: ,,, | Performed by: NURSE PRACTITIONER

## 2024-02-21 PROCEDURE — 25000003 PHARM REV CODE 250: Performed by: NURSE PRACTITIONER

## 2024-02-21 PROCEDURE — 36415 COLL VENOUS BLD VENIPUNCTURE: CPT | Performed by: NURSE PRACTITIONER

## 2024-02-21 PROCEDURE — 94761 N-INVAS EAR/PLS OXIMETRY MLT: CPT

## 2024-02-21 PROCEDURE — 25000003 PHARM REV CODE 250: Performed by: HOSPITALIST

## 2024-02-21 PROCEDURE — 63600175 PHARM REV CODE 636 W HCPCS: Performed by: NURSE PRACTITIONER

## 2024-02-21 PROCEDURE — 21400001 HC TELEMETRY ROOM

## 2024-02-21 RX ORDER — POLYETHYLENE GLYCOL 3350, SODIUM SULFATE ANHYDROUS, SODIUM BICARBONATE, SODIUM CHLORIDE, POTASSIUM CHLORIDE 236; 22.74; 6.74; 5.86; 2.97 G/4L; G/4L; G/4L; G/4L; G/4L
4000 POWDER, FOR SOLUTION ORAL ONCE
Status: DISCONTINUED | OUTPATIENT
Start: 2024-02-21 | End: 2024-02-23 | Stop reason: HOSPADM

## 2024-02-21 RX ADMIN — PANTOPRAZOLE SODIUM 40 MG: 40 INJECTION, POWDER, FOR SOLUTION INTRAVENOUS at 09:02

## 2024-02-21 RX ADMIN — POLYETHYLENE GLYCOL 3350, SODIUM SULFATE ANHYDROUS, SODIUM BICARBONATE, SODIUM CHLORIDE, POTASSIUM CHLORIDE 2000 ML: 236; 22.74; 6.74; 5.86; 2.97 POWDER, FOR SOLUTION ORAL at 02:02

## 2024-02-21 RX ADMIN — AMLODIPINE BESYLATE 5 MG: 5 TABLET ORAL at 09:02

## 2024-02-21 RX ADMIN — FUROSEMIDE 40 MG: 40 TABLET ORAL at 09:02

## 2024-02-21 RX ADMIN — TRAZODONE HYDROCHLORIDE 100 MG: 50 TABLET ORAL at 09:02

## 2024-02-21 RX ADMIN — TAMSULOSIN HYDROCHLORIDE 0.4 MG: 0.4 CAPSULE ORAL at 09:02

## 2024-02-21 NOTE — PLAN OF CARE
Problem: Occupational Therapy  Goal: Occupational Therapy Goal  Description: Goals to be met by: 03/21/2024      Patient will increase functional independence with ADLs by performing:    UE Dressing with Modified [place].  LE Dressing with Modified [place].  Grooming while standing with Modified [place].  Toileting from toilet with Modified [place] for hygiene and clothing management.   Toilet transfer to toilet with Modified [place].  Increased functional strength to WFL for self care.  Upper extremity exercise program x10 reps per handout, with independence.     Outcome: Ongoing, Progressing   Pt would benefit from continued OT to address deficits in self care and functional mobility. Recommending low intensity therapy; DME needs TBD pending progress. Pt asked about rollator; will defer to PT for most appropriate DME for ambulation.

## 2024-02-21 NOTE — NURSING
[facility] [place]  Nurses Note -- 4 Eyes      2/21/2024       Skin assessed on: Q Shift      [x] No Pressure Injuries Present    []Prevention Measures Documented    [] Yes LDA  for Pressure Injury Previously documented     [] Yes New Pressure Injury Discovered   [] LDA for New Pressure Injury Added      Attending RN:  Demi Baron RN     Second RN:  RAMILA Wiseman

## 2024-02-21 NOTE — PT/OT/SLP EVAL
"Occupational Therapy   Evaluation    Name: Alexander Duong Jr.  MRN: [id number]  Admitting Diagnosis: Acute blood loss anemia  Recent Surgery: Procedure(s) (LRB):  COLONOSCOPY (N/A)  EGD (ESOPHAGOGASTRODUODENOSCOPY) (N/A)      Recommendations:     Discharge Recommendations: Low Intensity Therapy  Discharge Equipment Recommendations:   (TBD)  Barriers to discharge:  Decreased caregiver support    Assessment:     Alexander Duong Jr. is a 84 y.o. male with a medical diagnosis of Acute blood loss anemia.  He presents with deconditioning. Performance deficits affecting function: weakness, impaired endurance, impaired self care skills, impaired functional mobility, gait instability, decreased lower extremity function, decreased upper extremity function, impaired cardiopulmonary response to activity.      Rehab Prognosis: Good; patient would benefit from acute skilled OT services to address these deficits and reach maximum level of function.       Plan:     Patient to be seen 3 x/week to address the above listed problems via self-care/home management, therapeutic activities, therapeutic exercises  Plan of Care Expires: 03/21/24  Plan of Care Reviewed with: patient    Subjective     Chief Complaint: Pt reports he is feeling a little better but still weaker than baseline. "Once I get a little better, I'll be just fine"  Patient/Family Comments/goals: To return to PLOF    Occupational Profile:  Living Environment: Pt lives alone in [facility], tub/  Previous level of function: Mod I to Indep with PRN use of hurry cane.  Roles and Routines: Caretaker to self and home. Light housework and meal prep but pt states niece completes most of grocery shopping and meal prep  Pt reports that he enjoys watching TV   Equipment Used at Home: cane, straight  Assistance upon Discharge: Niece but may be limited    Pain/Comfort:  Pain Rating 1: 0/10    Patients cultural, spiritual, Denominational conflicts given the current situation:      Objective: "     Communicated with: deborah prior to session.  Patient found HOB elevated with peripheral IV, telemetry upon OT entry to room.    General Precautions: Standard, fall  Orthopedic Precautions: N/A  Braces: N/A  Respiratory Status: Room air    Occupational Performance:    Bed Mobility:    Patient completed Rolling/Turning to Right with contact guard assistance  Patient completed Scooting/Bridging with contact guard assistance  Patient completed Supine to Sit with contact guard assistance and minimum assistance  Patient completed Sit to Supine with contact guard assistance    Functional Mobility/Transfers:  Patient completed Sit <> Stand Transfer with contact guard assistance  with  no assistive device   Functional Mobility: Pt with fair dynamic seated and standing balance.     Activities of Daily Living:  Upper Body Dressing: minimum assistance to don gown as robe seated EOB  Lower Body Dressing: stand by assistance to don/doff B socks seated EOB    Cognitive/Visual Perceptual:  Cognitive/Psychosocial Skills:     -       Oriented to: Person, Place, Time and Situation   -       Follows Commands/attention:Follows multistep  commands  -       Communication: clear/fluent  -       Memory: No Deficits noted  -       Safety awareness/insight to disability: intact   -       Mood/Affect/Coping skills/emotional control: Appropriate to situation    Physical Exam:  Postural examination/scapula alignment:    -       Rounded shoulders  Motor Planning:    -       WFL  Dominant hand:    -       R handed  Upper Extremity Range of Motion: BUE WFL     Upper Extremity Strength:  BUE WFL   Strength:  B hands WFL  Fine Motor Coordination:    -       Intact  Gross motor coordination:   WFL     AMPAC 6 Click ADL:  AMPAC Total Score: 20    Treatment & Education:  Pt educated on role of OT and POC.   Pt performing skills as listed above.     Patient left HOB elevated with all lines intact, call button in reach, bed alarm on, and nsg  notified    GOALS:   Multidisciplinary Problems       Occupational Therapy Goals          Problem: Occupational Therapy    Goal Priority Disciplines Outcome Interventions   Occupational Therapy Goal     OT, PT/OT Ongoing, Progressing    Description: Goals to be met by: 03/21/2024      Patient will increase functional independence with ADLs by performing:    UE Dressing with Modified [place].  LE Dressing with Modified [place].  Grooming while standing with Modified [place].  Toileting from toilet with Modified [place] for hygiene and clothing management.   Toilet transfer to toilet with Modified [place].  Increased functional strength to WFL for self care.  Upper extremity exercise program x10 reps per handout, with independence.                          History:     Past Medical History:   Diagnosis Date    Allergy     Hypertension          Past Surgical History:   Procedure Laterality Date    APPENDECTOMY      COLONOSCOPY Left 10/19/2015    Procedure: COLONOSCOPY;  Surgeon: Randell Briceno MD;  Location: [facility];  Service: Endoscopy;  Laterality: Left;       Time Tracking:     OT Date of Treatment: 02/21/24  OT Start Time: 1356  OT Stop Time: 1408  OT Total Time (min): 12 min    Billable Minutes:Evaluation 12    2/21/2024

## 2024-02-21 NOTE — PLAN OF CARE
Problem: Adult Inpatient Plan of Care  Goal: Optimal Comfort and Wellbeing  Outcome: Ongoing, Progressing  Goal: Readiness for Transition of Care  Outcome: Ongoing, Progressing     Problem: Adjustment to Illness (Gastrointestinal Bleeding)  Goal: Optimal Coping with Acute Illness  Outcome: Ongoing, Progressing     Problem: Fluid and Electrolyte Imbalance (Acute Kidney Injury/Impairment)  Goal: Fluid and Electrolyte Balance  Outcome: Ongoing, Progressing

## 2024-02-21 NOTE — ANESTHESIA PREPROCEDURE EVALUATION
02/21/2024  Alexander Duong Jr. is a 84 y.o., male.    Past Medical History:   Diagnosis Date    Allergy     Hypertension        Pre-op Assessment    I have reviewed the Patient Summary Reports.     I have reviewed the Nursing Notes. I have reviewed the NPO Status.   I have reviewed the Medications.     Review of Systems  Social:  Former Smoker       Hematology/Oncology:       -- Anemia:               Hematology Comments: Admission Hgb 4.4 >> 7.6 after 2 units?                    Cardiovascular:     Hypertension                                  Hypertension         Pulmonary:   COPD         Chronic Obstructive Pulmonary Disease (COPD):                      Renal/:  Chronic Renal Disease  BPH      Kidney Function/Disease             Hepatic/GI:        diverticulosis          Musculoskeletal:  Arthritis        Arthritis          Neurological:    Neuromuscular Disease,           Arthritis                         Neuromuscular Disease       Physical Exam  General: Well nourished, Cooperative, Alert and Oriented    Airway:  Mallampati: II / II  Mouth Opening: Normal  TM Distance: Normal  Tongue: Normal  Neck ROM: Normal ROM    Chest/Lungs:  Clear to auscultation, Normal Respiratory Rate    Heart:  Rate: Normal  Rhythm: Regular Rhythm  Sounds: Normal        Anesthesia Plan  Type of Anesthesia, risks & benefits discussed:    Anesthesia Type: Gen Natural Airway  Intra-op Monitoring Plan: Standard ASA Monitors  Induction:  IV  Informed Consent: Informed consent signed with the Patient and all parties understand the risks and agree with anesthesia plan.  All questions answered. Patient consented to blood products? Yes  ASA Score: 3  Day of Surgery Review of History & Physical: H&P Update referred to the surgeon/provider.    Ready For Surgery From Anesthesia Perspective.     .

## 2024-02-21 NOTE — PROGRESS NOTES
[facility]  Nurses Note -- 4 Eyes      2/20/2024       Skin assessed on: Q Shift      [x] No Pressure Injuries Present    []Prevention Measures Documented    [] Yes LDA  for Pressure Injury Previously documented     [] Yes New Pressure Injury Discovered   [] LDA for New Pressure Injury Added      Attending RN:  Ivone Shipley RN     Second RN:  Demi Baron RN

## 2024-02-21 NOTE — PROGRESS NOTES
[facility] Progress Note    Patient Complaint: bloody stool    PCP:   Naa Alcazar       LOS: 2        Initial History of Present Illness (HPI):  This is a 84 y.o. male consulted to GI service for GI bleed. PMH hypertension, benign prostatic hyperplasia, diffuse colonic diverticulosis with previous GI bleed, tobacco use disorder with 40 pack-year cigarette smoking, quit 15 years ago. Patient complaint of acute onset of rectal bleeding with intermittent lower abdominal pain that began on last Monday. Denies sob, weakness, lightheadedness, n/v, hematemesis, diarrhea or constipation. Denies blood thinners, NSAIDs, smoking or drinking. Past colonoscopy in 2015, report below.      Admit lab hgb 4.6, bun 27        Medical History:  has a past medical history of Allergy and Hypertension.    Surgical History:  has a past surgical history that includes Appendectomy and Colonoscopy (Left, 10/19/2015).    Interval History  S/p bowel brep. Stool still brow. Continue prep and ok for clears.      Objective Findings:    Vital Signs:  Temp:  [97.7 °F (36.5 °C)-99 °F (37.2 °C)]   Pulse:  [72-90]   Resp:  [18-20]   BP: (127-170)/(59-76)   SpO2:  [94 %-97 %]   Body mass index is 34.36 kg/m².      Physical Exam  Vitals and nursing note reviewed.   Constitutional:       Appearance: Normal appearance.   HENT:      Head: Normocephalic.   Pulmonary:      Effort: Pulmonary effort is normal.   Abdominal:      General: Bowel sounds are normal.      Palpations: Abdomen is soft.   Skin:     General: Skin is warm and dry.   Neurological:      Mental Status: He is alert and oriented to person, place, and time.   Psychiatric:         Mood and Affect: Mood normal.         Behavior: Behavior normal.         Thought Content: Thought content normal.         Judgment: Judgment normal.               Labs:  Lab Results   Component Value Date    WBC 7.27 02/21/2024    HGB 7.3 (L) 02/21/2024    HCT 22.8 (L) 02/21/2024      2024    CHOL 109 (L) 2022    TRIG 62 2022    HDL 29 (L) 2022    ALT 16 2024    AST 28 2024     2024    K 4.3 2024     2024    CREATININE 1.6 (H) 2024    BUN 17 2024    CO2 26 2024    TSH 1.785 2023    INR 1.1 2024    HGBA1C 4.9 2021             Imagin/19 CTA Acute GI Bleed abdomen and pelvis- The esophagus, stomach, spleen, pancreas, and adrenal glands are unremarkable.     The liver is normal in size and attenuation without focal abnormality.  The portal veins appear patent.  The gallbladder shows no evidence of stones or cholecystitis.  No intra-or extrahepatic biliary ductal dilatation.   Pan colonic diverticular disease.  No evidence of diverticulitis.  No evidence of gastrointestinal hemorrhage, bowel obstruction, or inflammation.  There is no ascites, free fluid, or intraperitoneal free air. No significant peritoneal or retroperitoneal adenopathy.     The abdominal aorta is normal in course and caliber without significant atherosclerotic calcifications.         Endoscopy: 10/2015 Colonoscopy- Non-bleeding internal hemorrhoids.                        - Severe diverticulosis in the sigmoid colon, in the                        descending colon, in the transverse colon and in the                        ascending colon. There was no evidence of                        diverticular bleeding.                        - Many 2 to 4 mm polyps in the sigmoid colon. A                        representative sample was resected and retrieved.     I have independently reviewed and interpreted the imaging above           Acute blood loss anemia. GI bleed. BRBPR.  Plan/ Recommendations:  1. Hgb 7.3 this am, stools still brown after prep. Extend prep, plan for colonoscopy Thursday    Denies any more overt bleeding. Rec start ppi bid. Plan for dual endoscopy on Thursday. Ok for clear liquids, bowel prep tonight and npo except  for prep at midnight.        Thank you so much for allowing us to participate in the care of Alexander Duong Jr. . Please contact us if you have any additional questions.    Maye Rollins NP  Gastroenterology  [facility] [place] [facility]

## 2024-02-21 NOTE — NURSING
Walked in patient's room and noticed remnants of beef stew and rice on his tray. The ticket on his tray said clear liquid but he was delivered a regular diet and he ate it. Dietary called to verify that they have him listed as a clear liquid to which they said they do. RHONDA Rollins notified of the mix up.

## 2024-02-21 NOTE — SUBJECTIVE & OBJECTIVE
Past Medical History:   Diagnosis Date    Allergy     Hypertension        Past Surgical History:   Procedure Laterality Date    APPENDECTOMY      COLONOSCOPY Left 10/19/2015    Procedure: COLONOSCOPY;  Surgeon: Randell Briceno MD;  Location: [facility];  Service: Endoscopy;  Laterality: Left;       Review of patient's allergies indicates:  No Known Allergies    No current facility-administered medications on file prior to encounter.     Current Outpatient Medications on File Prior to Encounter   Medication Sig    amLODIPine (NORVASC) 5 MG tablet Take 5 mg by mouth once daily.    azelastine (ASTELIN) 137 mcg (0.1 %) nasal spray 1 spray (137 mcg total) by Nasal route 2 (two) times daily.    cetirizine (ZYRTEC) 10 MG tablet Take 1 tablet (10 mg total) by mouth once daily.    tamsulosin (FLOMAX) 0.4 mg Cap Take 1 capsule (0.4 mg total) by mouth once daily.    traZODone (DESYREL) 100 MG tablet TAKE 1 TABLET BY MOUTH NIGHTLY AS NEEDED FOR INSOMNIA.     Family History    None       Tobacco Use    Smoking status: Former     Types: Cigarettes    Smokeless tobacco: Never    Tobacco comments:     Quit 15 years ago   Substance and Sexual Activity    Alcohol use: Yes     Alcohol/week: 1.0 standard drink of alcohol     Types: 1 Standard drinks or equivalent per week     Comment: sometimes beer or [place]    Drug use: No    Sexual activity: Not Currently     Review of Systems   Constitutional:  Positive for fatigue. Negative for appetite change, chills and diaphoresis.   HENT:  Negative for congestion, sore throat and tinnitus.    Eyes:  Negative for pain, discharge and itching.   Respiratory:  Negative for cough, chest tightness, shortness of breath and wheezing.    Cardiovascular:  Negative for chest pain, palpitations and leg swelling.   Gastrointestinal:  Positive for abdominal pain and blood in stool. Negative for abdominal distention, constipation, diarrhea, nausea, rectal pain and vomiting.   Endocrine: Negative for  cold intolerance, heat intolerance and polydipsia.   Genitourinary:  Negative for difficulty urinating, dysuria, flank pain, frequency and hematuria.   Musculoskeletal:  Negative for arthralgias and back pain.   Neurological:  Negative for dizziness, seizures, facial asymmetry, light-headedness, numbness and headaches.   Psychiatric/Behavioral:  Negative for agitation, confusion and hallucinations.      Objective:     Vital Signs (Most Recent):  Temp: 98.1 °F (36.7 °C) (02/21/24 0742)  Pulse: 84 (02/21/24 0805)  Resp: 18 (02/21/24 0742)  BP: (!) 146/71 (02/21/24 0955)  SpO2: 95 % (02/21/24 0742) Vital Signs (24h Range):  Temp:  [97.7 °F (36.5 °C)-99 °F (37.2 °C)] 98.1 °F (36.7 °C)  Pulse:  [72-90] 84  Resp:  [18-20] 18  SpO2:  [94 %-97 %] 95 %  BP: (127-170)/(59-76) 146/71     Weight: 102.5 kg (225 lb 15.5 oz)  Body mass index is 34.36 kg/m².     Physical Exam  Vitals reviewed.   Constitutional:       General: He is not in acute distress.     Appearance: Normal appearance. He is obese. He is not ill-appearing.   HENT:      Head: Normocephalic and atraumatic.      Nose: Nose normal. No congestion or rhinorrhea.      Mouth/Throat:      Mouth: Mucous membranes are dry.   Eyes:      Extraocular Movements: Extraocular movements intact.      Conjunctiva/sclera: Conjunctivae normal.      Pupils: Pupils are equal, round, and reactive to light.   Cardiovascular:      Rate and Rhythm: Normal rate and regular rhythm.      Pulses: Normal pulses.      Heart sounds: Normal heart sounds.   Pulmonary:      Effort: Pulmonary effort is normal. No respiratory distress.      Breath sounds: Normal breath sounds. No stridor. No wheezing, rhonchi or rales.   Chest:      Chest wall: No tenderness.   Abdominal:      General: Abdomen is flat. Bowel sounds are normal. There is no distension.      Palpations: Abdomen is soft.      Tenderness: There is abdominal tenderness (left lower quadrant). There is no right CVA tenderness, left CVA  tenderness, guarding or rebound.   Musculoskeletal:         General: No swelling or tenderness. Normal range of motion.      Cervical back: Normal range of motion and neck supple. No tenderness.      Right lower leg: No edema.      Left lower leg: No edema.   Skin:     General: Skin is warm and dry.   Neurological:      General: No focal deficit present.      Mental Status: He is alert and oriented to person, place, and time. Mental status is at baseline.      Cranial Nerves: No cranial nerve deficit.      Sensory: No sensory deficit.      Motor: Weakness (generalized) present.   Psychiatric:         Mood and Affect: Mood normal.         Behavior: Behavior normal.         Thought Content: Thought content normal.         Judgment: Judgment normal.              CRANIAL NERVES     CN III, IV, VI   Pupils are equal, round, and reactive to light.       Significant Labs: All pertinent labs within the past 24 hours have been reviewed.  Bilirubin:   Recent Labs   Lab 02/19/24  1659   BILITOT 0.6       CBC:   Recent Labs   Lab 02/20/24  1311 02/20/24  1831 02/21/24  0906   WBC 7.76 7.37 7.27   HGB 7.2* 7.3* 7.3*   HCT 23.3* 23.6* 22.8*    251 213       CMP:   Recent Labs   Lab 02/19/24  1659      K 4.3      CO2 26   GLU 99   BUN 17   CREATININE 1.6*   CALCIUM 8.5*   PROT 6.5   ALBUMIN 3.3*   BILITOT 0.6   ALKPHOS 60   AST 28   ALT 16   ANIONGAP 5*       Cardiac Markers:   Recent Labs   Lab 02/19/24  1659   BNP 33       Coagulation:   Recent Labs   Lab 02/19/24  1733   INR 1.1       Troponin:   Recent Labs   Lab 02/19/24  1659   TROPONINI 0.006         Significant Imaging: I have reviewed all pertinent imaging results/findings within the past 24 hours.  Imaging Results              CTA Acute GI [place], Abdomen and Pelvis (Final result)  Result time 02/19/24 19:10:05      Final result by Fabian Perez MD (02/19/24 19:10:05)                   Impression:      No active GI bleed.    Diverticulosis  without diverticulitis, pan colonic.    LAD and circumflex coronary artery calcifications.      Electronically signed by: Fabian Perez  Date:    02/19/2024  Time:    19:10               Narrative:    EXAMINATION:  CTA ACUTE GI BLEED, ABDOMEN AND PELVIS    CLINICAL HISTORY:  GI bleed;    TECHNIQUE:  Using 75 cc of  Omnipaque 350 IV, and multi-detector helical CT technique, axial CT angiogram images of the abdomen were obtained from the lung bases through the pelvis. Precontrast and portal venous phase images of the abdomen and pelvis also done. 2D post-processing coronal and sagittal reconstructions of the abdominal aorta and visceral arteries performed.    COMPARISON:  None    FINDINGS:  The lung bases are well aerated.  No consolidation, suspicious nodules, or pleural effusion.  The visualized portions of the heart appear intact with coronary artery calcifications in the LAD and circumflex coronary artery.  Small sliding-type hiatal hernia..    The esophagus, stomach, spleen, pancreas, and adrenal glands are unremarkable.    The liver is normal in size and attenuation without focal abnormality.  The portal veins appear patent.  The gallbladder shows no evidence of stones or cholecystitis.  No intra-or extrahepatic biliary ductal dilatation.    The kidneys demonstrate normal enhancement.  No renal mass, nephrolithiasis or hydronephrosis.  The ureters are normal in course and caliber. The urinary bladder appears unremarkable    Pan colonic diverticular disease.  No evidence of diverticulitis.  No evidence of gastrointestinal hemorrhage, bowel obstruction, or inflammation.  There is no ascites, free fluid, or intraperitoneal free air. No significant peritoneal or retroperitoneal adenopathy.    The abdominal aorta is normal in course and caliber without significant atherosclerotic calcifications.    The osseous structures and extraperitoneal soft tissues are intact with marked spondylosis and multilevel stenosis  throughout the spine..  Fat containing inguinal hernia on the left.

## 2024-02-21 NOTE — PLAN OF CARE
Problem: Fall Injury Risk  Goal: Absence of Fall and Fall-Related Injury  Outcome: Ongoing, Progressing  Intervention: Promote Injury-Free Environment  Flowsheets (Taken 2/21/2024 6975)  Safety Promotion/Fall Prevention:   assistive device/personal item within reach   side rails raised x 2     Problem: Bleeding (Gastrointestinal Bleeding)  Goal: Hemostasis  Intervention: Manage Gastrointestinal Bleeding  Flowsheets (Taken 2/21/2024 1758)  Bleeding Management: blood products administered

## 2024-02-21 NOTE — ASSESSMENT & PLAN NOTE
Patient's anemia is currently uncontrolled. Has received 2 units of PRBCs on 2/19/2024 . Etiology likely d/t acute blood loss which was from lower GI  We will crossmatch 2 units of PRBC  H&H Q 8 hourly  Consult GI for colonoscopy.  Current CBC reviewed-   Lab Results   Component Value Date    HGB 7.3 (L) 02/21/2024    HCT 22.8 (L) 02/21/2024     Monitor serial CBC and transfuse if patient becomes hemodynamically unstable, symptomatic or H/H drops below 7/21.    Patient received 2 pack RBC and HH is improving,GI is consulted.will have dual endoscopy in AM.

## 2024-02-21 NOTE — PROGRESS NOTES
"[facility] Medicine  Progress Note    Patient Name: Alexander Duong Jr.  MRN: [id number]  Patient Class: IP- Inpatient   Admission Date: 2/19/2024  Length of Stay: 2 days  Attending Physician: Moi Cole, *  Primary Care Provider: Naa Alcazar MD        Subjective:     Principal Problem:Acute blood loss anemia        HPI:  84-year-old  male with medical history significant for hypertension, benign prostatic hyperplasia, diffuse colonic diverticulosis with previous GI bleed, tobacco use disorder with 40 pack-year cigarette smoking, quit 15 years ago who presented to the emergency department on account bleeding per rectum of a week duration, he noticed blood when he wipes since super bowl day, denied previous constipation, denied abdominal pain, no nausea or vomiting reported.  However voiced left lower quadrant abdominal pain, said to be mild, nonradiating, no known relieving or aggravating factor.  He voiced previous history of lower GI bleed, he denied history of colon cancer, previous colonoscopy, NM GI bleed study were unrevealing.  He voiced previous history of abdominal surgery at age of 12 years for ruptured appendicitis.  Prior to presentation today he noticed lightheadedness and generalized weakness but no fall, denied fever, chills or rigors, no headache, neck pain, no upper respiratory tract symptoms.  Lab obtained in the emergency room at presentation showed H&H of 4.4/15.6 for which 2 units of blood was urgently typed, group and crossmatch for him, BUN creatinine 17/1.6, with baseline creatinine of 1.4.  CTA acute GI bleed, abdomen and pelvis showed "no active GI bleed.  Diverticulosis without diverticulitis, pan colonic"    Overview/Hospital Course:  84-year-old  male with medical history significant for hypertension, benign prostatic hyperplasia, diffuse colonic diverticulosis with previous GI bleed, tobacco use disorder with 40 pack-year " "cigarette smoking, quit 15 years ago who presented to the emergency department on account bleeding per rectum of a week duration, .  He voiced previous history of lower GI bleed, he denied history of colon cancer, previous colonoscopy, NM GI bleed study were unrevealing.  He voiced previous history of abdominal surgery at age of 12 years for ruptured appendicitis.  Prior to presentation today he noticed lightheadedness and generalized weakness but no fall, denied fever, chills or rigors, no headache, neck pain, no upper respiratory tract symptoms.  Lab obtained in the emergency room at presentation showed H&H of 4.4/15.6 for which 2 units of blood was urgently typed, group and crossmatch for him, BUN creatinine 17/1.6, with baseline creatinine of 1.4.  CTA acute GI bleed, abdomen and pelvis showed "no active GI bleed.  Diverticulosis without diverticulitis, pan colonic"  Patient received 2 pack RBC and HH is improving.,GI is consulted.will have dual endoscopy in AM.    Past Medical History:   Diagnosis Date    Allergy     Hypertension        Past Surgical History:   Procedure Laterality Date    APPENDECTOMY      COLONOSCOPY Left 10/19/2015    Procedure: COLONOSCOPY;  Surgeon: Randell Briceno MD;  Location: [facility];  Service: Endoscopy;  Laterality: Left;       Review of patient's allergies indicates:  No Known Allergies    No current facility-administered medications on file prior to encounter.     Current Outpatient Medications on File Prior to Encounter   Medication Sig    amLODIPine (NORVASC) 5 MG tablet Take 5 mg by mouth once daily.    azelastine (ASTELIN) 137 mcg (0.1 %) nasal spray 1 spray (137 mcg total) by Nasal route 2 (two) times daily.    cetirizine (ZYRTEC) 10 MG tablet Take 1 tablet (10 mg total) by mouth once daily.    tamsulosin (FLOMAX) 0.4 mg Cap Take 1 capsule (0.4 mg total) by mouth once daily.    traZODone (DESYREL) 100 MG tablet TAKE 1 TABLET BY MOUTH NIGHTLY AS NEEDED FOR INSOMNIA. "     Family History    None       Tobacco Use    Smoking status: Former     Types: Cigarettes    Smokeless tobacco: Never    Tobacco comments:     Quit 15 years ago   Substance and Sexual Activity    Alcohol use: Yes     Alcohol/week: 1.0 standard drink of alcohol     Types: 1 Standard drinks or equivalent per week     Comment: sometimes beer or crown royal    Drug use: No    Sexual activity: Not Currently     Review of Systems   Constitutional:  Positive for fatigue. Negative for appetite change, chills and diaphoresis.   HENT:  Negative for congestion, sore throat and tinnitus.    Eyes:  Negative for pain, discharge and itching.   Respiratory:  Negative for cough, chest tightness, shortness of breath and wheezing.    Cardiovascular:  Negative for chest pain, palpitations and leg swelling.   Gastrointestinal:  Positive for abdominal pain and blood in stool. Negative for abdominal distention, constipation, diarrhea, nausea, rectal pain and vomiting.   Endocrine: Negative for cold intolerance, heat intolerance and polydipsia.   Genitourinary:  Negative for difficulty urinating, dysuria, flank pain, frequency and hematuria.   Musculoskeletal:  Negative for arthralgias and back pain.   Neurological:  Negative for dizziness, seizures, facial asymmetry, light-headedness, numbness and headaches.   Psychiatric/Behavioral:  Negative for agitation, confusion and hallucinations.      Objective:     Vital Signs (Most Recent):  Temp: 98.1 °F (36.7 °C) (02/21/24 0742)  Pulse: 84 (02/21/24 0805)  Resp: 18 (02/21/24 0742)  BP: (!) 146/71 (02/21/24 0955)  SpO2: 95 % (02/21/24 0742) Vital Signs (24h Range):  Temp:  [97.7 °F (36.5 °C)-99 °F (37.2 °C)] 98.1 °F (36.7 °C)  Pulse:  [72-90] 84  Resp:  [18-20] 18  SpO2:  [94 %-97 %] 95 %  BP: (127-170)/(59-76) 146/71     Weight: 102.5 kg (225 lb 15.5 oz)  Body mass index is 34.36 kg/m².     Physical Exam  Vitals reviewed.   Constitutional:       General: He is not in acute distress.      Appearance: Normal appearance. He is obese. He is not ill-appearing.   HENT:      Head: Normocephalic and atraumatic.      Nose: Nose normal. No congestion or rhinorrhea.      Mouth/Throat:      Mouth: Mucous membranes are dry.   Eyes:      Extraocular Movements: Extraocular movements intact.      Conjunctiva/sclera: Conjunctivae normal.      Pupils: Pupils are equal, round, and reactive to light.   Cardiovascular:      Rate and Rhythm: Normal rate and regular rhythm.      Pulses: Normal pulses.      Heart sounds: Normal heart sounds.   Pulmonary:      Effort: Pulmonary effort is normal. No respiratory distress.      Breath sounds: Normal breath sounds. No stridor. No wheezing, rhonchi or rales.   Chest:      Chest wall: No tenderness.   Abdominal:      General: Abdomen is flat. Bowel sounds are normal. There is no distension.      Palpations: Abdomen is soft.      Tenderness: There is abdominal tenderness (left lower quadrant). There is no right CVA tenderness, left CVA tenderness, guarding or rebound.   Musculoskeletal:         General: No swelling or tenderness. Normal range of motion.      Cervical back: Normal range of motion and neck supple. No tenderness.      Right lower leg: No edema.      Left lower leg: No edema.   Skin:     General: Skin is warm and dry.   Neurological:      General: No focal deficit present.      Mental Status: He is alert and oriented to person, place, and time. Mental status is at baseline.      Cranial Nerves: No cranial nerve deficit.      Sensory: No sensory deficit.      Motor: Weakness (generalized) present.   Psychiatric:         Mood and Affect: Mood normal.         Behavior: Behavior normal.         Thought Content: Thought content normal.         Judgment: Judgment normal.              CRANIAL NERVES     CN III, IV, VI   Pupils are equal, round, and reactive to light.       Significant Labs: All pertinent labs within the past 24 hours have been reviewed.  Bilirubin:   Recent  Labs   Lab 02/19/24  1659   BILITOT 0.6       CBC:   Recent Labs   Lab 02/20/24  1311 02/20/24  1831 02/21/24  0906   WBC 7.76 7.37 7.27   HGB 7.2* 7.3* 7.3*   HCT 23.3* 23.6* 22.8*    251 213       CMP:   Recent Labs   Lab 02/19/24  1659      K 4.3      CO2 26   GLU 99   BUN 17   CREATININE 1.6*   CALCIUM 8.5*   PROT 6.5   ALBUMIN 3.3*   BILITOT 0.6   ALKPHOS 60   AST 28   ALT 16   ANIONGAP 5*       Cardiac Markers:   Recent Labs   Lab 02/19/24  1659   BNP 33       Coagulation:   Recent Labs   Lab 02/19/24  1733   INR 1.1       Troponin:   Recent Labs   Lab 02/19/24  1659   TROPONINI 0.006         Significant Imaging: I have reviewed all pertinent imaging results/findings within the past 24 hours.  Imaging Results              CTA Acute GI [place], Abdomen and Pelvis (Final result)  Result time 02/19/24 19:10:05      Final result by Fabian Perez MD (02/19/24 19:10:05)                   Impression:      No active GI bleed.    Diverticulosis without diverticulitis, diego colonic.    LAD and circumflex coronary artery calcifications.      Electronically signed by: Fabian Perez  Date:    02/19/2024  Time:    19:10               Narrative:    EXAMINATION:  CTA ACUTE GI BLEED, ABDOMEN AND PELVIS    CLINICAL HISTORY:  GI bleed;    TECHNIQUE:  Using 75 cc of  Omnipaque 350 IV, and multi-detector helical CT technique, axial CT angiogram images of the abdomen were obtained from the lung bases through the pelvis. Precontrast and portal venous phase images of the abdomen and pelvis also done. 2D post-processing coronal and sagittal reconstructions of the abdominal aorta and visceral arteries performed.    COMPARISON:  None    FINDINGS:  The lung bases are well aerated.  No consolidation, suspicious nodules, or pleural effusion.  The visualized portions of the heart appear intact with coronary artery calcifications in the LAD and circumflex coronary artery.  Small sliding-type hiatal hernia..    The  esophagus, stomach, spleen, pancreas, and adrenal glands are unremarkable.    The liver is normal in size and attenuation without focal abnormality.  The portal veins appear patent.  The gallbladder shows no evidence of stones or cholecystitis.  No intra-or extrahepatic biliary ductal dilatation.    The kidneys demonstrate normal enhancement.  No renal mass, nephrolithiasis or hydronephrosis.  The ureters are normal in course and caliber. The urinary bladder appears unremarkable    Pan colonic diverticular disease.  No evidence of diverticulitis.  No evidence of gastrointestinal hemorrhage, bowel obstruction, or inflammation.  There is no ascites, free fluid, or intraperitoneal free air. No significant peritoneal or retroperitoneal adenopathy.    The abdominal aorta is normal in course and caliber without significant atherosclerotic calcifications.    The osseous structures and extraperitoneal soft tissues are intact with marked spondylosis and multilevel stenosis throughout the spine..  Fat containing inguinal hernia on the left.                                        Assessment/Plan:      * Acute blood loss anemia  Patient's anemia is currently uncontrolled. Has received 2 units of PRBCs on 2/19/2024 . Etiology likely d/t acute blood loss which was from lower GI  We will crossmatch 2 units of PRBC  H&H Q 8 hourly  Consult GI for colonoscopy.  Current CBC reviewed-   Lab Results   Component Value Date    HGB 7.3 (L) 02/21/2024    HCT 22.8 (L) 02/21/2024     Monitor serial CBC and transfuse if patient becomes hemodynamically unstable, symptomatic or H/H drops below 7/21.    Patient received 2 pack RBC and HH is improving,GI is consulted.will have dual endoscopy in AM.    Aortic atherosclerosis  On medical treatments.      GI bleed  He presented with hematochezia x1 week  Noticed blood with wiping each time, bright red blood on stool at times  Had similar history of lower GI bleed in the past   NM GI bleed study in  2015 showed no acute GI bleed after presenting with similar symptoms.  CTA acute GI bleed, abdomen and pelvis showed no acute GI bleed at this time, with diverticulosis without diverticulitis said to be pan colonic.  Previous colonoscopy noticed, patient denied previous history of colon cancer   Only previous GI surgery was for ruptured appendicitis at the age of 12.  Hemoglobin was 4.6 at presentation, had urgent typed, grouped and crossmatched of 2 units of PRBC  We will consult GI to review  We will transfuse to hemoglobin of 7.    Patient received 2 pack RBC and HH is pending,GI is consulted.    Diverticulosis of colon with hemorrhage of large intestine  Diverticulosis with no diverticulitis  Although complained of mild left lower quadrant abdominal pain  We will consult GI for possible colonoscopy  Out of caution we will give pantoprazole at this time for GI bleed.  No evidence of infection at this time, we will hold off on antibiotics      RAFAEL (acute kidney injury)  Patient with acute kidney injury/acute renal failure likely due to pre-renal azotemia due to IVVD RAFAEL is currently stable.   Presented with lower GI x 7 days ago  Baseline creatinine  1.4  - Labs reviewed- Renal function/electrolytes with Estimated Creatinine Clearance: 38.4 mL/min (A) (based on SCr of 1.6 mg/dL (H)). according to latest data. Monitor urine output and serial BMP and adjust therapy as needed. Avoid nephrotoxins and renally dose meds for GFR listed above.    Morbid (severe) obesity due to excess calories  Body mass index is 34.36 kg/m². Morbid obesity complicates all aspects of disease management from diagnostic modalities to treatment.   Weight loss encouraged and health benefits explained to patient.         Pulmonary hypertension  Per record,on lasix.      Mixed hyperlipidemia  Continue lifestyle modification   Continue statin.    Stage 3b chronic kidney disease  Creatine stable for now. BMP reviewed- noted Estimated Creatinine  Clearance: 38.4 mL/min (A) (based on SCr of 1.6 mg/dL (H)). according to latest data. Based on current GFR, CKD stage is stage 3 - GFR 30-59.    Acute kidney injury likely due to volume depletion in the setting of GI bleed and reduced oral intake.  Monitor UOP and serial BMP and adjust therapy as needed. Renally dose meds.   Avoid nephrotoxic medications and procedures.    Essential hypertension  Chronic, uncontrolled. Latest blood pressure and vitals reviewed-     Temp:  [98.1 °F (36.7 °C)-98.6 °F (37 °C)]   Pulse:  [82-98]   Resp:  [17-20]   BP: (125-167)/(60-84)   SpO2:  [96 %-98 %] .   Home meds for hypertension were reviewed and noted below.   Hypertension Medications               amLODIPine (NORVASC) 5 MG tablet Take 5 mg by mouth once daily.            While in the hospital, will manage blood pressure as follows; Continue home antihypertensive regimen    Will utilize p.r.n. blood pressure medication only if patient's blood pressure greater than 180/110 and he develops symptoms such as worsening chest pain or shortness of breath.      VTE Risk Mitigation (From admission, onward)           Ordered     Reason for No Pharmacological VTE Prophylaxis  Once        Question:  Reasons:  Answer:  Active Bleeding    02/19/24 2117     IP VTE HIGH RISK PATIENT  Once         02/19/24 2117     Place sequential compression device  Until discontinued         02/19/24 2117                    Discharge Planning   TERRELL:      Code Status: Full Code   Is the patient medically ready for discharge?:     Reason for patient still in hospital (select all that apply): Patient trending condition  Discharge Plan A: Home                  Moi Cole MD  Department of Hospital Medicine   [facility]

## 2024-02-22 LAB
BASOPHILS # BLD AUTO: 0.02 K/UL (ref 0–0.2)
BASOPHILS NFR BLD: 0.3 % (ref 0–1.9)
DIFFERENTIAL METHOD BLD: ABNORMAL
EOSINOPHIL # BLD AUTO: 0.2 K/UL (ref 0–0.5)
EOSINOPHIL NFR BLD: 3.2 % (ref 0–8)
ERYTHROCYTE [DISTWIDTH] IN BLOOD BY AUTOMATED COUNT: 15.9 % (ref 11.5–14.5)
HCT VFR BLD AUTO: 25.2 % (ref 40–54)
HGB BLD-MCNC: 8 G/DL (ref 14–18)
IMM GRANULOCYTES # BLD AUTO: 0.01 K/UL (ref 0–0.04)
IMM GRANULOCYTES NFR BLD AUTO: 0.1 % (ref 0–0.5)
LYMPHOCYTES # BLD AUTO: 1.5 K/UL (ref 1–4.8)
LYMPHOCYTES NFR BLD: 21.2 % (ref 18–48)
MCH RBC QN AUTO: 29.2 PG (ref 27–31)
MCHC RBC AUTO-ENTMCNC: 31.7 G/DL (ref 32–36)
MCV RBC AUTO: 92 FL (ref 82–98)
MONOCYTES # BLD AUTO: 0.7 K/UL (ref 0.3–1)
MONOCYTES NFR BLD: 10.1 % (ref 4–15)
NEUTROPHILS # BLD AUTO: 4.6 K/UL (ref 1.8–7.7)
NEUTROPHILS NFR BLD: 65.1 % (ref 38–73)
NRBC BLD-RTO: 0 /100 WBC
PLATELET # BLD AUTO: 241 K/UL (ref 150–450)
PMV BLD AUTO: 9.3 FL (ref 9.2–12.9)
RBC # BLD AUTO: 2.74 M/UL (ref 4.6–6.2)
WBC # BLD AUTO: 7.13 K/UL (ref 3.9–12.7)

## 2024-02-22 PROCEDURE — 37000008 HC ANESTHESIA 1ST 15 MINUTES: Performed by: INTERNAL MEDICINE

## 2024-02-22 PROCEDURE — 25000003 PHARM REV CODE 250: Performed by: NURSE ANESTHETIST, CERTIFIED REGISTERED

## 2024-02-22 PROCEDURE — 94761 N-INVAS EAR/PLS OXIMETRY MLT: CPT

## 2024-02-22 PROCEDURE — 85025 COMPLETE CBC W/AUTO DIFF WBC: CPT | Performed by: ANESTHESIOLOGY

## 2024-02-22 PROCEDURE — 43235 EGD DIAGNOSTIC BRUSH WASH: CPT | Mod: ,,, | Performed by: INTERNAL MEDICINE

## 2024-02-22 PROCEDURE — 97161 PT EVAL LOW COMPLEX 20 MIN: CPT

## 2024-02-22 PROCEDURE — 36415 COLL VENOUS BLD VENIPUNCTURE: CPT | Performed by: ANESTHESIOLOGY

## 2024-02-22 PROCEDURE — 25000003 PHARM REV CODE 250: Performed by: STUDENT IN AN ORGANIZED HEALTH CARE EDUCATION/TRAINING PROGRAM

## 2024-02-22 PROCEDURE — 63600175 PHARM REV CODE 636 W HCPCS: Performed by: NURSE PRACTITIONER

## 2024-02-22 PROCEDURE — 43235 EGD DIAGNOSTIC BRUSH WASH: CPT | Performed by: INTERNAL MEDICINE

## 2024-02-22 PROCEDURE — 63600175 PHARM REV CODE 636 W HCPCS: Performed by: NURSE ANESTHETIST, CERTIFIED REGISTERED

## 2024-02-22 PROCEDURE — 25000003 PHARM REV CODE 250: Performed by: HOSPITALIST

## 2024-02-22 PROCEDURE — 97530 THERAPEUTIC ACTIVITIES: CPT

## 2024-02-22 PROCEDURE — D9220A PRA ANESTHESIA: Mod: ANES,,, | Performed by: ANESTHESIOLOGY

## 2024-02-22 PROCEDURE — 97116 GAIT TRAINING THERAPY: CPT

## 2024-02-22 PROCEDURE — 21400001 HC TELEMETRY ROOM

## 2024-02-22 PROCEDURE — C9113 INJ PANTOPRAZOLE SODIUM, VIA: HCPCS | Performed by: NURSE PRACTITIONER

## 2024-02-22 PROCEDURE — 97535 SELF CARE MNGMENT TRAINING: CPT

## 2024-02-22 PROCEDURE — D9220A PRA ANESTHESIA: Mod: CRNA,,, | Performed by: NURSE ANESTHETIST, CERTIFIED REGISTERED

## 2024-02-22 PROCEDURE — 0DJ08ZZ INSPECTION OF UPPER INTESTINAL TRACT, VIA NATURAL OR ARTIFICIAL OPENING ENDOSCOPIC: ICD-10-PCS | Performed by: INTERNAL MEDICINE

## 2024-02-22 RX ORDER — LIDOCAINE HYDROCHLORIDE 10 MG/ML
1 INJECTION, SOLUTION EPIDURAL; INFILTRATION; INTRACAUDAL; PERINEURAL ONCE
Status: DISCONTINUED | OUTPATIENT
Start: 2024-02-22 | End: 2024-02-23 | Stop reason: HOSPADM

## 2024-02-22 RX ORDER — PROPOFOL 10 MG/ML
INJECTION, EMULSION INTRAVENOUS
Status: DISPENSED
Start: 2024-02-22 | End: 2024-02-23

## 2024-02-22 RX ORDER — SODIUM CHLORIDE 9 MG/ML
INJECTION, SOLUTION INTRAVENOUS CONTINUOUS
Status: DISCONTINUED | OUTPATIENT
Start: 2024-02-22 | End: 2024-02-23 | Stop reason: HOSPADM

## 2024-02-22 RX ORDER — LIDOCAINE HYDROCHLORIDE 20 MG/ML
INJECTION, SOLUTION EPIDURAL; INFILTRATION; INTRACAUDAL; PERINEURAL
Status: DISPENSED
Start: 2024-02-22 | End: 2024-02-23

## 2024-02-22 RX ORDER — LIDOCAINE HYDROCHLORIDE 20 MG/ML
INJECTION INTRAVENOUS
Status: DISCONTINUED | OUTPATIENT
Start: 2024-02-22 | End: 2024-02-22

## 2024-02-22 RX ORDER — PROPOFOL 10 MG/ML
VIAL (ML) INTRAVENOUS
Status: DISCONTINUED | OUTPATIENT
Start: 2024-02-22 | End: 2024-02-22

## 2024-02-22 RX ORDER — LIDOCAINE HYDROCHLORIDE 40 MG/ML
SOLUTION TOPICAL
Status: DISCONTINUED | OUTPATIENT
Start: 2024-02-22 | End: 2024-02-22

## 2024-02-22 RX ADMIN — LIDOCAINE HYDROCHLORIDE 50 MG: 20 INJECTION, SOLUTION INTRAVENOUS at 12:02

## 2024-02-22 RX ADMIN — AZELASTINE HYDROCHLORIDE 137 MCG: 137 SPRAY, METERED NASAL at 09:02

## 2024-02-22 RX ADMIN — FUROSEMIDE 40 MG: 40 TABLET ORAL at 08:02

## 2024-02-22 RX ADMIN — TAMSULOSIN HYDROCHLORIDE 0.4 MG: 0.4 CAPSULE ORAL at 08:02

## 2024-02-22 RX ADMIN — SODIUM CHLORIDE: 0.9 INJECTION, SOLUTION INTRAVENOUS at 12:02

## 2024-02-22 RX ADMIN — PROPOFOL 70 MG: 10 INJECTION, EMULSION INTRAVENOUS at 12:02

## 2024-02-22 RX ADMIN — TRAZODONE HYDROCHLORIDE 100 MG: 50 TABLET ORAL at 09:02

## 2024-02-22 RX ADMIN — GLYCOPYRROLATE 0.1 MG: 0.2 INJECTION, SOLUTION INTRAMUSCULAR; INTRAVITREAL at 12:02

## 2024-02-22 RX ADMIN — PANTOPRAZOLE SODIUM 40 MG: 40 INJECTION, POWDER, FOR SOLUTION INTRAVENOUS at 09:02

## 2024-02-22 RX ADMIN — AMLODIPINE BESYLATE 5 MG: 5 TABLET ORAL at 08:02

## 2024-02-22 RX ADMIN — LIDOCAINE HYDROCHLORIDE 4 ML: 40 SOLUTION TOPICAL at 12:02

## 2024-02-22 RX ADMIN — PANTOPRAZOLE SODIUM 40 MG: 40 INJECTION, POWDER, FOR SOLUTION INTRAVENOUS at 08:02

## 2024-02-22 NOTE — SUBJECTIVE & OBJECTIVE
Past Medical History:   Diagnosis Date    Allergy     Hypertension        Past Surgical History:   Procedure Laterality Date    APPENDECTOMY      COLONOSCOPY Left 10/19/2015    Procedure: COLONOSCOPY;  Surgeon: Randell Briceno MD;  Location: [facility];  Service: Endoscopy;  Laterality: Left;       Review of patient's allergies indicates:  No Known Allergies    No current facility-administered medications on file prior to encounter.     Current Outpatient Medications on File Prior to Encounter   Medication Sig    amLODIPine (NORVASC) 5 MG tablet Take 5 mg by mouth once daily.    azelastine (ASTELIN) 137 mcg (0.1 %) nasal spray 1 spray (137 mcg total) by Nasal route 2 (two) times daily.    cetirizine (ZYRTEC) 10 MG tablet Take 1 tablet (10 mg total) by mouth once daily.    tamsulosin (FLOMAX) 0.4 mg Cap Take 1 capsule (0.4 mg total) by mouth once daily.    traZODone (DESYREL) 100 MG tablet TAKE 1 TABLET BY MOUTH NIGHTLY AS NEEDED FOR INSOMNIA.     Family History    None       Tobacco Use    Smoking status: Former     Types: Cigarettes    Smokeless tobacco: Never    Tobacco comments:     Quit 15 years ago   Substance and Sexual Activity    Alcohol use: Yes     Alcohol/week: 1.0 standard drink of alcohol     Types: 1 Standard drinks or equivalent per week     Comment: sometimes beer or crown royal    Drug use: No    Sexual activity: Not Currently     Review of Systems   Constitutional:  Positive for fatigue. Negative for appetite change, chills and diaphoresis.   HENT:  Negative for congestion, sore throat and tinnitus.    Eyes:  Negative for pain, discharge and itching.   Respiratory:  Negative for cough, chest tightness, shortness of breath and wheezing.    Cardiovascular:  Negative for chest pain, palpitations and leg swelling.   Gastrointestinal:  Positive for abdominal pain and blood in stool. Negative for abdominal distention, constipation, diarrhea, nausea, rectal pain and vomiting.   Endocrine: Negative for  cold intolerance, heat intolerance and polydipsia.   Genitourinary:  Negative for difficulty urinating, dysuria, flank pain, frequency and hematuria.   Musculoskeletal:  Negative for arthralgias and back pain.   Neurological:  Negative for dizziness, seizures, facial asymmetry, light-headedness, numbness and headaches.   Psychiatric/Behavioral:  Negative for agitation, confusion and hallucinations.      Objective:     Vital Signs (Most Recent):  Temp: 98.9 °F (37.2 °C) (02/22/24 0746)  Pulse: 80 (02/22/24 0746)  Resp: 18 (02/22/24 0746)  BP: (!) 148/67 (02/22/24 0746)  SpO2: 97 % (02/22/24 0746) Vital Signs (24h Range):  Temp:  [98 °F (36.7 °C)-98.9 °F (37.2 °C)] 98.9 °F (37.2 °C)  Pulse:  [72-90] 80  Resp:  [16-20] 18  SpO2:  [92 %-98 %] 97 %  BP: (114-166)/(50-80) 148/67     Weight: 102.5 kg (225 lb 15.5 oz)  Body mass index is 34.36 kg/m².     Physical Exam  Vitals reviewed.   Constitutional:       General: He is not in acute distress.     Appearance: Normal appearance. He is obese. He is not ill-appearing.   HENT:      Head: Normocephalic and atraumatic.      Nose: Nose normal. No congestion or rhinorrhea.      Mouth/Throat:      Mouth: Mucous membranes are dry.   Eyes:      Extraocular Movements: Extraocular movements intact.      Conjunctiva/sclera: Conjunctivae normal.      Pupils: Pupils are equal, round, and reactive to light.   Cardiovascular:      Rate and Rhythm: Normal rate and regular rhythm.      Pulses: Normal pulses.      Heart sounds: Normal heart sounds.   Pulmonary:      Effort: Pulmonary effort is normal. No respiratory distress.      Breath sounds: Normal breath sounds. No stridor. No wheezing, rhonchi or rales.   Chest:      Chest wall: No tenderness.   Abdominal:      General: Abdomen is flat. Bowel sounds are normal. There is no distension.      Palpations: Abdomen is soft.      Tenderness: There is abdominal tenderness (left lower quadrant). There is no right CVA tenderness, left CVA  "tenderness, guarding or rebound.   Musculoskeletal:         General: No swelling or tenderness. Normal range of motion.      Cervical back: Normal range of motion and neck supple. No tenderness.      Right lower leg: No edema.      Left lower leg: No edema.   Skin:     General: Skin is warm and dry.   Neurological:      General: No focal deficit present.      Mental Status: He is alert and oriented to person, place, and time. Mental status is at baseline.      Cranial Nerves: No cranial nerve deficit.      Sensory: No sensory deficit.      Motor: Weakness (generalized) present.   Psychiatric:         Mood and Affect: Mood normal.         Behavior: Behavior normal.         Thought Content: Thought content normal.         Judgment: Judgment normal.              CRANIAL NERVES     CN III, IV, VI   Pupils are equal, round, and reactive to light.       Significant Labs: All pertinent labs within the past 24 hours have been reviewed.  Bilirubin:   Recent Labs   Lab 02/19/24  1659   BILITOT 0.6       CBC:   Recent Labs   Lab 02/20/24  1311 02/20/24  1831 02/21/24  0906   WBC 7.76 7.37 7.27   HGB 7.2* 7.3* 7.3*   HCT 23.3* 23.6* 22.8*    251 213       CMP:   No results for input(s): "NA", "K", "CL", "CO2", "GLU", "BUN", "CREATININE", "CALCIUM", "PROT", "ALBUMIN", "BILITOT", "ALKPHOS", "AST", "ALT", "ANIONGAP", "EGFRNONAA" in the last 48 hours.    Invalid input(s): "ESTGFAFRICA"    Cardiac Markers:   No results for input(s): "CKMB", "MYOGLOBIN", "BNP", "TROPISTAT" in the last 48 hours.    Coagulation:   No results for input(s): "PT", "INR", "APTT" in the last 48 hours.    Troponin:   No results for input(s): "TROPONINI", "TROPONINIHS" in the last 48 hours.      Significant Imaging: I have reviewed all pertinent imaging results/findings within the past 24 hours.  Imaging Results              CTA Acute GI [place], Abdomen and Pelvis (Final result)  Result time 02/19/24 19:10:05      Final result by Fabian Perez MD " (02/19/24 19:10:05)                   Impression:      No active GI bleed.    Diverticulosis without diverticulitis, diego colonic.    LAD and circumflex coronary artery calcifications.      Electronically signed by: Fabian Perez  Date:    02/19/2024  Time:    19:10               Narrative:    EXAMINATION:  CTA ACUTE GI BLEED, ABDOMEN AND PELVIS    CLINICAL HISTORY:  GI bleed;    TECHNIQUE:  Using 75 cc of  Omnipaque 350 IV, and multi-detector helical CT technique, axial CT angiogram images of the abdomen were obtained from the lung bases through the pelvis. Precontrast and portal venous phase images of the abdomen and pelvis also done. 2D post-processing coronal and sagittal reconstructions of the abdominal aorta and visceral arteries performed.    COMPARISON:  None    FINDINGS:  The lung bases are well aerated.  No consolidation, suspicious nodules, or pleural effusion.  The visualized portions of the heart appear intact with coronary artery calcifications in the LAD and circumflex coronary artery.  Small sliding-type hiatal hernia..    The esophagus, stomach, spleen, pancreas, and adrenal glands are unremarkable.    The liver is normal in size and attenuation without focal abnormality.  The portal veins appear patent.  The gallbladder shows no evidence of stones or cholecystitis.  No intra-or extrahepatic biliary ductal dilatation.    The kidneys demonstrate normal enhancement.  No renal mass, nephrolithiasis or hydronephrosis.  The ureters are normal in course and caliber. The urinary bladder appears unremarkable    Pan colonic diverticular disease.  No evidence of diverticulitis.  No evidence of gastrointestinal hemorrhage, bowel obstruction, or inflammation.  There is no ascites, free fluid, or intraperitoneal free air. No significant peritoneal or retroperitoneal adenopathy.    The abdominal aorta is normal in course and caliber without significant atherosclerotic calcifications.    The osseous structures  and extraperitoneal soft tissues are intact with marked spondylosis and multilevel stenosis throughout the spine..  Fat containing inguinal hernia on the left.

## 2024-02-22 NOTE — ANESTHESIA POSTPROCEDURE EVALUATION
Anesthesia Post Evaluation    Patient: Alexander Duong JrTodd    Procedure(s) Performed: Procedure(s) (LRB):  EGD (ESOPHAGOGASTRODUODENOSCOPY) (N/A)    Final Anesthesia Type: general      Patient location during evaluation: PACU  Patient participation: Yes- Able to Participate  Level of consciousness: awake and alert  Post-procedure vital signs: reviewed and stable  Pain management: adequate  Airway patency: patent    PONV status at discharge: No PONV  Anesthetic complications: no      Respiratory status: spontaneous ventilation and room air  Hydration status: euvolemic  Follow-up not needed.              Vitals Value Taken Time   /62 02/22/24 1315   Temp 36.4 °C (97.5 °F) 02/22/24 1245   Pulse 77 02/22/24 1315   Resp 17 02/22/24 1315   SpO2 99 % 02/22/24 1315         Event Time   Out of Recovery 02/22/2024 13:30:31         Pain/Howard Score: Howard Score: 9 (2/22/2024  1:00 PM)

## 2024-02-22 NOTE — PLAN OF CARE
Problem: Physical Therapy  Goal: Physical Therapy Goal  Outcome: Adequate for Care Transition   Initial PT evaluation performed today.  Pt OK for OOB to chair and ambulate to bathroom with nursing staff.  PT to sign off.  Low Intensity Therapy and RW recommended at time of D/C.

## 2024-02-22 NOTE — PLAN OF CARE
Problem: Fall Injury Risk  Goal: Absence of Fall and Fall-Related Injury  Outcome: Ongoing, Progressing     Problem: Adjustment to Illness (Gastrointestinal Bleeding)  Goal: Optimal Coping with Acute Illness  Outcome: Ongoing, Progressing     Problem: Fluid and Electrolyte Imbalance (Acute Kidney Injury/Impairment)  Goal: Fluid and Electrolyte Balance  Outcome: Ongoing, Progressing

## 2024-02-22 NOTE — PROGRESS NOTES
"[facility] Medicine  Progress Note    Patient Name: Alexander Duong Jr.  MRN: [id number]  Patient Class: IP- Inpatient   Admission Date: 2/19/2024  Length of Stay: 3 days  Attending Physician: Moi Cole, *  Primary Care Provider: Naa Alcazar MD        Subjective:     Principal Problem:Acute blood loss anemia        HPI:  84-year-old  male with medical history significant for hypertension, benign prostatic hyperplasia, diffuse colonic diverticulosis with previous GI bleed, tobacco use disorder with 40 pack-year cigarette smoking, quit 15 years ago who presented to the emergency department on account bleeding per rectum of a week duration, he noticed blood when he wipes since super bowl day, denied previous constipation, denied abdominal pain, no nausea or vomiting reported.  However voiced left lower quadrant abdominal pain, said to be mild, nonradiating, no known relieving or aggravating factor.  He voiced previous history of lower GI bleed, he denied history of colon cancer, previous colonoscopy, NM GI bleed study were unrevealing.  He voiced previous history of abdominal surgery at age of 12 years for ruptured appendicitis.  Prior to presentation today he noticed lightheadedness and generalized weakness but no fall, denied fever, chills or rigors, no headache, neck pain, no upper respiratory tract symptoms.  Lab obtained in the emergency room at presentation showed H&H of 4.4/15.6 for which 2 units of blood was urgently typed, group and crossmatch for him, BUN creatinine 17/1.6, with baseline creatinine of 1.4.  CTA acute GI bleed, abdomen and pelvis showed "no active GI bleed.  Diverticulosis without diverticulitis, pan colonic"    Overview/Hospital Course:  84-year-old  male with medical history significant for hypertension, benign prostatic hyperplasia, diffuse colonic diverticulosis with previous GI bleed, tobacco use disorder with 40 pack-year " "cigarette smoking, quit 15 years ago who presented to the emergency department on account bleeding per rectum of a week duration, .  He voiced previous history of lower GI bleed, he denied history of colon cancer, previous colonoscopy, NM GI bleed study were unrevealing.  He voiced previous history of abdominal surgery at age of 12 years for ruptured appendicitis.  Prior to presentation today he noticed lightheadedness and generalized weakness but no fall, denied fever, chills or rigors, no headache, neck pain, no upper respiratory tract symptoms.  Lab obtained in the emergency room at presentation showed H&H of 4.4/15.6 for which 2 units of blood was urgently typed, group and crossmatch for him, BUN creatinine 17/1.6, with baseline creatinine of 1.4.  CTA acute GI bleed, abdomen and pelvis showed "no active GI bleed.  Diverticulosis without diverticulitis, pan colonic"  Patient received 2 pack RBC and HH is improving.,GI is consulted.will have dual endoscopy today,but patient was non complains with Golytely.    Past Medical History:   Diagnosis Date    Allergy     Hypertension        Past Surgical History:   Procedure Laterality Date    APPENDECTOMY      COLONOSCOPY Left 10/19/2015    Procedure: COLONOSCOPY;  Surgeon: Randell Briceno MD;  Location: [facility];  Service: Endoscopy;  Laterality: Left;       Review of patient's allergies indicates:  No Known Allergies    No current facility-administered medications on file prior to encounter.     Current Outpatient Medications on File Prior to Encounter   Medication Sig    amLODIPine (NORVASC) 5 MG tablet Take 5 mg by mouth once daily.    azelastine (ASTELIN) 137 mcg (0.1 %) nasal spray 1 spray (137 mcg total) by Nasal route 2 (two) times daily.    cetirizine (ZYRTEC) 10 MG tablet Take 1 tablet (10 mg total) by mouth once daily.    tamsulosin (FLOMAX) 0.4 mg Cap Take 1 capsule (0.4 mg total) by mouth once daily.    traZODone (DESYREL) 100 MG tablet TAKE 1 TABLET BY " MOUTH NIGHTLY AS NEEDED FOR INSOMNIA.     Family History    None       Tobacco Use    Smoking status: Former     Types: Cigarettes    Smokeless tobacco: Never    Tobacco comments:     Quit 15 years ago   Substance and Sexual Activity    Alcohol use: Yes     Alcohol/week: 1.0 standard drink of alcohol     Types: 1 Standard drinks or equivalent per week     Comment: sometimes beer or [employer] royal    Drug use: No    Sexual activity: Not Currently     Review of Systems   Constitutional:  Positive for fatigue. Negative for appetite change, chills and diaphoresis.   HENT:  Negative for congestion, sore throat and tinnitus.    Eyes:  Negative for pain, discharge and itching.   Respiratory:  Negative for cough, chest tightness, shortness of breath and wheezing.    Cardiovascular:  Negative for chest pain, palpitations and leg swelling.   Gastrointestinal:  Positive for abdominal pain and blood in stool. Negative for abdominal distention, constipation, diarrhea, nausea, rectal pain and vomiting.   Endocrine: Negative for cold intolerance, heat intolerance and polydipsia.   Genitourinary:  Negative for difficulty urinating, dysuria, flank pain, frequency and hematuria.   Musculoskeletal:  Negative for arthralgias and back pain.   Neurological:  Negative for dizziness, seizures, facial asymmetry, light-headedness, numbness and headaches.   Psychiatric/Behavioral:  Negative for agitation, confusion and hallucinations.      Objective:     Vital Signs (Most Recent):  Temp: 98.9 °F (37.2 °C) (02/22/24 0746)  Pulse: 80 (02/22/24 0746)  Resp: 18 (02/22/24 0746)  BP: (!) 148/67 (02/22/24 0746)  SpO2: 97 % (02/22/24 0746) Vital Signs (24h Range):  Temp:  [98 °F (36.7 °C)-98.9 °F (37.2 °C)] 98.9 °F (37.2 °C)  Pulse:  [72-90] 80  Resp:  [16-20] 18  SpO2:  [92 %-98 %] 97 %  BP: (114-166)/(50-80) 148/67     Weight: 102.5 kg (225 lb 15.5 oz)  Body mass index is 34.36 kg/m².     Physical Exam  Vitals reviewed.   Constitutional:        General: He is not in acute distress.     Appearance: Normal appearance. He is obese. He is not ill-appearing.   HENT:      Head: Normocephalic and atraumatic.      Nose: Nose normal. No congestion or rhinorrhea.      Mouth/Throat:      Mouth: Mucous membranes are dry.   Eyes:      Extraocular Movements: Extraocular movements intact.      Conjunctiva/sclera: Conjunctivae normal.      Pupils: Pupils are equal, round, and reactive to light.   Cardiovascular:      Rate and Rhythm: Normal rate and regular rhythm.      Pulses: Normal pulses.      Heart sounds: Normal heart sounds.   Pulmonary:      Effort: Pulmonary effort is normal. No respiratory distress.      Breath sounds: Normal breath sounds. No stridor. No wheezing, rhonchi or rales.   Chest:      Chest wall: No tenderness.   Abdominal:      General: Abdomen is flat. Bowel sounds are normal. There is no distension.      Palpations: Abdomen is soft.      Tenderness: There is abdominal tenderness (left lower quadrant). There is no right CVA tenderness, left CVA tenderness, guarding or rebound.   Musculoskeletal:         General: No swelling or tenderness. Normal range of motion.      Cervical back: Normal range of motion and neck supple. No tenderness.      Right lower leg: No edema.      Left lower leg: No edema.   Skin:     General: Skin is warm and dry.   Neurological:      General: No focal deficit present.      Mental Status: He is alert and oriented to person, place, and time. Mental status is at baseline.      Cranial Nerves: No cranial nerve deficit.      Sensory: No sensory deficit.      Motor: Weakness (generalized) present.   Psychiatric:         Mood and Affect: Mood normal.         Behavior: Behavior normal.         Thought Content: Thought content normal.         Judgment: Judgment normal.              CRANIAL NERVES     CN III, IV, VI   Pupils are equal, round, and reactive to light.       Significant Labs: All pertinent labs within the past 24 hours  "have been reviewed.  Bilirubin:   Recent Labs   Lab 02/19/24  1659   BILITOT 0.6       CBC:   Recent Labs   Lab 02/20/24  1311 02/20/24  1831 02/21/24  0906   WBC 7.76 7.37 7.27   HGB 7.2* 7.3* 7.3*   HCT 23.3* 23.6* 22.8*    251 213       CMP:   No results for input(s): "NA", "K", "CL", "CO2", "GLU", "BUN", "CREATININE", "CALCIUM", "PROT", "ALBUMIN", "BILITOT", "ALKPHOS", "AST", "ALT", "ANIONGAP", "EGFRNONAA" in the last 48 hours.    Invalid input(s): "ESTGFAFRICA"    Cardiac Markers:   No results for input(s): "CKMB", "MYOGLOBIN", "BNP", "TROPISTAT" in the last 48 hours.    Coagulation:   No results for input(s): "PT", "INR", "APTT" in the last 48 hours.    Troponin:   No results for input(s): "TROPONINI", "TROPONINIHS" in the last 48 hours.      Significant Imaging: I have reviewed all pertinent imaging results/findings within the past 24 hours.  Imaging Results              CTA Acute GI [place], Abdomen and Pelvis (Final result)  Result time 02/19/24 19:10:05      Final result by Fabian Perez MD (02/19/24 19:10:05)                   Impression:      No active GI bleed.    Diverticulosis without diverticulitis, diego colonic.    LAD and circumflex coronary artery calcifications.      Electronically signed by: Fabian Perez  Date:    02/19/2024  Time:    19:10               Narrative:    EXAMINATION:  CTA ACUTE GI BLEED, ABDOMEN AND PELVIS    CLINICAL HISTORY:  GI bleed;    TECHNIQUE:  Using 75 cc of  Omnipaque 350 IV, and multi-detector helical CT technique, axial CT angiogram images of the abdomen were obtained from the lung bases through the pelvis. Precontrast and portal venous phase images of the abdomen and pelvis also done. 2D post-processing coronal and sagittal reconstructions of the abdominal aorta and visceral arteries performed.    COMPARISON:  None    FINDINGS:  The lung bases are well aerated.  No consolidation, suspicious nodules, or pleural effusion.  The visualized portions of the " heart appear intact with coronary artery calcifications in the LAD and circumflex coronary artery.  Small sliding-type hiatal hernia..    The esophagus, stomach, spleen, pancreas, and adrenal glands are unremarkable.    The liver is normal in size and attenuation without focal abnormality.  The portal veins appear patent.  The gallbladder shows no evidence of stones or cholecystitis.  No intra-or extrahepatic biliary ductal dilatation.    The kidneys demonstrate normal enhancement.  No renal mass, nephrolithiasis or hydronephrosis.  The ureters are normal in course and caliber. The urinary bladder appears unremarkable    Pan colonic diverticular disease.  No evidence of diverticulitis.  No evidence of gastrointestinal hemorrhage, bowel obstruction, or inflammation.  There is no ascites, free fluid, or intraperitoneal free air. No significant peritoneal or retroperitoneal adenopathy.    The abdominal aorta is normal in course and caliber without significant atherosclerotic calcifications.    The osseous structures and extraperitoneal soft tissues are intact with marked spondylosis and multilevel stenosis throughout the spine..  Fat containing inguinal hernia on the left.                                        Assessment/Plan:      * Acute blood loss anemia  Patient's anemia is currently uncontrolled. Has received 2 units of PRBCs on 2/19/2024 . Etiology likely d/t acute blood loss which was from lower GI  We will crossmatch 2 units of PRBC  H&H Q 8 hourly  Consult GI for colonoscopy.  Current CBC reviewed-   Lab Results   Component Value Date    HGB 7.3 (L) 02/21/2024    HCT 22.8 (L) 02/21/2024     Monitor serial CBC and transfuse if patient becomes hemodynamically unstable, symptomatic or H/H drops below 7/21.    Patient received 2 pack RBC and HH is improving,GI is consulted.dual endoscopy today,but patient was non complains with Golytely.    Aortic atherosclerosis  On medical treatments.      GI bleed  He  presented with hematochezia x1 week  Noticed blood with wiping each time, bright red blood on stool at times  Had similar history of lower GI bleed in the past   NM GI bleed study in 2015 showed no acute GI bleed after presenting with similar symptoms.  CTA acute GI bleed, abdomen and pelvis showed no acute GI bleed at this time, with diverticulosis without diverticulitis said to be pan colonic.  Previous colonoscopy noticed, patient denied previous history of colon cancer   Only previous GI surgery was for ruptured appendicitis at the age of 12.  Hemoglobin was 4.6 at presentation, had urgent typed, grouped and crossmatched of 2 units of PRBC  We will consult GI to review  We will transfuse to hemoglobin of 7.    Patient received 2 pack RBC and HH is pending,GI is consulted.    Diverticulosis of colon with hemorrhage of large intestine  Diverticulosis with no diverticulitis  Although complained of mild left lower quadrant abdominal pain  We will consult GI for possible colonoscopy  Out of caution we will give pantoprazole at this time for GI bleed.  No evidence of infection at this time, we will hold off on antibiotics      RAFAEL (acute kidney injury)  Patient with acute kidney injury/acute renal failure likely due to pre-renal azotemia due to IVVD RAFAEL is currently stable.   Presented with lower GI x 7 days ago  Baseline creatinine  1.4  - Labs reviewed- Renal function/electrolytes with Estimated Creatinine Clearance: 38.4 mL/min (A) (based on SCr of 1.6 mg/dL (H)). according to latest data. Monitor urine output and serial BMP and adjust therapy as needed. Avoid nephrotoxins and renally dose meds for GFR listed above.    Morbid (severe) obesity due to excess calories  Body mass index is 34.36 kg/m². Morbid obesity complicates all aspects of disease management from diagnostic modalities to treatment.   Weight loss encouraged and health benefits explained to patient.         Pulmonary hypertension  Per record,on  lasix.      Mixed hyperlipidemia  Continue lifestyle modification   Continue statin.    Stage 3b chronic kidney disease  Creatine stable for now. BMP reviewed- noted Estimated Creatinine Clearance: 38.4 mL/min (A) (based on SCr of 1.6 mg/dL (H)). according to latest data. Based on current GFR, CKD stage is stage 3 - GFR 30-59.    Acute kidney injury likely due to volume depletion in the setting of GI bleed and reduced oral intake.  Monitor UOP and serial BMP and adjust therapy as needed. Renally dose meds.   Avoid nephrotoxic medications and procedures.    Essential hypertension  Chronic, uncontrolled. Latest blood pressure and vitals reviewed-     Temp:  [98.1 °F (36.7 °C)-98.6 °F (37 °C)]   Pulse:  [82-98]   Resp:  [17-20]   BP: (125-167)/(60-84)   SpO2:  [96 %-98 %] .   Home meds for hypertension were reviewed and noted below.   Hypertension Medications               amLODIPine (NORVASC) 5 MG tablet Take 5 mg by mouth once daily.            While in the hospital, will manage blood pressure as follows; Continue home antihypertensive regimen    Will utilize p.r.n. blood pressure medication only if patient's blood pressure greater than 180/110 and he develops symptoms such as worsening chest pain or shortness of breath.      VTE Risk Mitigation (From admission, onward)           Ordered     Reason for No Pharmacological VTE Prophylaxis  Once        Question:  Reasons:  Answer:  Active Bleeding    02/19/24 2117     IP VTE HIGH RISK PATIENT  Once         02/19/24 2117     Place sequential compression device  Until discontinued         02/19/24 2117                    Discharge Planning   TERRELL:      Code Status: Full Code   Is the patient medically ready for discharge?:     Reason for patient still in hospital (select all that apply): Patient trending condition  Discharge Plan A: Home                  Moi Cole MD  Department of Hospital Medicine   [facility] - [facility]

## 2024-02-22 NOTE — TRANSFER OF CARE
"Anesthesia Transfer of Care Note    Patient: Alexander Duong Jr.    Procedure(s) Performed: Procedure(s) (LRB):  EGD (ESOPHAGOGASTRODUODENOSCOPY) (N/A)    Patient location: GI    Anesthesia Type: general    Transport from OR: Transported from OR on room air with adequate spontaneous ventilation    Post pain: adequate analgesia    Post assessment: no apparent anesthetic complications and tolerated procedure well    Post vital signs: stable    Level of consciousness: lethargic and responds to stimulation    Nausea/Vomiting: no nausea/vomiting    Complications: none    Transfer of care protocol was followed      Last vitals: Visit Vitals  /65 (BP Location: Right arm, Patient Position: Lying)   Pulse 68   Temp 36.4 °C (97.5 °F) (Oral)   Resp 16   Ht 5' 8" (1.727 m)   Wt 102.5 kg (225 lb 15.5 oz)   SpO2 99%   BMI 34.36 kg/m²     "

## 2024-02-22 NOTE — NURSING
[facility]  Nurses Note -- 4 Eyes      2/22/2024       Skin assessed on: Q Shift      [x] No Pressure Injuries Present    [x]Prevention Measures Documented    [] Yes LDA  for Pressure Injury Previously documented     [] Yes New Pressure Injury Discovered   [] LDA for New Pressure Injury Added      Attending RN:  Jacquelyn Casey RN     Second RN:  Ivone Sanchez RN

## 2024-02-22 NOTE — PROGRESS NOTES
Pt refusing to drink glycol prep. Pt verbalize , he had enough drinking yesterday, he can't take it anymore not until few days.     Pt educated about the need to have clear bowels to get scope done.     Pt further emphasized that Dr's are okay with the pt drinking at least half of it,    Pt verbalizing I know you all are trying to help me but I can't take it anymore, I am actually feeling good after I had my evening  meal earlier.    Pt requesting to drink or eat something at this time.  Nurse provided some jello and orange juice to pt.    JOSEP Munoz Showers notified. Provider aware about the situation.  Plan of care ongoing

## 2024-02-22 NOTE — PLAN OF CARE
Dr. Blanchard at bedside, procedure findings discussed with patient, patient verbalizes understanding.

## 2024-02-22 NOTE — NURSING
Patient had endoscopy  done today.  Patient return, meal was order and patient tolerated without difficulties.

## 2024-02-22 NOTE — ASSESSMENT & PLAN NOTE
Patient's anemia is currently uncontrolled. Has received 2 units of PRBCs on 2/19/2024 . Etiology likely d/t acute blood loss which was from lower GI  We will crossmatch 2 units of PRBC  H&H Q 8 hourly  Consult GI for colonoscopy.  Current CBC reviewed-   Lab Results   Component Value Date    HGB 7.3 (L) 02/21/2024    HCT 22.8 (L) 02/21/2024     Monitor serial CBC and transfuse if patient becomes hemodynamically unstable, symptomatic or H/H drops below 7/21.    Patient received 2 pack RBC and HH is improving,GI is consulted.dual endoscopy today,but patient was non complains with Golytely.

## 2024-02-22 NOTE — PT/OT/SLP PROGRESS
Occupational Therapy   Treatment    Name: Alexander Duong Jr.  MRN: [id number]  Admitting Diagnosis:  Acute blood loss anemia  Day of Surgery    Recommendations:     Discharge Recommendations: Low Intensity Therapy  Discharge Equipment Recommendations:   (PT is recommending a RW)  Barriers to discharge:   (The patient lives alone)    Assessment:     Alexander Duong Jr. is a 84 y.o. male with a medical diagnosis of Acute blood loss anemia. Performance deficits affecting function are impaired endurance, impaired self care skills, impaired functional mobility, gait instability, decreased upper extremity function, decreased safety awareness, impaired cardiopulmonary response to activity.   The patient was getting up from his chair and attempting to go take a shower. The patient was educated re: unable to shower 2* telemetry and IV lines. The patient was able to perform a sponge bath while standing at the sink, not AD and a chair behind him for safety. The patient tolerated standing at the sink ~15 min. HR was 127, SpO2 95%. Communication is limited by [place].    Rehab Prognosis:  Good; patient would benefit from acute skilled OT services to address these deficits and reach maximum level of function.       Plan:     Patient to be seen 3 x/week to address the above listed problems via self-care/home management, therapeutic activities, therapeutic exercises  Plan of Care Expires: 03/21/24  Plan of Care Reviewed with: patient    Subjective     Chief Complaint: I haven't showered since I got here. I shower every day at home.  Patient/Family Comments/goals: take a shower  Pain/Comfort:  Pain Rating 1: 0/10    Objective:     Communicated with: Jacquelyn torres prior to session.  Patient found  getting up from his chair  with telemetry, pulse ox (continuous) upon OT entry to room.    General Precautions: Standard, fall, hearing impaired    Orthopedic Precautions:N/A  Braces: N/A  Respiratory Status: Room air     Occupational Performance:      Bed Mobility:    N/T     Functional Mobility/Transfers:  Patient completed Sit <> Stand Transfer with stand by assistance  with  no assistive device   Functional Mobility: The patient amb without AD to the sink with SBA/(S) and stood at the sink ~15 min to perform a sponge bath. A chair was placed behind the patient but the patient did not need to sit.     Activities of Daily Living:  Grooming: supervision to wash hands and face while standing at the sink  Bathing: supervision and set up to stand at the sink to sponge bathe    Upper Body Dressing: contact guard assistance to doff/don front and back gown  Lower Body Dressing: The patient requested OT to assist with doff of socks and to hakan clean socks        [facility] 6 Click ADL: 22    Treatment & Education:  Performed self care and functional mobility as noted above  Educated the patient re: need to request nursing permission/assistance to shower. The patient verbalized understanding.    Patient left up in chair with all lines intact, call button in reach, and nurseJacquelyn notified    GOALS:   Multidisciplinary Problems       Occupational Therapy Goals          Problem: Occupational Therapy    Goal Priority Disciplines Outcome Interventions   Occupational Therapy Goal     OT, PT/OT Ongoing, Progressing    Description: Goals to be met by: 03/21/2024      Patient will increase functional independence with ADLs by performing:    UE Dressing with Modified [place].  LE Dressing with Modified [place].  Grooming while standing with Modified [place].  Toileting from toilet with Modified [place] for hygiene and clothing management.   Toilet transfer to toilet with Modified [place].  Increased functional strength to WFL for self care.  Upper extremity exercise program x10 reps per handout, with independence.                          Time Tracking:     OT Date of Treatment: 02/22/24  OT Start Time: 1520  OT Stop Time: 1545  OT Total Time (min): 25  min    Billable Minutes:Self Care/Home Management 15  Therapeutic Activity 10  Total Time 25    OT/MARGA: OT     Number of MARGA visits since last OT visit: 0    2/22/2024

## 2024-02-22 NOTE — INTERVAL H&P NOTE
The patient has been examined and the H&P has been reviewed:    I concur with the findings and no changes have occurred since H&P was written.    Anesthesia risks, benefits and alternative options discussed and understood by patient/family.    History and Exam unchanged from visit. Colon Prep not completed yet    Procedure - EGD  Neck - supple  Plan of anesthesia - General  ASA - per anesthesia  Mallampati - per anesthesia  Anesthesia problems - no  Family history of anesthesia problems - no        Active Hospital Problems    Diagnosis  POA    *Acute blood loss anemia [D62]  Yes    Aortic atherosclerosis [I70.0]  Yes     On lumbar xray      GI bleed [K92.2]  Yes    Diverticulosis of colon with hemorrhage of large intestine [K57.31]  Yes    RAFAEL (acute kidney injury) [N17.9]  Yes    Morbid (severe) obesity due to excess calories [E66.01]  Yes    Pulmonary hypertension [I27.20]  Yes    Mixed hyperlipidemia [E78.2]  Yes    Essential hypertension [I10]  Yes    Stage 3b chronic kidney disease [N18.32]  Yes      Resolved Hospital Problems   No resolved problems to display.

## 2024-02-22 NOTE — PROGRESS NOTES
[facility] [place]  Nurses Note -- 4 Eyes      2/21/2024       Skin assessed on: Q Shift      [x] No Pressure Injuries Present    []Prevention Measures Documented    [] Yes LDA  for Pressure Injury Previously documented     [] Yes New Pressure Injury Discovered   [] LDA for New Pressure Injury Added      Attending RN:  Ivone Shipley RN     Second RN:  Demi Baron RN

## 2024-02-22 NOTE — PROVATION PATIENT INSTRUCTIONS
Discharge Summary/Instructions after an Endoscopic Procedure  Patient Name: Alexander Duong  Patient MRN: [id number]  Patient YOB: 1940 Thursday, February 22, 2024  Kulwant Blanchard MD  Dear patient,  As a result of recent federal legislation (The Federal Cures Act), you may   receive lab or pathology results from your procedure in your [facility]   account before your physician is able to contact you. Your physician or   their representative will relay the results to you with their   recommendations at their soonest availability.  Thank you,  RESTRICTIONS:  During your procedure today, you received medications for sedation.  These   medications may affect your judgment, balance and coordination.  Therefore,   for 24 hours, you have the following restrictions:   - DO NOT drive a car, operate machinery, make legal/financial decisions,   sign important papers or drink alcohol.    ACTIVITY:  Today: no heavy lifting, straining or running due to procedural   sedation/anesthesia.  The following day: return to full activity including work.  DIET:  Eat and drink normally unless instructed otherwise.     TREATMENT FOR COMMON SIDE EFFECTS:  - Mild abdominal pain, nausea, belching, bloating or excessive gas:  rest,   eat lightly and use a heating pad.  - Sore Throat: treat with throat lozenges and/or gargle with warm salt   water.  - Because air was used during the procedure, expelling large amounts of air   from your rectum or belching is normal.  - If a bowel prep was taken, you may not have a bowel movement for 1-3 days.    This is normal.  SYMPTOMS TO WATCH FOR AND REPORT TO YOUR PHYSICIAN:  1. Abdominal pain or bloating, other than gas cramps.  2. Chest pain.  3. Back pain.  4. Signs of infection such as: chills or fever occurring within 24 hours   after the procedure.  5. Rectal bleeding, which would show as bright red, maroon, or black stools.   (A tablespoon of blood from the rectum is not serious, especially if    hemorrhoids are present.)  6. Vomiting.  7. Weakness or dizziness.  GO DIRECTLY TO THE NEAREST EMERGENCY ROOM IF YOU HAVE ANY OF THE FOLLOWING:      Difficulty breathing              Chills and/or fever over 101 F   Persistent vomiting and/or vomiting blood   Severe abdominal pain   Severe chest pain   Black, tarry stools   Bleeding- more than one tablespoon   Any other symptom or condition that you feel may need urgent attention  Your doctor recommends these additional instructions:  If any biopsies were taken, your doctors clinic will contact you in 1 to 2   weeks with any results.  - Patient has a contact number available for emergencies.  The signs and   symptoms of potential delayed complications were discussed with the   patient.  Return to normal activities tomorrow.  Written discharge   instructions were provided to the patient.   - Return patient to [facility] for ongoing care.   - The findings and recommendations were discussed with the designated   responsible adult.  For questions, problems or results please call your physician - Kulwant Blanchard MD at Work:  [phone].  [facility] can be reached at [phone]     IF A COMPLICATION OR EMERGENCY SITUATION ARISES AND YOU ARE UNABLE TO REACH   YOUR PHYSICIAN - GO DIRECTLY TO THE EMERGENCY ROOM.  Kulwant Blanchard MD  2/22/2024 12:43:24 PM  This report has been verified and signed electronically.  Dear patient,  As a result of recent federal legislation (The Federal Cures Act), you may   receive lab or pathology results from your procedure in your [facility]   account before your physician is able to contact you. Your physician or   their representative will relay the results to you with their   recommendations at their soonest availability.  Thank you,  PROVATION

## 2024-02-22 NOTE — PT/OT/SLP EVAL
"Physical Therapy Evaluation and Discharge Note    Patient Name:  Alexander Duong Jr.   MRN:  [id number]    Recommendations:     Discharge Recommendations: Low Intensity Therapy  Discharge Equipment Recommendations: walker, rolling   Barriers to discharge:  None from PT standpoint    Assessment:     Alexander Duong Jr. is a 84 y.o. male admitted with a medical diagnosis of Acute blood loss anemia. .  At this time, patient is functioning at their prior level of function and does not require further acute PT services.     Recent Surgery: Procedure(s) (LRB):  EGD (ESOPHAGOGASTRODUODENOSCOPY) (N/A) Day of Surgery    Plan:     During this hospitalization, patient does not require further acute PT services.  Please re-consult if situation changes.      Subjective     Chief Complaint: Hungry  Patient/Family Comments/goals: Pt states that he cannot drink any more Prep  Pain/Comfort:  Pain Rating 1: 0/10    Patients cultural, spiritual, Hinduism conflicts given the current situation: no    Living Environment:  Pt lives alone in a [facility] with a THE  Prior to admission, patients level of function was Modified Independent with [place].  Equipment used at home: cane, straight.  DME owned (not currently used): none.  Upon discharge, patient will have assistance from Niece and daughter.    Objective:     Communicated with nsg prior to session.  Patient found HOB elevated with telemetry upon PT entry to room.    General Precautions: Standard, fall    Orthopedic Precautions:N/A   Braces: N/A  Respiratory Status: Room air    Exams:  Cognitive Exam:  Patient is oriented to Person, Place, Time  Gross Motor Coordination:  mildly impaired 2/2 and weakness  Postural Exam:  Patient presented with the following abnormalities:    -       Rounded shoulders  -       Forward head  Sensation:    -       Intact  light/touch B LE"s  Skin Integrity/Edema:      -       Skin integrity: Visible skin intact  -       Edema: None noted   RLE ROM: WFL  RLE " Strength: WFL  LLE ROM: WFL  LLE Strength: WFL    Functional Mobility:  Bed Mobility:     Scooting: stand by assistance  Supine to Sit: stand by assistance  Transfers:     Sit to Stand:  contact guard assistance with rolling walker  Gait: Gait training with RW and SBA/CGA with VC/TC for walker management/safety  approx 250'  Balance: Fair+/Fair    AM-PAC 6 CLICK MOBILITY  Total Score:19       Treatment and Education:  Gait training as above    AM-PAC 6 CLICK MOBILITY  Total Score:19     Patient left  in W/C for transport to [facility]  with all lines intact, call button in reach, nsg notified, and transport present.    GOALS:   Multidisciplinary Problems       Physical Therapy Goals          Problem: Physical Therapy    Goal Priority Disciplines Outcome Goal Variances Interventions   Physical Therapy Goal     PT, PT/OT Adequate for Care Transition                         History:     Past Medical History:   Diagnosis Date    Allergy     Hypertension        Past Surgical History:   Procedure Laterality Date    APPENDECTOMY      COLONOSCOPY Left 10/19/2015    Procedure: COLONOSCOPY;  Surgeon: Randell Briceno MD;  Location: [facility];  Service: Endoscopy;  Laterality: Left;       Time Tracking:     PT Received On: 02/22/24  PT Start Time: 1046     PT Stop Time: 1109  PT Total Time (min): 23 min     Billable Minutes: Evaluation 15 and Gait Training 8      02/22/2024

## 2024-02-22 NOTE — PLAN OF CARE
Drowsy, arousable, denies complaints, VSS, criteria met for transfer back to room. Attempted to call report, RAMILA Casey not available.

## 2024-02-23 VITALS
DIASTOLIC BLOOD PRESSURE: 60 MMHG | HEART RATE: 86 BPM | RESPIRATION RATE: 18 BRPM | HEIGHT: 68 IN | SYSTOLIC BLOOD PRESSURE: 128 MMHG | TEMPERATURE: 98 F | BODY MASS INDEX: 34.25 KG/M2 | OXYGEN SATURATION: 97 % | WEIGHT: 226 LBS

## 2024-02-23 LAB
BASOPHILS # BLD AUTO: 0.02 K/UL (ref 0–0.2)
BASOPHILS # BLD AUTO: 0.02 K/UL (ref 0–0.2)
BASOPHILS NFR BLD: 0.4 % (ref 0–1.9)
BASOPHILS NFR BLD: 0.4 % (ref 0–1.9)
DIFFERENTIAL METHOD BLD: ABNORMAL
DIFFERENTIAL METHOD BLD: ABNORMAL
EOSINOPHIL # BLD AUTO: 0.3 K/UL (ref 0–0.5)
EOSINOPHIL # BLD AUTO: 0.3 K/UL (ref 0–0.5)
EOSINOPHIL NFR BLD: 4.6 % (ref 0–8)
EOSINOPHIL NFR BLD: 4.6 % (ref 0–8)
ERYTHROCYTE [DISTWIDTH] IN BLOOD BY AUTOMATED COUNT: 15.9 % (ref 11.5–14.5)
ERYTHROCYTE [DISTWIDTH] IN BLOOD BY AUTOMATED COUNT: 15.9 % (ref 11.5–14.5)
HCT VFR BLD AUTO: 26.9 % (ref 40–54)
HCT VFR BLD AUTO: 26.9 % (ref 40–54)
HGB BLD-MCNC: 8.2 G/DL (ref 14–18)
HGB BLD-MCNC: 8.2 G/DL (ref 14–18)
IMM GRANULOCYTES # BLD AUTO: 0.01 K/UL (ref 0–0.04)
IMM GRANULOCYTES # BLD AUTO: 0.01 K/UL (ref 0–0.04)
IMM GRANULOCYTES NFR BLD AUTO: 0.2 % (ref 0–0.5)
IMM GRANULOCYTES NFR BLD AUTO: 0.2 % (ref 0–0.5)
LYMPHOCYTES # BLD AUTO: 1.2 K/UL (ref 1–4.8)
LYMPHOCYTES # BLD AUTO: 1.2 K/UL (ref 1–4.8)
LYMPHOCYTES NFR BLD: 22.8 % (ref 18–48)
LYMPHOCYTES NFR BLD: 22.8 % (ref 18–48)
MCH RBC QN AUTO: 28.4 PG (ref 27–31)
MCH RBC QN AUTO: 28.4 PG (ref 27–31)
MCHC RBC AUTO-ENTMCNC: 30.5 G/DL (ref 32–36)
MCHC RBC AUTO-ENTMCNC: 30.5 G/DL (ref 32–36)
MCV RBC AUTO: 93 FL (ref 82–98)
MCV RBC AUTO: 93 FL (ref 82–98)
MONOCYTES # BLD AUTO: 0.5 K/UL (ref 0.3–1)
MONOCYTES # BLD AUTO: 0.5 K/UL (ref 0.3–1)
MONOCYTES NFR BLD: 9.4 % (ref 4–15)
MONOCYTES NFR BLD: 9.4 % (ref 4–15)
NEUTROPHILS # BLD AUTO: 3.4 K/UL (ref 1.8–7.7)
NEUTROPHILS # BLD AUTO: 3.4 K/UL (ref 1.8–7.7)
NEUTROPHILS NFR BLD: 62.6 % (ref 38–73)
NEUTROPHILS NFR BLD: 62.6 % (ref 38–73)
NRBC BLD-RTO: 1 /100 WBC
NRBC BLD-RTO: 1 /100 WBC
PLATELET # BLD AUTO: 268 K/UL (ref 150–450)
PLATELET # BLD AUTO: 268 K/UL (ref 150–450)
PMV BLD AUTO: 10.2 FL (ref 9.2–12.9)
PMV BLD AUTO: 10.2 FL (ref 9.2–12.9)
RBC # BLD AUTO: 2.89 M/UL (ref 4.6–6.2)
RBC # BLD AUTO: 2.89 M/UL (ref 4.6–6.2)
WBC # BLD AUTO: 5.44 K/UL (ref 3.9–12.7)
WBC # BLD AUTO: 5.44 K/UL (ref 3.9–12.7)

## 2024-02-23 PROCEDURE — 99232 SBSQ HOSP IP/OBS MODERATE 35: CPT | Mod: ,,, | Performed by: NURSE PRACTITIONER

## 2024-02-23 PROCEDURE — C9113 INJ PANTOPRAZOLE SODIUM, VIA: HCPCS | Performed by: NURSE PRACTITIONER

## 2024-02-23 PROCEDURE — 36415 COLL VENOUS BLD VENIPUNCTURE: CPT | Performed by: HOSPITALIST

## 2024-02-23 PROCEDURE — 63600175 PHARM REV CODE 636 W HCPCS: Performed by: NURSE PRACTITIONER

## 2024-02-23 PROCEDURE — 85025 COMPLETE CBC W/AUTO DIFF WBC: CPT | Performed by: HOSPITALIST

## 2024-02-23 PROCEDURE — 25000003 PHARM REV CODE 250: Performed by: STUDENT IN AN ORGANIZED HEALTH CARE EDUCATION/TRAINING PROGRAM

## 2024-02-23 PROCEDURE — 25000003 PHARM REV CODE 250: Performed by: HOSPITALIST

## 2024-02-23 RX ADMIN — AMLODIPINE BESYLATE 5 MG: 5 TABLET ORAL at 08:02

## 2024-02-23 RX ADMIN — PANTOPRAZOLE SODIUM 40 MG: 40 INJECTION, POWDER, FOR SOLUTION INTRAVENOUS at 08:02

## 2024-02-23 RX ADMIN — AZELASTINE HYDROCHLORIDE 137 MCG: 137 SPRAY, METERED NASAL at 08:02

## 2024-02-23 RX ADMIN — FUROSEMIDE 40 MG: 40 TABLET ORAL at 08:02

## 2024-02-23 RX ADMIN — TAMSULOSIN HYDROCHLORIDE 0.4 MG: 0.4 CAPSULE ORAL at 08:02

## 2024-02-23 NOTE — PLAN OF CARE
02/23/24 0916   Medicare Message   Important Message from Medicare regarding Discharge Appeal Rights Given to patient/caregiver;Explained to patient/caregiver;Signed/date by patient/caregiver   Date IMM was signed 02/23/24   Time IMM was signed 0915     Pt agreed to discharge.

## 2024-02-23 NOTE — PROGRESS NOTES
[facility] [place]  Nurses Note -- 4 Eyes      2/23/2024       Skin assessed on: Q Shift      [x] No Pressure Injuries Present    []Prevention Measures Documented    [] Yes LDA  for Pressure Injury Previously documented     [] Yes New Pressure Injury Discovered   [] LDA for New Pressure Injury Added      Attending RN:  Ivone Shipley, RAMILA     Second RN:  Jacquelyn Casey RN

## 2024-02-23 NOTE — PLAN OF CARE
Case Management Final Discharge Note    Discharge Disposition: [facility]    New DME ordered / company name: Per DME, pt received a walker in 2021.     Primary Caretaker and contact information: Areli- sister [phone]    Scheduled followup appointment: PCP and  GI    Referrals placed: GI    Transportation: Pt's family will provide transportation home when discharged.    Patient and family educated on discharge services and updated on DC plan. Bedside RN notified, patient clear to discharge from Case Management Perspective.       02/23/24 1154   Final Note   Assessment Type Final Discharge Note   Anticipated Discharge Disposition Home-Health   What phone number can be called within the next 1-3 days to see how you are doing after discharge? [phone]   Hospital Resources/Appts/Education Provided Appointments scheduled and added to AVS   Post-Acute Status   Post-Acute Authorization Home Health   Home Health Status Set-up Complete/Auth obtained   Coverage PHN   Patient choice form signed by patient/caregiver List from System Post-Acute Care   Discharge Delays None known at this time

## 2024-02-23 NOTE — PT/OT/SLP PROGRESS
Occupational Therapy      Patient Name:  Alexander Duong Jr.   MRN:  [id number]    2:10 PM  Patient not seen today secondary to  (Pt sleeping, to d/c shortly). Will follow-up.    2/23/2024

## 2024-02-23 NOTE — PLAN OF CARE
Problem: Adult Inpatient Plan of Care  Goal: Plan of Care Review  Outcome: Ongoing, Progressing  Goal: Patient-Specific Goal (Individualized)  Outcome: Ongoing, Progressing  Goal: Absence of Hospital-Acquired Illness or Injury  Outcome: Ongoing, Progressing     Problem: Fall Injury Risk  Goal: Absence of Fall and Fall-Related Injury  Outcome: Ongoing, Progressing     Problem: Adjustment to Illness (Gastrointestinal Bleeding)  Goal: Optimal Coping with Acute Illness  Outcome: Ongoing, Progressing     Problem: Fluid and Electrolyte Imbalance (Acute Kidney Injury/Impairment)  Goal: Fluid and Electrolyte Balance  Outcome: Ongoing, Progressing

## 2024-02-23 NOTE — DISCHARGE SUMMARY
"[facility] Medicine  Discharge Summary      Patient Name: Alexander Duong Jr.  MRN: [id number]  KRISHAN: [id number]  Patient Class: IP- Inpatient  Admission Date: 2/19/2024  Hospital Length of Stay: 4 days  Discharge Date and Time:  02/23/2024 10:09 AM  Attending Physician: Moi Cole, *   Discharging Provider: Moi Cole MD  Primary Care Provider: Naa Alcazar MD    Primary Care Team: Networked reference to record PCT     HPI:   84-year-old  male with medical history significant for hypertension, benign prostatic hyperplasia, diffuse colonic diverticulosis with previous GI bleed, tobacco use disorder with 40 pack-year cigarette smoking, quit 15 years ago who presented to the emergency department on account bleeding per rectum of a week duration, he noticed blood when he wipes since super bowl day, denied previous constipation, denied abdominal pain, no nausea or vomiting reported.  However voiced left lower quadrant abdominal pain, said to be mild, nonradiating, no known relieving or aggravating factor.  He voiced previous history of lower GI bleed, he denied history of colon cancer, previous colonoscopy, NM GI bleed study were unrevealing.  He voiced previous history of abdominal surgery at age of 12 years for ruptured appendicitis.  Prior to presentation today he noticed lightheadedness and generalized weakness but no fall, denied fever, chills or rigors, no headache, neck pain, no upper respiratory tract symptoms.  Lab obtained in the emergency room at presentation showed H&H of 4.4/15.6 for which 2 units of blood was urgently typed, group and crossmatch for him, BUN creatinine 17/1.6, with baseline creatinine of 1.4.  CTA acute GI bleed, abdomen and pelvis showed "no active GI bleed.  Diverticulosis without diverticulitis, pan colonic"    Procedure(s) (LRB):  EGD (ESOPHAGOGASTRODUODENOSCOPY) (N/A)      Hospital Course:   84-year-old  male " "with medical history significant for hypertension, benign prostatic hyperplasia, diffuse colonic diverticulosis with previous GI bleed, tobacco use disorder with 40 pack-year cigarette smoking, quit 15 years ago who presented to the emergency department on account bleeding per rectum of a week duration, .  He voiced previous history of lower GI bleed, he denied history of colon cancer, previous colonoscopy, NM GI bleed study were unrevealing.  He voiced previous history of abdominal surgery at age of 12 years for ruptured appendicitis.  Prior to presentation today he noticed lightheadedness and generalized weakness but no fall, denied fever, chills or rigors, no headache, neck pain, no upper respiratory tract symptoms.  Lab obtained in the emergency room at presentation showed H&H of 4.4/15.6 for which 2 units of blood was urgently typed, group and crossmatch for him, BUN creatinine 17/1.6, with baseline creatinine of 1.4.  CTA acute GI bleed, abdomen and pelvis showed "no active GI bleed.  Diverticulosis without diverticulitis, pan colonic"  Patient received 2 pack RBC and HH is improving.,GI is consulted.will have dual endoscopy today,but patient was non complains with Golytely.patient has EGD with no sign of bleeding,HH is much improved,no sign of bleeding was seen,patient was discharged home with follow up plan with PCP and GI as out patient for out patient colonoscopy.     Goals of Care Treatment Preferences:  Code Status: Full Code      Consults:   Consults (From admission, onward)          Status Ordering Provider     Inpatient consult to Social Work  Once        Provider:  (Not yet assigned)    Completed EZEQUIEL ARMSTRONG     Inpatient consult to Social Work/Case Management  Once        Provider:  (Not yet assigned)    Completed SAMSON BERMAN     Inpatient consult to Gastroenterology  Once        Provider:  Sonu Patino MD    Completed CAMRYN SEAMAN            No new Assessment & Plan " "notes have been filed under this hospital service since the last note was generated.  Service: Hospital Medicine    Final Active Diagnoses:    Diagnosis Date Noted POA    PRINCIPAL PROBLEM:  Acute blood loss anemia [D62] 10/17/2015 Yes    Aortic atherosclerosis [I70.0] 10/06/2021 Yes    GI bleed [K92.2] 05/31/2021 Yes    Diverticulosis of colon with hemorrhage of large intestine [K57.31] 05/15/2021 Yes    RAFAEL (acute kidney injury) [N17.9] 05/15/2021 Yes    Morbid (severe) obesity due to excess calories [E66.01]  Yes    Pulmonary hypertension [I27.20] 08/02/2018 Yes    Mixed hyperlipidemia [E78.2] 07/15/2018 Yes    Essential hypertension [I10] 10/17/2015 Yes    Stage 3b chronic kidney disease [N18.32] 10/17/2015 Yes      Problems Resolved During this Admission:       Discharged Condition: stable    Disposition: Home or Self Care    Follow Up:   Follow-up Information       Naa Alcazar MD Follow up.    Specialty: Family Medicine  Contact information:  [address] [place], [place] [id number] [phone]               Naa Alcazar MD Follow up in 1 week(s).    Specialty: Family Medicine  Contact information:  [address] [place] [address]  [phone]                           Patient Instructions:      WALKER FOR HOME USE     Order Specific Question Answer Comments   Type of Walker: Adult (5'4"-6'6")    With wheels? Yes    Height: 5' 8" (1.727 m)    Weight: 102.5 kg (225 lb 15.5 oz)    Length of need (1-99 months): 99    Does patient have medical equipment at home? cane, straight    Please check all that apply: Patient's condition impairs ambulation.    Please check all that apply: Patient is unable to safely ambulate without equipment.      Ambulatory referral/consult to Gastroenterology   Standing Status: Future   Referral Priority: Routine Referral Type: Consultation   Referral Reason: Specialty Services Required   Requested Specialty: Gastroenterology   Number of Visits " "Requested: 1     Activity as tolerated       Significant Diagnostic Studies: Labs: BMP: No results for input(s): "GLU", "NA", "K", "CL", "CO2", "BUN", "CREATININE", "CALCIUM", "MG" in the last 48 hours., CMP No results for input(s): "NA", "K", "CL", "CO2", "GLU", "BUN", "CREATININE", "CALCIUM", "PROT", "ALBUMIN", "BILITOT", "ALKPHOS", "AST", "ALT", "ANIONGAP", "ESTGFRAFRICA", "EGFRNONAA" in the last 48 hours., and CBC   Recent Labs   Lab 02/22/24  1030 02/23/24  0334   WBC 7.13 5.44  5.44   HGB 8.0* 8.2*  8.2*   HCT 25.2* 26.9*  26.9*    268  268     Radiology: CT scan: CT ABDOMEN PELVIS WITH CONTRAST: No results found for this visit on 02/19/24. and CT ABDOMEN PELVIS WITHOUT CONTRAST: No results found for this visit on 02/19/24.    Pending Diagnostic Studies:       None           Medications:  Reconciled Home Medications:      Medication List        CONTINUE taking these medications      amLODIPine 5 MG tablet  Commonly known as: NORVASC  Take 5 mg by mouth once daily.     azelastine 137 mcg (0.1 %) nasal spray  Commonly known as: ASTELIN  1 spray (137 mcg total) by Nasal route 2 (two) times daily.     cetirizine 10 MG tablet  Commonly known as: ZYRTEC  Take 1 tablet (10 mg total) by mouth once daily.     tamsulosin 0.4 mg Cap  Commonly known as: FLOMAX  Take 1 capsule (0.4 mg total) by mouth once daily.     traZODone 100 MG tablet  Commonly known as: DESYREL  TAKE 1 TABLET BY MOUTH NIGHTLY AS NEEDED FOR INSOMNIA.              Indwelling Lines/Drains at time of discharge:   Lines/Drains/Airways       None                   Time spent on the discharge of patient:  over 30  minutes         Moi Cole MD  Department of [facility] Medicine  [facility]  "

## 2024-02-23 NOTE — PLAN OF CARE
Referrals sent to [facility]. Pt doesn't have a preference.     02/23/24 0944   Post-Acute Status   Post-Acute Authorization Home Health   Home Health Status Pending post-acute provider review/more information requested   Coverage PHN   Discharge Delays (!) Post-Acute Set-up   Discharge Plan   Discharge Plan A Home Health   Discharge Plan B Home

## 2024-02-23 NOTE — PLAN OF CARE
Problem: Adult Inpatient Plan of Care  Goal: Plan of Care Review  2/23/2024 1418 by Jacquelyn Casey RN  Outcome: Ongoing, Progressing  2/23/2024 1415 by Jacquelyn Casey RN  Outcome: Ongoing, Progressing  Goal: Patient-Specific Goal (Individualized)  2/23/2024 1418 by Jacquelyn Casey RN  Outcome: Ongoing, Progressing  2/23/2024 1415 by Jacquelyn Casey RN  Outcome: Ongoing, Progressing  Goal: Absence of Hospital-Acquired Illness or Injury  2/23/2024 1418 by Jacquelyn Casey RN  Outcome: Ongoing, Progressing  2/23/2024 1415 by Jacquelyn Casey RN  Outcome: Ongoing, Progressing  Goal: Optimal Comfort and Wellbeing  2/23/2024 1418 by Jacquelyn Casey RN  Outcome: Ongoing, Progressing  2/23/2024 1415 by Jacquelyn Casey RN  Outcome: Ongoing, Progressing  Goal: Readiness for Transition of Care  2/23/2024 1418 by Jacquelyn Casey RN  Outcome: Ongoing, Progressing  2/23/2024 1415 by Jacquelyn Casey RN  Outcome: Ongoing, Progressing     Problem: Fall Injury Risk  Goal: Absence of Fall and Fall-Related Injury  2/23/2024 1418 by Jacquelyn Casey RN  Outcome: Ongoing, Progressing  2/23/2024 1415 by Jacquelyn Casey RN  Outcome: Ongoing, Progressing     Problem: Adjustment to Illness (Gastrointestinal Bleeding)  Goal: Optimal Coping with Acute Illness  2/23/2024 1418 by Jacquelyn Casey RN  Outcome: Ongoing, Progressing  2/23/2024 1415 by Jacquelyn Casey RN  Outcome: Ongoing, Progressing     Problem: Bleeding (Gastrointestinal Bleeding)  Goal: Hemostasis  2/23/2024 1418 by Jacquelyn Casey RN  Outcome: Ongoing, Progressing  2/23/2024 1415 by Jacquelyn Casey RN  Outcome: Ongoing, Progressing     Problem: Fluid and Electrolyte Imbalance (Acute Kidney Injury/Impairment)  Goal: Fluid and Electrolyte Balance  2/23/2024 1418 by Jacquelyn Casey RN  Outcome: Ongoing, Progressing  2/23/2024 1415 by Jacquelyn Casey RN  Outcome: Ongoing, Progressing     Problem: Oral Intake Inadequate (Acute  Kidney Injury/Impairment)  Goal: Optimal Nutrition Intake  2/23/2024 1418 by Jacquelyn Casey RN  Outcome: Ongoing, Progressing  2/23/2024 1415 by Jacquelyn Casey RN  Outcome: Ongoing, Progressing     Problem: Renal Function Impairment (Acute Kidney Injury/Impairment)  Goal: Effective Renal Function  2/23/2024 1418 by Jacquelyn Casey RN  Outcome: Ongoing, Progressing  2/23/2024 1415 by Jacquelyn Casey RN  Outcome: Ongoing, Progressing

## 2024-02-23 NOTE — PROGRESS NOTES
[facility] Progress Note    Patient Complaint: bloody stool    PCP:   Naa Alcazar       LOS: 4        Initial History of Present Illness (HPI):  This is a 84 y.o. male consulted to GI service for GI bleed. PMH hypertension, benign prostatic hyperplasia, diffuse colonic diverticulosis with previous GI bleed, tobacco use disorder with 40 pack-year cigarette smoking, quit 15 years ago. Patient complaint of acute onset of rectal bleeding with intermittent lower abdominal pain that began on last Monday. Denies sob, weakness, lightheadedness, n/v, hematemesis, diarrhea or constipation. Denies blood thinners, NSAIDs, smoking or drinking. Past colonoscopy in 2015, report below.      Admit lab hgb 4.6, bun 27        Medical History:  has a past medical history of Allergy and Hypertension.    Surgical History:  has a past surgical history that includes Appendectomy; Colonoscopy (Left, 10/19/2015); and Esophagogastroduodenoscopy (N/A, 2/22/2024).    Interval History  S/p egd, colonoscopy canceled d/t patient not prepping. Hgb stable. Tolerating diet.      Objective Findings:    Vital Signs:  Temp:  [97.2 °F (36.2 °C)-99.2 °F (37.3 °C)]   Pulse:  [68-92]   Resp:  [16-20]   BP: (116-151)/(58-70)   SpO2:  [94 %-100 %]   Body mass index is 34.36 kg/m².      Physical Exam  Vitals and nursing note reviewed.   Constitutional:       Appearance: Normal appearance.   HENT:      Head: Normocephalic.   Pulmonary:      Effort: Pulmonary effort is normal.   Abdominal:      General: Bowel sounds are normal.      Palpations: Abdomen is soft.   Skin:     General: Skin is warm and dry.   Neurological:      Mental Status: He is alert and oriented to person, place, and time.   Psychiatric:         Mood and Affect: Mood normal.         Behavior: Behavior normal.         Thought Content: Thought content normal.         Judgment: Judgment normal.               Labs:  Lab Results   Component Value Date    WBC 5.44 02/23/2024     WBC 5.44 2024    HGB 8.2 (L) 2024    HGB 8.2 (L) 2024    HCT 26.9 (L) 2024    HCT 26.9 (L) 2024     2024     2024    CHOL 109 (L) 2022    TRIG 62 2022    HDL 29 (L) 2022    ALT 16 2024    AST 28 2024     2024    K 4.3 2024     2024    CREATININE 1.6 (H) 2024    BUN 17 2024    CO2 26 2024    TSH 1.785 2023    INR 1.1 2024    HGBA1C 4.9 2021             Imagin/19 CTA Acute GI Bleed abdomen and pelvis- The esophagus, stomach, spleen, pancreas, and adrenal glands are unremarkable.     The liver is normal in size and attenuation without focal abnormality.  The portal veins appear patent.  The gallbladder shows no evidence of stones or cholecystitis.  No intra-or extrahepatic biliary ductal dilatation.   Pan colonic diverticular disease.  No evidence of diverticulitis.  No evidence of gastrointestinal hemorrhage, bowel obstruction, or inflammation.  There is no ascites, free fluid, or intraperitoneal free air. No significant peritoneal or retroperitoneal adenopathy.     The abdominal aorta is normal in course and caliber without significant atherosclerotic calcifications.         Endoscopy: 24 EGD  - Normal esophagus.                          - Small hiatal hernia.                          - Normal stomach.                          - Normal examined duodenum.                          - No specimens collected.   10/2015 Colonoscopy- Non-bleeding internal hemorrhoids.                        - Severe diverticulosis in the sigmoid colon, in the                        descending colon, in the transverse colon and in the                        ascending colon. There was no evidence of                        diverticular bleeding.                        - Many 2 to 4 mm polyps in the sigmoid colon. A                        representative sample was resected and  retrieved.     I have independently reviewed and interpreted the imaging above           Acute blood loss anemia. GI bleed. BRBPR.  Plan/ Recommendations:  1. Hgb 8.2 this am,s/p normal EGD.   Denies overt bleeding. Ok to stop ppi. Rec f/I in GI clinic to discuss anemia.    Will sign off  Thank you so much for allowing us to participate in the care of Alexander Duong Jr. . Please contact us if you have any additional questions.    Maye Rollins NP  Gastroenterology  [facility]

## 2024-02-23 NOTE — PLAN OF CARE
[facility] [place] - Telemetry      HOME HEALTH ORDERS  FACE TO FACE ENCOUNTER    Patient Name: Alexander Duong Jr.  YOB: 1940    PCP: Naa Alcazar MD   PCP Address: [address] [place] [address] [address] / [place]  PCP Phone Number: [phone]  PCP Fax: [phone]    Encounter Date: 2/19/24    Admit to Home Health    Diagnoses:  Active Hospital Problems    Diagnosis  POA    *Acute blood loss anemia [D62]  Yes    Aortic atherosclerosis [I70.0]  Yes     On lumbar xray      GI bleed [K92.2]  Yes    Diverticulosis of colon with hemorrhage of large intestine [K57.31]  Yes    RAFAEL (acute kidney injury) [N17.9]  Yes    Morbid (severe) obesity due to excess calories [E66.01]  Yes    Pulmonary hypertension [I27.20]  Yes    Mixed hyperlipidemia [E78.2]  Yes    Essential hypertension [I10]  Yes    Stage 3b chronic kidney disease [N18.32]  Yes      Resolved Hospital Problems   No resolved problems to display.       Follow Up Appointments:  No future appointments.    Allergies:Review of patient's allergies indicates:  No Known Allergies    Medications: Review discharge medications with patient and family and provide education.    Current Facility-Administered Medications   Medication Dose Route Frequency Provider Last Rate Last Admin    0.9%  NaCl infusion (for blood administration)   Intravenous Q24H PRN Rod Diaz MD        0.9%  NaCl infusion   Intravenous Continuous Alek Foley II, MD        amLODIPine tablet 5 mg  5 mg Oral Daily Claus Lala MD   5 mg at 02/22/24 0805    azelastine 137 mcg (0.1 %) nasal spray 137 mcg  1 spray Nasal BID Claus Lala MD   137 mcg at 02/22/24 2116    furosemide tablet 40 mg  40 mg Oral Daily Moi Cole MD   40 mg at 02/22/24 0805    LIDOcaine (PF) 10 mg/ml (1%) injection 10 mg  1 mL Intradermal Once Alek Foley II, MD        pantoprazole injection 40 mg  40 mg Intravenous BID Maye Rollins NP   40 mg at 02/22/24 2115     polyethylene glycol (GoLYTELY) solution  4,000 mL Oral Once Maye Rollins NP        tamsulosin 24 hr capsule 0.4 mg  0.4 mg Oral Daily Claus Lala MD   0.4 mg at 02/22/24 0805    traZODone tablet 100 mg  100 mg Oral QHS Claus Lala MD   100 mg at 02/22/24 2115     Current Discharge Medication List        CONTINUE these medications which have NOT CHANGED    Details   amLODIPine (NORVASC) 5 MG tablet Take 5 mg by mouth once daily.      azelastine (ASTELIN) 137 mcg (0.1 %) nasal spray 1 spray (137 mcg total) by Nasal route 2 (two) times daily.  Qty: 30 mL, Refills: 5    Associated Diagnoses: Seasonal allergic rhinitis due to other allergic trigger      cetirizine (ZYRTEC) 10 MG tablet Take 1 tablet (10 mg total) by mouth once daily.  Qty: 90 tablet, Refills: 3    Associated Diagnoses: Seasonal allergic rhinitis due to other allergic trigger      tamsulosin (FLOMAX) 0.4 mg Cap Take 1 capsule (0.4 mg total) by mouth once daily.  Qty: 30 capsule, Refills: 11    Associated Diagnoses: BPH with urinary obstruction      traZODone (DESYREL) 100 MG tablet TAKE 1 TABLET BY MOUTH NIGHTLY AS NEEDED FOR INSOMNIA.  Qty: 90 tablet, Refills: 1    Associated Diagnoses: Primary insomnia               I have seen and examined this patient within the last 30 days. My clinical findings that support the need for the home health skilled services and home bound status are the following:no   Weakness/numbness causing balance and gait disturbance due to Weakness/Debility making it taxing to leave home.     Diet:   cardiac diet        Referrals/ Consults  Physical Therapy to evaluate and treat. Evaluate for home safety and equipment needs; Establish/upgrade home exercise program. Perform / instruct on therapeutic exercises, gait training, transfer training, and Range of Motion.  Occupational Therapy to evaluate and treat. Evaluate home environment for safety and equipment needs. Perform/Instruct on transfers, ADL training, ROM,  and therapeutic exercises.    Activities:   activity as tolerated    Nursing:   Agency to admit patient within 24 hours of hospital discharge unless specified on physician order or at patient request    SN to complete comprehensive assessment including routine vital signs. Instruct on disease process and s/s of complications to report to MD. Review/verify medication list sent home with the patient at time of discharge  and instruct patient/caregiver as needed. Frequency may be adjusted depending on start of care date.     Skilled nurse to perform up to 3 visits PRN for symptoms related to diagnosis    Notify MD if SBP > 160 or < 90; DBP > 90 or < 50; HR > 120 or < 50; Temp > 101; O2 < 88%; Other:       Ok to schedule additional visits based on staff availability and patient request on consecutive days within the home health episode.    When multiple disciplines ordered:    Start of Care occurs on Sunday - Wednesday schedule remaining discipline evaluations as ordered on separate consecutive days following the start of care.    Thursday SOC -schedule subsequent evaluations Friday and Monday the following week.     Friday - Saturday SOC - schedule subsequent discipline evaluations on consecutive days starting Monday of the following week.    For all post-discharge communication and subsequent orders please contact patient's primary care physician. If unable to reach primary care physician or do not receive response within 30 minutes, please contact [facility]  for clinical staff order clarification    Miscellaneous   Routine Skin for Bedridden Patients: Instruct patient/caregiver to apply moisture barrier cream to all skin folds and wet areas in perineal area daily and after baths and all bowel movements.    Home Health Aide:  Nursing Twice weekly, Physical Therapy Twice weekly, and Occupational Therapy Twice weekly    Wound Care Orders  no    I certify that this patient is confined to his home and needs intermittent  skilled nursing care, physical therapy, and occupational therapy.

## 2024-02-23 NOTE — PLAN OF CARE
[facility] [place] - Telemetry      HOME HEALTH ORDERS  FACE TO FACE ENCOUNTER    Patient Name: Alexander Duong Jr.  YOB: 1940    PCP: Naa Alcazar MD   PCP Address: [address] / [place]  PCP Phone Number: [phone]  PCP Fax: [phone]    Encounter Date: 2/19/24    Admit to Home Health    Diagnoses:  Active Hospital Problems    Diagnosis  POA    *Acute blood loss anemia [D62]  Yes    Aortic atherosclerosis [I70.0]  Yes     On lumbar xray      GI bleed [K92.2]  Yes    Diverticulosis of colon with hemorrhage of large intestine [K57.31]  Yes    RAFAEL (acute kidney injury) [N17.9]  Yes    Morbid (severe) obesity due to excess calories [E66.01]  Yes    Pulmonary hypertension [I27.20]  Yes    Mixed hyperlipidemia [E78.2]  Yes    Essential hypertension [I10]  Yes    Stage 3b chronic kidney disease [N18.32]  Yes      Resolved Hospital Problems   No resolved problems to display.       Follow Up Appointments:  Future Appointments   Date Time Provider Department Center   3/4/2024  9:40 AM Alesia Moreno MD [facility] MED [place]   6/12/2024 10:30 AM Karen Wilkerson MD [facility] GASTR[facility]       Allergies:Review of patient's allergies indicates:  No Known Allergies    Medications: Review discharge medications with patient and family and provide education.    Current Facility-Administered Medications   Medication Dose Route Frequency Provider Last Rate Last Admin    0.9%  NaCl infusion (for blood administration)   Intravenous Q24H PRN Rod Diaz MD        0.9%  NaCl infusion   Intravenous Continuous Alek Foley II, MD        amLODIPine tablet 5 mg  5 mg Oral Daily Claus Lala MD   5 mg at 02/23/24 0845    azelastine 137 mcg (0.1 %) nasal spray 137 mcg  1 spray Nasal BID Claus Lala MD   137 mcg at 02/23/24 0845    furosemide tablet 40 mg  40 mg Oral Daily Moi Cole MD   40 mg at 02/23/24 0845    LIDOcaine (PF) 10 mg/ml (1%) injection 10 mg   1 mL Intradermal Once Alek Foley II, MD        pantoprazole injection 40 mg  40 mg Intravenous BID Maye Rollins, RHONDA   40 mg at 02/23/24 0845    polyethylene glycol (GoLYTELY) solution  4,000 mL Oral Once Maye Rollins, NP        tamsulosin 24 hr capsule 0.4 mg  0.4 mg Oral Daily Claus Lala MD   0.4 mg at 02/23/24 0845    traZODone tablet 100 mg  100 mg Oral QHS Claus Lala MD   100 mg at 02/22/24 2115     Current Discharge Medication List        CONTINUE these medications which have NOT CHANGED    Details   amLODIPine (NORVASC) 5 MG tablet Take 5 mg by mouth once daily.      azelastine (ASTELIN) 137 mcg (0.1 %) nasal spray 1 spray (137 mcg total) by Nasal route 2 (two) times daily.  Qty: 30 mL, Refills: 5    Associated Diagnoses: Seasonal allergic rhinitis due to other allergic trigger      cetirizine (ZYRTEC) 10 MG tablet Take 1 tablet (10 mg total) by mouth once daily.  Qty: 90 tablet, Refills: 3    Associated Diagnoses: Seasonal allergic rhinitis due to other allergic trigger      tamsulosin (FLOMAX) 0.4 mg Cap Take 1 capsule (0.4 mg total) by mouth once daily.  Qty: 30 capsule, Refills: 11    Associated Diagnoses: BPH with urinary obstruction      traZODone (DESYREL) 100 MG tablet TAKE 1 TABLET BY MOUTH NIGHTLY AS NEEDED FOR INSOMNIA.  Qty: 90 tablet, Refills: 1    Associated Diagnoses: Primary insomnia               I have seen and examined this patient within the last 30 days. My clinical findings that support the need for the home health skilled services and home bound status are the following:no   Medical restrictions requiring assistance of another human to leave home due to  Deafness/auditory impairment.     Diet:   cardiac diet      Referrals/ Consults  Physical Therapy to evaluate and treat. Evaluate for home safety and equipment needs; Establish/upgrade home exercise program. Perform / instruct on therapeutic exercises, gait training, transfer training, and Range of  Motion.  Occupational Therapy to evaluate and treat. Evaluate home environment for safety and equipment needs. Perform/Instruct on transfers, ADL training, ROM, and therapeutic exercises.    Activities:   activity as tolerated    Nursing:   Agency to admit patient within 24 hours of hospital discharge unless specified on physician order or at patient request    SN to complete comprehensive assessment including routine vital signs. Instruct on disease process and s/s of complications to report to MD. Review/verify medication list sent home with the patient at time of discharge  and instruct patient/caregiver as needed. Frequency may be adjusted depending on start of care date.     Skilled nurse to perform up to 3 visits PRN for symptoms related to diagnosis    Notify MD if SBP > 160 or < 90; DBP > 90 or < 50; HR > 120 or < 50; Temp > 101; O2 < 88%; Other:    velvet Richardson to schedule additional visits based on staff availability and patient request on consecutive days within the home health episode.    When multiple disciplines ordered:    Start of Care occurs on Sunday - Wednesday schedule remaining discipline evaluations as ordered on separate consecutive days following the start of care.    Thursday SOC -schedule subsequent evaluations Friday and Monday the following week.     Friday - Saturday SOC - schedule subsequent discipline evaluations on consecutive days starting Monday of the following week.    For all post-discharge communication and subsequent orders please contact patient's primary care physician. If unable to reach primary care physician or do not receive response within 30 minutes, please contact [facility]  for clinical staff order clarification    Miscellaneous   Routine Skin for Bedridden Patients: Instruct patient/caregiver to apply moisture barrier cream to all skin folds and wet areas in perineal area daily and after baths and all bowel movements.    Home Health Aide:  Nursing Twice weekly, Physical  Therapy Twice weekly, and Occupational Therapy Twice weekly    Wound Care Orders  no    I certify that this patient is confined to his home and needs intermittent skilled nursing care, physical therapy, and occupational therapy.

## 2024-02-23 NOTE — NURSING
Patient have been discharge with instructions.  IV, tele box have been removed and virtual nurse notified.

## 2024-02-26 NOTE — PHYSICIAN QUERY
PT Name: Alexander Duong Jr.  MR #: [id number]     DOCUMENTATION CLARIFICATION      CDS/: Brandie Fischer               Contact information:  This form is a permanent document in the medical record.     Query Date: February 26, 2024    By submitting this query, we are merely seeking further clarification of documentation. Please utilize your independent clinical judgment when addressing the question(s) below.    The Medical Record contains the following:     Indicators   Supporting Clinical Findings Location in Medical Record    Documentation/History of condition      Pathology/Culture      Lab Value(s)      Radiology Findings      Treatment/Medication      Other        Provider, please further specify the diagnosis of Osteomyelitis.  [   ] Acute   [   ] Chronic   [   ] Subacute   [   ] Acute on chronic   [   ] Ruled Out   [   ] Past history only, not a current diagnosis   [   ] Other clarification (please specify): _____________   [ {Click F2; select 'X' if Clinically Undetermined:019430}  ] Clinically undetermined     Present on admission (POA) status:    [   ] Yes (Y)   [   ] No (N)   [   ] Documentation insufficient to determine if condition is POA (U)   [ {Click F2; select 'X' if Clinically Undetermined:628153}  ] Clinically Undetermined (W)       Please document in your progress notes daily for the duration of treatment until resolved, and include in your discharge summary.    Form No. [id number]

## 2024-02-27 ENCOUNTER — PATIENT OUTREACH (OUTPATIENT)
Dept: ADMINISTRATIVE | Facility: CLINIC | Age: 84
End: 2024-02-27
Payer: MEDICARE

## 2024-02-27 NOTE — PROGRESS NOTES
C3 nurse attempted to contact Alexander Duong Jr.  for a TCC post hospital discharge follow up call. The patient is unable to conduct the call @ this time. The patient requested a callback.    The patient has a scheduled [facility] appointment with Alesia Moreno MD on 03/04/24 @ 4696. Message sent to Physician staff.

## 2024-02-28 NOTE — PROGRESS NOTES
C3 nurse spoke with Alexander Duong Jr. for a TCC post hospital discharge follow up call. The patient has a scheduled [facility] appointment with Alesia Moreno MD on 03/04/24 @ 9784.

## 2024-02-28 NOTE — PROGRESS NOTES
2nd Attempt made to reach patient for TCC call. Spoke with patient's sister, Roxana is unable to conduct the call @ this time. Advised cb patient

## 2024-03-04 ENCOUNTER — OFFICE VISIT (OUTPATIENT)
Dept: FAMILY MEDICINE | Facility: CLINIC | Age: 84
End: 2024-03-04
Payer: MEDICARE

## 2024-03-04 VITALS
HEART RATE: 95 BPM | RESPIRATION RATE: 18 BRPM | HEIGHT: 68 IN | BODY MASS INDEX: 33.51 KG/M2 | OXYGEN SATURATION: 97 % | DIASTOLIC BLOOD PRESSURE: 66 MMHG | SYSTOLIC BLOOD PRESSURE: 142 MMHG | WEIGHT: 221.13 LBS | TEMPERATURE: 98 F

## 2024-03-04 DIAGNOSIS — N18.32 STAGE 3B CHRONIC KIDNEY DISEASE: ICD-10-CM

## 2024-03-04 DIAGNOSIS — E78.2 MIXED HYPERLIPIDEMIA: ICD-10-CM

## 2024-03-04 DIAGNOSIS — K92.2 GASTROINTESTINAL HEMORRHAGE, UNSPECIFIED GASTROINTESTINAL HEMORRHAGE TYPE: ICD-10-CM

## 2024-03-04 DIAGNOSIS — K59.09 OTHER CONSTIPATION: Primary | ICD-10-CM

## 2024-03-04 DIAGNOSIS — I10 ESSENTIAL HYPERTENSION: ICD-10-CM

## 2024-03-04 DIAGNOSIS — Z71.89 ADVANCED CARE PLANNING/COUNSELING DISCUSSION: ICD-10-CM

## 2024-03-04 PROBLEM — R73.09 ELEVATED GLUCOSE: Status: RESOLVED | Noted: 2018-07-15 | Resolved: 2024-03-04

## 2024-03-04 PROBLEM — I70.0 AORTIC ATHEROSCLEROSIS: Status: RESOLVED | Noted: 2021-10-06 | Resolved: 2024-03-04

## 2024-03-04 PROBLEM — R79.89 ELEVATED TROPONIN: Status: RESOLVED | Noted: 2023-08-17 | Resolved: 2024-03-04

## 2024-03-04 PROCEDURE — 99215 OFFICE O/P EST HI 40 MIN: CPT | Mod: S$GLB,,, | Performed by: INTERNAL MEDICINE

## 2024-03-04 PROCEDURE — 99999 PR PBB SHADOW E&M-EST. PATIENT-LVL IV: CPT | Mod: PBBFAC,,, | Performed by: INTERNAL MEDICINE

## 2024-03-04 RX ORDER — POLYETHYLENE GLYCOL 3350 17 G/17G
17 POWDER, FOR SOLUTION ORAL 2 TIMES DAILY PRN
Qty: 30 EACH | Refills: 0 | Status: SHIPPED | OUTPATIENT
Start: 2024-03-04

## 2024-03-04 RX ORDER — BISACODYL 5 MG
5 TABLET, DELAYED RELEASE (ENTERIC COATED) ORAL DAILY PRN
Qty: 30 TABLET | Refills: 0 | Status: SHIPPED | OUTPATIENT
Start: 2024-03-04

## 2024-03-04 NOTE — ASSESSMENT & PLAN NOTE
The ASCVD Risk score (Christopher LOPEZ, et al., 2019) failed to calculate for the following reasons:    The 2019 ASCVD risk score is only valid for ages 40 to 79    - lipid panel

## 2024-03-04 NOTE — PROGRESS NOTES
Chief Complaint: Hospital Follow Up, Establish Care, Fatigue, and Constipation      Alexander Duong Jr.  is a 84 y.o. year old patient who presents today for hosp follow up     Was admitted on 2/19 with sx anemia. Found to have Hb 4.4. was transfused. EGD was normal. Planned for outpatient colonoscopy.   Denies melena or blood in the stool but reports that he does not energy.  Also has constipation     Past Surgical History:   Procedure Laterality Date    APPENDECTOMY      COLONOSCOPY Left 10/19/2015    Procedure: COLONOSCOPY;  Surgeon: Randell Briceno MD;  Location: [facility] ENDO;  Service: Endoscopy;  Laterality: Left;    ESOPHAGOGASTRODUODENOSCOPY N/A 2/22/2024    Procedure: EGD (ESOPHAGOGASTRODUODENOSCOPY);  Surgeon: Kulwant Blanchard MD;  Location: [facility];  Service: Endoscopy;  Laterality: N/A;        History reviewed. No pertinent family history.     Social History     Socioeconomic History    Marital status: Single   Tobacco Use    Smoking status: Former     Types: Cigarettes    Smokeless tobacco: Never    Tobacco comments:     Quit 15 years ago   Substance and Sexual Activity    Alcohol use: Yes     Alcohol/week: 1.0 standard drink of alcohol     Types: 1 Standard drinks or equivalent per week     Comment: sometimes beer or crown royal    Drug use: No    Sexual activity: Not Currently     Social Determinants of Health     Financial Resource Strain: Low Risk  (2/20/2024)    Overall Financial Resource Strain (CARDIA)     Difficulty of Paying Living Expenses: Not very hard   Food Insecurity: No Food Insecurity (2/20/2024)    Hunger Vital Sign     Worried About Running Out of Food in the Last Year: Never true     Ran Out of Food in the Last Year: Never true   Transportation Needs: No Transportation Needs (2/20/2024)    PRAPARE - Transportation     Lack of Transportation (Medical): No     Lack of Transportation (Non-Medical): No   Physical Activity: Inactive (2/20/2024)    Exercise Vital Sign     Days of Exercise per  Week: 0 days     Minutes of Exercise per Session: 0 min   Stress: No Stress Concern Present (2/20/2024)    [place] [employer] - Occupational Stress Questionnaire     Feeling of Stress : Only a little   Social Connections: Socially Isolated (2/20/2024)    Social Connection and Isolation Panel [NHANES]     Frequency of Communication with Friends and Family: Three times a week     Frequency of Social Gatherings with Friends and Family: Three times a week     Attends Worship Services: Never     Active Member of Clubs or Organizations: No     Attends Club or Organization Meetings: Never     Marital Status: Never    Housing Stability: Low Risk  (2/20/2024)    Housing Stability Vital Sign     Unable to Pay for Housing in the Last Year: No     Number of Places Lived in the Last Year: 1     Unstable Housing in the Last Year: No         Current Outpatient Medications:     amLODIPine (NORVASC) 5 MG tablet, Take 5 mg by mouth once daily., Disp: , Rfl:     azelastine (ASTELIN) 137 mcg (0.1 %) nasal spray, 1 spray (137 mcg total) by Nasal route 2 (two) times daily., Disp: 30 mL, Rfl: 5    cetirizine (ZYRTEC) 10 MG tablet, Take 1 tablet (10 mg total) by mouth once daily., Disp: 90 tablet, Rfl: 3    tamsulosin (FLOMAX) 0.4 mg Cap, Take 1 capsule (0.4 mg total) by mouth once daily., Disp: 30 capsule, Rfl: 11    traZODone (DESYREL) 100 MG tablet, TAKE 1 TABLET BY MOUTH NIGHTLY AS NEEDED FOR INSOMNIA., Disp: 90 tablet, Rfl: 1    bisacodyL (DULCOLAX) 5 mg EC tablet, Take 1 tablet (5 mg total) by mouth daily as needed for Constipation., Disp: 30 tablet, Rfl: 0    polyethylene glycol (GLYCOLAX) 17 gram PwPk, Take 17 g by mouth 2 (two) times daily as needed for Constipation., Disp: 30 each, Rfl: 0     Review of Systems   Constitutional:  Positive for malaise/fatigue.   Gastrointestinal:  Positive for constipation. Negative for abdominal pain, blood in stool, diarrhea, melena and nausea.        Objective:       Vitals:    03/04/24 0937   BP: (!) 142/66   Pulse:    Resp:    Temp:        Physical Exam  Vitals and nursing note reviewed.   Constitutional:       Appearance: Normal appearance. He is obese.   HENT:      Head: Normocephalic and atraumatic.   Cardiovascular:      Rate and Rhythm: Normal rate and regular rhythm.   Pulmonary:      Effort: Pulmonary effort is normal.      Breath sounds: Normal breath sounds. No wheezing or rales.   Abdominal:      General: Bowel sounds are normal.      Palpations: Abdomen is soft.      Tenderness: There is no abdominal tenderness.   Musculoskeletal:      Right lower leg: No edema.      Left lower leg: No edema.      Comments: Uses cane   Skin:     General: Skin is warm and dry.   Neurological:      General: No focal deficit present.      Mental Status: He is alert and oriented to person, place, and time.   Psychiatric:         Mood and Affect: Mood normal.         Behavior: Behavior normal.          Assessment:       1. Other constipation    2. History of gastrointestinal hemorrhage, unspecified gastrointestinal hemorrhage type    3. Stage 3b chronic kidney disease    4. Essential hypertension    5. Mixed hyperlipidemia    6. Advanced care planning/counseling discussion          Plan:   1. Other constipation  -     polyethylene glycol (GLYCOLAX) 17 gram PwPk; Take 17 g by mouth 2 (two) times daily as needed for Constipation.  Dispense: 30 each; Refill: 0  -     bisacodyL (DULCOLAX) 5 mg EC tablet; Take 1 tablet (5 mg total) by mouth daily as needed for Constipation.  Dispense: 30 tablet; Refill: 0    2. History of gastrointestinal hemorrhage, unspecified gastrointestinal hemorrhage type  Assessment & Plan:  Hb 8, has underlying CKD related anemia   Found to have Hb 4.4. was transfused. EGD was normal. Planned for outpatient colonoscopy.    - repeat CBC, iron panel next week       Orders:  -     Ferritin; Future; Expected date: 03/04/2024  -     Iron and TIBC; Future; Expected date:  03/04/2024  -     CBC Auto Differential; Future; Expected date: 03/04/2024    3. Stage 3b chronic kidney disease  Assessment & Plan:  With recent RAFAEL. Likely 2/2 HTN and GIB     - repeat CMP, counseled pt about possible referral to Nephrology     Orders:  -     Comprehensive Metabolic Panel; Future; Expected date: 03/04/2024  -     Hemoglobin A1C; Future; Expected date: 03/04/2024  -     Lipid Panel; Future; Expected date: 03/04/2024    4. Essential hypertension  Assessment & Plan:  Due to risk of hypotension will not change rx    -continue amlodipine 5mg       5. Mixed hyperlipidemia  Assessment & Plan:  The ASCVD Risk score (Christopher DK, et al., 2019) failed to calculate for the following reasons:    The 2019 ASCVD risk score is only valid for ages 40 to 79    - lipid panel      6. Advanced care planning/counseling discussion  Assessment & Plan:  Had never had this discussion before   Pt lives alone, independent with ADLs  His sister helps with his finances   Does have a possible mild cognitive impairment  Provided patient with forms and encouraged he have this discussion with his family            Follow up in about 3 months (around 6/4/2024) for f/up.    Total 40 mins spent on patient with more than 50% spent counseling

## 2024-03-04 NOTE — ASSESSMENT & PLAN NOTE
With recent RAFAEL. Likely 2/2 HTN and GIB     - repeat CMP, counseled pt about possible referral to Nephrology

## 2024-03-04 NOTE — ASSESSMENT & PLAN NOTE
Had never had this discussion before   Pt lives alone, independent with ADLs  His sister helps with his finances   Does have a possible mild cognitive impairment  Provided patient with forms and encouraged he have this discussion with his family

## 2024-03-04 NOTE — PROGRESS NOTES
Health Maintenance Due   Topic     Shingles Vaccine (1 of 2) Hx of chickenpox. Notified pt can get vaccine at pharmacy.    RSV Vaccine (Age 60+ and Pregnant patients) (1 - 1-dose 60+ series) Not offered at this Facility    Influenza Vaccine (1) Pt decline    COVID-19 Vaccine (3 - 2023-24 season) Not offered at this Facility    Lipid Panel  Consult pcp

## 2024-03-04 NOTE — ASSESSMENT & PLAN NOTE
Hb 8, has underlying CKD related anemia   Found to have Hb 4.4. was transfused. EGD was normal. Planned for outpatient colonoscopy.    - repeat CBC, iron panel next week

## 2024-03-30 NOTE — PROGRESS NOTES
Intake Assessment    Assessment Method  Assessment Method  Assessment Method: In-person assessment    Intake Assessment Completed By  Intake Assessment Completed by:  Completed by:: Damien Frausto  Reason for Seeking Treatment  Patient stated reason for treatment: SI  Reason for assessment: Other (SI)  The Patient is Experiencing: An increase in acute symptomatology    Pt. Brought to Hospital By  Patient Brought to Hospital By  Pt Brought to Hospital By:: Ambulance  Do You Identify as Male or Female?: Male    Provider Information  Provider Information  How were you referred here: Self  Psychiatrist: Yes  Clinic: [facility] (telehealth)  Primary Care Physician: None  Providers Offered: Info provided  Therapist: Yes  Phone: [phone]  Address: [facility]  Clinic: [facility]  : None  Any Other Providers Past and/or Present: None  Most Recent Inpatient/Partial Hospitalization/IOP  Most Recent Inpatient/Partial Hospitalization/IOP: Yes  When: March 2023  Where: [facility]  Level of Care: INPT  Provide Additional Treatment History: NA    Weapons Assessment  Weapons  Do You Have Any Weapons or Firearms with You Today?: No  Do You Have Any Weapons or Firearms You Plan to Use to Harm Yourself or Others?: No    Violence Assessment  Violence Assessment  Any history of arrests or legal charges for serious crimes (robbery, sexual assault, assault battery, weapons charge, murder)?: No  Any recent or current thoughts/threats to harm or kill others?: No  Access to Means: No  Has there been a recent history of anger, outbursts, property destruction, or aggression?: No   Does patient have a plan to act in a violent manner?: No    Pt. Safety Screen  [place] Violence Checklist  Confused: 0  Irritable: 0  Boisterous: 0  Verbal Threats: 0  Physical Threats: 0  Attacking Objects: 0  Total Score (Sum): 0    C-SSRS (Short)  [place] Suicide Severity Rating Scale (C-SSRS)  1. Have you 
[place] Report - unable to reach patient, the line is busy x2 attempts.  
wished you were dead or wished you could go to sleep and not wake up? (past month): Yes  2. Have you actually had any thoughts of killing yourself? (past month): Yes  3. Have you been thinking about how you might kill yourself? (past month): Yes  4. Have you had these thoughts and had some intention of acting on them? (past month): Yes  5. Have you started to work out or worked out the details of how to kill yourself? Do you intend to carry out this plan? (past month): No  6. Have you ever done anything, started to do anything, or prepared to do anything to end your life? (lifetime): No  Suicide Evaluation: High Risk - Red    Contributing Factors to Suicide Risk  Contributing Factors to Suicide Risk  Current/Past Psychiatric History: Bipolar Disorder  Key Suicidal Symptoms: Intrusive thoughts  Precipitants/Stressors/Interpersonal Concerns: Change in psychotropic medication, Recent discharge from a psychiatric hospital  Protective Factors/Reason for Living: Future orientation, Social supports, Compliance with medications    Family Mental Health Hx.  Family Mental Health History  Family History of Suicide: No  Family History of Suicide Attempts: No  Family History of Mental Health Diagnosis: Yes  Description: depression  Family Member with Psychiatric Disorder Requiring Hospitalization: No    Legal Status  Legal Status  Legal Issues: None    Abuse Assessment  Interpersonal Safety  Interpersonal Safety Screening: Complete assessment (alone or age 12 years or less with parents)  How often does anyone, including family and friends, physically hurt you? : Never  How often does anyone, including family and friends, insult or talk down to you?  : Never  How often does anyone, including family and friends, threaten you with harm? : Never  How often does anyone, including family and friends, scream or curse at you? : Never    Trauma Assessment  Trauma Assessment  In your life, have you ever had any experience that was so 
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 : Never true  Within the past 12 months, you worried that your food would run out before you got money to buy more.  : Never true  Are You Drinking Fluids Daily?: Yes  Any Changes in Your Appetite?: No  Dental Problems Impairing Ability to Eat?: No  Weight Gain of 10 or More Pounds in the Last Month: No  Weight Loss of 10 or More Pounds in the Last Month: No  Do you have a history of disordered eating?: No    Mental Status  MENTAL STATUS  Level of Consciousness (calc): Alert  Orientation: Oriented to person, Oriented to place, Oriented to time  Attention (calc): Maintains attention  Memory: Intact  Perceptual Misinterpretations/Hallucinations: Clear reality based perceptions  Speech: Clear/understandable  Motor Behavior-Agitation (calc): Calm and purposeful  Motor Behavior-Retardation (calc): Calm and purposeful  Behavior: Appropriate to situation  Affect: Appropriate to situation  Mood : Constricted    Social Assessment  Social Assessment  What is your living situation today? : I have a steady place to live  Type of Residence: Apartment  In the past 12 months has the electric, gas, oil, or water company threatened to shut off services in your home?: No  Do you have problems with any of the following? : None of the above  Living Arrangements: Alone  Support Systems: Friends, Family members, Significant other, Siblings  In the past year, have you or any family members you live with been unable to get any of the following when it was really 
needed? Check all that apply.: None  How often do you see or talk to people that you care about and feel close to? (For example: talking to friends on the phone, visiting friends or family, going to [employer] or club meetings): 5 or more times a week  In the past 12 months, has lack of reliable transportation kept you from medical appointments, meetings, work or from getting things needed for daily living? : No  Employment Status: Employed  Are you a ?: No   Status: None    Substance Possession   Substance Possession  Do You Have Any Alcohol or Drugs with You Today?: No    Alcohol Assessment  Alcohol  How often do you have a drink containing alcohol?: Never  How many standard drinks containing alcohol do you have on a typical day?: 0,1 or 2  How often do you have 6 or more drinks on one occasion?: Never  Audit C Total Score: 0  Alcohol Use Status: No or low risk with validated tool  Alcohol Use: Denies    Substance Abuse Assessment       Past and Current Effects of Withdrawal  Effects of Use/Current Withdrawal  Effects of Substance Withdrawal: Denies  Consequences of Use  Consequences of Use: Denies    AODA Infectious Disease Hx       Assessment Summary  Assessment Summary  Assessment Summary: PT presents in the ED with a chief complaint of SI. Per EMR, PT discharged from a psychiatric facility approximately 1 week ago. PT received a new diagnosis of bipolar disorder and was started on Abilify. When asked by the ED  why he came to the ED, PT reported “I was having some very negative thoughts (suicidal ideation). I feel if I go back to my apartment, it will continue and I be a danger to myself.” PT endorsed suicidal ideation; denied HI. PT risk factors include intrusive thought, recent discharge from a psychiatric hospital, and recent medication change. Protective factors include social supports, future orientation, and willingness to engage in treatment. PT denied alcohol and elicit 
drug use. PT reported frequent cannabis use up until his last admission and has not used in 2 weeks. PT sees therapist in [place] and has a provider for medications via telehealth through [employer] [facility].  PT currently calm/cooperative and wanting to check in.  PT appears to meet criteria for psychiatric admission. Case staffed with the attending physician.    Physician Recommended LOC  Recommended Level of Care   Recommended Level of Care: IP    Reasons for Level of Treatment  Reasons for Level of Treatment  Reason(s) for Inpatient Treatment: Danger to self/others/property with plan and intent or attempt    Primary Diagnosis  Primary Diagnosis  State ICD 10 Diagnosis: F31.9 Bipolar I disorder, Current or most recent episode unspecified, by history    Referral Source Notified  Referral Source  Referral Source Notified: No referral source    Weapons Intervention  Weapons Intervention  Do You Have Any Weapons or Firearms at Home?: No  Duty to Warn Completed: Not applicable (admitted to inpatient)  Unable to Complete Duty to Warn: Not applicable    Safety Plan Task        Intake Assessment Completed  Intake Assessment Completed  Intake Assessment Completed: Yes  Total Face to Face Time: 30 min    FOID Clear and Present Danger Reporting   FOID Clear and Present Danger Reporting  Event Type: Clear and Present Danger  Clear and Present Danger Event Date: 03/29/24  Clear and Present Danger: A. Communicates a serious threat of physical violence to himself/herself or others  Qualified Examiner First Name: Damien  Qualified Examiner Last Name: Jett  Qualified Examiner Type: Clinical   Qualified Examiner Note: SI  Clear and Present Danger:    Informed of Body Search       Final Disposition   Final Disposition  Disposition Level of Care: IP       Sub Abuse Assess      POC Urine Drug Screen Results        Amphetamines / Stimulants  Amphetamines/Stimulant  Amphetamines/Stimulants Use: Denies     Benzo's / 
Sedatives   Benzodiazepines/Sedatives  Benzodiazepines/Sedatives Use: Denies     Cocaine   Cocaine  Cocaine Use: Denies     Hallucinogens   Hallucinogens  Hallucinogens Use: Denies     Marijuana   Marijuana  Marijuana/Hashish Use: Denies (has not used since his admission)     Opiates   Opiates  Opiates Use: Denies     Opioid Reversal Agents   Opioid Reversal Agent: Narcan  Reversal Agent Administered: Denies       Synthetic Cannabinoids   Synthetic Cannabinoids  Synthetic Cannabinoids Use: Denies       Synthetic Cathinones (Bath Salts)  Synthetic Cathinones (Bath Salts)  Synthetic Cathinones (Bath Salts) Use: Denies       Volatile Solvents / Inhalants  Volatile Solvents/Inhalants  Volatile Solvents/Inhalants Use: Denies     OTC Medications   Over the Counter Medication Misuse  Dextromethorphan (DXM) Use: Denies      Other Drug Misuse   Other Drug Misuse  Do You Use Any Other Drugs?: Denies     Caffeine Intake         Opioid Withdrawal Scale         Substance Treatment Hx.           
Name band;

## 2024-04-15 ENCOUNTER — PATIENT OUTREACH (OUTPATIENT)
Dept: ADMINISTRATIVE | Facility: HOSPITAL | Age: 84
End: 2024-04-15
Payer: MEDICARE

## 2024-04-15 ENCOUNTER — TELEPHONE (OUTPATIENT)
Dept: UROLOGY | Facility: CLINIC | Age: 84
End: 2024-04-15
Payer: MEDICARE

## 2024-04-15 ENCOUNTER — TELEPHONE (OUTPATIENT)
Dept: ADMINISTRATIVE | Facility: HOSPITAL | Age: 84
End: 2024-04-15
Payer: MEDICARE

## 2024-04-15 RX ORDER — ATORVASTATIN CALCIUM 40 MG/1
40 TABLET, FILM COATED ORAL DAILY
Qty: 90 TABLET | Refills: 3 | Status: SHIPPED | OUTPATIENT
Start: 2024-04-15 | End: 2025-04-15

## 2024-04-15 RX ORDER — METOPROLOL TARTRATE 100 MG/1
100 TABLET ORAL 2 TIMES DAILY
Qty: 180 TABLET | Refills: 3 | Status: SHIPPED | OUTPATIENT
Start: 2024-04-15 | End: 2025-04-15

## 2024-04-15 RX ORDER — HYDRALAZINE HYDROCHLORIDE 50 MG/1
50 TABLET, FILM COATED ORAL 3 TIMES DAILY
Qty: 90 TABLET | Refills: 11 | Status: SHIPPED | OUTPATIENT
Start: 2024-04-15 | End: 2025-04-15

## 2024-04-15 NOTE — TELEPHONE ENCOUNTER
----- Message from Gina Barrera MA sent at 4/15/2024  7:33 AM CDT -----  Type: RX Refill Request    Who Called: Self    Have you contacted your pharmacy: no    Refill or New Rx:    RX Name and Strength:    metoprolol tartrate (LOPRESSOR) tablet 100 mg  Atorvastatin 40 mg  Hydralazine 50mg      tamsulosin (FLOMAX) 0.4 mg Cap      Preferred Pharmacy with phone number: [facility]/pharmacy #[id number] - [place] [id number] [facility]   Phone: [phone]  Fax: [phone]          Local or Mail Order:local    Ordering Provider:Josh    Would the patient rather a call back or a response via [facility]? Yes, call     Best Call Back Number: [phone] (home)

## 2024-04-15 NOTE — TELEPHONE ENCOUNTER
Patient will call back on Wednesday when nurse come to check reading. Currently out of blood pressure medication, notified it was sent today. He will call to check status and will  to start taking. Set 2 weeks reminder to call patient.

## 2024-04-15 NOTE — TELEPHONE ENCOUNTER
----- Message from Gina Barrera MA sent at 4/15/2024  7:33 AM CDT -----  Type: RX Refill Request    Who Called: Self    Have you contacted your pharmacy: no    Refill or New Rx:    RX Name and Strength:    metoprolol tartrate (LOPRESSOR) tablet 100 mg  Atorvastatin 40 mg  Hydralazine 50mg      tamsulosin (FLOMAX) 0.4 mg Cap      Preferred Pharmacy with phone number: [facility]/pharmacy #[id number] - [place] [id number] [facility]   Phone: [phone]  Fax: [phone]          Local or Mail Order:local    Ordering Provider:Josh    Would the patient rather a call back or a response via [facility]? Yes, call     Best Call Back Number: [phone] (home)

## 2024-04-15 NOTE — PROGRESS NOTES
Population Health Chart Review & Patient Outreach Details      Additional [facility] Health Notes:        Patient will call back with readings. Stated nurse usually come in Wednesday to check. Stated out of blood pressure medication but will call [facility] to see if ready for .        Updates Requested / Reviewed:      Updated Care Coordination Note and Care Everywhere         Health Maintenance Topics Overdue:      VB Score: 1     Uncontrolled BP    Influenza Vaccine  Shingles/Zoster Vaccine  RSV Vaccine                  Health Maintenance Topic(s) Outreach Outcomes & Actions Taken:    Blood Pressure - Outreach Outcomes & Actions Taken  : Patient Will Call Back or Send Portal Message with Reading

## 2024-04-15 NOTE — TELEPHONE ENCOUNTER
----- Message from Aleisa Moreno MD sent at 4/15/2024 12:53 PM CDT -----  Regarding: Blood pressure  Cesar Martinez,     Please follow up with patient's home blood pressure readings. Thank you!     Best,  Dr. Moreno

## 2024-04-15 NOTE — TELEPHONE ENCOUNTER
No care due was identified.  [facility] Embedded Care Due Messages. Reference number: [id number].   4/15/2024 8:47:41 AM CDT

## 2024-04-29 ENCOUNTER — PATIENT OUTREACH (OUTPATIENT)
Dept: ADMINISTRATIVE | Facility: HOSPITAL | Age: 84
End: 2024-04-29
Payer: MEDICARE

## 2024-04-29 ENCOUNTER — EXTERNAL HOME HEALTH (OUTPATIENT)
Dept: HOME HEALTH SERVICES | Facility: HOSPITAL | Age: 84
End: 2024-04-29
Payer: MEDICARE

## 2024-04-29 NOTE — TELEPHONE ENCOUNTER
Need remote bp reading. Patient usually have nurse that come to the house to check reading. He will get them to call office with readings.

## 2024-04-29 NOTE — PROGRESS NOTES
Population Health Chart Review & Patient Outreach Details      Additional [facility] Health Notes:    Need remote bp reading. Patient usually have nurse that come to the house to check reading. He will get them to call office with readings.           Updates Requested / Reviewed:      Updated Care Coordination Note and Care Everywhere         Health Maintenance Topics Overdue:      VB Score: 1     Uncontrolled BP    Influenza Vaccine  Shingles/Zoster Vaccine  RSV Vaccine                  Health Maintenance Topic(s) Outreach Outcomes & Actions Taken:    Blood Pressure - Outreach Outcomes & Actions Taken  : Need remote bp reading. Patient usually have nurse that come to the house to check reading. He will get them to call office with readings.

## 2024-05-02 DIAGNOSIS — N40.1 BPH WITH URINARY OBSTRUCTION: ICD-10-CM

## 2024-05-02 DIAGNOSIS — N13.8 BPH WITH URINARY OBSTRUCTION: ICD-10-CM

## 2024-05-02 RX ORDER — TAMSULOSIN HYDROCHLORIDE 0.4 MG/1
0.4 CAPSULE ORAL DAILY
Qty: 30 CAPSULE | Refills: 11 | Status: SHIPPED | OUTPATIENT
Start: 2024-05-02 | End: 2024-06-10 | Stop reason: DRUGHIGH
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
## 2024-05-15 ENCOUNTER — EXTERNAL HOME HEALTH (OUTPATIENT)
Dept: HOME HEALTH SERVICES | Facility: HOSPITAL | Age: 84
End: 2024-05-15
Payer: MEDICARE

## 2024-05-20 PROBLEM — K57.31 DIVERTICULOSIS OF COLON WITH HEMORRHAGE OF LARGE INTESTINE: Status: RESOLVED | Noted: 2021-05-15 | Resolved: 2024-05-20

## 2024-05-20 PROBLEM — N17.9 AKI (ACUTE KIDNEY INJURY): Status: RESOLVED | Noted: 2021-05-15 | Resolved: 2024-05-20

## 2024-05-20 PROBLEM — K92.2 GI BLEED: Status: RESOLVED | Noted: 2021-05-31 | Resolved: 2024-05-20

## 2024-06-05 ENCOUNTER — TELEPHONE (OUTPATIENT)
Dept: FAMILY MEDICINE | Facility: CLINIC | Age: 84
End: 2024-06-05
Payer: MEDICARE

## 2024-06-05 NOTE — TELEPHONE ENCOUNTER
----- Message from Matilde PEG Landon sent at 6/5/2024 12:38 PM CDT -----  Regarding: Home health Nurse Deangelo [phone]  Type: Patient Call Back     What is the request in detail:  Pt states he can't sleep, and he was prescribed traZODone (DESYREL) 100 MG tablet and he doesn't want to take it due to him feeling funny the next day. Pt had a couple episodes of SOB and its unusual for pt to be SOB. Nurse noticed that pt had an increase of ankle circumference and in the last week it increased 1 cm and didn't increase in the right ankle     Can the clinic reply by [facility]? No     Would the patient rather a call back or a response via [facility]? Call back    Best call back number: Please leave  Nurse Bright [phone]      Additional Information:    Thank you.

## 2024-06-05 NOTE — TELEPHONE ENCOUNTER
----- Message from Joni Maciel sent at 6/5/2024 12:32 PM CDT -----  Type: Patient Call Back          Who called: Deangelo ([facility])          What is the request in detail: Speak with office about traZODone (DESYREL) 100 MG tablet and SOB (no feeling it now).          Can the clinic reply by [facility]? No         Would the patient rather a call back or a response via [facility]? Call back         Best call back number: Telephone Information:  Mobile          [phone]  or [phone]              Additional Information:         Thank you.

## 2024-06-05 NOTE — TELEPHONE ENCOUNTER
Spoke to annemarie; states pt having trouble sleeping, was prescribed trazadone in the past but he doesn't like how it makes him feel, would like something else prescribed; pt also concerned because he is having episodes of SOB and slight increased ankle swelling, not constant just sometimes

## 2024-06-10 ENCOUNTER — OFFICE VISIT (OUTPATIENT)
Dept: FAMILY MEDICINE | Facility: CLINIC | Age: 84
End: 2024-06-10
Payer: MEDICARE

## 2024-06-10 VITALS
TEMPERATURE: 98 F | DIASTOLIC BLOOD PRESSURE: 78 MMHG | HEIGHT: 68 IN | OXYGEN SATURATION: 98 % | HEART RATE: 72 BPM | BODY MASS INDEX: 34.01 KG/M2 | WEIGHT: 224.44 LBS | SYSTOLIC BLOOD PRESSURE: 138 MMHG

## 2024-06-10 DIAGNOSIS — I10 ESSENTIAL HYPERTENSION: ICD-10-CM

## 2024-06-10 DIAGNOSIS — G47.00 INSOMNIA, UNSPECIFIED TYPE: ICD-10-CM

## 2024-06-10 DIAGNOSIS — N40.1 BPH WITH URINARY OBSTRUCTION: ICD-10-CM

## 2024-06-10 DIAGNOSIS — N13.8 BPH WITH URINARY OBSTRUCTION: ICD-10-CM

## 2024-06-10 DIAGNOSIS — J30.89 SEASONAL ALLERGIC RHINITIS DUE TO OTHER ALLERGIC TRIGGER: ICD-10-CM

## 2024-06-10 DIAGNOSIS — H91.90 HEARING LOSS, UNSPECIFIED HEARING LOSS TYPE, UNSPECIFIED LATERALITY: ICD-10-CM

## 2024-06-10 DIAGNOSIS — J41.8 MIXED SIMPLE AND MUCOPURULENT CHRONIC BRONCHITIS: Primary | ICD-10-CM

## 2024-06-10 PROBLEM — J20.9 CHRONIC BRONCHITIS WITH ACUTE EXACERBATION: Status: RESOLVED | Noted: 2019-02-04 | Resolved: 2024-06-10

## 2024-06-10 PROBLEM — J42 CHRONIC BRONCHITIS WITH ACUTE EXACERBATION: Status: RESOLVED | Noted: 2019-02-04 | Resolved: 2024-06-10

## 2024-06-10 PROCEDURE — 3288F FALL RISK ASSESSMENT DOCD: CPT | Mod: CPTII,S$GLB,, | Performed by: INTERNAL MEDICINE

## 2024-06-10 PROCEDURE — 99214 OFFICE O/P EST MOD 30 MIN: CPT | Mod: S$GLB,,, | Performed by: INTERNAL MEDICINE

## 2024-06-10 PROCEDURE — 1126F AMNT PAIN NOTED NONE PRSNT: CPT | Mod: CPTII,S$GLB,, | Performed by: INTERNAL MEDICINE

## 2024-06-10 PROCEDURE — 1101F PT FALLS ASSESS-DOCD LE1/YR: CPT | Mod: CPTII,S$GLB,, | Performed by: INTERNAL MEDICINE

## 2024-06-10 PROCEDURE — 1160F RVW MEDS BY RX/DR IN RCRD: CPT | Mod: CPTII,S$GLB,, | Performed by: INTERNAL MEDICINE

## 2024-06-10 PROCEDURE — 99999 PR PBB SHADOW E&M-EST. PATIENT-LVL IV: CPT | Mod: PBBFAC,,, | Performed by: INTERNAL MEDICINE

## 2024-06-10 PROCEDURE — 1159F MED LIST DOCD IN RCRD: CPT | Mod: CPTII,S$GLB,, | Performed by: INTERNAL MEDICINE

## 2024-06-10 RX ORDER — AZELASTINE 1 MG/ML
1 SPRAY, METERED NASAL 2 TIMES DAILY
Qty: 30 ML | Refills: 5 | Status: SHIPPED | OUTPATIENT
Start: 2024-06-10 | End: 2025-06-10

## 2024-06-10 RX ORDER — ACETAMINOPHEN 500 MG
5 TABLET ORAL NIGHTLY PRN
Qty: 90 TABLET | Refills: 0 | Status: SHIPPED | OUTPATIENT
Start: 2024-06-10 | End: 2024-09-08

## 2024-06-10 RX ORDER — ALBUTEROL SULFATE 90 UG/1
2 AEROSOL, METERED RESPIRATORY (INHALATION) EVERY 6 HOURS PRN
Qty: 18 G | Refills: 1 | Status: SHIPPED | OUTPATIENT
Start: 2024-06-10

## 2024-06-10 RX ORDER — TAMSULOSIN HYDROCHLORIDE 0.4 MG/1
0.8 CAPSULE ORAL DAILY
Qty: 180 CAPSULE | Refills: 0 | Status: SHIPPED | OUTPATIENT
Start: 2024-06-10 | End: 2024-09-08

## 2024-06-10 NOTE — PATIENT INSTRUCTIONS
Increase dose of flomax to 0.8mg daily (take two 0.4mg)    Bring medications to next appointment     Start melatonin for sleep     Stop watching TV before

## 2024-06-10 NOTE — ASSESSMENT & PLAN NOTE
Due to risk of hypotension will not change rx  Unsure if he is taking hydralazine or metoprolol    -continue amlodipine 5mg

## 2024-06-10 NOTE — PROGRESS NOTES
Health Maintenance Due   Topic     Shingles Vaccine (1 of 2)  hx chicken pox. Notified pt can get vaccine at pharmacy    RSV Vaccine (Age 60+ and Pregnant patients) (1 - 1-dose 60+ series) Not offered at this office    COVID-19 Vaccine (3 - 2023-24 season) Not offered at this office    Lipid Panel  Consult pcp

## 2024-06-10 NOTE — ASSESSMENT & PLAN NOTE
Elevated PSA (5.7)  Takes 0.4mg flomax, with 2-3 episodes a night    - increase to 0.8mg  - needs recheck

## 2024-06-10 NOTE — PROGRESS NOTES
Chief Complaint: Insomnia (Pt states that they have been having trouble sleeping for the past four months) and Shortness of Breath (Pt states that they have been feeling short winded after doing certain tasks)      Alexander Duong Jr.  is a 84 y.o. year old patient who presents today for follow up     Poor memory and hard of hearing. Patient lives alone    Gets SOB when he walks 1/2 a block. Sleeps on his side, does get SOB sometimes. Going on for a couple of months. Reports he has been coughing too. No lightheadedness or dizziness. No syncope. Resolves when he sits.     Goes to sleep 11-12am. Wakes up a lot to go to the bathroom.     Past Medical History:   Diagnosis Date    Allergy     Hypertension        Past Surgical History:   Procedure Laterality Date    APPENDECTOMY      COLONOSCOPY Left 10/19/2015    Procedure: COLONOSCOPY;  Surgeon: Randell Briceno MD;  Location: [facility];  Service: Endoscopy;  Laterality: Left;    ESOPHAGOGASTRODUODENOSCOPY N/A 2/22/2024    Procedure: EGD (ESOPHAGOGASTRODUODENOSCOPY);  Surgeon: Kulwant Blanchard MD;  Location: [facility];  Service: Endoscopy;  Laterality: N/A;        No family history on file.     Social History     Socioeconomic History    Marital status: Single   Tobacco Use    Smoking status: Former     Types: Cigarettes    Smokeless tobacco: Never    Tobacco comments:     Quit 15 years ago   Substance and Sexual Activity    Alcohol use: Yes     Alcohol/week: 1.0 standard drink of alcohol     Types: 1 Standard drinks or equivalent per week     Comment: sometimes beer or crown royal    Drug use: No    Sexual activity: Not Currently     Social Determinants of Health     Financial Resource Strain: Low Risk  (2/20/2024)    Overall Financial Resource Strain (CARDIA)     Difficulty of Paying Living Expenses: Not very hard   Food Insecurity: No Food Insecurity (2/20/2024)    Hunger Vital Sign     Worried About Running Out of Food in the Last Year: Never true     Ran Out of Food in  the Last Year: Never true   Transportation Needs: No Transportation Needs (2/20/2024)    PRAPARE - Transportation     Lack of Transportation (Medical): No     Lack of Transportation (Non-Medical): No   Physical Activity: Inactive (2/20/2024)    Exercise Vital Sign     Days of Exercise per Week: 0 days     Minutes of Exercise per Session: 0 min   Stress: No Stress Concern Present (2/20/2024)    [employer] [place] [employer] - Occupational Stress Questionnaire     Feeling of Stress : Only a little   Housing Stability: Low Risk  (2/20/2024)    Housing Stability Vital Sign     Unable to Pay for Housing in the Last Year: No     Number of Places Lived in the Last Year: 1     Unstable Housing in the Last Year: No         Current Outpatient Medications:     amLODIPine (NORVASC) 5 MG tablet, Take 5 mg by mouth once daily., Disp: , Rfl:     atorvastatin (LIPITOR) 40 MG tablet, Take 1 tablet (40 mg total) by mouth once daily., Disp: 90 tablet, Rfl: 3    bisacodyL (DULCOLAX) 5 mg EC tablet, Take 1 tablet (5 mg total) by mouth daily as needed for Constipation., Disp: 30 tablet, Rfl: 0    cetirizine (ZYRTEC) 10 MG tablet, Take 1 tablet (10 mg total) by mouth once daily., Disp: 90 tablet, Rfl: 3    hydrALAZINE (APRESOLINE) 50 MG tablet, Take 1 tablet (50 mg total) by mouth 3 (three) times daily., Disp: 90 tablet, Rfl: 11    metoprolol tartrate (LOPRESSOR) 100 MG tablet, Take 1 tablet (100 mg total) by mouth 2 (two) times daily., Disp: 180 tablet, Rfl: 3    polyethylene glycol (GLYCOLAX) 17 gram PwPk, Take 17 g by mouth 2 (two) times daily as needed for Constipation., Disp: 30 each, Rfl: 0    albuterol (VENTOLIN HFA) 90 mcg/actuation inhaler, Inhale 2 puffs into the lungs every 6 (six) hours as needed for Wheezing or Shortness of Breath. Rescue, Disp: 18 g, Rfl: 1    melatonin (MELATIN) 5 mg, Take 1 tablet (5 mg total) by mouth nightly as needed for Insomnia., Disp: 90 tablet, Rfl: 0    tamsulosin (FLOMAX) 0.4 mg Cap,  Take 2 capsules (0.8 mg total) by mouth once daily., Disp: 180 capsule, Rfl: 0     Review of Systems   Respiratory:  Positive for shortness of breath and wheezing.    Psychiatric/Behavioral:  The patient has insomnia.         Objective:      Vitals:    06/10/24 1343   BP: 138/78   Pulse: 72   Temp: 98.4 °F (36.9 °C)       Physical Exam  Constitutional:       Appearance: Normal appearance.   Cardiovascular:      Rate and Rhythm: Normal rate and regular rhythm.      Heart sounds: No murmur heard.  Pulmonary:      Effort: Pulmonary effort is normal.      Breath sounds: Wheezing present. No rales.   Musculoskeletal:      Right lower leg: No edema.      Left lower leg: No edema.   Neurological:      Mental Status: He is alert.          Assessment:       1. Mixed simple and mucopurulent chronic bronchitis    2. BPH with urinary obstruction    3. Insomnia, unspecified type    4. Hearing loss, unspecified hearing loss type, unspecified laterality    5. Essential hypertension          Plan:   1. Mixed simple and mucopurulent chronic bronchitis  Assessment & Plan:  Had stopped inhaler     - restart albuterol BID     Orders:  -     albuterol (VENTOLIN HFA) 90 mcg/actuation inhaler; Inhale 2 puffs into the lungs every 6 (six) hours as needed for Wheezing or Shortness of Breath. Rescue  Dispense: 18 g; Refill: 1    2. BPH with urinary obstruction  Assessment & Plan:  Elevated PSA (5.7)  Takes 0.4mg flomax, with 2-3 episodes a night    - increase to 0.8mg  - needs recheck      Orders:  -     tamsulosin (FLOMAX) 0.4 mg Cap; Take 2 capsules (0.8 mg total) by mouth once daily.  Dispense: 180 capsule; Refill: 0    3. Insomnia, unspecified type  Assessment & Plan:  Patient watches TV before bed. Trazodone made him drowsy during the day     - start melatonin 5mg nightly   - counseled on sleep hygiene     Orders:  -     melatonin (MELATIN) 5 mg; Take 1 tablet (5 mg total) by mouth nightly as needed for Insomnia.  Dispense: 90 tablet;  Refill: 0    4. Hearing loss, unspecified hearing loss type, unspecified laterality  Assessment & Plan:  Does not have hearing aids     - referral to audiology     Orders:  -     Ambulatory referral/consult to Audiology; Future; Expected date: 06/17/2024    5. Essential hypertension  Assessment & Plan:  Due to risk of hypotension will not change rx  Unsure if he is taking hydralazine or metoprolol    -continue amlodipine 5mg           Follow up in about 2 months (around 8/10/2024) for f/up.

## 2024-06-10 NOTE — ASSESSMENT & PLAN NOTE
Patient watches TV before bed. Trazodone made him drowsy during the day     - start melatonin 5mg nightly   - counseled on sleep hygiene

## 2024-06-26 PROCEDURE — G0179 MD RECERTIFICATION HHA PT: HCPCS | Mod: ,,, | Performed by: STUDENT IN AN ORGANIZED HEALTH CARE EDUCATION/TRAINING PROGRAM

## 2024-07-25 ENCOUNTER — EXTERNAL HOME HEALTH (OUTPATIENT)
Dept: HOME HEALTH SERVICES | Facility: HOSPITAL | Age: 84
End: 2024-07-25
Payer: MEDICARE

## 2024-08-05 ENCOUNTER — TELEPHONE (OUTPATIENT)
Dept: FAMILY MEDICINE | Facility: CLINIC | Age: 84
End: 2024-08-05
Payer: MEDICARE

## 2024-08-26 ENCOUNTER — OFFICE VISIT (OUTPATIENT)
Dept: FAMILY MEDICINE | Facility: CLINIC | Age: 84
End: 2024-08-26
Payer: MEDICARE

## 2024-08-26 VITALS
RESPIRATION RATE: 16 BRPM | SYSTOLIC BLOOD PRESSURE: 158 MMHG | OXYGEN SATURATION: 95 % | HEIGHT: 68 IN | HEART RATE: 88 BPM | WEIGHT: 228.19 LBS | BODY MASS INDEX: 34.59 KG/M2 | DIASTOLIC BLOOD PRESSURE: 72 MMHG | TEMPERATURE: 98 F

## 2024-08-26 DIAGNOSIS — N13.8 BPH WITH URINARY OBSTRUCTION: ICD-10-CM

## 2024-08-26 DIAGNOSIS — R06.83 SNORES: ICD-10-CM

## 2024-08-26 DIAGNOSIS — R05.9 COUGH, UNSPECIFIED TYPE: Primary | ICD-10-CM

## 2024-08-26 DIAGNOSIS — N18.32 STAGE 3B CHRONIC KIDNEY DISEASE: ICD-10-CM

## 2024-08-26 DIAGNOSIS — I10 ESSENTIAL HYPERTENSION: ICD-10-CM

## 2024-08-26 DIAGNOSIS — E78.2 MIXED HYPERLIPIDEMIA: ICD-10-CM

## 2024-08-26 DIAGNOSIS — N40.1 BPH WITH URINARY OBSTRUCTION: ICD-10-CM

## 2024-08-26 DIAGNOSIS — I27.20 PULMONARY HYPERTENSION: ICD-10-CM

## 2024-08-26 DIAGNOSIS — Z12.5 ENCOUNTER FOR SCREENING FOR MALIGNANT NEOPLASM OF PROSTATE: ICD-10-CM

## 2024-08-26 PROCEDURE — 99214 OFFICE O/P EST MOD 30 MIN: CPT | Mod: S$GLB,,,

## 2024-08-26 PROCEDURE — 1159F MED LIST DOCD IN RCRD: CPT | Mod: CPTII,S$GLB,,

## 2024-08-26 PROCEDURE — 1160F RVW MEDS BY RX/DR IN RCRD: CPT | Mod: CPTII,S$GLB,,

## 2024-08-26 PROCEDURE — 3288F FALL RISK ASSESSMENT DOCD: CPT | Mod: CPTII,S$GLB,,

## 2024-08-26 PROCEDURE — 99999 PR PBB SHADOW E&M-EST. PATIENT-LVL IV: CPT | Mod: PBBFAC,,,

## 2024-08-26 PROCEDURE — 1101F PT FALLS ASSESS-DOCD LE1/YR: CPT | Mod: CPTII,S$GLB,,

## 2024-08-26 NOTE — PROGRESS NOTES
HPI     Chief Complaint:  Chief Complaint   Patient presents with    Follow-up    Cough     And not sleeping good at night       Alexander Duong Jr. is a 84 y.o. male with multiple medical diagnoses as listed in the medical history and problem list that presents for cough  Uses a cane    Cough  The cough is Productive of sputum (sometimes cough something up not always). Associated symptoms include ear congestion (uses a q tip), nasal congestion and postnasal drip. Pertinent negatives include no chills, fever, sore throat or shortness of breath. He has tried steroid inhaler for the symptoms. The treatment provided mild relief.     Sleep: snores,  3-4 hrs at night then naps in daytime sometimes  Cough has been going on for months, unsure  when it started. More at night  Sneeze coming out of the shower  Denies smoking or drinking   Denies shortness of breath or chest pain;  denies productive cough states that when it is it is barely anything   States that he has not taken his inhaler today    Denies sick contacts, lives alone      6/10/2024  Dr. Moreno  Chief Complaint: Insomnia (Pt states that they have been having trouble sleeping for the past four months) and Shortness of Breath (Pt states that they have been feeling short winded after doing certain tasks)        Alexander Duong Jr.  is a 84 y.o. year old patient who presents today for follow up      Poor memory and hard of hearing. Patient lives alone     Gets SOB when he walks 1/2 a block. Sleeps on his side, does get SOB sometimes. Going on for a couple of months. Reports he has been coughing too. No lightheadedness or dizziness. No syncope. Resolves when he sits.      Goes to sleep 11-12am. Wakes up a lot to go to the bathroom.     Plan:   1. Mixed simple and mucopurulent chronic bronchitis  Assessment & Plan:  Had stopped inhaler      - restart albuterol BID      Orders:  -     albuterol (VENTOLIN HFA) 90 mcg/actuation inhaler; Inhale 2 puffs into the lungs every 6  (six) hours as needed for Wheezing or Shortness of Breath. Rescue  Dispense: 18 g; Refill: 1     2. BPH with urinary obstruction  Assessment & Plan:  Elevated PSA (5.7)  Takes 0.4mg flomax, with 2-3 episodes a night     - increase to 0.8mg  - needs recheck        Orders:  -     tamsulosin (FLOMAX) 0.4 mg Cap; Take 2 capsules (0.8 mg total) by mouth once daily.  Dispense: 180 capsule; Refill: 0     3. Insomnia, unspecified type  Assessment & Plan:  Patient watches TV before bed. Trazodone made him drowsy during the day      - start melatonin 5mg nightly   - counseled on sleep hygiene      Orders:  -     melatonin (MELATIN) 5 mg; Take 1 tablet (5 mg total) by mouth nightly as needed for Insomnia.  Dispense: 90 tablet; Refill: 0     4. Hearing loss, unspecified hearing loss type, unspecified laterality  Assessment & Plan:  Does not have hearing aids      - referral to audiology      Orders:  -     Ambulatory referral/consult to Audiology; Future; Expected date: 06/17/2024     5. Essential hypertension  Assessment & Plan:  Due to risk of hypotension will not change rx  Unsure if he is taking hydralazine or metoprolol     -continue amlodipine 5mg              Follow up in about 2 months (around 8/10/2024) for f/up.    Assessment & Plan     Will recheck labs and PSA     Referral to sleep Medicine to get a sleep study for Sleep apnea     Instructed to use inhaler as he is whezzing on  auscultation,  denies shortness of breath     Information provided on AVS      Problem List Items Addressed This Visit          Pulmonary    Cough - Primary    Relevant Orders    B-TYPE NATRIURETIC PEPTIDE       Cardiac/Vascular    Essential hypertension    Relevant Orders    CBC Auto Differential    Comprehensive Metabolic Panel    Mixed hyperlipidemia    Relevant Orders    LIPID PANEL    Pulmonary hypertension       Renal/    Stage 3b chronic kidney disease    Relevant Orders    Comprehensive Metabolic Panel    BPH with urinary  obstruction    Relevant Orders    PSA, Screening    Encounter for screening for malignant neoplasm of prostate    Relevant Orders    PSA, Screening       Other    Snores    Relevant Orders    Ambulatory referral/consult to Sleep Disorders         --------------------------------------------      Health Maintenance:  Health Maintenance         Date Due Completion Date    Shingles Vaccine (1 of 2) Never done ---    RSV Vaccine (Age 60+ and Pregnant patients) (1 - 1-dose 60+ series) Never done ---    COVID-19 Vaccine (3 - 2023-24 season) 09/01/2023 5/11/2021    Lipid Panel 11/03/2023 11/3/2022    Override on 4/8/2016: Done (future)    Influenza Vaccine (1) 09/01/2024 11/29/2022 (Declined)    Override on 11/29/2022: Declined (Declined for season)            Health maintenance reviewed    Follow Up:  No follow-ups on file.    Exam     Review of Systems:  (as noted above)  Review of Systems   Constitutional:  Negative for chills and fever.   HENT:  Positive for postnasal drip. Negative for sore throat.    Respiratory:  Positive for cough. Negative for shortness of breath.    Gastrointestinal:  Negative for abdominal pain and nausea.   Genitourinary:  Negative for difficulty urinating.   Neurological:  Negative for dizziness.       Physical Exam:   Physical Exam  Constitutional:       General: He is not in acute distress.     Appearance: He is not ill-appearing, toxic-appearing or diaphoretic.   HENT:      Head: Normocephalic and atraumatic.   Cardiovascular:      Rate and Rhythm: Normal rate and regular rhythm.      Pulses: Normal pulses.      Heart sounds: No murmur heard.  Pulmonary:      Effort: Pulmonary effort is normal.      Breath sounds: Wheezing present.   Abdominal:      Palpations: Abdomen is soft.   Musculoskeletal:      Right lower leg: No edema.      Left lower leg: No edema.   Neurological:      Mental Status: He is alert and oriented to person, place, and time.   Psychiatric:         Mood and Affect: Mood  "normal.       Vitals:    08/26/24 1438   BP: (!) 158/72   BP Location: Left arm   Patient Position: Sitting   BP Method: Large (Manual)   Pulse: 88   Resp: 16   Temp: 98.2 °F (36.8 °C)   TempSrc: Oral   SpO2: 95%   Weight: 103.5 kg (228 lb 2.8 oz)   Height: 5' 8" (1.727 m)      Body mass index is 34.69 kg/m².        History     Past Medical History:  Past Medical History:   Diagnosis Date    Allergy     Hypertension        Past Surgical History:  Past Surgical History:   Procedure Laterality Date    APPENDECTOMY      COLONOSCOPY Left 10/19/2015    Procedure: COLONOSCOPY;  Surgeon: Randell Briceno MD;  Location: [facility];  Service: Endoscopy;  Laterality: Left;    ESOPHAGOGASTRODUODENOSCOPY N/A 2/22/2024    Procedure: EGD (ESOPHAGOGASTRODUODENOSCOPY);  Surgeon: Kulwant Blanchard MD;  Location: [facility] END[facility];  Service: Endoscopy;  Laterality: N/A;       Social History:  Social History     Socioeconomic History    Marital status: Single   Tobacco Use    Smoking status: Former     Types: Cigarettes    Smokeless tobacco: Never    Tobacco comments:     Quit 15 years ago   Substance and Sexual Activity    Alcohol use: Yes     Alcohol/week: 1.0 standard drink of alcohol     Types: 1 Standard drinks or equivalent per week     Comment: sometimes beer or crown royal    Drug use: No    Sexual activity: Not Currently     Social Determinants of Health     Financial Resource Strain: Low Risk  (2/20/2024)    Overall Financial Resource Strain (CARDIA)     Difficulty of Paying Living Expenses: Not very hard   Food Insecurity: No Food Insecurity (2/20/2024)    Hunger Vital Sign     Worried About Running Out of Food in the Last Year: Never true     Ran Out of Food in the Last Year: Never true   Transportation Needs: No Transportation Needs (2/20/2024)    PRAPARE - Transportation     Lack of Transportation (Medical): No     Lack of Transportation (Non-Medical): No   Physical Activity: Inactive (2/20/2024)    Exercise Vital Sign     Days of " Exercise per Week: 0 days     Minutes of Exercise per Session: 0 min   Stress: No Stress Concern Present (2/20/2024)    [employer] [place] [employer] - Occupational Stress Questionnaire     Feeling of Stress : Only a little   Housing Stability: Low Risk  (2/20/2024)    Housing Stability Vital Sign     Unable to Pay for Housing in the Last Year: No     Number of Places Lived in the Last Year: 1     Unstable Housing in the Last Year: No       Family History:  No family history on file.    Allergies and Medications: (updated and reviewed)  Review of patient's allergies indicates:  No Known Allergies  Current Outpatient Medications   Medication Sig Dispense Refill    albuterol (VENTOLIN HFA) 90 mcg/actuation inhaler Inhale 2 puffs into the lungs every 6 (six) hours as needed for Wheezing or Shortness of Breath. Rescue 18 g 1    amLODIPine (NORVASC) 5 MG tablet Take 5 mg by mouth once daily.      atorvastatin (LIPITOR) 40 MG tablet Take 1 tablet (40 mg total) by mouth once daily. 90 tablet 3    azelastine (ASTELIN) 137 mcg (0.1 %) nasal spray 1 spray (137 mcg total) by Nasal route 2 (two) times daily. 30 mL 5    bisacodyL (DULCOLAX) 5 mg EC tablet Take 1 tablet (5 mg total) by mouth daily as needed for Constipation. 30 tablet 0    cetirizine (ZYRTEC) 10 MG tablet Take 1 tablet (10 mg total) by mouth once daily. 90 tablet 3    hydrALAZINE (APRESOLINE) 50 MG tablet Take 1 tablet (50 mg total) by mouth 3 (three) times daily. 90 tablet 11    melatonin (MELATIN) 5 mg Take 1 tablet (5 mg total) by mouth nightly as needed for Insomnia. 90 tablet 0    metoprolol tartrate (LOPRESSOR) 100 MG tablet Take 1 tablet (100 mg total) by mouth 2 (two) times daily. 180 tablet 3    polyethylene glycol (GLYCOLAX) 17 gram PwPk Take 17 g by mouth 2 (two) times daily as needed for Constipation. 30 each 0    tamsulosin (FLOMAX) 0.4 mg Cap Take 2 capsules (0.8 mg total) by mouth once daily. 180 capsule 0     No current  facility-administered medications for this visit.       Patient Care Team:  Alesia Moreno MD as PCP - General (Internal Medicine)  Mynor Simmons MA as Care Coordinator  Isabela Mancera as ED Navigator         - The patient is given an After Visit Summary that lists all medications with directions, allergies, education, orders placed during this encounter and follow-up instructions.      - I have reviewed the patient's medical information including past medical, family, and social history sections including the medications and allergies.      - We discussed the patient's current medications.     This note was created by combination of typed  and MModal dictation.  Transcription errors may be present.  If there are any questions, please contact me.       Teresa Ramon PA-C

## 2024-08-26 NOTE — PROGRESS NOTES
Health Maintenance Due   Topic     Shingles Vaccine (1 of 2) hx chickenpox ; inform pt can get vaccine at pharmacy.    RSV Vaccine (Age 60+ and Pregnant patients) (1 - 1-dose 60+ series) Not given at this facility       COVID-19 Vaccine (3 - 2023-24 season) Not given at this facility       Lipid Panel

## 2024-09-09 DIAGNOSIS — N40.1 BPH WITH URINARY OBSTRUCTION: ICD-10-CM

## 2024-09-09 DIAGNOSIS — N13.8 BPH WITH URINARY OBSTRUCTION: ICD-10-CM

## 2024-09-09 NOTE — TELEPHONE ENCOUNTER
Care Due:                  Date            Visit Type   Department     Provider  --------------------------------------------------------------------------------                                EP -                              PRIMARY      [facility]  Last Visit: 06-      CARE ([facility])   MEDICINE       Alesia Moreno  Next Visit: None Scheduled  None         None Found                                                            Last  Test          Frequency    Reason                     Performed    Due Date  --------------------------------------------------------------------------------    Lipid Panel.  12 months..  atorvastatin.............  11-   10-    [facility] Embedded Care Due Messages. Reference number: [id number].   9/09/2024 1:51:24 PM CDT

## 2024-09-10 RX ORDER — TAMSULOSIN HYDROCHLORIDE 0.4 MG/1
0.8 CAPSULE ORAL DAILY
Qty: 180 CAPSULE | Refills: 0 | Status: SHIPPED | OUTPATIENT
Start: 2024-09-10 | End: 2024-12-09

## 2024-10-08 RX ORDER — ATORVASTATIN CALCIUM 40 MG/1
40 TABLET, FILM COATED ORAL DAILY
Qty: 90 TABLET | Refills: 3 | Status: SHIPPED | OUTPATIENT
Start: 2024-10-08 | End: 2025-10-08

## 2024-10-08 RX ORDER — METOPROLOL TARTRATE 100 MG/1
100 TABLET ORAL 2 TIMES DAILY
Qty: 180 TABLET | Refills: 3 | Status: SHIPPED | OUTPATIENT
Start: 2024-10-08 | End: 2025-10-08

## 2024-10-08 NOTE — TELEPHONE ENCOUNTER
Last Office Visit Info:   The patient's last visit with Alseia Moreno MD was on 6/10/2024.    The patient's last visit in current department was on 8/26/2024.        Last CBC Results:   Lab Results   Component Value Date    WBC 5.44 02/23/2024    WBC 5.44 02/23/2024    HGB 8.2 (L) 02/23/2024    HGB 8.2 (L) 02/23/2024    HCT 26.9 (L) 02/23/2024    HCT 26.9 (L) 02/23/2024     02/23/2024     02/23/2024       Last CMP Results  Lab Results   Component Value Date     02/19/2024    K 4.3 02/19/2024     02/19/2024    CO2 26 02/19/2024    BUN 17 02/19/2024    CREATININE 1.6 (H) 02/19/2024    CALCIUM 8.5 (L) 02/19/2024    ALBUMIN 3.3 (L) 02/19/2024    AST 28 02/19/2024    ALT 16 02/19/2024       Last Lipids  Lab Results   Component Value Date    CHOL 109 (L) 11/03/2022    TRIG 62 11/03/2022    HDL 29 (L) 11/03/2022    LDLCALC 67.6 11/03/2022       Last A1C  Lab Results   Component Value Date    HGBA1C 4.9 02/08/2021       Last TSH  Lab Results   Component Value Date    TSH 1.785 06/07/2023             Current Med Refills  Medication List with Changes/Refills   Current Medications    ALBUTEROL (VENTOLIN HFA) 90 MCG/ACTUATION INHALER    Inhale 2 puffs into the lungs every 6 (six) hours as needed for Wheezing or Shortness of Breath. Rescue       Start Date: 6/10/2024 End Date: --    AMLODIPINE (NORVASC) 5 MG TABLET    Take 5 mg by mouth once daily.       Start Date: --        End Date: --    ATORVASTATIN (LIPITOR) 40 MG TABLET    Take 1 tablet (40 mg total) by mouth once daily.       Start Date: 4/15/2024 End Date: 4/15/2025    AZELASTINE (ASTELIN) 137 MCG (0.1 %) NASAL SPRAY    1 spray (137 mcg total) by Nasal route 2 (two) times daily.       Start Date: 6/10/2024 End Date: 6/10/2025    BISACODYL (DULCOLAX) 5 MG EC TABLET    Take 1 tablet (5 mg total) by mouth daily as needed for Constipation.       Start Date: 3/4/2024  End Date: --    CETIRIZINE (ZYRTEC) 10 MG TABLET    Take 1 tablet (10 mg total) by  mouth once daily.       Start Date: 9/14/2023 End Date: 9/13/2024    HYDRALAZINE (APRESOLINE) 50 MG TABLET    Take 1 tablet (50 mg total) by mouth 3 (three) times daily.       Start Date: 4/15/2024 End Date: 4/15/2025    METOPROLOL TARTRATE (LOPRESSOR) 100 MG TABLET    Take 1 tablet (100 mg total) by mouth 2 (two) times daily.       Start Date: 4/15/2024 End Date: 4/15/2025    POLYETHYLENE GLYCOL (GLYCOLAX) 17 GRAM PWPK    Take 17 g by mouth 2 (two) times daily as needed for Constipation.       Start Date: 3/4/2024  End Date: --    TAMSULOSIN (FLOMAX) 0.4 MG CAP    Take 2 capsules (0.8 mg total) by mouth once daily.       Start Date: 9/10/2024 End Date: 12/9/2024       Order(s) placed per written order guidelines: none    Please advise.

## 2024-10-08 NOTE — TELEPHONE ENCOUNTER
No care due was identified.  [facility] Embedded Care Due Messages. Reference number: [id number].   10/08/2024 8:37:35 AM CDT

## 2024-12-15 DIAGNOSIS — N40.1 BPH WITH URINARY OBSTRUCTION: ICD-10-CM

## 2024-12-15 DIAGNOSIS — N13.8 BPH WITH URINARY OBSTRUCTION: ICD-10-CM

## 2024-12-15 NOTE — TELEPHONE ENCOUNTER
Care Due:                  Date            Visit Type   Department     Provider  --------------------------------------------------------------------------------                                EP -                              PRIMARY      [facility]  Last Visit: 06-      CARE ([facility])   MEDICINE       Alesia Moreno  Next Visit: None Scheduled  None         None Found                                                            Last  Test          Frequency    Reason                     Performed    Due Date  --------------------------------------------------------------------------------    CBC.........  12 months..  hydrALAZINE..............  02- 02-    CMP.........  12 months..  atorvastatin.............  02- 02-    Lipid Panel.  12 months..  atorvastatin.............  11-   10-    [facility] Embedded Care Due Messages. Reference number: [id number].   12/15/2024 1:17:44 AM CST

## 2024-12-16 RX ORDER — TAMSULOSIN HYDROCHLORIDE 0.4 MG/1
2 CAPSULE ORAL
Qty: 180 CAPSULE | Refills: 1 | Status: SHIPPED | OUTPATIENT
Start: 2024-12-16

## 2024-12-16 NOTE — TELEPHONE ENCOUNTER
Refill Routing Note   Medication(s) are not appropriate for processing by [facility] for the following reason(s):        No active prescription written by provider    [facility] action(s):  Defer     Requires labs : Yes             Appointments  past 12m or future 3m with PCP    Date Provider   Last Visit   6/10/2024 Alesia Moreno MD   Next Visit   Visit date not found Alesia Moreno MD   ED visits in past 90 days: 0        Note composed:5:04 AM 12/16/2024

## 2025-01-14 ENCOUNTER — TELEPHONE (OUTPATIENT)
Dept: FAMILY MEDICINE | Facility: CLINIC | Age: 85
End: 2025-01-14
Payer: MEDICARE

## 2025-01-14 NOTE — TELEPHONE ENCOUNTER
----- Message from Suze sent at 1/14/2025  9:17 AM CST -----  Type: RX Refill Request    Who Called:Patient    Refill or New RX: Refill    RX Name and Strength: atorvastatin (LIPITOR) 40 MG tablet          Preferred Pharmacy with Phone Number:   [facility]/pharmacy #[id number] - [place] [id number] [facility]  [address] [address]  Phone: [phone] Fax: [phone]           Best Call Back Number: [phone]       Additional Information:

## 2025-01-14 NOTE — TELEPHONE ENCOUNTER
Spoke to pharmacist and was told pt still has 3 refills remaining but not due for refill until march 21; tried to explain this to pt but he didn't understand; advised him to bring his medication bottles to the office so we can see what he has

## 2025-05-08 ENCOUNTER — HOSPITAL ENCOUNTER (INPATIENT)
Facility: HOSPITAL | Age: 85
LOS: 3 days | Discharge: HOME HEALTH | DRG: 436 | End: 2025-05-11
Attending: STUDENT IN AN ORGANIZED HEALTH CARE EDUCATION/TRAINING PROGRAM | Admitting: STUDENT IN AN ORGANIZED HEALTH CARE EDUCATION/TRAINING PROGRAM
Payer: MEDICARE

## 2025-05-08 DIAGNOSIS — R16.0 LIVER MASS: ICD-10-CM

## 2025-05-08 DIAGNOSIS — R53.1 WEAKNESS: ICD-10-CM

## 2025-05-08 DIAGNOSIS — R07.9 CHEST PAIN: ICD-10-CM

## 2025-05-08 DIAGNOSIS — S36.112A LIVER HEMATOMA, INITIAL ENCOUNTER: Primary | ICD-10-CM

## 2025-05-08 DIAGNOSIS — R79.89 ELEVATED TROPONIN: ICD-10-CM

## 2025-05-08 LAB
ABSOLUTE EOSINOPHIL (OHS): 0.04 K/UL
ABSOLUTE MONOCYTE (OHS): 0.93 K/UL (ref 0.3–1)
ABSOLUTE NEUTROPHIL COUNT (OHS): 5.73 K/UL (ref 1.8–7.7)
AFP SERPL-MCNC: <2 NG/ML
ALBUMIN SERPL BCP-MCNC: 2.9 G/DL (ref 3.5–5.2)
ALLENS TEST: ABNORMAL
ALP SERPL-CCNC: 58 UNIT/L (ref 40–150)
ALT SERPL W/O P-5'-P-CCNC: 17 UNIT/L (ref 10–44)
AMMONIA PLAS-SCNC: 23 UMOL/L (ref 10–50)
ANION GAP (OHS): 8 MMOL/L (ref 8–16)
ANION GAP SERPL CALC-SCNC: 15 MMOL/L (ref 8–16)
AST SERPL-CCNC: 70 UNIT/L (ref 11–45)
BACTERIA #/AREA URNS AUTO: NORMAL /HPF
BASOPHILS # BLD AUTO: 0.02 K/UL
BASOPHILS NFR BLD AUTO: 0.2 %
BILIRUB SERPL-MCNC: 1.2 MG/DL (ref 0.1–1)
BILIRUB UR QL STRIP.AUTO: NEGATIVE
BUN SERPL-MCNC: 23 MG/DL (ref 6–30)
BUN SERPL-MCNC: 24 MG/DL (ref 8–23)
CALCIUM SERPL-MCNC: 8.9 MG/DL (ref 8.7–10.5)
CHLORIDE SERPL-SCNC: 100 MMOL/L (ref 95–110)
CHLORIDE SERPL-SCNC: 96 MMOL/L (ref 95–110)
CLARITY UR: CLEAR
CO2 SERPL-SCNC: 26 MMOL/L (ref 23–29)
COLOR UR AUTO: YELLOW
CREAT SERPL-MCNC: 1.8 MG/DL (ref 0.5–1.4)
CREAT SERPL-MCNC: 2 MG/DL (ref 0.5–1.4)
ERYTHROCYTE [DISTWIDTH] IN BLOOD BY AUTOMATED COUNT: 14.2 % (ref 11.5–14.5)
GFR SERPLBLD CREATININE-BSD FMLA CKD-EPI: 36 ML/MIN/1.73/M2
GLUCOSE SERPL-MCNC: 123 MG/DL (ref 70–110)
GLUCOSE SERPL-MCNC: 127 MG/DL (ref 70–110)
GLUCOSE UR QL STRIP: NEGATIVE
HCT VFR BLD AUTO: 25.2 % (ref 40–54)
HCT VFR BLD CALC: 25 %PCV (ref 36–54)
HGB BLD-MCNC: 8 GM/DL (ref 14–18)
HGB UR QL STRIP: NEGATIVE
HOLD SPECIMEN: NORMAL
HYALINE CASTS UR QL AUTO: 0 /LPF (ref 0–1)
IMM GRANULOCYTES # BLD AUTO: 0.03 K/UL (ref 0–0.04)
IMM GRANULOCYTES NFR BLD AUTO: 0.4 % (ref 0–0.5)
KETONES UR QL STRIP: NEGATIVE
LEUKOCYTE ESTERASE UR QL STRIP: NEGATIVE
LIPASE SERPL-CCNC: 26 U/L (ref 4–60)
LYMPHOCYTES # BLD AUTO: 1.65 K/UL (ref 1–4.8)
MAGNESIUM SERPL-MCNC: 1.6 MG/DL (ref 1.6–2.6)
MCH RBC QN AUTO: 30.7 PG (ref 27–31)
MCHC RBC AUTO-ENTMCNC: 31.7 G/DL (ref 32–36)
MCV RBC AUTO: 97 FL (ref 82–98)
MICROSCOPIC COMMENT: NORMAL
NITRITE UR QL STRIP: NEGATIVE
NUCLEATED RBC (/100WBC) (OHS): 0 /100 WBC
OHS QRS DURATION: 80 MS
OHS QRS DURATION: 82 MS
OHS QTC CALCULATION: 467 MS
OHS QTC CALCULATION: 479 MS
PH UR STRIP: 6 [PH]
PLATELET # BLD AUTO: 198 K/UL (ref 150–450)
PMV BLD AUTO: 10.3 FL (ref 9.2–12.9)
POC IONIZED CALCIUM: 1.17 MMOL/L (ref 1.06–1.42)
POC TCO2 (MEASURED): 27 MMOL/L (ref 23–29)
POTASSIUM BLD-SCNC: 4.1 MMOL/L (ref 3.5–5.1)
POTASSIUM SERPL-SCNC: 4.2 MMOL/L (ref 3.5–5.1)
PROT SERPL-MCNC: 7.6 GM/DL (ref 6–8.4)
PROT UR QL STRIP: ABNORMAL
RBC # BLD AUTO: 2.61 M/UL (ref 4.6–6.2)
RBC #/AREA URNS AUTO: 0 /HPF (ref 0–4)
RELATIVE EOSINOPHIL (OHS): 0.5 %
RELATIVE LYMPHOCYTE (OHS): 19.6 % (ref 18–48)
RELATIVE MONOCYTE (OHS): 11.1 % (ref 4–15)
RELATIVE NEUTROPHIL (OHS): 68.2 % (ref 38–73)
SAMPLE: ABNORMAL
SITE: ABNORMAL
SODIUM BLD-SCNC: 133 MMOL/L (ref 136–145)
SODIUM SERPL-SCNC: 134 MMOL/L (ref 136–145)
SP GR UR STRIP: >=1.03
TROPONIN I SERPL DL<=0.01 NG/ML-MCNC: 0.09 NG/ML
UROBILINOGEN UR STRIP-ACNC: NEGATIVE EU/DL
WBC # BLD AUTO: 8.4 K/UL (ref 3.9–12.7)
WBC #/AREA URNS AUTO: 0 /HPF (ref 0–5)

## 2025-05-08 PROCEDURE — 84295 ASSAY OF SERUM SODIUM: CPT

## 2025-05-08 PROCEDURE — 85025 COMPLETE CBC W/AUTO DIFF WBC: CPT

## 2025-05-08 PROCEDURE — 80053 COMPREHEN METABOLIC PANEL: CPT

## 2025-05-08 PROCEDURE — 82105 ALPHA-FETOPROTEIN SERUM: CPT | Performed by: STUDENT IN AN ORGANIZED HEALTH CARE EDUCATION/TRAINING PROGRAM

## 2025-05-08 PROCEDURE — 99900035 HC TECH TIME PER 15 MIN (STAT)

## 2025-05-08 PROCEDURE — 21400001 HC TELEMETRY ROOM

## 2025-05-08 PROCEDURE — 85014 HEMATOCRIT: CPT

## 2025-05-08 PROCEDURE — 99285 EMERGENCY DEPT VISIT HI MDM: CPT | Mod: 25

## 2025-05-08 PROCEDURE — 93005 ELECTROCARDIOGRAM TRACING: CPT

## 2025-05-08 PROCEDURE — 82565 ASSAY OF CREATININE: CPT

## 2025-05-08 PROCEDURE — A9585 GADOBUTROL INJECTION: HCPCS | Performed by: STUDENT IN AN ORGANIZED HEALTH CARE EDUCATION/TRAINING PROGRAM

## 2025-05-08 PROCEDURE — 99285 EMERGENCY DEPT VISIT HI MDM: CPT | Mod: ,,, | Performed by: INTERNAL MEDICINE

## 2025-05-08 PROCEDURE — 84132 ASSAY OF SERUM POTASSIUM: CPT

## 2025-05-08 PROCEDURE — 83735 ASSAY OF MAGNESIUM: CPT | Performed by: STUDENT IN AN ORGANIZED HEALTH CARE EDUCATION/TRAINING PROGRAM

## 2025-05-08 PROCEDURE — 93010 ELECTROCARDIOGRAM REPORT: CPT | Mod: ,,, | Performed by: INTERNAL MEDICINE

## 2025-05-08 PROCEDURE — 25000003 PHARM REV CODE 250: Performed by: STUDENT IN AN ORGANIZED HEALTH CARE EDUCATION/TRAINING PROGRAM

## 2025-05-08 PROCEDURE — 36415 COLL VENOUS BLD VENIPUNCTURE: CPT | Performed by: STUDENT IN AN ORGANIZED HEALTH CARE EDUCATION/TRAINING PROGRAM

## 2025-05-08 PROCEDURE — 25500020 PHARM REV CODE 255: Performed by: STUDENT IN AN ORGANIZED HEALTH CARE EDUCATION/TRAINING PROGRAM

## 2025-05-08 PROCEDURE — 93010 ELECTROCARDIOGRAM REPORT: CPT | Mod: 76,,, | Performed by: INTERNAL MEDICINE

## 2025-05-08 PROCEDURE — 82962 GLUCOSE BLOOD TEST: CPT

## 2025-05-08 PROCEDURE — 83690 ASSAY OF LIPASE: CPT

## 2025-05-08 PROCEDURE — 82330 ASSAY OF CALCIUM: CPT

## 2025-05-08 PROCEDURE — 96360 HYDRATION IV INFUSION INIT: CPT

## 2025-05-08 PROCEDURE — 80074 ACUTE HEPATITIS PANEL: CPT | Performed by: NURSE PRACTITIONER

## 2025-05-08 PROCEDURE — 84484 ASSAY OF TROPONIN QUANT: CPT | Performed by: STUDENT IN AN ORGANIZED HEALTH CARE EDUCATION/TRAINING PROGRAM

## 2025-05-08 PROCEDURE — 81001 URINALYSIS AUTO W/SCOPE: CPT

## 2025-05-08 PROCEDURE — 82140 ASSAY OF AMMONIA: CPT | Performed by: STUDENT IN AN ORGANIZED HEALTH CARE EDUCATION/TRAINING PROGRAM

## 2025-05-08 PROCEDURE — 63600175 PHARM REV CODE 636 W HCPCS: Performed by: STUDENT IN AN ORGANIZED HEALTH CARE EDUCATION/TRAINING PROGRAM

## 2025-05-08 RX ORDER — NALOXONE HCL 0.4 MG/ML
0.02 VIAL (ML) INJECTION
Status: DISCONTINUED | OUTPATIENT
Start: 2025-05-08 | End: 2025-05-11 | Stop reason: HOSPADM

## 2025-05-08 RX ORDER — ONDANSETRON HYDROCHLORIDE 2 MG/ML
4 INJECTION, SOLUTION INTRAVENOUS EVERY 8 HOURS PRN
Status: DISCONTINUED | OUTPATIENT
Start: 2025-05-08 | End: 2025-05-11 | Stop reason: HOSPADM

## 2025-05-08 RX ORDER — GLUCAGON 1 MG
1 KIT INJECTION
Status: DISCONTINUED | OUTPATIENT
Start: 2025-05-08 | End: 2025-05-11 | Stop reason: HOSPADM

## 2025-05-08 RX ORDER — METOPROLOL TARTRATE 50 MG/1
100 TABLET ORAL 2 TIMES DAILY
Status: DISCONTINUED | OUTPATIENT
Start: 2025-05-08 | End: 2025-05-11 | Stop reason: HOSPADM

## 2025-05-08 RX ORDER — GADOBUTROL 604.72 MG/ML
10 INJECTION INTRAVENOUS
Status: COMPLETED | OUTPATIENT
Start: 2025-05-08 | End: 2025-05-08

## 2025-05-08 RX ORDER — POLYETHYLENE GLYCOL 3350 17 G/17G
17 POWDER, FOR SOLUTION ORAL 2 TIMES DAILY PRN
Status: DISCONTINUED | OUTPATIENT
Start: 2025-05-08 | End: 2025-05-11 | Stop reason: HOSPADM

## 2025-05-08 RX ORDER — SODIUM CHLORIDE 0.9 % (FLUSH) 0.9 %
10 SYRINGE (ML) INJECTION EVERY 12 HOURS PRN
Status: DISCONTINUED | OUTPATIENT
Start: 2025-05-08 | End: 2025-05-11 | Stop reason: HOSPADM

## 2025-05-08 RX ORDER — TAMSULOSIN HYDROCHLORIDE 0.4 MG/1
2 CAPSULE ORAL DAILY
Status: DISCONTINUED | OUTPATIENT
Start: 2025-05-08 | End: 2025-05-11 | Stop reason: HOSPADM

## 2025-05-08 RX ORDER — IBUPROFEN 200 MG
24 TABLET ORAL
Status: DISCONTINUED | OUTPATIENT
Start: 2025-05-08 | End: 2025-05-11 | Stop reason: HOSPADM

## 2025-05-08 RX ORDER — ACETAMINOPHEN 325 MG/1
650 TABLET ORAL EVERY 4 HOURS PRN
Status: DISCONTINUED | OUTPATIENT
Start: 2025-05-08 | End: 2025-05-11 | Stop reason: HOSPADM

## 2025-05-08 RX ORDER — IBUPROFEN 200 MG
16 TABLET ORAL
Status: DISCONTINUED | OUTPATIENT
Start: 2025-05-08 | End: 2025-05-11 | Stop reason: HOSPADM

## 2025-05-08 RX ADMIN — SODIUM CHLORIDE, POTASSIUM CHLORIDE, SODIUM LACTATE AND CALCIUM CHLORIDE 1000 ML: 600; 310; 30; 20 INJECTION, SOLUTION INTRAVENOUS at 11:05

## 2025-05-08 RX ADMIN — LACTULOSE 20 G: 20 SOLUTION ORAL at 03:05

## 2025-05-08 RX ADMIN — IOHEXOL 100 ML: 350 INJECTION, SOLUTION INTRAVENOUS at 10:05

## 2025-05-08 RX ADMIN — METOPROLOL TARTRATE 100 MG: 50 TABLET, FILM COATED ORAL at 09:05

## 2025-05-08 RX ADMIN — GADOBUTROL 10 ML: 604.72 INJECTION INTRAVENOUS at 08:05

## 2025-05-08 RX ADMIN — LACTULOSE 20 G: 20 SOLUTION ORAL at 09:05

## 2025-05-08 RX ADMIN — TAMSULOSIN HYDROCHLORIDE 0.8 MG: 0.4 CAPSULE ORAL at 03:05

## 2025-05-08 NOTE — CONSULTS
VIR consult note      Chief Complaint: abdominal pain    History of Present Illness:  Alexander Duong Jr. is a 85 y.o. male with hx of HTN, HLD, CKD stage 3b, and BPH, who presents to the ED for generalized abdominal pain for 4 days with occasional vomiting and changes in mentation & memory. CT scan showed suspected ruptured hepatic mass with subcapsular hematoma. No active bleeding noted. IR consulted for evaluation. 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          Admission H&P reviewed.  Past Medical History:   Diagnosis Date    Allergy     Hypertension      Past Surgical History:   Procedure Laterality Date    APPENDECTOMY      COLONOSCOPY Left 10/19/2015    Procedure: COLONOSCOPY;  Surgeon: Randell Briceno MD;  Location: [facility] ENDO;  Service: Endoscopy;  Laterality: Left;    ESOPHAGOGASTRODUODENOSCOPY N/A 2/22/2024    Procedure: EGD (ESOPHAGOGASTRODUODENOSCOPY);  Surgeon: Kulwant Blanchard MD;  Location: [facility] ENDO;  Service: Endoscopy;  Laterality: N/A;       Review of Systems:   As documented in primary team H&P    Home Meds:   Prior to Admission medications    Medication Sig Start Date End Date Taking? Authorizing Provider   albuterol (VENTOLIN HFA) 90 mcg/actuation inhaler Inhale 2 puffs into the lungs every 6 (six) hours as needed for Wheezing or Shortness of Breath. Rescue 6/10/24   Alesia Moreno MD   amLODIPine (NORVASC) 5 MG tablet Take 5 mg by mouth once daily.    Provider, Historical   atorvastatin (LIPITOR) 40 MG tablet Take 1 tablet (40 mg total) by mouth once daily. 10/8/24 10/8/25  Alesia Moreno MD   azelastine (ASTELIN) 137 mcg (0.1 %) nasal spray 1 spray (137 mcg total) by Nasal route 2 (two) times daily. 6/10/24 6/10/25  Alesia Moreno MD   bisacodyL (DULCOLAX) 5 mg EC tablet Take 1 tablet (5 mg total) by mouth daily as needed for Constipation. 3/4/24   Alesia Moreno MD   cetirizine (ZYRTEC) 10 MG tablet Take 1 tablet  "(10 mg total) by mouth once daily. 9/14/23 9/13/24  Naa Alcazar MD   hydrALAZINE (APRESOLINE) 50 MG tablet Take 1 tablet (50 mg total) by mouth 3 (three) times daily. 4/15/24 4/15/25  Alesia Moreno MD   metoprolol tartrate (LOPRESSOR) 100 MG tablet Take 1 tablet (100 mg total) by mouth 2 (two) times daily. 10/8/24 10/8/25  Alesia Moreno MD   polyethylene glycol (GLYCOLAX) 17 gram PwPk Take 17 g by mouth 2 (two) times daily as needed for Constipation. 3/4/24   Alesia Moreno MD   tamsulosin (FLOMAX) 0.4 mg Cap TAKE 2 CAPSULES BY MOUTH ONCE DAILY. 12/16/24   Salinas Glasgow MD     Scheduled Meds:    lactulose  20 g Oral TID    metoprolol tartrate  100 mg Oral BID    tamsulosin  2 capsule Oral Daily     Continuous Infusions:   PRN Meds:  Current Facility-Administered Medications:     acetaminophen, 650 mg, Oral, Q4H PRN    dextrose 50%, 12.5 g, Intravenous, PRN    dextrose 50%, 25 g, Intravenous, PRN    glucagon (human recombinant), 1 mg, Intramuscular, PRN    glucose, 16 g, Oral, PRN    glucose, 24 g, Oral, PRN    naloxone, 0.02 mg, Intravenous, PRN    ondansetron, 4 mg, Intravenous, Q8H PRN    polyethylene glycol, 17 g, Oral, BID PRN    sodium chloride 0.9%, 10 mL, Intravenous, Q12H PRN       Allergies: Review of patient's allergies indicates:  No Known Allergies         Vitals:  Temp: 99.3 °F (37.4 °C) (05/08/25 0906)  Pulse: 99 (05/08/25 1300)  Resp: 19 (05/08/25 1300)  BP: (!) 158/69 (05/08/25 1300)  SpO2: (!) 93 % (05/08/25 1200)     Physical Exam:  ASA: III  Mallampati: II    General: no acute distress  Mental Status: alert and oriented to person, place and time  HEENT: normocephalic, atraumatic  Chest: unlabored breathing  Heart: regular heart rate  Abdomen: General tenderness.   Extremity: moves all extremities    Labs:  No results for input(s): "INR", "PT", "PTT" in the last 168 hours.    Recent Labs   Lab 05/08/25  0938 05/08/25  0941   WBC 8.40  --    HGB 8.0*  --    HCT 25.2* 25*   MCV 97  --      --  "      Recent Labs   Lab 05/08/25  0938   *   *   K 4.2      CO2 26   BUN 24*   CREATININE 1.8*   CALCIUM 8.9   MG 1.6   ALT 17   AST 70*   ALBUMIN 2.9*   BILITOT 1.2*       Imaging    All recent imaging Independently reviewed by myself.         ASSESSMENT/PLAN:   Alexander Duong Jr. is a 85 y.o. male with hx of HTN, HLD, CKD stage 3b, and BPH, who presents to the ED for generalized abdominal pain for 4 days with occasional vomiting and changes in mentation & memory. CT scan showed suspected ruptured hepatic mass with subcapsular hematoma. No active bleeding noted. IR consulted for evaluation. 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         Plan/recommendations:  - CT reviewed by IR. Given no active bleed at this time and stable Hg & vitals, recommend to defer embolization to avoid embolizing vessels that could potentially be needed for transarterial cancer therapy.   - Trend Hg and monitor vital signs closely.   - Recommend further diagnostic evaluation for the hepatic mass; APF and multiphase CT or MRI liver protocol.   - Once diagnosis is reached will have multidisciplinary meeting to discuss treatment approach.   - Please consult hepatology/GI for initiation of multidisciplinary tumor discussion.            Mirza Gold MD  VIR

## 2025-05-08 NOTE — SUBJECTIVE & OBJECTIVE
Past Medical History:   Diagnosis Date    Allergy     Hypertension        Past Surgical History:   Procedure Laterality Date    APPENDECTOMY      COLONOSCOPY Left 10/19/2015    Procedure: COLONOSCOPY;  Surgeon: Randell Briceno MD;  Location: [facility] ENDO;  Service: Endoscopy;  Laterality: Left;    ESOPHAGOGASTRODUODENOSCOPY N/A 2/22/2024    Procedure: EGD (ESOPHAGOGASTRODUODENOSCOPY);  Surgeon: Kulwant Blanchard MD;  Location: [facility] ENDO;  Service: Endoscopy;  Laterality: N/A;       Review of patient's allergies indicates:  No Known Allergies    No current facility-administered medications on file prior to encounter.     Current Outpatient Medications on File Prior to Encounter   Medication Sig    albuterol (VENTOLIN HFA) 90 mcg/actuation inhaler Inhale 2 puffs into the lungs every 6 (six) hours as needed for Wheezing or Shortness of Breath. Rescue    amLODIPine (NORVASC) 5 MG tablet Take 5 mg by mouth once daily.    atorvastatin (LIPITOR) 40 MG tablet Take 1 tablet (40 mg total) by mouth once daily.    azelastine (ASTELIN) 137 mcg (0.1 %) nasal spray 1 spray (137 mcg total) by Nasal route 2 (two) times daily.    bisacodyL (DULCOLAX) 5 mg EC tablet Take 1 tablet (5 mg total) by mouth daily as needed for Constipation.    cetirizine (ZYRTEC) 10 MG tablet Take 1 tablet (10 mg total) by mouth once daily.    hydrALAZINE (APRESOLINE) 50 MG tablet Take 1 tablet (50 mg total) by mouth 3 (three) times daily.    metoprolol tartrate (LOPRESSOR) 100 MG tablet Take 1 tablet (100 mg total) by mouth 2 (two) times daily.    polyethylene glycol (GLYCOLAX) 17 gram PwPk Take 17 g by mouth 2 (two) times daily as needed for Constipation.    tamsulosin (FLOMAX) 0.4 mg Cap TAKE 2 CAPSULES BY MOUTH ONCE DAILY.     Family History    None       Tobacco Use    Smoking status: Former     Types: Cigarettes    Smokeless tobacco: Never    Tobacco comments:     Quit 15 years ago   Substance and Sexual Activity    Alcohol use: Yes     Alcohol/week: 1.0  standard drink of alcohol     Types: 1 Standard drinks or equivalent per week     Comment: sometimes beer or [employer] [place]    Drug use: No    Sexual activity: Not Currently     Review of Systems  Objective:     Vital Signs (Most Recent):  Temp: 99.3 °F (37.4 °C) (05/08/25 0906)  Pulse: 99 (05/08/25 1300)  Resp: 20 (05/08/25 1151)  BP: (!) 158/69 (05/08/25 1300)  SpO2: (!) 92 % (05/08/25 1300) Vital Signs (24h Range):  Temp:  [99.3 °F (37.4 °C)] 99.3 °F (37.4 °C)  Pulse:  [] 99  Resp:  [18-20] 20  SpO2:  [92 %-97 %] 92 %  BP: (131-158)/(60-69) 158/69        There is no height or weight on file to calculate BMI.     Physical Exam  Vitals and nursing note reviewed.   Constitutional:       General: He is not in acute distress.     Appearance: He is ill-appearing.   HENT:      Head: Normocephalic.      Mouth/Throat:      Mouth: Mucous membranes are dry.   Eyes:      Conjunctiva/sclera: Conjunctivae normal.   Cardiovascular:      Rate and Rhythm: Normal rate and regular rhythm.      Pulses: Normal pulses.      Heart sounds: No murmur heard.  Pulmonary:      Effort: Pulmonary effort is normal. No respiratory distress.      Breath sounds: No wheezing.   Abdominal:      General: Bowel sounds are normal. There is distension.      Tenderness: There is no abdominal tenderness.   Musculoskeletal:         General: No swelling.   Skin:     General: Skin is warm and dry.   Neurological:      General: No focal deficit present.      Mental Status: He is alert.      Comments: Alert, oriented to self and situation   Psychiatric:         Mood and Affect: Mood normal.         Thought Content: Thought content normal.                Significant Labs: All pertinent labs within the past 24 hours have been reviewed.    Significant Imaging: I have reviewed all pertinent imaging results/findings within the past 24 hours.

## 2025-05-08 NOTE — PLAN OF CARE
Problem: Fall Injury Risk  Goal: Absence of Fall and Fall-Related Injury  Outcome: Met  Intervention: Promote Injury-Free Environment  Flowsheets (Taken 5/8/2025 7711)  Safety Promotion/Fall Prevention:   assistive device/personal item within reach   bed alarm set   instructed to call staff for mobility   lighting adjusted   medications reviewed   room near unit station   side rails raised x 2   in recliner, wheels locked

## 2025-05-08 NOTE — NURSING
Called MRI to see if the pt will be going down today, informed by Tello that there are still people ahead of my patient and that they should be sending for him in a few hours and to keep NPO. MD made aware via secure chat. Will continue to monitor

## 2025-05-08 NOTE — ASSESSMENT & PLAN NOTE
RAFAEL is likely due to pre-renal azotemia due to dehydration. Baseline creatinine is 1.4. Most recent creatinine and eGFR are listed below.  Recent Labs     05/08/25  0938   CREATININE 1.8*   EGFRNORACEVR 36*      Plan  - RAFAEL is stable  - Avoid nephrotoxins and renally dose meds for GFR listed above  - Monitor urine output, serial BMP, and adjust therapy as needed  - IVF -r echeck in AM

## 2025-05-08 NOTE — NURSING
Spoke with Tello from MRI, updated on time. Possibly going down at 7pm, pt and his niece updated. Will continue to monitor

## 2025-05-08 NOTE — ED TRIAGE NOTES
"Pt to the ED with complaints of abdominal "soreness" accompanied by vomiting since Monday. Pt reports taking miralax and another stool softener yesterday morning because he thought he was constipated. LBM yesterday evening. Pt also reports recent episodes of urinary incontinence. Pt denies dysuria. Pt BIB his niece who also reports that over the last two weeks pt has been intermittently confused, repeating questions or statements frequently and has also appeared generally weak. Pt denies tingling/numbness, change in vision or headache.   "

## 2025-05-08 NOTE — HPI
Mr. Duong is an 85-year-old male with past medical history of hypertension, BPH who presents to the emergency department with his niece for evaluation of abdominal pain, nausea and vomiting.  Niece also reports that patient has been intermittently confused for the past week or so.  She reports he is very independent, lives alone and does all of his ADLs.  He does not drive.  He washes his own clothes, cooks his own meals and takes care of his house.   In the ED, patient afebrile with pulse 88 and .60 and 96% on room air. Labs notable for Hb 8, BUN/Cr 24/1.8.  T bili 1.2 and AST 70.  UA without evidence of infection.  CT of the abdomen and pelvis with a new right hepatic lesion with associated rupture extrahepatic component with a subcapsular hematoma concerning for hepatocellular carcinoma or metastatic disease.  IR and General surgery consulted.  MRCP ordered.  AFP and ammonia ordered.  He was given 1 L lactated Ringer's.  We will start on lactulose.  Niece at bedside and is main person that helps patient out.  He has no paperwork or advanced directives.  I discussed code status and encouraged the patient and niece to discuss this further.  He will be admitted to hospital medicine for further management.

## 2025-05-08 NOTE — ED PROVIDER NOTES
Encounter Date: 5/8/2025    SCRIBE #1 NOTE: I, Meseret Son, am scribing for, and in the presence of,  Jenn Lakhani MD. I have scribed the following portions of the note - Other sections scribed: HPI, ROS, PE.       History     Chief Complaint   Patient presents with    Vomiting    Weakness    Abdominal Pain     Pt complaining of abdominal pain with vomiting x Monday with weakness and periods of confusion, also urinary incontinence.      Patient is a 85 y.o. male, with a PMHx of HTN, HLD, CKD stage 3b, and BPH, who presents to the ED for evaluation of generalized abdominal pain that began 4 days ago. He reports associated vomiting (last episode 4 days ago), weakness, and urinary incontinence. Patient's niece at bedside reports patient has been having acute memory loss and confusion x 1-2 weeks, which are not baseline. Patient reports taking Metamucil this morning and states his LBM was yesterday. No other exacerbating or alleviating factors. Patient reports PSHx of appendectomy when he was 11 y.o. Patient denies dysuria, urinary frequency, nausea, fever, chest pain, SOB, or other associated symptoms.     The history is provided by the patient and a relative. No  was used.     Review of patient's allergies indicates:  No Known Allergies  Past Medical History:   Diagnosis Date    Allergy     Hypertension      Past Surgical History:   Procedure Laterality Date    APPENDECTOMY      COLONOSCOPY Left 10/19/2015    Procedure: COLONOSCOPY;  Surgeon: Randell Briceno MD;  Location: [facility];  Service: Endoscopy;  Laterality: Left;    ESOPHAGOGASTRODUODENOSCOPY N/A 2/22/2024    Procedure: EGD (ESOPHAGOGASTRODUODENOSCOPY);  Surgeon: Kulwant Blanchard MD;  Location: [facility];  Service: Endoscopy;  Laterality: N/A;     No family history on file.  Social History[1]  Review of Systems   Constitutional:  Negative for fever.   HENT:  Negative for ear pain.    Eyes:  Negative for pain.   Respiratory:  Negative  for shortness of breath.    Cardiovascular:  Negative for chest pain.   Gastrointestinal:  Positive for abdominal pain and vomiting. Negative for nausea.   Genitourinary:  Positive for enuresis. Negative for dysuria and frequency.   Musculoskeletal:  Negative for back pain.   Neurological:  Positive for weakness.   Psychiatric/Behavioral:  Positive for confusion.        Physical Exam     Initial Vitals [05/08/25 0906]   BP Pulse Resp Temp SpO2   131/60 88 18 99.3 °F (37.4 °C) 96 %      MAP       --         Physical Exam    Nursing note and vitals reviewed.  Constitutional: He appears well-developed and well-nourished.   HENT:   Head: Normocephalic and atraumatic.   Eyes: Conjunctivae and EOM are normal.   Neck:   Normal range of motion.  Cardiovascular:  Normal rate, regular rhythm and normal heart sounds.           Pulmonary/Chest: Breath sounds normal. No respiratory distress. He has no wheezes.   Abdominal: Abdomen is soft. Bowel sounds are normal. He exhibits distension. There is generalized abdominal tenderness (mild).   Musculoskeletal:         General: Normal range of motion.      Cervical back: Normal range of motion.     Neurological: He is alert.   Skin: Skin is warm and dry.   Psychiatric: He has a normal mood and affect.         ED Course   Procedures  Labs Reviewed   COMPREHENSIVE METABOLIC PANEL - Abnormal       Result Value    Sodium 134 (*)     Potassium 4.2      Chloride 100      CO2 26      Glucose 123 (*)     BUN 24 (*)     Creatinine 1.8 (*)     Calcium 8.9      Protein Total 7.6      Albumin 2.9 (*)     Bilirubin Total 1.2 (*)     ALP 58      AST 70 (*)     ALT 17      Anion Gap 8      eGFR 36 (*)    CBC WITH DIFFERENTIAL - Abnormal    WBC 8.40      RBC 2.61 (*)     HGB 8.0 (*)     HCT 25.2 (*)     MCV 97      MCH 30.7      MCHC 31.7 (*)     RDW 14.2      Platelet Count 198      MPV 10.3      Nucleated RBC 0      Neut % 68.2      Lymph % 19.6      Mono % 11.1      Eos % 0.5      Basophil % 0.2       Imm Grans % 0.4      Neut # 5.73      Lymph # 1.65      Mono # 0.93      Eos # 0.04      Baso # 0.02      Imm Grans # 0.03     TROPONIN I - Abnormal    Troponin-I 0.091 (*)    ISTAT PROCEDURE - Abnormal    POC Glucose 127 (*)     POC BUN 23      POC Creatinine 2.0 (*)     POC Sodium 133 (*)     POC Potassium 4.1      POC Chloride 96      POC TCO2 (MEASURED) 27      POC Anion Gap 15      POC Ionized Calcium 1.17      POC Hematocrit 25 (*)     Sample VENOUS      Site Other      Allens Test N/A     LIPASE - Normal    Lipase Level 26     MAGNESIUM - Normal    Magnesium  1.6     CBC W/ AUTO DIFFERENTIAL    Narrative:     The following orders were created for panel order CBC auto differential.  Procedure                               Abnormality         Status                     ---------                               -----------         ------                     CBC with Differential[[id number]]       Abnormal            Final result                 Please view results for these tests on the individual orders.   URINALYSIS, REFLEX TO URINE CULTURE   AFP TUMOR MARKER   [place] URINE HOLD   SARS-COV-2 RDRP GENE   POCT INFLUENZA A/B MOLECULAR   ISTAT CHEM8     EKG Readings: (Independently Interpreted)   Normal sinus rhythm, rate of 90, normal axis, normal intervals       Imaging Results               CT Abdomen Pelvis With IV Contrast NO Oral Contrast (Final result)  Result time 05/08/25 11:10:20      Final result by Davin Swain MD (05/08/25 11:10:20)                   Impression:      1. New 7.5 x 6.6 cm right hepatic lesion with an associated rupture extrahepatic component resulting in a 5.3 x 10.1 cm subcapsular hematoma and ill-defined right upper quadrant hemorrhage extending inferiorly along the retroperitoneal planes.  Findings are most concerning for hepatocellular carcinoma or metastatic disease.  2. 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  3. Colonic diverticulosis.  4. Hepatic and renal  cysts.  5. Portacaval lymphadenopathy.  6. Circumferential bladder wall thickening, likely sequela chronic outlet obstruction noting associated prostatomegaly.  7. Severe atherosclerotic calcification.  8. Additional findings as detailed in the body of the report.  This report was flagged in Epic as abnormal.      Electronically signed by: Davin Swain MD  Date:    05/08/2025  Time:    11:10               Narrative:    EXAMINATION:  CT ABDOMEN PELVIS WITH IV CONTRAST    CLINICAL HISTORY:  LLQ abdominal pain;RLQ abdominal pain (Age >= 14y);    TECHNIQUE:  5 mm axial images were obtained through the abdomen and pelvis following administration of 100 cc of Omnipaque 350 IV contrast.  Coronal and sagittal reformats were performed.    COMPARISON:  CT 02/19/2024.    FINDINGS:  Heart: Mild calcification of the aortic annulus.  No pericardial effusion.    Lungs: No consolidation.  No pleural effusions.    Liver: Normal in size.  New 7.5 x 6.6 cm right lobe heterogeneous hypoenhancing lesion with an associated ruptured extrahepatic component with a resulting 5.3 x 10.1 cm subcapsular hematoma and ill-defined hemorrhage at the level of the right upper quadrant that surrounds the adjacent colon and tracks inferiorly along the retroperitoneum.  Subcentimeter hypodensity within the inferior right lobe, too small to characterize but likely small cyst.  No intrahepatic bile duct dilatation.Diminutive caliber of the right portal vein.  Left portal vein, splenic vein and SMV are patent.    Gallbladder: Intraluminal high attenuation foci measuring up to 0.8 cm, likely calcified gallstones.  Extra-hepatic bile ducts are normal in caliber.    Stomach: Unremarkable.    Duodenum: Unremarkable.    Spleen: Normal in size. No focal lesions.    Pancreas: Unremarkable.    Adrenal glands: Unremarkable    Kidneys: Normal in size and location.  Bilateral cortical hypodensities, likely benign cysts.  No cortical enhancing lesions.  No  nephrolithiasis.  No hydronephrosis or hydroureter.  Circumferential bladder wall thickening.    Genital organs: Prostate is slightly enlarged.No pelvic free fluid.    Bowel: Small bowel is normal in caliber.  Numerous colonic diverticula.  Appendectomy peer no obstruction or inflammatory changes.    Aorta: Severe atherosclerotic calcification.  Celiac artery, SMA, bilateral renal arteries and RIGO are patent.  No aneurysm.  No dissection.    IVC/Veins: Unremarkable.    Peritoneum/mesentery: No intra-abdominal free air.  No focal mesenteric masses.    Lymph nodes: Enlarged portacaval lymph nodes, increased in size when compared to the previous CT with index node measuring 1.2 cm in short axis (series 2, image 53).    Subcutaneous soft tissues: Fat containing umbilical and left inguinal hernias.    Osseous structures: Degenerative changes of the spine and bilateral hips.  No acute fractures. No suspicious lytic or blastic lesions.                                       Medications   iohexoL (OMNIPAQUE 350) injection 100 mL (100 mLs Intravenous Given 5/8/25 1031)   lactated ringers bolus 1,000 mL (1,000 mLs Intravenous New Bag 5/8/25 1103)     Medical Decision Making  Alexander Duong Jr. is a 85 y.o. male with h/o HTN, HLD, CKD stage 3b, and BPH, who presents to the ED with generalized abdominal pain that began 4 days ago.    Initial vitals reassuring. Physical exam reveals distended but soft abdomen that is mildly tender diffusely.    Patient presentation most concerning for UTI. DDx of abdominal pain is broad and includes pancreatitis, GERD, cholecystitis, cholelithiasis, appendicitis, pyelonephritis, kidney stone, diverticulitis, diverticulosis, constipation. Also considered SBO, abdominal aortic aneurysm, aortic dissection.     I will obtain the following to better assess: cbc, cmp, lipase, trop, EKG, UA, covid/flu, CT A/P. Patient is currently declining any medications for pain or nausea, will continue to monitor.       DISCLAIMER: This note was prepared with [employer] voice recognition transcription software. Garbled syntax, mangled pronouns, and other bizarre constructions may be attributed to that software system.    Amount and/or Complexity of Data Reviewed  Independent Historian: caregiver     Details: See HPI.  Labs: ordered. Decision-making details documented in ED Course.  Radiology: ordered. Decision-making details documented in ED Course.  ECG/medicine tests: ordered and independent interpretation performed.    Risk  Prescription drug management.  Decision regarding hospitalization.            Scribe Attestation:   Scribe #1: I performed the above scribed service and the documentation accurately describes the services I performed. I attest to the accuracy of the note.        ED Course as of 05/08/25 1215   Thu May 08, 2025   1039 Patient's troponin is elevated.  Initial EKG without signs of ischemia, we will repeat EKG, repeat troponin.  Given patient denies chest pain, anticipate troponin elevation is likely due to demand and CKD. [KI]   1115 CT scan shows large liver mass with likely rupture, intrahepatic and extrahepatic hemorrhage [KI]   1130 Spoke with surgery regarding the findings on the CT scan.  They recommend consulting IR [KI]   1148 Patient and his family member at the bedside have been updated on the plan of care.  Surgery is at the bedside evaluating the patient [KI]   1150 IR recommendations are: draw an AFP, get a triple phase CT or liver MRI, trend labs.  If hgb stable and no active bleed, would hold off embo.  If it doesn't meet criteria for HCC, would biopsy.  If it does meet criteria for HCC, would be a discussion of Y90 vs combination therapy depending on vascular invasion [KI]   1150 I will place imaging order, AFP order and discuss admission with hospital medicine [KI]   1215 Patient has been accepted to Hospital Medicine by Dr. Moisés Castro [KI]      ED Course User Index  [KI] Jenn Lakhani  MD JULIO WILKERSON, Nikole Lakhani MD, personally performed the services described in this documentation. All medical record entries made by the scribe were at my direction and in my presence. I have reviewed the chart and agree that the record reflects my personal performance and is accurate and complete.    Clinical Impression:  Final diagnoses:  [R53.1] Weakness  [R79.89] Elevated troponin  [R16.0] Liver mass          ED Disposition Condition    Admit                     [1]   Social History  Tobacco Use    Smoking status: Former     Types: Cigarettes    Smokeless tobacco: Never    Tobacco comments:     Quit 15 years ago   Substance Use Topics    Alcohol use: Yes     Alcohol/week: 1.0 standard drink of alcohol     Types: 1 Standard drinks or equivalent per week     Comment: sometimes beer or crown royal    Drug use: No        Jenn Lakhani MD  05/08/25 8534

## 2025-05-08 NOTE — ASSESSMENT & PLAN NOTE
Presents with abdominal pain, nausea and vomiting. Imaging concerning for new liver mass of the right hepatic lobe with extracapsular hemorrhage.  Hemoglobin stable at 8 right now.  IR consulted and following along in case patient needs further embolization.  Concerned that this is malignancy.  Checking AFP and ammonia given history of intermittent confusion.  No mass noted on imaging back in February of 2024.  Discussed with patient and niece at bedside.  We will follow up on MRCP and lab results.  Ongoing discussions.

## 2025-05-08 NOTE — H&P
[facility] Emergency Dept  [facility]  History & Physical    Patient Name: Alexander Duong Jr.  MRN: [id number]  Patient Class: IP- Inpatient  Admission Date: 5/8/2025  Attending Physician: Moisés Castro MD   Primary Care Provider: Alesia Moreno MD         Patient information was obtained from patient, relative(s), and ER records.     Subjective:     Principal Problem:Liver mass    Chief Complaint:   Chief Complaint   Patient presents with    Vomiting    Weakness    Abdominal Pain     Pt complaining of abdominal pain with vomiting x Monday with weakness and periods of confusion, also urinary incontinence.         HPI: Mr. Duong is an 85-year-old male with past medical history of hypertension, BPH who presents to the emergency department with his niece for evaluation of abdominal pain, nausea and vomiting.  Niece also reports that patient has been intermittently confused for the past week or so.  She reports he is very independent, lives alone and does all of his ADLs.  He does not drive.  He washes his own clothes, cooks his own meals and takes care of his house.   In the ED, patient afebrile with pulse 88 and .60 and 96% on room air. Labs notable for Hb 8, BUN/Cr 24/1.8.  T bili 1.2 and AST 70.  UA without evidence of infection.  CT of the abdomen and pelvis with a new right hepatic lesion with associated rupture extrahepatic component with a subcapsular hematoma concerning for hepatocellular carcinoma or metastatic disease.  IR and General surgery consulted.  MRCP ordered.  AFP and ammonia ordered.  He was given 1 L lactated Ringer's.  We will start on lactulose.  Niece at bedside and is main person that helps patient out.  He has no paperwork or advanced directives.  I discussed code status and encouraged the patient and niece to discuss this further.  He will be admitted to hospital medicine for further management.    Past Medical History:   Diagnosis Date    Allergy     Hypertension        Past  Surgical History:   Procedure Laterality Date    APPENDECTOMY      COLONOSCOPY Left 10/19/2015    Procedure: COLONOSCOPY;  Surgeon: Randell Briceno MD;  Location: [facility] ENDO;  Service: Endoscopy;  Laterality: Left;    ESOPHAGOGASTRODUODENOSCOPY N/A 2/22/2024    Procedure: EGD (ESOPHAGOGASTRODUODENOSCOPY);  Surgeon: Kulwant Blanchard MD;  Location: [facility] ENDO;  Service: Endoscopy;  Laterality: N/A;       Review of patient's allergies indicates:  No Known Allergies    No current facility-administered medications on file prior to encounter.     Current Outpatient Medications on File Prior to Encounter   Medication Sig    albuterol (VENTOLIN HFA) 90 mcg/actuation inhaler Inhale 2 puffs into the lungs every 6 (six) hours as needed for Wheezing or Shortness of Breath. Rescue    amLODIPine (NORVASC) 5 MG tablet Take 5 mg by mouth once daily.    atorvastatin (LIPITOR) 40 MG tablet Take 1 tablet (40 mg total) by mouth once daily.    azelastine (ASTELIN) 137 mcg (0.1 %) nasal spray 1 spray (137 mcg total) by Nasal route 2 (two) times daily.    bisacodyL (DULCOLAX) 5 mg EC tablet Take 1 tablet (5 mg total) by mouth daily as needed for Constipation.    cetirizine (ZYRTEC) 10 MG tablet Take 1 tablet (10 mg total) by mouth once daily.    hydrALAZINE (APRESOLINE) 50 MG tablet Take 1 tablet (50 mg total) by mouth 3 (three) times daily.    metoprolol tartrate (LOPRESSOR) 100 MG tablet Take 1 tablet (100 mg total) by mouth 2 (two) times daily.    polyethylene glycol (GLYCOLAX) 17 gram PwPk Take 17 g by mouth 2 (two) times daily as needed for Constipation.    tamsulosin (FLOMAX) 0.4 mg Cap TAKE 2 CAPSULES BY MOUTH ONCE DAILY.     Family History    None       Tobacco Use    Smoking status: Former     Types: Cigarettes    Smokeless tobacco: Never    Tobacco comments:     Quit 15 years ago   Substance and Sexual Activity    Alcohol use: Yes     Alcohol/week: 1.0 standard drink of alcohol     Types: 1 Standard drinks or equivalent per week      Comment: sometimes beer or crown royal    Drug use: No    Sexual activity: Not Currently     Review of Systems  Objective:     Vital Signs (Most Recent):  Temp: 99.3 °F (37.4 °C) (05/08/25 0906)  Pulse: 99 (05/08/25 1300)  Resp: 20 (05/08/25 1151)  BP: (!) 158/69 (05/08/25 1300)  SpO2: (!) 92 % (05/08/25 1300) Vital Signs (24h Range):  Temp:  [99.3 °F (37.4 °C)] 99.3 °F (37.4 °C)  Pulse:  [] 99  Resp:  [18-20] 20  SpO2:  [92 %-97 %] 92 %  BP: (131-158)/(60-69) 158/69        There is no height or weight on file to calculate BMI.     Physical Exam  Vitals and nursing note reviewed.   Constitutional:       General: He is not in acute distress.     Appearance: He is ill-appearing.   HENT:      Head: Normocephalic.      Mouth/Throat:      Mouth: Mucous membranes are dry.   Eyes:      Conjunctiva/sclera: Conjunctivae normal.   Cardiovascular:      Rate and Rhythm: Normal rate and regular rhythm.      Pulses: Normal pulses.      Heart sounds: No murmur heard.  Pulmonary:      Effort: Pulmonary effort is normal. No respiratory distress.      Breath sounds: No wheezing.   Abdominal:      General: Bowel sounds are normal. There is distension.      Tenderness: There is no abdominal tenderness.   Musculoskeletal:         General: No swelling.   Skin:     General: Skin is warm and dry.   Neurological:      General: No focal deficit present.      Mental Status: He is alert.      Comments: Alert, oriented to self and situation   Psychiatric:         Mood and Affect: Mood normal.         Thought Content: Thought content normal.                Significant Labs: All pertinent labs within the past 24 hours have been reviewed.    Significant Imaging: I have reviewed all pertinent imaging results/findings within the past 24 hours.  Assessment/Plan:     Assessment & Plan  Liver mass  Presents with abdominal pain, nausea and vomiting. Imaging concerning for new liver mass of the right hepatic lobe with extracapsular hemorrhage.   Hemoglobin stable at 8 right now.  IR consulted and following along in case patient needs further embolization.  Concerned that this is malignancy.  Checking AFP and ammonia given history of intermittent confusion.  No mass noted on imaging back in February of 2024.  Discussed with patient and niece at bedside.  We will follow up on MRCP and lab results.  Ongoing discussions.    Stage 3b chronic kidney disease  Creatine stable for now. BMP reviewed- noted CrCl cannot be calculated (Unknown ideal weight.). according to latest data. Based on current GFR, CKD stage is stage 3 - GFR 30-59.  Monitor UOP and serial BMP and adjust therapy as needed. Renally dose meds. Avoid nephrotoxic medications and procedures.  RAFAEL (acute kidney injury)  RAFAEL is likely due to pre-renal azotemia due to dehydration. Baseline creatinine is 1.4. Most recent creatinine and eGFR are listed below.  Recent Labs     05/08/25  0938   CREATININE 1.8*   EGFRNORACEVR 36*      Plan  - RAFAEL is stable  - Avoid nephrotoxins and renally dose meds for GFR listed above  - Monitor urine output, serial BMP, and adjust therapy as needed  - IVF -r echeck in AM  Advanced care planning/counseling discussion  I encouraged discussion between patient and niece regarding goals of care. They have never spoken about it and patient does not have any paperwork that she knows of for this. Continue with Full code. Ongoing discussion through hospital stay and as results.    ACP time 5 minutes.    BPH with urinary obstruction  - resume flomax    Liver hematoma  Patient's hemorrhage is due to liver mass, this bleeding is not associated with a medication or a coagulopathy. Patients most recent Hgb, Hct, platelets, and INR are listed below.  Recent Labs     05/08/25  0938 05/08/25  0941   HGB 8.0*  --    HCT 25.2* 25*     --      Plan  - Will trend hemoglobin/hematocrit Daily  - Will monitor and correct any coagulation defects  - Will transfuse if Hgb is <7g/dl (<8g/dl in  cases of active ACS) or if patient has rapid bleeding leading to hemodynamic instability- f/u MRI  VTE Risk Mitigation (From admission, onward)           Ordered     IP VTE HIGH RISK PATIENT  Once         05/08/25 1250     Place sequential compression device  Until discontinued         05/08/25 1250     Reason for No Pharmacological VTE Prophylaxis  Once        Question:  Reasons:  Answer:  Risk of Bleeding    05/08/25 1250                                    Moisés Castro MD  Department of Hospital Medicine  [facility] - [place], [place] - Emergency Dept

## 2025-05-08 NOTE — ASSESSMENT & PLAN NOTE
I encouraged discussion between patient and niece regarding goals of care. They have never spoken about it and patient does not have any paperwork that she knows of for this. Continue with Full code. Ongoing discussion through hospital stay and as results.    ACP time 5 minutes.

## 2025-05-08 NOTE — CONSULTS
History & Physical    SUBJECTIVE:     History of Present Illness:  Patient is a 85 y.o. male presents with history of HTN, HLD, CKD, BPH, recent hospital admission for GI bleed who presented to hospital with a four day history of abdominal pain and vomiting. Patient and his daughter at bedside denies any recent falls or trauma to abdomen. Patient is unaware of any recent unintentional weight loss but has noted an increase in abdominal girth over the past few days.     Chief Complaint   Patient presents with    Vomiting    Weakness    Abdominal Pain     Pt complaining of abdominal pain with vomiting x Monday with weakness and periods of confusion, also urinary incontinence.        Review of patient's allergies indicates:  No Known Allergies    Current Medications[1]    Past Medical History:   Diagnosis Date    Allergy     Hypertension      Past Surgical History:   Procedure Laterality Date    APPENDECTOMY      COLONOSCOPY Left 10/19/2015    Procedure: COLONOSCOPY;  Surgeon: Randell Briceno MD;  Location: [facility];  Service: Endoscopy;  Laterality: Left;    ESOPHAGOGASTRODUODENOSCOPY N/A 2/22/2024    Procedure: EGD (ESOPHAGOGASTRODUODENOSCOPY);  Surgeon: Kulwant Blanchard MD;  Location: [facility];  Service: Endoscopy;  Laterality: N/A;     No family history on file.  Social History[2]     Review of Systems:  Review of Systems   All other systems reviewed and are negative.        OBJECTIVE:     Vital Signs (Most Recent)  Temp: 99.3 °F (37.4 °C) (05/08/25 0906)  Pulse: 99 (05/08/25 1300)  Resp: 19 (05/08/25 1300)  BP: (!) 158/69 (05/08/25 1300)  SpO2: (!) 93 % (05/08/25 1200)           Physical Exam:  Physical Exam  Constitutional:       Comments: Slightly confused   HENT:      Head: Normocephalic.   Cardiovascular:      Rate and Rhythm: Normal rate and regular rhythm.   Pulmonary:      Effort: Pulmonary effort is normal.   Abdominal:      General: There is distension.      Palpations: Abdomen is soft.      Tenderness:  There is no abdominal tenderness. There is no guarding or rebound.   Skin:     Capillary Refill: Capillary refill takes less than 2 seconds.   Neurological:      General: No focal deficit present.           Laboratory  CBC: Reviewed  CMP: Reviewed    Diagnostic Results:  CT: Reviewed    Significant Diagnostic Studies: Labs: CMP   Recent Labs   Lab 05/08/25  0938   *   K 4.2      CO2 26   *   BUN 24*   CREATININE 1.8*   CALCIUM 8.9   PROT 7.6   ALBUMIN 2.9*   BILITOT 1.2*   ALKPHOS 58   AST 70*   ALT 17   ANIONGAP 8    and CBC   Recent Labs   Lab 05/08/25  0938 05/08/25  0941   WBC 8.40  --    HGB 8.0*  --    HCT 25.2* 25*     --      Radiology: CT scan: CT ABDOMEN PELVIS WITH CONTRAST:   Results for orders placed or performed during the hospital encounter of 05/08/25   CT Abdomen Pelvis With IV Contrast NO Oral Contrast    Narrative    EXAMINATION:  CT ABDOMEN PELVIS WITH IV CONTRAST    CLINICAL HISTORY:  LLQ abdominal pain;RLQ abdominal pain (Age >= 14y);    TECHNIQUE:  5 mm axial images were obtained through the abdomen and pelvis following administration of 100 cc of Omnipaque 350 IV contrast.  Coronal and sagittal reformats were performed.    COMPARISON:  CT 02/19/2024.    FINDINGS:  Heart: Mild calcification of the aortic annulus.  No pericardial effusion.    Lungs: No consolidation.  No pleural effusions.    Liver: Normal in size.  New 7.5 x 6.6 cm right lobe heterogeneous hypoenhancing lesion with an associated ruptured extrahepatic component with a resulting 5.3 x 10.1 cm subcapsular hematoma and ill-defined hemorrhage at the level of the right upper quadrant that surrounds the adjacent colon and tracks inferiorly along the retroperitoneum.  Subcentimeter hypodensity within the inferior right lobe, too small to characterize but likely small cyst.  No intrahepatic bile duct dilatation.Diminutive caliber of the right portal vein.  Left portal vein, splenic vein and SMV are  patent.    Gallbladder: Intraluminal high attenuation foci measuring up to 0.8 cm, likely calcified gallstones.  Extra-hepatic bile ducts are normal in caliber.    Stomach: Unremarkable.    Duodenum: Unremarkable.    Spleen: Normal in size. No focal lesions.    Pancreas: Unremarkable.    Adrenal glands: Unremarkable    Kidneys: Normal in size and location.  Bilateral cortical hypodensities, likely benign cysts.  No cortical enhancing lesions.  No nephrolithiasis.  No hydronephrosis or hydroureter.  Circumferential bladder wall thickening.    Genital organs: Prostate is slightly enlarged.No pelvic free fluid.    Bowel: Small bowel is normal in caliber.  Numerous colonic diverticula.  Appendectomy peer no obstruction or inflammatory changes.    Aorta: Severe atherosclerotic calcification.  Celiac artery, SMA, bilateral renal arteries and RIGO are patent.  No aneurysm.  No dissection.    IVC/Veins: Unremarkable.    Peritoneum/mesentery: No intra-abdominal free air.  No focal mesenteric masses.    Lymph nodes: Enlarged portacaval lymph nodes, increased in size when compared to the previous CT with index node measuring 1.2 cm in short axis (series 2, image 53).    Subcutaneous soft tissues: Fat containing umbilical and left inguinal hernias.    Osseous structures: Degenerative changes of the spine and bilateral hips.  No acute fractures. No suspicious lytic or blastic lesions.      Impression    1. New 7.5 x 6.6 cm right hepatic lesion with an associated rupture extrahepatic component resulting in a 5.3 x 10.1 cm subcapsular hematoma and ill-defined right upper quadrant hemorrhage extending inferiorly along the retroperitoneal planes.  Findings are most concerning for hepatocellular carcinoma or metastatic disease.  2. 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  3. Colonic diverticulosis.  4. Hepatic and renal cysts.  5. Portacaval lymphadenopathy.  6. Circumferential bladder wall thickening, likely sequela  chronic outlet obstruction noting associated prostatomegaly.  7. Severe atherosclerotic calcification.  8. Additional findings as detailed in the body of the report.  This report was flagged in Epic as abnormal.      Electronically signed by: Davin Swain MD  Date:    05/08/2025  Time:    11:10       ASSESSMENT/PLAN:     Mr. Duong is a patient who presented with abdominal pain, nausea and vomiting who was found to have a newly seen 7.5cm hepatic lesion with surround extrahepatic and subcapsular hematoma. He denies any recent trauma or falls. Patient is hemodynamically stable and has not had an acute drop in Hb since last admission to hospital.    PLAN:Plan     Recommend IR consultation for potential embolization although low index of suspicion for active bleeding in setting of hemodynamic stability and stable Hb  Will need workup for etiology of hepatic mass  No acute surgical intervention at this time              [1]   Current Facility-Administered Medications   Medication Dose Route Frequency Provider Last Rate Last Admin    acetaminophen tablet 650 mg  650 mg Oral Q4H PRN Moisés Castro MD        dextrose 50% injection 12.5 g  12.5 g Intravenous PRMoisés Swann MD        dextrose 50% injection 25 g  25 g Intravenous PRN Moisés Castro MD        glucagon (human recombinant) injection 1 mg  1 mg Intramuscular PRN Moisés Castro MD        glucose chewable tablet 16 g  16 g Oral PRN Moisés Castro MD        glucose chewable tablet 24 g  24 g Oral PRN Moisés Castro MD        lactulose 20 gram/30 mL solution Soln 20 g  20 g Oral TID Moisés Castro MD        metoprolol tartrate (LOPRESSOR) tablet 100 mg  100 mg Oral BID Moisés Castro MD        naloxone 0.4 mg/mL injection 0.02 mg  0.02 mg Intravenous PRN Moisés Castro MD        ondansetron injection 4 mg  4 mg Intravenous Q8H PRN Moisés Castro MD        polyethylene glycol packet 17 g  17 g Oral BID PRN Moisés Castro MD        sodium  chloride 0.9% flush 10 mL  10 mL Intravenous Q12H PRN Moisés Castro MD        tamsulosin 24 hr capsule 0.8 mg  2 capsule Oral Daily Moisés Castro MD         Current Outpatient Medications   Medication Sig Dispense Refill    albuterol (VENTOLIN HFA) 90 mcg/actuation inhaler Inhale 2 puffs into the lungs every 6 (six) hours as needed for Wheezing or Shortness of Breath. Rescue 18 g 1    amLODIPine (NORVASC) 5 MG tablet Take 5 mg by mouth once daily.      atorvastatin (LIPITOR) 40 MG tablet Take 1 tablet (40 mg total) by mouth once daily. 90 tablet 3    azelastine (ASTELIN) 137 mcg (0.1 %) nasal spray 1 spray (137 mcg total) by Nasal route 2 (two) times daily. 30 mL 5    bisacodyL (DULCOLAX) 5 mg EC tablet Take 1 tablet (5 mg total) by mouth daily as needed for Constipation. 30 tablet 0    cetirizine (ZYRTEC) 10 MG tablet Take 1 tablet (10 mg total) by mouth once daily. 90 tablet 3    hydrALAZINE (APRESOLINE) 50 MG tablet Take 1 tablet (50 mg total) by mouth 3 (three) times daily. 90 tablet 11    metoprolol tartrate (LOPRESSOR) 100 MG tablet Take 1 tablet (100 mg total) by mouth 2 (two) times daily. 180 tablet 3    polyethylene glycol (GLYCOLAX) 17 gram PwPk Take 17 g by mouth 2 (two) times daily as needed for Constipation. 30 each 0    tamsulosin (FLOMAX) 0.4 mg Cap TAKE 2 CAPSULES BY MOUTH ONCE DAILY. 180 capsule 1   [2]   Social History  Tobacco Use    Smoking status: Former     Types: Cigarettes    Smokeless tobacco: Never    Tobacco comments:     Quit 15 years ago   Substance Use Topics    Alcohol use: Yes     Alcohol/week: 1.0 standard drink of alcohol     Types: 1 Standard drinks or equivalent per week     Comment: sometimes beer or [employer]    Drug use: No

## 2025-05-08 NOTE — ASSESSMENT & PLAN NOTE
Patient's hemorrhage is due to liver mass, this bleeding is not associated with a medication or a coagulopathy. Patients most recent Hgb, Hct, platelets, and INR are listed below.  Recent Labs     05/08/25  0938 05/08/25  0941   HGB 8.0*  --    HCT 25.2* 25*     --      Plan  - Will trend hemoglobin/hematocrit Daily  - Will monitor and correct any coagulation defects  - Will transfuse if Hgb is <7g/dl (<8g/dl in cases of active ACS) or if patient has rapid bleeding leading to hemodynamic instability- f/u MRI

## 2025-05-09 PROBLEM — R50.9 FEVER: Status: ACTIVE | Noted: 2025-05-09

## 2025-05-09 LAB
ABSOLUTE EOSINOPHIL (OHS): 0.04 K/UL
ABSOLUTE MONOCYTE (OHS): 0.96 K/UL (ref 0.3–1)
ABSOLUTE NEUTROPHIL COUNT (OHS): 4.98 K/UL (ref 1.8–7.7)
ALBUMIN SERPL BCP-MCNC: 2.6 G/DL (ref 3.5–5.2)
ALP SERPL-CCNC: 68 UNIT/L (ref 40–150)
ALT SERPL W/O P-5'-P-CCNC: 18 UNIT/L (ref 10–44)
ANION GAP (OHS): 11 MMOL/L (ref 8–16)
AST SERPL-CCNC: 68 UNIT/L (ref 11–45)
BASOPHILS # BLD AUTO: 0.02 K/UL
BASOPHILS NFR BLD AUTO: 0.3 %
BILIRUB SERPL-MCNC: 0.9 MG/DL (ref 0.1–1)
BUN SERPL-MCNC: 18 MG/DL (ref 8–23)
CALCIUM SERPL-MCNC: 8.2 MG/DL (ref 8.7–10.5)
CANCER AG19-9 SERPL-ACNC: 11.7 U/ML
CARCINOEMBRYONIC ANTIGEN (OHS): 1.8 NG/ML
CERULOPLASMIN SERPL-MCNC: 37 MG/DL (ref 15–45)
CHLORIDE SERPL-SCNC: 100 MMOL/L (ref 95–110)
CO2 SERPL-SCNC: 26 MMOL/L (ref 23–29)
CREAT SERPL-MCNC: 1.4 MG/DL (ref 0.5–1.4)
ERYTHROCYTE [DISTWIDTH] IN BLOOD BY AUTOMATED COUNT: 13.9 % (ref 11.5–14.5)
FERRITIN SERPL-MCNC: 235.3 NG/ML (ref 20–300)
GFR SERPLBLD CREATININE-BSD FMLA CKD-EPI: 49 ML/MIN/1.73/M2
GLUCOSE SERPL-MCNC: 107 MG/DL (ref 70–110)
HAV IGM SERPL QL IA: NORMAL
HBV CORE IGM SERPL QL IA: NORMAL
HBV SURFACE AG SERPL QL IA: NORMAL
HCT VFR BLD AUTO: 24.1 % (ref 40–54)
HCV AB SERPL QL IA: NORMAL
HGB BLD-MCNC: 7.6 GM/DL (ref 14–18)
IGG SERPL-MCNC: 1984 MG/DL (ref 650–1600)
IMM GRANULOCYTES # BLD AUTO: 0.03 K/UL (ref 0–0.04)
IMM GRANULOCYTES NFR BLD AUTO: 0.4 % (ref 0–0.5)
INR PPP: 1.1 (ref 0.8–1.2)
LYMPHOCYTES # BLD AUTO: 1.3 K/UL (ref 1–4.8)
MAGNESIUM SERPL-MCNC: 1.7 MG/DL (ref 1.6–2.6)
MCH RBC QN AUTO: 30.3 PG (ref 27–31)
MCHC RBC AUTO-ENTMCNC: 31.5 G/DL (ref 32–36)
MCV RBC AUTO: 96 FL (ref 82–98)
NUCLEATED RBC (/100WBC) (OHS): 0 /100 WBC
PHOSPHATE SERPL-MCNC: 2.7 MG/DL (ref 2.7–4.5)
PLATELET # BLD AUTO: 194 K/UL (ref 150–450)
PMV BLD AUTO: 10.5 FL (ref 9.2–12.9)
POTASSIUM SERPL-SCNC: 3.9 MMOL/L (ref 3.5–5.1)
PROT SERPL-MCNC: 7.3 GM/DL (ref 6–8.4)
PROTHROMBIN TIME: 12 SECONDS (ref 9–12.5)
RBC # BLD AUTO: 2.51 M/UL (ref 4.6–6.2)
RELATIVE EOSINOPHIL (OHS): 0.5 %
RELATIVE LYMPHOCYTE (OHS): 17.7 % (ref 18–48)
RELATIVE MONOCYTE (OHS): 13.1 % (ref 4–15)
RELATIVE NEUTROPHIL (OHS): 68 % (ref 38–73)
SODIUM SERPL-SCNC: 137 MMOL/L (ref 136–145)
WBC # BLD AUTO: 7.33 K/UL (ref 3.9–12.7)

## 2025-05-09 PROCEDURE — 80053 COMPREHEN METABOLIC PANEL: CPT | Performed by: STUDENT IN AN ORGANIZED HEALTH CARE EDUCATION/TRAINING PROGRAM

## 2025-05-09 PROCEDURE — 87040 BLOOD CULTURE FOR BACTERIA: CPT | Performed by: STUDENT IN AN ORGANIZED HEALTH CARE EDUCATION/TRAINING PROGRAM

## 2025-05-09 PROCEDURE — 63600175 PHARM REV CODE 636 W HCPCS: Performed by: STUDENT IN AN ORGANIZED HEALTH CARE EDUCATION/TRAINING PROGRAM

## 2025-05-09 PROCEDURE — 85025 COMPLETE CBC W/AUTO DIFF WBC: CPT | Performed by: STUDENT IN AN ORGANIZED HEALTH CARE EDUCATION/TRAINING PROGRAM

## 2025-05-09 PROCEDURE — 82784 ASSAY IGA/IGD/IGG/IGM EACH: CPT | Performed by: NURSE PRACTITIONER

## 2025-05-09 PROCEDURE — 99223 1ST HOSP IP/OBS HIGH 75: CPT | Mod: ,,, | Performed by: NURSE PRACTITIONER

## 2025-05-09 PROCEDURE — 80321 ALCOHOLS BIOMARKERS 1OR 2: CPT | Performed by: NURSE PRACTITIONER

## 2025-05-09 PROCEDURE — 82728 ASSAY OF FERRITIN: CPT | Performed by: NURSE PRACTITIONER

## 2025-05-09 PROCEDURE — 83735 ASSAY OF MAGNESIUM: CPT | Performed by: STUDENT IN AN ORGANIZED HEALTH CARE EDUCATION/TRAINING PROGRAM

## 2025-05-09 PROCEDURE — 86301 IMMUNOASSAY TUMOR CA 19-9: CPT | Performed by: NURSE PRACTITIONER

## 2025-05-09 PROCEDURE — 21400001 HC TELEMETRY ROOM

## 2025-05-09 PROCEDURE — 82103 ALPHA-1-ANTITRYPSIN TOTAL: CPT | Performed by: NURSE PRACTITIONER

## 2025-05-09 PROCEDURE — 86381 MITOCHONDRIAL ANTIBODY EACH: CPT | Performed by: NURSE PRACTITIONER

## 2025-05-09 PROCEDURE — 25000003 PHARM REV CODE 250: Performed by: STUDENT IN AN ORGANIZED HEALTH CARE EDUCATION/TRAINING PROGRAM

## 2025-05-09 PROCEDURE — 85610 PROTHROMBIN TIME: CPT | Performed by: NURSE PRACTITIONER

## 2025-05-09 PROCEDURE — 82378 CARCINOEMBRYONIC ANTIGEN: CPT | Performed by: NURSE PRACTITIONER

## 2025-05-09 PROCEDURE — 82390 ASSAY OF CERULOPLASMIN: CPT | Performed by: NURSE PRACTITIONER

## 2025-05-09 PROCEDURE — 86038 ANTINUCLEAR ANTIBODIES: CPT | Performed by: NURSE PRACTITIONER

## 2025-05-09 PROCEDURE — 36415 COLL VENOUS BLD VENIPUNCTURE: CPT | Performed by: STUDENT IN AN ORGANIZED HEALTH CARE EDUCATION/TRAINING PROGRAM

## 2025-05-09 PROCEDURE — 86015 ACTIN ANTIBODY EACH: CPT | Performed by: NURSE PRACTITIONER

## 2025-05-09 PROCEDURE — 84100 ASSAY OF PHOSPHORUS: CPT | Performed by: STUDENT IN AN ORGANIZED HEALTH CARE EDUCATION/TRAINING PROGRAM

## 2025-05-09 PROCEDURE — 94761 N-INVAS EAR/PLS OXIMETRY MLT: CPT

## 2025-05-09 RX ADMIN — ACETAMINOPHEN 650 MG: 325 TABLET ORAL at 11:05

## 2025-05-09 RX ADMIN — PIPERACILLIN SODIUM AND TAZOBACTAM SODIUM 4.5 G: 4; .5 INJECTION, POWDER, FOR SOLUTION INTRAVENOUS at 01:05

## 2025-05-09 RX ADMIN — TAMSULOSIN HYDROCHLORIDE 0.8 MG: 0.4 CAPSULE ORAL at 08:05

## 2025-05-09 RX ADMIN — METOPROLOL TARTRATE 100 MG: 50 TABLET, FILM COATED ORAL at 08:05

## 2025-05-09 RX ADMIN — VANCOMYCIN HYDROCHLORIDE 1750 MG: 1.75 INJECTION, POWDER, LYOPHILIZED, FOR SOLUTION INTRAVENOUS at 01:05

## 2025-05-09 NOTE — NURSING
Received report care assumed. Patient lying in bed resting, NAD noted. Safety precautions maintained.    [facility]  Nurses Note -- 4 Eyes      5/8/2025       Skin assessed on: Q Shift      [x] No Pressure Injuries Present    [x]Prevention Measures Documented    [] Yes LDA  for Pressure Injury Previously documented     [] Yes New Pressure Injury Discovered   [] LDA for New Pressure Injury Added      Attending RN:  Mireya Constantino LPN     Second RN:  Ana Malone LPN

## 2025-05-09 NOTE — SUBJECTIVE & OBJECTIVE
Interval History: feeling better today, ate breakfast and updated his niece and his sister at bedside. Initially planned for d/c however patient now with fever of 101. Blood cultures ordered, antibiotics started.    Review of Systems  Objective:     Vital Signs (Most Recent):  Temp: (!) 101 °F (38.3 °C) (05/09/25 1130)  Pulse: 82 (05/09/25 1126)  Resp: 18 (05/09/25 1125)  BP: 112/63 (05/09/25 1125)  SpO2: (!) 91 % (05/09/25 1125) Vital Signs (24h Range):  Temp:  [97.7 °F (36.5 °C)-101 °F (38.3 °C)] 101 °F (38.3 °C)  Pulse:  [] 82  Resp:  [18-20] 18  SpO2:  [91 %-99 %] 91 %  BP: (112-203)/(63-88) 112/63     Weight: 93.1 kg (205 lb 4 oz)  Body mass index is 31.21 kg/m².    Intake/Output Summary (Last 24 hours) at 5/9/2025 1133  Last data filed at 5/9/2025 0849  Gross per 24 hour   Intake 1240 ml   Output 903 ml   Net 337 ml         Physical Exam  Vitals and nursing note reviewed.   Constitutional:       General: He is not in acute distress.     Appearance: He is ill-appearing.   HENT:      Head: Normocephalic.      Mouth/Throat:      Mouth: Mucous membranes are dry.   Eyes:      Conjunctiva/sclera: Conjunctivae normal.   Cardiovascular:      Rate and Rhythm: Normal rate and regular rhythm.      Pulses: Normal pulses.      Heart sounds: No murmur heard.  Pulmonary:      Effort: Pulmonary effort is normal. No respiratory distress.      Breath sounds: No wheezing.   Abdominal:      General: Bowel sounds are normal. There is distension.      Tenderness: There is no abdominal tenderness.   Musculoskeletal:         General: No swelling.   Skin:     General: Skin is warm and dry.   Neurological:      General: No focal deficit present.      Mental Status: He is alert.      Comments: Alert, oriented to self and situation   Psychiatric:         Mood and Affect: Mood normal.         Thought Content: Thought content normal.               Significant Labs: All pertinent labs within the past 24 hours have been  reviewed.    Significant Imaging: I have reviewed all pertinent imaging results/findings within the past 24 hours.

## 2025-05-09 NOTE — ASSESSMENT & PLAN NOTE
Presents with abdominal pain, nausea and vomiting. Imaging concerning for new liver mass of the right hepatic lobe with extracapsular hemorrhage.  Hemoglobin stable at 8 right now.  IR consulted and following along in case patient needs further embolization.  Concerned that this is malignancy.  Checking AFP and ammonia given history of intermittent confusion.  No mass noted on imaging back in February of 2024.  Discussed with patient and niece at bedside.   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

## 2025-05-09 NOTE — ASSESSMENT & PLAN NOTE
Patient's hemorrhage is due to liver mass, this bleeding is not associated with a medication or a coagulopathy. Patients most recent Hgb, Hct, platelets, and INR are listed below.  Recent Labs     05/08/25  0938 05/08/25  0941 05/09/25  0441   HGB 8.0*  --  7.6*   HCT 25.2* 25* 24.1*     --  194     Plan  - Will trend hemoglobin/hematocrit Daily  - Will monitor and correct any coagulation defects  - Will transfuse if Hgb is <7g/dl (<8g/dl in cases of active ACS) or if patient has rapid bleeding leading to hemodynamic instability- f/u MRI

## 2025-05-09 NOTE — ASSESSMENT & PLAN NOTE
RAFAEL is likely due to pre-renal azotemia due to dehydration. Baseline creatinine is 1.4. Most recent creatinine and eGFR are listed below.  Recent Labs     05/08/25  0938 05/09/25  0441   CREATININE 1.8* 1.4   EGFRNORACEVR 36* 49*      Plan  - RAFAEL is stable  - Avoid nephrotoxins and renally dose meds for GFR listed above  - Monitor urine output, serial BMP, and adjust therapy as needed  - IVF -r echeck in AM

## 2025-05-09 NOTE — TELEPHONE ENCOUNTER
Patient: Alexander Duong Jr.       MRN: [id number]      : 1940     Age: 85 y.o.  [address]  [facility] [address]    Presenting Radiologists: Edi Canela MD    Providers: Parrish Zhu MD    Priority of review: Cancer    Patient Transplant Status: Other (inpatient [facility])    Reason for presentation: Initial staging for treatment recommendations    Clinical Summary: 85 year old who presented to [facility] with c/o abdominal pain. Hx HTN, HLD, BPH, CKD stage 3b.     AFP: <2.0    CT Impression:  1. New 7.5 x 6.6 cm right hepatic lesion with an associated rupture extrahepatic component resulting in a 5.3 x 10.1 cm subcapsular hematoma and ill-defined right upper quadrant hemorrhage extending inferiorly along the retroperitoneal planes.  Findings are most concerning for hepatocellular carcinoma or metastatic disease.  2. 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  3. Colonic diverticulosis.  4. Hepatic and renal cysts.  5. Portacaval lymphadenopathy.  6. Circumferential bladder wall thickening, likely sequela chronic outlet obstruction noting associated prostatomegaly.  7. Severe atherosclerotic calcification.  8. Additional findings as detailed in the body of the report.    MRI Impression:  Right hepatic lobe mass with associated rupture of liver capsule, subscapular hematoma, and right upper quadrant hemorrhage extending inferiorly along retroperitoneal planes, similar to same day CT. Tumor signal/enhancement characteristics most suggestive of hepatocellular carcinoma. Recommend clinical correlation.     IR WB Recommendations:  CT reviewed by IR. Given no active bleed at this time and stable Hg & vitals, recommend to defer embolization to avoid embolizing vessels that could potentially be needed for transarterial cancer therapy.     Imaging to be reviewed: CT and MRI 2025    HCC Treatment History: None    Platelets:   Lab Results   Component Value Date/Time     2025  04:41 AM     02/23/2024 03:34 AM     02/23/2024 03:34 AM     Creatinine:   Lab Results   Component Value Date/Time    CREATININE 1.4 05/09/2025 04:41 AM     Bilirubin:   Lab Results   Component Value Date/Time    BILITOT 0.9 05/09/2025 04:41 AM    BILITOT 0.6 02/19/2024 04:59 PM     AFP Last 3 each if available:   Lab Results   Component Value Date/Time    AFP <2.0 05/08/2025 12:16 PM       MELD: Computed MELD 3.0 unavailable. One or more values for this score either were not found within the given timeframe or did not fit some other criterion.  Computed MELD-Na unavailable. One or more values for this score either were not found within the given timeframe or did not fit some other criterion.      Plan: 7.5cm mass with imaging characteristics most suggestive of HCC, and new compared to scan from 2/2024.  No evidence of h/o cirrhosis.  Could consider biopsy if needed.  Otherwise, would recommend Y90, given age.    Committee Discussion: No lesion seen on 2/2024 scan. Presented with a ruptured tumor coming off the posterior aspect of the RHL. No history of prior surgery, but the posterior lobe does look a little atrophic. Read as images features most consistent with HCC.     Plan: IR for Y90 with biopsy at time of mapping, if indicated.     Note forwarded to Maye Rollins NP    Follow-up Provider: Maye Rollins NP

## 2025-05-09 NOTE — NURSING
[facility]  Nurses Note -- 4 Eyes      5/9/2025       Skin assessed on: Q Shift      [x] No Pressure Injuries Present    []Prevention Measures Documented    [] Yes LDA  for Pressure Injury Previously documented     [] Yes New Pressure Injury Discovered   [] LDA for New Pressure Injury Added      Attending RN:  Ana Malone LPN     Second RN:  VALENCIA Gomes

## 2025-05-09 NOTE — PROGRESS NOTES
Surgery Progress Note    Alexander Duong Jr. is a 85 y.o. year old male in hospital for abdominal pain found to have a liver mass with associated subcapsular and extracapsular hematoma    Patient states that pain is controlled, is having bowel function    ROS:  Negative except above    PE:  Vitals:    05/09/25 0850 05/09/25 1125 05/09/25 1126 05/09/25 1130   BP:  112/63     BP Location:       Patient Position:       Pulse: 99 85 82    Resp:  18     Temp:  (!) 101 °F (38.3 °C)  (!) 101 °F (38.3 °C)   TempSrc:  Oral     SpO2:  (!) 91%     Weight:       Height:           NAD  No belabored breathing  No skin changes  Abd soft nt nd    Significant Diagnostic Studies: Labs: CMP   Recent Labs   Lab 05/08/25  0938 05/09/25  0441   * 137   K 4.2 3.9    100   CO2 26 26   * 107   BUN 24* 18   CREATININE 1.8* 1.4   CALCIUM 8.9 8.2*   PROT 7.6 7.3   ALBUMIN 2.9* 2.6*   BILITOT 1.2* 0.9   ALKPHOS 58 68   AST 70* 68*   ALT 17 18   ANIONGAP 8 11    and CBC   Recent Labs   Lab 05/08/25  0938 05/08/25  0941 05/09/25  0441   WBC 8.40  --  7.33   HGB 8.0*  --  7.6*   HCT 25.2*   < > 24.1*     --  194    < > = values in this interval not displayed.     Radiology: MRI:   MRI Abdomen W WO Contrast   Final Result      Right hepatic lobe mass with associated rupture of liver capsule, subscapular hematoma, and right upper quadrant hemorrhage extending inferiorly along retroperitoneal planes, similar to same day CT.  Tumor signal/enhancement characteristics most suggestive of hepatocellular carcinoma.  Recommend clinical correlation.      Additional findings as above.         Electronically signed by: Landon Quintana   Date:    05/09/2025   Time:    08:31      CT Abdomen Pelvis With IV Contrast NO Oral Contrast   Final Result   Abnormal      1. New 7.5 x 6.6 cm right hepatic lesion with an associated rupture extrahepatic component resulting in a 5.3 x 10.1 cm subcapsular hematoma and ill-defined right upper quadrant  hemorrhage extending inferiorly along the retroperitoneal planes.  Findings are most concerning for hepatocellular carcinoma or metastatic disease.   2. 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   3. Colonic diverticulosis.   4. Hepatic and renal cysts.   5. Portacaval lymphadenopathy.   6. Circumferential bladder wall thickening, likely sequela chronic outlet obstruction noting associated prostatomegaly.   7. Severe atherosclerotic calcification.   8. Additional findings as detailed in the body of the report.   This report was flagged in Epic as abnormal.         Electronically signed by: Davin Swain MD   Date:    05/08/2025   Time:    11:10            A/P:  Alexander Rhoda . is a 85 y.o. year old male  with a newly found right hepatic lobe mass with associated rupture. On imaging, findings concerning for HCC.    Recommend also ruling out metastatic disease such as primary colon cancer in workup  Continue to trend Hb  Continue to monitor abd exam  No acute surgical intervention      Yanni Hemphill  General Surgery - [facility]  5/9/2025

## 2025-05-09 NOTE — NURSING
Dr. Day was notified of elevated HR rate. Metoprolol was given at 2131.Patient sitting sitting at the edge of bed. Denies any symptoms at this time. New order placed for Telemetry monitoring. Plan of care ongoing.

## 2025-05-09 NOTE — PROGRESS NOTES
[facility] Medicine  Progress Note    Patient Name: Alexander Duong Jr.  MRN: [id number]  Patient Class: IP- Inpatient   Admission Date: 5/8/2025  Length of Stay: 1 days  Attending Physician: Moisés Castro MD  Primary Care Provider: Alesia Moreno MD        Subjective     Principal Problem:Liver mass        HPI:  Mr. Duong is an 85-year-old male with past medical history of hypertension, BPH who presents to the emergency department with his niece for evaluation of abdominal pain, nausea and vomiting.  Niece also reports that patient has been intermittently confused for the past week or so.  She reports he is very independent, lives alone and does all of his ADLs.  He does not drive.  He washes his own clothes, cooks his own meals and takes care of his house.   In the ED, patient afebrile with pulse 88 and .60 and 96% on room air. Labs notable for Hb 8, BUN/Cr 24/1.8.  T bili 1.2 and AST 70.  UA without evidence of infection.  CT of the abdomen and pelvis with a new right hepatic lesion with associated rupture extrahepatic component with a subcapsular hematoma concerning for hepatocellular carcinoma or metastatic disease.  IR and General surgery consulted.  MRCP ordered.  AFP and ammonia ordered.  He was given 1 L lactated Ringer's.  We will start on lactulose.  Niece at bedside and is main person that helps patient out.  He has no paperwork or advanced directives.  I discussed code status and encouraged the patient and niece to discuss this further.  He will be admitted to hospital medicine for further management.    Overview/Hospital Course:  No notes on file    Interval History: feeling better today, ate breakfast and updated his niece and his sister at bedside. Initially planned for d/c however patient now with fever of 101. Blood cultures ordered, antibiotics started.    Review of Systems  Objective:     Vital Signs (Most Recent):  Temp: (!) 101 °F (38.3 °C) (05/09/25 1130)  Pulse: 82  (05/09/25 1126)  Resp: 18 (05/09/25 1125)  BP: 112/63 (05/09/25 1125)  SpO2: (!) 91 % (05/09/25 1125) Vital Signs (24h Range):  Temp:  [97.7 °F (36.5 °C)-101 °F (38.3 °C)] 101 °F (38.3 °C)  Pulse:  [] 82  Resp:  [18-20] 18  SpO2:  [91 %-99 %] 91 %  BP: (112-203)/(63-88) 112/63     Weight: 93.1 kg (205 lb 4 oz)  Body mass index is 31.21 kg/m².    Intake/Output Summary (Last 24 hours) at 5/9/2025 1133  Last data filed at 5/9/2025 0849  Gross per 24 hour   Intake 1240 ml   Output 903 ml   Net 337 ml         Physical Exam  Vitals and nursing note reviewed.   Constitutional:       General: He is not in acute distress.     Appearance: He is ill-appearing.   HENT:      Head: Normocephalic.      Mouth/Throat:      Mouth: Mucous membranes are dry.   Eyes:      Conjunctiva/sclera: Conjunctivae normal.   Cardiovascular:      Rate and Rhythm: Normal rate and regular rhythm.      Pulses: Normal pulses.      Heart sounds: No murmur heard.  Pulmonary:      Effort: Pulmonary effort is normal. No respiratory distress.      Breath sounds: No wheezing.   Abdominal:      General: Bowel sounds are normal. There is distension.      Tenderness: There is no abdominal tenderness.   Musculoskeletal:         General: No swelling.   Skin:     General: Skin is warm and dry.   Neurological:      General: No focal deficit present.      Mental Status: He is alert.      Comments: Alert, oriented to self and situation   Psychiatric:         Mood and Affect: Mood normal.         Thought Content: Thought content normal.               Significant Labs: All pertinent labs within the past 24 hours have been reviewed.    Significant Imaging: I have reviewed all pertinent imaging results/findings within the past 24 hours.      Assessment & Plan  Liver mass  Presents with abdominal pain, nausea and vomiting. Imaging concerning for new liver mass of the right hepatic lobe with extracapsular hemorrhage.  Hemoglobin stable at 8 right now.  IR consulted  and following along in case patient needs further embolization.  Concerned that this is malignancy.  Checking AFP and ammonia given history of intermittent confusion.  No mass noted on imaging back in February of 2024.  Discussed with patient and niece at bedside.   - MRCP consistent with likely HCC  - oddly, AFP is within normal limits  - discussed with GI - he will need Hepatology follow up    Stage 3b chronic kidney disease  Creatine stable for now. BMP reviewed- noted Estimated Creatinine Clearance: 41.2 mL/min (based on SCr of 1.4 mg/dL). according to latest data. Based on current GFR, CKD stage is stage 3 - GFR 30-59.  Monitor UOP and serial BMP and adjust therapy as needed. Renally dose meds. Avoid nephrotoxic medications and procedures.  RAFAEL (acute kidney injury)  RAFAEL is likely due to pre-renal azotemia due to dehydration. Baseline creatinine is 1.4. Most recent creatinine and eGFR are listed below.  Recent Labs     05/08/25  0938 05/09/25  0441   CREATININE 1.8* 1.4   EGFRNORACEVR 36* 49*      Plan  - RAFAEL is stable  - Avoid nephrotoxins and renally dose meds for GFR listed above  - Monitor urine output, serial BMP, and adjust therapy as needed  - IVF -r echeck in AM  Advanced care planning/counseling discussion  I encouraged discussion between patient and niece regarding goals of care. They have never spoken about it and patient does not have any paperwork that she knows of for this. Continue with Full code. Ongoing discussion through hospital stay and as results.    ACP time 5 minutes.    BPH with urinary obstruction  - resume flomax    Liver hematoma  Patient's hemorrhage is due to liver mass, this bleeding is not associated with a medication or a coagulopathy. Patients most recent Hgb, Hct, platelets, and INR are listed below.  Recent Labs     05/08/25  0938 05/08/25  0941 05/09/25  0441   HGB 8.0*  --  7.6*   HCT 25.2* 25* 24.1*     --  194     Plan  - Will trend hemoglobin/hematocrit Daily  -  Will monitor and correct any coagulation defects  - Will transfuse if Hgb is <7g/dl (<8g/dl in cases of active ACS) or if patient has rapid bleeding leading to hemodynamic instability- f/u MRI  Fever  Developed fever on 5/9 -- blood cultures ordered, empiric antibiotics started  - f/u results    VTE Risk Mitigation (From admission, onward)           Ordered     IP VTE HIGH RISK PATIENT  Once         05/08/25 1250     Place sequential compression device  Until discontinued         05/08/25 1250     Reason for No Pharmacological VTE Prophylaxis  Once        Question:  Reasons:  Answer:  Risk of Bleeding    05/08/25 1250                    Discharge Planning   TERRELL:      Code Status: Full Code   Medical Readiness for Discharge Date:                            Moisés Castro MD  Department of Hospital Medicine   [facility] [place] [facility]

## 2025-05-09 NOTE — NURSING
OM-WB MEWS TRIGGER FOLLOW UP       MEWS Monitoring, Score is: 3  Indication for review: HR elevated and BP elevated    Bedside Nurse, Mireya contacted, MD aware/ following, instructed to call [phone] for further concerns or assistance.    Pt had just returned to unit from MRI. SBP was 180s and HR 110s. Scheduled metoprolol was given by primary RN. BP and HR rechecked, bp improved to 147/68, hr remained elevated 120s. Primary RN Mireya notified MD and telemetry monitoring was ordered. Pt HR now improved at 100 bmp at 2330. Care ongoing.

## 2025-05-09 NOTE — PLAN OF CARE
Case Management Assessment - [facility] [place]    PCP: Alesia Moreno MD    Pharmacy:   [facility]/pharm[facility] #[id number] - [place], [place] - [address]  [address]  [facility] [address]  Phone: [phone] Fax: [phone]    [id number] - [place], [place] - [address]  [address]  [facility] [address]  Phone: [phone] Fax: [phone]    [id number] - [place], [place] - [address] AT [facility]  [address]  [facility] [address]  Phone: [phone] Fax: [phone]        Patient Arrived From: Home  Existing Help at Home: Self/Family    Barriers to Discharge: none    Discharge Plan:    A. Home   B. Home with Family    CM met with patient at bedside to discuss discharge planning. Patient reside home alone and uses a straight cane for mobility. Patient does not have any HH/CM services. Patient sister Areli will provide transportation and needed support upon discharge.      CM will continue to follow patient discharge needs.     05/09/25 1331   Discharge Assessment   Assessment Type Discharge Planning Assessment   Confirmed/corrected address, phone number and insurance Yes   Confirmed Demographics Correct on Facesheet   Source of Information patient   When was your last doctors appointment? 08/26/24   Reason For Admission Weakness   People in Home alone   Facility Arrived From: Home   Do you expect to return to your current living situation? Yes   Do you have help at home or someone to help you manage your care at home? Yes   Who are your caregiver(s) and their phone number(s)? Sister Areli [phone]   Prior to hospitilization cognitive status: Alert/Oriented   Current cognitive status: Alert/Oriented   Walking or Climbing Stairs Difficulty yes   Walking or Climbing Stairs ambulation difficulty, requires equipment   Mobility Management straight cane   Equipment Currently Used at Home cane, straight   Readmission within 30  days? No   Patient currently being followed by outpatient case management? No   Do you currently have service(s) that help you manage your care at home? No   Do you take prescription medications? Yes   Do you have prescription coverage? Yes   Coverage PHN   Do you have any problems affording any of your prescribed medications? No   Is the patient taking medications as prescribed? yes   Who is going to help you get home at discharge? Sister Areli [phone]   How do you get to doctors appointments? family or friend will provide   Are you on dialysis? No   Do you take coumadin? No   Discharge Plan A Home   Discharge Plan B Home with family   DME Needed Upon Discharge    (tbd)   Discharge Plan discussed with: Patient   Transition of Care Barriers None   Physical Activity   On average, how many days per week do you engage in moderate to strenuous exercise (like a brisk walk)? 5 days   Financial Resource Strain   How hard is it for you to pay for the very basics like food, housing, medical care, and heating? Not hard   Stress   Do you feel stress - tense, restless, nervous, or anxious, or unable to sleep at night because your mind is troubled all the time - these days? To some exte   Alcohol Use   Q2: How many drinks containing alcohol do you have on a typical day when you are drinking? None

## 2025-05-09 NOTE — PLAN OF CARE
[facility] Telemetry      HOME HEALTH ORDERS  FACE TO FACE ENCOUNTER    Patient Name: Alexander Duong Jr.  YOB: 1940    PCP: Alesia Moreno MD   PCP Address: [address] / [place] [address]  PCP Phone Number: [phone]  PCP Fax: [phone]    Encounter Date: 5/8/25    Admit to Home Health    Diagnoses:  Active Hospital Problems    Diagnosis  POA    *Liver mass [R16.0]  Yes    Fever [R50.9]  No    Liver hematoma [S36.112A]  Yes    BPH with urinary obstruction [N40.1, N13.8]  Yes    Advanced care planning/counseling discussion [Z71.89]  Not Applicable    RAFAEL (acute kidney injury) [N17.9]  Yes    Stage 3b chronic kidney disease [N18.32]  Yes      Resolved Hospital Problems   No resolved problems to display.       Follow Up Appointments:  Future Appointments   Date Time Provider Department Center   5/19/2025 10:30 AM Teresa Ramon PA-C [facility] [place]       Allergies:Review of patient's allergies indicates:  No Known Allergies    Medications: Review discharge medications with patient and family and provide education.      I have seen and examined this patient within the last 30 days. My clinical findings that support the need for the home health skilled services and home bound status are the following:no   Weakness/numbness causing balance and gait disturbance due to Weakness/Debility making it taxing to leave home.     Diet:   cardiac diet    Labs:  N/a    Referrals/ Consults  Physical Therapy to evaluate and treat. Evaluate for home safety and equipment needs; Establish/upgrade home exercise program. Perform / instruct on therapeutic exercises, gait training, transfer training, and Range of Motion.  Occupational Therapy to evaluate and treat. Evaluate home environment for safety and equipment needs. Perform/Instruct on transfers, ADL training, ROM, and therapeutic exercises.   to evaluate for community resources/long-range planning.  Aide to provide  assistance with personal care, ADLs, and vital signs.    Activities:   activity as tolerated    Nursing:   Agency to admit patient within 24 hours of hospital discharge unless specified on physician order or at patient request    SN to complete comprehensive assessment including routine vital signs. Instruct on disease process and s/s of complications to report to MD. Review/verify medication list sent home with the patient at time of discharge  and instruct patient/caregiver as needed. Frequency may be adjusted depending on start of care date.     Skilled nurse to perform up to 3 visits PRN for symptoms related to diagnosis    Notify MD if SBP > 160 or < 90; DBP > 90 or < 50; HR > 120 or < 50; Temp > 101; O2 < 88%; Other:       Ok to schedule additional visits based on staff availability and patient request on consecutive days within the home health episode.    Miscellaneous       Home Health Aide:  Nursing , Physical Therapy , Occupational Therapy , Medical Social Work , and Home Health Aide     Wound Care Orders  no    I certify that this patient is confined to his home and needs intermittent skilled nursing care, physical therapy, and occupational therapy.

## 2025-05-09 NOTE — CONSULTS
[facility] Consultation Note    Patient Complaint: abdominal pain    PCP:   Alesia Moreno       LOS: 1        Initial History of Present Illness (HPI):  This is a 85 y.o. male consulted to GI service for liver mass. PMH hypertension, BPH, anemia, GI bleed. Patient complaint of acute onset of abdominal pain with associated symptoms of nausea, vomiting and confusion that began a week ago. Denies fever, chills, sob, dizziness, weakness, hematemesis, BRBPR, melena, diarrhea or constipation. Denies any recent falls, trauma or hx of liver disease. Denies smoking, drinking. Has a hx of anemia and gi bleed, had endoscopy in 2024, report below. Attributed to hemorrhoids, imaging in Feb 2024 w/o liver abnormality.    Hgb 8.0, 7.6, ast 68        Medical History:  has a past medical history of Allergy and Hypertension.    Surgical History:  has a past surgical history that includes Appendectomy; Colonoscopy (Left, 10/19/2015); and Esophagogastroduodenoscopy (N/A, 2/22/2024).      Objective Findings:    Vital Signs:  Temp:  [97.7 °F (36.5 °C)-98.8 °F (37.1 °C)]   Pulse:  []   Resp:  [18-20]   BP: (125-203)/(63-88)   SpO2:  [91 %-99 %]   Body mass index is 31.21 kg/m².      Physical Exam  Vitals and nursing note reviewed.   Constitutional:       Appearance: Normal appearance.   HENT:      Head: Normocephalic.   Pulmonary:      Effort: Pulmonary effort is normal.   Abdominal:      General: Bowel sounds are normal.      Palpations: Abdomen is soft.   Skin:     General: Skin is warm and dry.   Neurological:      Mental Status: He is alert and oriented to person, place, and time.   Psychiatric:         Mood and Affect: Mood normal.         Behavior: Behavior normal.         Thought Content: Thought content normal.         Judgment: Judgment normal.               Labs:  Lab Results   Component Value Date    WBC 7.33 05/09/2025    HGB 7.6 (L) 05/09/2025    HCT 24.1 (L) 05/09/2025     05/09/2025    CHOL 109  (L) 2022    TRIG 62 2022    HDL 29 (L) 2022    ALT 18 2025    AST 68 (H) 2025     2025    K 3.9 2025     2025    CREATININE 1.4 2025    BUN 18 2025    CO2 26 2025    TSH 1.785 2023    INR 1.1 2024    HGBA1C 4.9 2021     MELD 3.0: 12 at 2025 11:59 AM  MELD-Na: 11 at 2025 11:59 AM  Calculated from:  Serum Creatinine: 1.4 mg/dL at 2025  4:41 AM  Serum Sodium: 137 mmol/L at 2025  4:41 AM  Total Bilirubin: 0.9 mg/dL (Using min of 1 mg/dL) at 2025  4:41 AM  Serum Albumin: 2.6 g/dL at 2025  4:41 AM  INR(ratio): 1.1 at 2025 11:59 AM  Age at listing (hypothetical): 85 years  Sex: Male at 2025 11:59 AM    Child corbett class B          Imagin25 CCT abdomen pelvis-  New 7.5 x 6.6 cm right hepatic lesion with an associated rupture extrahepatic component resulting in a 5.3 x 10.1 cm subcapsular hematoma and ill-defined right upper quadrant hemorrhage extending inferiorly along the retroperitoneal planes.  Findings are most concerning for hepatocellular carcinoma or metastatic disease.  2. 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  3. Colonic diverticulosis.  4. Hepatic and renal cysts.  5. Portacaval lymphadenopathy.  6. Circumferential bladder wall thickening, likely sequela chronic outlet obstruction noting associated prostatomegaly.  7. Severe atherosclerotic calcification.      Endoscopy: 2024 EGD-  - Normal esophagus.                          - Small hiatal hernia.                          - Normal stomach.                          - Normal examined duodenum.                          - No specimens collected.   10/19/2015 Colonoscopy-   Non-bleeding internal hemorrhoids.                        - Severe diverticulosis in the sigmoid colon, in the                        descending colon, in the transverse colon and in the                        ascending colon. There was no  "evidence of                        diverticular bleeding.                        - Many 2 to 4 mm polyps in the sigmoid colon. A                        representative sample was resected and retrieved.     I have independently reviewed and interpreted the imaging above           Abdominal pain. ABLA. Abnormal CT abdomen. Elevated ast. Hepatic hemorrhage.   Plan/ Recommendations:  MELD 11, Child corbett class B. Reports pain has subsided. Again denies any trauma or falls. Hgb from 8.0 to 7.6, denies overt bleeding. IR and gen surg following, no plan for urgent intervention at this time with findings of  "New 7.5 x 6.6 cm right lobe heterogeneous hypoenhancing lesion with an associated ruptured extrahepatic component with a resulting 5.3 x 10.1 cm subcapsular hematoma and ill-defined hemorrhage at the level of the right upper quadrant that surrounds the adjacent colon and tracks inferiorly along the retroperitoneum."  Rec trend levels, keep hgb above 7, obtain acute hep panel, liver auto immune studies, cancer markers (afp normal).   Corresponded with Hepatology at [facility] via secure chat Dr Zhu recs to continue to monitor patient, no rec for urgent/emergent transfer to [facility] at this time. "He will be reviewed at Tuesday's multi-D IR conference."    Rec low threshold for transfer to high level of care at [facility].    Thank you so much for allowing us to participate in the care of Alexander Duong Jr. . Please contact us if you have any additional questions.    Maye Rollins NP  Gastroenterology  [facility] [place] - Med Surg        "

## 2025-05-09 NOTE — ASSESSMENT & PLAN NOTE
Creatine stable for now. BMP reviewed- noted Estimated Creatinine Clearance: 41.2 mL/min (based on SCr of 1.4 mg/dL). according to latest data. Based on current GFR, CKD stage is stage 3 - GFR 30-59.  Monitor UOP and serial BMP and adjust therapy as needed. Renally dose meds. Avoid nephrotoxic medications and procedures.

## 2025-05-09 NOTE — PLAN OF CARE
No acute distress noted, patient free from falls or injury this shift.  Bed in low position, wheels locked, call light in reach for assistance, plan of care continued.      Problem: Adult Inpatient Plan of Care  Goal: Absence of Hospital-Acquired Illness or Injury  Outcome: Progressing     Problem: Acute Kidney Injury/Impairment  Goal: Fluid and Electrolyte Balance  Outcome: Progressing     Problem: Skin Injury Risk Increased  Goal: Skin Health and Integrity  Outcome: Progressing

## 2025-05-09 NOTE — PROGRESS NOTES
"Pharmacokinetic Initial Assessment: IV Vancomycin    Assessment/Plan:    Initiate intravenous vancomycin with loading dose of 1750 mg once followed by a maintenance dose of vancomycin 1500 mg IV every 24 hours  Desired empiric serum trough concentration is 10 to 20 mcg/mL  Draw vancomycin trough level 60 min prior to third dose on 5/11 at approximately 1300  Pharmacy will continue to follow and monitor vancomycin.      Please contact pharmacy at extension [phone] with any questions regarding this assessment.     Thank you for the consult,   Tamara Juárez       Patient brief summary:  Alexander Duong Jr. is a 85 y.o. male initiated on antimicrobial therapy with IV Vancomycin for treatment of suspected bacteremia    Drug Allergies:   Review of patient's allergies indicates:  No Known Allergies    Actual Body Weight:   93.1 kg    Renal Function:   Estimated Creatinine Clearance: 41.2 mL/min (based on SCr of 1.4 mg/dL).,     Dialysis Method (if applicable):  N/A    CBC (last 72 hours):  Recent Labs   Lab Result Units 05/08/25  0938 05/09/25  0441   WBC K/uL 8.40 7.33   HGB gm/dL 8.0* 7.6*   HCT % 25.2* 24.1*   Platelet Count K/uL 198 194   Lymph % % 19.6 17.7*   Mono % % 11.1 13.1   Eos % % 0.5 0.5   Basophil % % 0.2 0.3       Metabolic Panel (last 72 hours):  Recent Labs   Lab Result Units 05/08/25  0938 05/08/25  1208 05/09/25  0441   Sodium mmol/L 134*  --  137   Potassium mmol/L 4.2  --  3.9   Chloride mmol/L 100  --  100   CO2 mmol/L 26  --  26   Glucose mg/dL 123*  --  107   Glucose, UA   --  Negative  --    BUN mg/dL 24*  --  18   Creatinine mg/dL 1.8*  --  1.4   Albumin g/dL 2.9*  --  2.6*   Bilirubin Total mg/dL 1.2*  --  0.9   ALP unit/L 58  --  68   AST unit/L 70*  --  68*   ALT unit/L 17  --  18   Magnesium  mg/dL 1.6  --  1.7   Phosphorus Level mg/dL  --   --  2.7       Drug levels (last 3 results):  No results for input(s): "VANCOMYCINRA", "VANCORANDOM", "VANCOMYCINPE", "VANCOPEAK", "VANCOMYCINTR", " ""[facility]" in the last 72 hours.    Microbiologic Results:  Microbiology Results (last 7 days)       Procedure Component Value Units Date/Time    Blood culture [[id number]] Collected: 05/09/25 1159    Order Status: Resulted Specimen: Blood Updated: 05/09/25 1205    Blood culture [[id number]] Collected: 05/09/25 1159    Order Status: Resulted Specimen: Blood Updated: 05/09/25 1204            "

## 2025-05-09 NOTE — NURSING
Patient back on the unit from MRI via transport. Orders placed for diet. Will be NPO at midnight. Plan of care ongoing.

## 2025-05-10 LAB
ABSOLUTE EOSINOPHIL (OHS): 0.16 K/UL
ABSOLUTE MONOCYTE (OHS): 0.74 K/UL (ref 0.3–1)
ABSOLUTE NEUTROPHIL COUNT (OHS): 4.04 K/UL (ref 1.8–7.7)
ALBUMIN SERPL BCP-MCNC: 2.4 G/DL (ref 3.5–5.2)
ALP SERPL-CCNC: 59 UNIT/L (ref 40–150)
ALT SERPL W/O P-5'-P-CCNC: 24 UNIT/L (ref 10–44)
ANION GAP (OHS): 7 MMOL/L (ref 8–16)
AST SERPL-CCNC: 67 UNIT/L (ref 11–45)
BASOPHILS # BLD AUTO: 0.02 K/UL
BASOPHILS NFR BLD AUTO: 0.3 %
BILIRUB SERPL-MCNC: 1.5 MG/DL (ref 0.1–1)
BUN SERPL-MCNC: 16 MG/DL (ref 8–23)
CALCIUM SERPL-MCNC: 8 MG/DL (ref 8.7–10.5)
CHLORIDE SERPL-SCNC: 102 MMOL/L (ref 95–110)
CO2 SERPL-SCNC: 26 MMOL/L (ref 23–29)
CREAT SERPL-MCNC: 1.4 MG/DL (ref 0.5–1.4)
ERYTHROCYTE [DISTWIDTH] IN BLOOD BY AUTOMATED COUNT: 13.7 % (ref 11.5–14.5)
GFR SERPLBLD CREATININE-BSD FMLA CKD-EPI: 49 ML/MIN/1.73/M2
GLUCOSE SERPL-MCNC: 101 MG/DL (ref 70–110)
HCT VFR BLD AUTO: 24.3 % (ref 40–54)
HGB BLD-MCNC: 7.6 GM/DL (ref 14–18)
IMM GRANULOCYTES # BLD AUTO: 0.02 K/UL (ref 0–0.04)
IMM GRANULOCYTES NFR BLD AUTO: 0.3 % (ref 0–0.5)
INFLUENZA A MOLECULAR (OHS): NEGATIVE
INFLUENZA B MOLECULAR (OHS): NEGATIVE
IRON SATN MFR SERPL: 8 % (ref 20–50)
IRON SERPL-MCNC: 17 UG/DL (ref 45–160)
LYMPHOCYTES # BLD AUTO: 1.34 K/UL (ref 1–4.8)
MAGNESIUM SERPL-MCNC: 1.7 MG/DL (ref 1.6–2.6)
MCH RBC QN AUTO: 29.6 PG (ref 27–31)
MCHC RBC AUTO-ENTMCNC: 31.3 G/DL (ref 32–36)
MCV RBC AUTO: 95 FL (ref 82–98)
NUCLEATED RBC (/100WBC) (OHS): 0 /100 WBC
PHOSPHATE SERPL-MCNC: 2.2 MG/DL (ref 2.7–4.5)
PLATELET # BLD AUTO: 225 K/UL (ref 150–450)
PMV BLD AUTO: 10.3 FL (ref 9.2–12.9)
POTASSIUM SERPL-SCNC: 3.6 MMOL/L (ref 3.5–5.1)
PROT SERPL-MCNC: 7 GM/DL (ref 6–8.4)
RBC # BLD AUTO: 2.57 M/UL (ref 4.6–6.2)
RELATIVE EOSINOPHIL (OHS): 2.5 %
RELATIVE LYMPHOCYTE (OHS): 21.2 % (ref 18–48)
RELATIVE MONOCYTE (OHS): 11.7 % (ref 4–15)
RELATIVE NEUTROPHIL (OHS): 64 % (ref 38–73)
SARS-COV-2 RDRP RESP QL NAA+PROBE: NEGATIVE
SODIUM SERPL-SCNC: 135 MMOL/L (ref 136–145)
TIBC SERPL-MCNC: 222 UG/DL (ref 250–450)
TRANSFERRIN SERPL-MCNC: 150 MG/DL (ref 200–375)
WBC # BLD AUTO: 6.32 K/UL (ref 3.9–12.7)

## 2025-05-10 PROCEDURE — U0002 COVID-19 LAB TEST NON-CDC: HCPCS | Performed by: STUDENT IN AN ORGANIZED HEALTH CARE EDUCATION/TRAINING PROGRAM

## 2025-05-10 PROCEDURE — 80053 COMPREHEN METABOLIC PANEL: CPT | Performed by: STUDENT IN AN ORGANIZED HEALTH CARE EDUCATION/TRAINING PROGRAM

## 2025-05-10 PROCEDURE — 99233 SBSQ HOSP IP/OBS HIGH 50: CPT | Mod: ,,, | Performed by: SURGERY

## 2025-05-10 PROCEDURE — 25000003 PHARM REV CODE 250: Performed by: STUDENT IN AN ORGANIZED HEALTH CARE EDUCATION/TRAINING PROGRAM

## 2025-05-10 PROCEDURE — 21400001 HC TELEMETRY ROOM

## 2025-05-10 PROCEDURE — 85025 COMPLETE CBC W/AUTO DIFF WBC: CPT | Performed by: STUDENT IN AN ORGANIZED HEALTH CARE EDUCATION/TRAINING PROGRAM

## 2025-05-10 PROCEDURE — 63600175 PHARM REV CODE 636 W HCPCS: Performed by: STUDENT IN AN ORGANIZED HEALTH CARE EDUCATION/TRAINING PROGRAM

## 2025-05-10 PROCEDURE — 83540 ASSAY OF IRON: CPT | Performed by: STUDENT IN AN ORGANIZED HEALTH CARE EDUCATION/TRAINING PROGRAM

## 2025-05-10 PROCEDURE — 84100 ASSAY OF PHOSPHORUS: CPT | Performed by: STUDENT IN AN ORGANIZED HEALTH CARE EDUCATION/TRAINING PROGRAM

## 2025-05-10 PROCEDURE — 87502 INFLUENZA DNA AMP PROBE: CPT | Performed by: STUDENT IN AN ORGANIZED HEALTH CARE EDUCATION/TRAINING PROGRAM

## 2025-05-10 PROCEDURE — 36415 COLL VENOUS BLD VENIPUNCTURE: CPT | Performed by: STUDENT IN AN ORGANIZED HEALTH CARE EDUCATION/TRAINING PROGRAM

## 2025-05-10 PROCEDURE — 83735 ASSAY OF MAGNESIUM: CPT | Performed by: STUDENT IN AN ORGANIZED HEALTH CARE EDUCATION/TRAINING PROGRAM

## 2025-05-10 RX ADMIN — VANCOMYCIN HYDROCHLORIDE 1500 MG: 1.5 INJECTION, POWDER, LYOPHILIZED, FOR SOLUTION INTRAVENOUS at 02:05

## 2025-05-10 RX ADMIN — ACETAMINOPHEN 650 MG: 325 TABLET ORAL at 08:05

## 2025-05-10 RX ADMIN — METOPROLOL TARTRATE 100 MG: 50 TABLET, FILM COATED ORAL at 08:05

## 2025-05-10 RX ADMIN — METOPROLOL TARTRATE 100 MG: 50 TABLET, FILM COATED ORAL at 09:05

## 2025-05-10 RX ADMIN — PIPERACILLIN SODIUM AND TAZOBACTAM SODIUM 4.5 G: 4; .5 INJECTION, POWDER, FOR SOLUTION INTRAVENOUS at 12:05

## 2025-05-10 RX ADMIN — TAMSULOSIN HYDROCHLORIDE 0.8 MG: 0.4 CAPSULE ORAL at 09:05

## 2025-05-10 RX ADMIN — PIPERACILLIN SODIUM AND TAZOBACTAM SODIUM 4.5 G: 4; .5 INJECTION, POWDER, FOR SOLUTION INTRAVENOUS at 01:05

## 2025-05-10 NOTE — ASSESSMENT & PLAN NOTE
RAFAEL is likely due to pre-renal azotemia due to dehydration. Baseline creatinine is 1.4. Most recent creatinine and eGFR are listed below.  Recent Labs     05/08/25  0938 05/09/25  0441 05/10/25  0550   CREATININE 1.8* 1.4 1.4   EGFRNORACEVR 36* 49* 49*      Plan  - RAFAEL is resolved  - Avoid nephrotoxins and renally dose meds for GFR listed above  - Monitor urine output, serial BMP, and adjust therapy as needed

## 2025-05-10 NOTE — ASSESSMENT & PLAN NOTE
Developed fever on 5/9 -- blood cultures ordered, empiric antibiotics started  - Flu/COVID negative  - blood cultures neg today  - continue to monitor another 24 hours (total 48 hours), if no growth likely d/c antibiotics  - f/u results

## 2025-05-10 NOTE — SUBJECTIVE & OBJECTIVE
Interval History:blood cultures neg to date, unsteady on feet. PT/OT consulted. Updated nimaría Wilde.    Review of Systems  Objective:     Vital Signs (Most Recent):  Temp: 99.2 °F (37.3 °C) (05/10/25 1050)  Pulse: 81 (05/10/25 1509)  Resp: 18 (05/10/25 1050)  BP: 133/72 (05/10/25 1050)  SpO2: (!) 91 % (05/10/25 1050) Vital Signs (24h Range):  Temp:  [98.2 °F (36.8 °C)-99.2 °F (37.3 °C)] 99.2 °F (37.3 °C)  Pulse:  [72-91] 81  Resp:  [17-18] 18  SpO2:  [90 %-95 %] 91 %  BP: (127-160)/(63-74) 133/72     Weight: 93.1 kg (205 lb 4 oz)  Body mass index is 31.21 kg/m².    Intake/Output Summary (Last 24 hours) at 5/10/2025 1512  Last data filed at 5/10/2025 1050  Gross per 24 hour   Intake --   Output 1600 ml   Net -1600 ml         Physical Exam  Vitals and nursing note reviewed.   Constitutional:       General: He is not in acute distress.     Appearance: He is ill-appearing.   HENT:      Head: Normocephalic.      Mouth/Throat:      Mouth: Mucous membranes are dry.   Eyes:      Conjunctiva/sclera: Conjunctivae normal.   Cardiovascular:      Rate and Rhythm: Normal rate and regular rhythm.      Pulses: Normal pulses.      Heart sounds: No murmur heard.  Pulmonary:      Effort: Pulmonary effort is normal. No respiratory distress.      Breath sounds: No wheezing.   Abdominal:      General: Bowel sounds are normal. There is distension.      Tenderness: There is no abdominal tenderness.   Musculoskeletal:         General: No swelling.   Skin:     General: Skin is warm and dry.   Neurological:      General: No focal deficit present.      Mental Status: He is alert.      Comments: Alert, oriented to self and situation   Psychiatric:         Mood and Affect: Mood normal.         Thought Content: Thought content normal.               Significant Labs: All pertinent labs within the past 24 hours have been reviewed.    Significant Imaging: I have reviewed all pertinent imaging results/findings within the past 24 hours.

## 2025-05-10 NOTE — PROGRESS NOTES
Surgery Progress Note    Alexander Duong Jr. is a 85 y.o. year old male in hospital for abdominal pain found to have a liver mass with associated subcapsular and extracapsular hematoma    Patient states that pain is controlled, is having bowel function    ROS:  Negative except above    PE:  Vitals:    05/10/25 0325 05/10/25 0439 05/10/25 0656 05/10/25 0739   BP:  (!) 150/72  (!) 151/74   BP Location:  Left arm     Patient Position:  Lying     Pulse: 81 81 85 86   Resp:  18  18   Temp:  98.7 °F (37.1 °C)  99.1 °F (37.3 °C)   TempSrc:  Oral  Oral   SpO2:  95%  (!) 93%   Weight:       Height:           NAD  No belabored breathing  No skin changes  Abd soft nt nd    Significant Diagnostic Studies: Labs: CMP   Recent Labs   Lab 05/08/25  0938 05/09/25  0441 05/10/25  0550   * 137 135*   K 4.2 3.9 3.6    100 102   CO2 26 26 26   * 107 101   BUN 24* 18 16   CREATININE 1.8* 1.4 1.4   CALCIUM 8.9 8.2* 8.0*   PROT 7.6 7.3 7.0   ALBUMIN 2.9* 2.6* 2.4*   BILITOT 1.2* 0.9 1.5*   ALKPHOS 58 68 59   AST 70* 68* 67*   ALT 17 18 24   ANIONGAP 8 11 7*    and CBC   Recent Labs   Lab 05/08/25  0938 05/08/25  0941 05/09/25  0441 05/10/25  0550   WBC 8.40  --  7.33 6.32   HGB 8.0*  --  7.6* 7.6*   HCT 25.2*   < > 24.1* 24.3*     --  194 225    < > = values in this interval not displayed.     Radiology: MRI:   MRI Abdomen W WO Contrast   Final Result      Right hepatic lobe mass with associated rupture of liver capsule, subscapular hematoma, and right upper quadrant hemorrhage extending inferiorly along retroperitoneal planes, similar to same day CT.  Tumor signal/enhancement characteristics most suggestive of hepatocellular carcinoma.  Recommend clinical correlation.      Additional findings as above.         Electronically signed by: Landon Quintana   Date:    05/09/2025   Time:    08:31      CT Abdomen Pelvis With IV Contrast NO Oral Contrast   Final Result   Abnormal      1. New 7.5 x 6.6 cm right hepatic  lesion with an associated rupture extrahepatic component resulting in a 5.3 x 10.1 cm subcapsular hematoma and ill-defined right upper quadrant hemorrhage extending inferiorly along the retroperitoneal planes.  Findings are most concerning for hepatocellular carcinoma or metastatic disease.   2. 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   3. Colonic diverticulosis.   4. Hepatic and renal cysts.   5. Portacaval lymphadenopathy.   6. Circumferential bladder wall thickening, likely sequela chronic outlet obstruction noting associated prostatomegaly.   7. Severe atherosclerotic calcification.   8. Additional findings as detailed in the body of the report.   This report was flagged in Epic as abnormal.         Electronically signed by: Davin Swain MD   Date:    05/08/2025   Time:    11:10            A/P:  Alexander Rosenthallg Heart. is a 85 y.o. year old male  with a newly found right hepatic lobe mass with associated rupture. On imaging, findings concerning for HCC.    Recommend also ruling out metastatic disease such as primary colon cancer in workup  Continue to trend Hb  Continue to monitor abd exam  No acute surgical intervention. At this time, general surgery will sign off. Please feel free to contact the team with any further questions or concerns.      Yanni Hemphill  General Surgery - [facility]  5/10/2025

## 2025-05-10 NOTE — PROGRESS NOTES
[facility] Medicine  Progress Note    Patient Name: Alexander Duong Jr.  MRN: [id number]  Patient Class: IP- Inpatient   Admission Date: 5/8/2025  Length of Stay: 2 days  Attending Physician: Moisés Castro MD  Primary Care Provider: Alesia Moreno MD        Subjective     Principal Problem:Liver mass        HPI:  Mr. Duong is an 85-year-old male with past medical history of hypertension, BPH who presents to the emergency department with his niece for evaluation of abdominal pain, nausea and vomiting.  Niece also reports that patient has been intermittently confused for the past week or so.  She reports he is very independent, lives alone and does all of his ADLs.  He does not drive.  He washes his own clothes, cooks his own meals and takes care of his house.   In the ED, patient afebrile with pulse 88 and .60 and 96% on room air. Labs notable for Hb 8, BUN/Cr 24/1.8.  T bili 1.2 and AST 70.  UA without evidence of infection.  CT of the abdomen and pelvis with a new right hepatic lesion with associated rupture extrahepatic component with a subcapsular hematoma concerning for hepatocellular carcinoma or metastatic disease.  IR and General surgery consulted.  MRCP ordered.  AFP and ammonia ordered.  He was given 1 L lactated Ringer's.  We will start on lactulose.  Niece at bedside and is main person that helps patient out.  He has no paperwork or advanced directives.  I discussed code status and encouraged the patient and niece to discuss this further.  He will be admitted to hospital medicine for further management.    Overview/Hospital Course:  No notes on file    Interval History:blood cultures neg to date, unsteady on feet. PT/OT consulted. Updated nimaría Wilde.    Review of Systems  Objective:     Vital Signs (Most Recent):  Temp: 99.2 °F (37.3 °C) (05/10/25 1050)  Pulse: 81 (05/10/25 1509)  Resp: 18 (05/10/25 1050)  BP: 133/72 (05/10/25 1050)  SpO2: (!) 91 % (05/10/25 1050) Vital Signs (24h  Range):  Temp:  [98.2 °F (36.8 °C)-99.2 °F (37.3 °C)] 99.2 °F (37.3 °C)  Pulse:  [72-91] 81  Resp:  [17-18] 18  SpO2:  [90 %-95 %] 91 %  BP: (127-160)/(63-74) 133/72     Weight: 93.1 kg (205 lb 4 oz)  Body mass index is 31.21 kg/m².    Intake/Output Summary (Last 24 hours) at 5/10/2025 1512  Last data filed at 5/10/2025 1050  Gross per 24 hour   Intake --   Output 1600 ml   Net -1600 ml         Physical Exam  Vitals and nursing note reviewed.   Constitutional:       General: He is not in acute distress.     Appearance: He is ill-appearing.   HENT:      Head: Normocephalic.      Mouth/Throat:      Mouth: Mucous membranes are dry.   Eyes:      Conjunctiva/sclera: Conjunctivae normal.   Cardiovascular:      Rate and Rhythm: Normal rate and regular rhythm.      Pulses: Normal pulses.      Heart sounds: No murmur heard.  Pulmonary:      Effort: Pulmonary effort is normal. No respiratory distress.      Breath sounds: No wheezing.   Abdominal:      General: Bowel sounds are normal. There is distension.      Tenderness: There is no abdominal tenderness.   Musculoskeletal:         General: No swelling.   Skin:     General: Skin is warm and dry.   Neurological:      General: No focal deficit present.      Mental Status: He is alert.      Comments: Alert, oriented to self and situation   Psychiatric:         Mood and Affect: Mood normal.         Thought Content: Thought content normal.               Significant Labs: All pertinent labs within the past 24 hours have been reviewed.    Significant Imaging: I have reviewed all pertinent imaging results/findings within the past 24 hours.      Assessment & Plan  Liver mass  Presents with abdominal pain, nausea and vomiting. Imaging concerning for new liver mass of the right hepatic lobe with extracapsular hemorrhage.  Hemoglobin stable at 8 right now.  IR consulted and following along in case patient needs further embolization.  Concerned that this is malignancy.  Checking AFP and  ammonia given history of intermittent confusion.  No mass noted on imaging back in February of 2024.  Discussed with patient and niece at bedside.   - MRCP consistent with likely HCC  - oddly, AFP is within normal limits  - discussed with GI - he will need Hepatology follow up    Stage 3b chronic kidney disease  Creatine stable for now. BMP reviewed- noted Estimated Creatinine Clearance: 41.2 mL/min (based on SCr of 1.4 mg/dL). according to latest data. Based on current GFR, CKD stage is stage 3 - GFR 30-59.  Monitor UOP and serial BMP and adjust therapy as needed. Renally dose meds. Avoid nephrotoxic medications and procedures.  RAFAEL (acute kidney injury)  RAFAEL is likely due to pre-renal azotemia due to dehydration. Baseline creatinine is 1.4. Most recent creatinine and eGFR are listed below.  Recent Labs     05/08/25  0938 05/09/25  0441 05/10/25  0550   CREATININE 1.8* 1.4 1.4   EGFRNORACEVR 36* 49* 49*      Plan  - RAFAEL is resolved  - Avoid nephrotoxins and renally dose meds for GFR listed above  - Monitor urine output, serial BMP, and adjust therapy as needed  Advanced care planning/counseling discussion  I encouraged discussion between patient and niece regarding goals of care. They have never spoken about it and patient does not have any paperwork that she knows of for this. Continue with Full code. Ongoing discussion through hospital stay and as results.    ACP time 5 minutes.    BPH with urinary obstruction  - resume flomax    Liver hematoma  Patient's hemorrhage is due to liver mass, this bleeding is not associated with a medication or a coagulopathy. Patients most recent Hgb, Hct, platelets, and INR are listed below.  Recent Labs     05/08/25  0938 05/08/25  0941 05/09/25  0441 05/09/25  1159 05/10/25  0550   HGB 8.0*  --  7.6*  --  7.6*   HCT 25.2* 25* 24.1*  --  24.3*     --  194  --  225   INR  --   --   --  1.1  --      Plan  - Will trend hemoglobin/hematocrit Daily  - Will monitor and correct any  coagulation defects  - Will transfuse if Hgb is <7g/dl (<8g/dl in cases of active ACS) or if patient has rapid bleeding leading to hemodynamic instability- MRI stable.  - Hb stable and repeat imaging noted stability  - discussed with Gen Surgery and GI -- no intervention needed, discussed with niece and sister that if patient develops any abdomina pain, weakness or nausea/vomiting he will need to go straight to ED  Fever  Developed fever on 5/9 -- blood cultures ordered, empiric antibiotics started  - Flu/COVID negative  - blood cultures neg today  - continue to monitor another 24 hours (total 48 hours), if no growth likely d/c antibiotics  - f/u results    VTE Risk Mitigation (From admission, onward)           Ordered     IP VTE HIGH RISK PATIENT  Once         05/08/25 1250     Place sequential compression device  Until discontinued         05/08/25 1250     Reason for No Pharmacological VTE Prophylaxis  Once        Question:  Reasons:  Answer:  Risk of Bleeding    05/08/25 1250                    Discharge Planning   TERRELL: 5/11/2025     Code Status: Full Code   Medical Readiness for Discharge Date:   Discharge Plan A: Home                Please place Justification for DME        Moisés Castro MD  Department of Hospital Medicine   [facility] [place] - Telemetry

## 2025-05-10 NOTE — PLAN OF CARE
Problem: Adult Inpatient Plan of Care  Goal: Plan of Care Review  Outcome: Progressing  Flowsheets (Taken 5/10/2025 8069)  Plan of Care Reviewed With: patient     Problem: Acute Kidney Injury/Impairment  Goal: Fluid and Electrolyte Balance  Outcome: Progressing     Problem: Skin Injury Risk Increased  Goal: Skin Health and Integrity  Outcome: Progressing

## 2025-05-10 NOTE — PROGRESS NOTES
Vancomycin Consult Follow-up:  Patient reviewed,  no changes in renal function, no new levels, continue current therapy; Next levels due: 5/11 @ 13:00

## 2025-05-10 NOTE — NURSING
[facility] [place] 4 [place]      5/10/2025       Skin assessed on: Q Shift      [x] No Pressure Injuries Present    [x]Prevention Measures Documented    [] Yes LDA  for Pressure Injury Previously documented     [] Yes New Pressure Injury Discovered   [] LDA for New Pressure Injury Added      Attending RN:  Ivette Zaidi LPN     Second RN:  Areli MCGRATH

## 2025-05-10 NOTE — ASSESSMENT & PLAN NOTE
Patient's hemorrhage is due to liver mass, this bleeding is not associated with a medication or a coagulopathy. Patients most recent Hgb, Hct, platelets, and INR are listed below.  Recent Labs     05/08/25  0938 05/08/25  0941 05/09/25  0441 05/09/25  1159 05/10/25  0550   HGB 8.0*  --  7.6*  --  7.6*   HCT 25.2* 25* 24.1*  --  24.3*     --  194  --  225   INR  --   --   --  1.1  --      Plan  - Will trend hemoglobin/hematocrit Daily  - Will monitor and correct any coagulation defects  - Will transfuse if Hgb is <7g/dl (<8g/dl in cases of active ACS) or if patient has rapid bleeding leading to hemodynamic instability- MRI stable.  - Hb stable and repeat imaging noted stability  - discussed with Gen Surgery and GI -- no intervention needed, discussed with niece and sister that if patient develops any abdomina pain, weakness or nausea/vomiting he will need to go straight to ED

## 2025-05-11 VITALS
TEMPERATURE: 98 F | SYSTOLIC BLOOD PRESSURE: 155 MMHG | HEART RATE: 84 BPM | BODY MASS INDEX: 31.11 KG/M2 | DIASTOLIC BLOOD PRESSURE: 69 MMHG | OXYGEN SATURATION: 94 % | WEIGHT: 205.25 LBS | RESPIRATION RATE: 20 BRPM | HEIGHT: 68 IN

## 2025-05-11 LAB
ABSOLUTE EOSINOPHIL (OHS): 0.15 K/UL
ABSOLUTE MONOCYTE (OHS): 0.73 K/UL (ref 0.3–1)
ABSOLUTE NEUTROPHIL COUNT (OHS): 4.88 K/UL (ref 1.8–7.7)
ALBUMIN SERPL BCP-MCNC: 2.4 G/DL (ref 3.5–5.2)
ALP SERPL-CCNC: 64 UNIT/L (ref 40–150)
ALT SERPL W/O P-5'-P-CCNC: 29 UNIT/L (ref 10–44)
ANION GAP (OHS): 10 MMOL/L (ref 8–16)
AST SERPL-CCNC: 68 UNIT/L (ref 11–45)
BASOPHILS # BLD AUTO: 0.02 K/UL
BASOPHILS NFR BLD AUTO: 0.3 %
BILIRUB SERPL-MCNC: 1.8 MG/DL (ref 0.1–1)
BUN SERPL-MCNC: 15 MG/DL (ref 8–23)
CALCIUM SERPL-MCNC: 8.1 MG/DL (ref 8.7–10.5)
CHLORIDE SERPL-SCNC: 101 MMOL/L (ref 95–110)
CO2 SERPL-SCNC: 25 MMOL/L (ref 23–29)
CREAT SERPL-MCNC: 1.3 MG/DL (ref 0.5–1.4)
ERYTHROCYTE [DISTWIDTH] IN BLOOD BY AUTOMATED COUNT: 13.7 % (ref 11.5–14.5)
GFR SERPLBLD CREATININE-BSD FMLA CKD-EPI: 53 ML/MIN/1.73/M2
GLUCOSE SERPL-MCNC: 96 MG/DL (ref 70–110)
HCT VFR BLD AUTO: 25.7 % (ref 40–54)
HGB BLD-MCNC: 7.9 GM/DL (ref 14–18)
IMM GRANULOCYTES # BLD AUTO: 0.02 K/UL (ref 0–0.04)
IMM GRANULOCYTES NFR BLD AUTO: 0.3 % (ref 0–0.5)
LYMPHOCYTES # BLD AUTO: 1.14 K/UL (ref 1–4.8)
MAGNESIUM SERPL-MCNC: 1.6 MG/DL (ref 1.6–2.6)
MCH RBC QN AUTO: 29 PG (ref 27–31)
MCHC RBC AUTO-ENTMCNC: 30.7 G/DL (ref 32–36)
MCV RBC AUTO: 95 FL (ref 82–98)
NUCLEATED RBC (/100WBC) (OHS): 0 /100 WBC
PHOSPHATE SERPL-MCNC: 2.2 MG/DL (ref 2.7–4.5)
PLATELET # BLD AUTO: 271 K/UL (ref 150–450)
PMV BLD AUTO: 10.1 FL (ref 9.2–12.9)
POTASSIUM SERPL-SCNC: 3.5 MMOL/L (ref 3.5–5.1)
PROCALCITONIN SERPL-MCNC: 0.16 NG/ML
PROT SERPL-MCNC: 7.1 GM/DL (ref 6–8.4)
RBC # BLD AUTO: 2.72 M/UL (ref 4.6–6.2)
RELATIVE EOSINOPHIL (OHS): 2.2 %
RELATIVE LYMPHOCYTE (OHS): 16.4 % (ref 18–48)
RELATIVE MONOCYTE (OHS): 10.5 % (ref 4–15)
RELATIVE NEUTROPHIL (OHS): 70.3 % (ref 38–73)
SODIUM SERPL-SCNC: 136 MMOL/L (ref 136–145)
VANCOMYCIN TROUGH SERPL-MCNC: 11.5 UG/ML (ref 10–22)
WBC # BLD AUTO: 6.94 K/UL (ref 3.9–12.7)

## 2025-05-11 PROCEDURE — 97161 PT EVAL LOW COMPLEX 20 MIN: CPT

## 2025-05-11 PROCEDURE — 84100 ASSAY OF PHOSPHORUS: CPT | Performed by: STUDENT IN AN ORGANIZED HEALTH CARE EDUCATION/TRAINING PROGRAM

## 2025-05-11 PROCEDURE — 83735 ASSAY OF MAGNESIUM: CPT | Performed by: STUDENT IN AN ORGANIZED HEALTH CARE EDUCATION/TRAINING PROGRAM

## 2025-05-11 PROCEDURE — 85025 COMPLETE CBC W/AUTO DIFF WBC: CPT | Performed by: STUDENT IN AN ORGANIZED HEALTH CARE EDUCATION/TRAINING PROGRAM

## 2025-05-11 PROCEDURE — 97535 SELF CARE MNGMENT TRAINING: CPT

## 2025-05-11 PROCEDURE — 97110 THERAPEUTIC EXERCISES: CPT

## 2025-05-11 PROCEDURE — 25000003 PHARM REV CODE 250: Performed by: STUDENT IN AN ORGANIZED HEALTH CARE EDUCATION/TRAINING PROGRAM

## 2025-05-11 PROCEDURE — 97165 OT EVAL LOW COMPLEX 30 MIN: CPT

## 2025-05-11 PROCEDURE — 80202 ASSAY OF VANCOMYCIN: CPT | Performed by: STUDENT IN AN ORGANIZED HEALTH CARE EDUCATION/TRAINING PROGRAM

## 2025-05-11 PROCEDURE — 84145 PROCALCITONIN (PCT): CPT | Performed by: STUDENT IN AN ORGANIZED HEALTH CARE EDUCATION/TRAINING PROGRAM

## 2025-05-11 PROCEDURE — 80053 COMPREHEN METABOLIC PANEL: CPT | Performed by: STUDENT IN AN ORGANIZED HEALTH CARE EDUCATION/TRAINING PROGRAM

## 2025-05-11 PROCEDURE — 36415 COLL VENOUS BLD VENIPUNCTURE: CPT | Performed by: STUDENT IN AN ORGANIZED HEALTH CARE EDUCATION/TRAINING PROGRAM

## 2025-05-11 PROCEDURE — 63600175 PHARM REV CODE 636 W HCPCS: Performed by: STUDENT IN AN ORGANIZED HEALTH CARE EDUCATION/TRAINING PROGRAM

## 2025-05-11 RX ORDER — LANOLIN ALCOHOL/MO/W.PET/CERES
1 CREAM (GRAM) TOPICAL DAILY
Status: DISCONTINUED | OUTPATIENT
Start: 2025-05-11 | End: 2025-05-11 | Stop reason: HOSPADM

## 2025-05-11 RX ADMIN — METOPROLOL TARTRATE 100 MG: 50 TABLET, FILM COATED ORAL at 08:05

## 2025-05-11 RX ADMIN — VANCOMYCIN HYDROCHLORIDE 1500 MG: 1.5 INJECTION, POWDER, LYOPHILIZED, FOR SOLUTION INTRAVENOUS at 01:05

## 2025-05-11 RX ADMIN — FERROUS SULFATE TAB 325 MG (65 MG ELEMENTAL FE) 1 EACH: 325 (65 FE) TAB at 08:05

## 2025-05-11 RX ADMIN — TAMSULOSIN HYDROCHLORIDE 0.8 MG: 0.4 CAPSULE ORAL at 08:05

## 2025-05-11 RX ADMIN — PIPERACILLIN SODIUM AND TAZOBACTAM SODIUM 4.5 G: 4; .5 INJECTION, POWDER, FOR SOLUTION INTRAVENOUS at 12:05

## 2025-05-11 NOTE — NURSING
AVS virtually reviewed with patient's niece in its entirety with emphasis on diet, medications, follow-up appointments and reasons to return to the ED. Patient also encouraged to utilize their patient portal. Ease and convenience of use reiterated. Education complete and patient voiced understanding. All questions answered. Discharge teaching complete.

## 2025-05-11 NOTE — PT/OT/SLP EVAL
Occupational Therapy Evaluation and Discharge     Name: Alexander Duong Jr.  MRN: [id number]  Admitting Diagnosis: Liver mass  Recent Surgery: * No surgery found *      Recommendations:     Discharge Recommendations: Low Intensity Therapy  Level of Assistance Recommended: Intermittent assistance and Intermittent supervision  Discharge Equipment Recommendations: walker, rolling, shower chair  Barriers to discharge: None    Assessment:     Alexander Duong Jr. is a 85 y.o. male with a medical diagnosis of Liver mass. He presents with performance deficits affecting function including impaired cognition, decreased safety awareness. Patient is [place], but could answer all questions with no problems noted. He said he has not been feeling bad, but his niece brought him to hospital because she was worried about him. He walked in room without AD and with supervision to modified independence with no LOB noted.     Rehab Prognosis: Good; patient would not need acute OT services to address these deficits and reach maximum level of function.    Plan:     Plan of Care Expires: 05/11/25  Plan of Care Reviewed with: patient    Subjective     Chief Complaint: none   Patient Comments/Goals: he said his niece helps him at home   Pain/Comfort:  Pain Rating 1: 0/10    Patients cultural, spiritual, Taoism conflicts given the current situation: no    Social History:  Living Environment: Patient lives alone in a single story home with number of outside stair(s): 1 TH step   Prior Level of Function: Prior to admission, patient was modified independent; niece helps with meals and shopping, but he does laundry and cleaning at home   Roles and Routines: Patient was not driving and not working prior to admission; has a niece and other family members who help with driving .  Equipment Used at Home: cane, straight  DME owned (not currently used): none  Assistance Upon Discharge: family    Objective:     Communicated with RN prior to session. Patient  found HOB elevated with peripheral IV, telemetry ([employer] telesitter) upon OT entry to room.    General Precautions: Standard, hearing impaired, fall   Orthopedic Precautions: N/A   Braces: N/A    Respiratory Status: Room air    Occupational Performance    Gait belt applied - N/A    Bed Mobility:   Rolling/Turning to Left with modified independence  Scooting anteriorly to EOB to have both feet planted on floor: modified independence  Supine to sit from left side of bed with modified independence    Functional Mobility/Transfers:  Sit <> Stand Transfer with supervision with no AD  Bed <> Chair Transfer using Step Transfer technique with supervision with no AD  Functional Mobility: He walked in room from bed to sink and then to bathroom and then to chair with no AD and supervision to mod independence     Activities of Daily Living:  Grooming: independence standing at sink   Toileting: he could sit on toilet with mod independence     Cognitive/Visual Perceptual:  Cognitive/Psychosocial Skills:    -     Oriented to: Person, Place, Time, Situation  -     Follows Commands/attention: Follows multistep  commands  -     Communication: clear/fluent  -     Memory: No Deficits noted  -     Safety awareness/insight to disability: intact  -     Mood/Affect/Coping skills/emotional control: Appropriate to situation  Visual/Perceptual:    -     Intact  [place] both ears     Physical Exam:  Balance:    -     Sitting: independence  -     Standing: modified independence  Postural examination/scapula alignment:    -       No postural abnormalities identified  Skin integrity: Visible skin intact  Edema:  None noted  Sensation:    -       Intact  Motor Planning: Intact  Dominant hand: Right  Upper Extremity Range of Motion:     -       Right Upper Extremity: WNL  -       Left Upper Extremity: WNL  Upper Extremity Strength:    -       Right Upper Extremity: WNL  -       Left Upper Extremity: WNL   Strength:    -       Right Upper Extremity:  WNL  -       Left Upper Extremity: WNL  Fine Motor Coordination:    -       Intact  Gross motor coordination:   WFL    AMPAC 6 Click ADL:  AMPAC Total Score: 24    Treatment & Education:  Patient educated on role of OT, POC, and goals for therapy  Patient educated on importance of OOB activities with staff member assistance and sitting OOB majority of the day  Occupational therapy educated patient re: possible need for shower chair for safety, but otherwise, he may also need a RW     Patient clear to ambulate to/from bathroom with RN/PCT, assist x1 .    Patient left up in chair with all lines intact, call button in reach, and RN notified.    GOALS:   Multidisciplinary Problems       Occupational Therapy Goals       Not on file              Multidisciplinary Problems (Resolved)          Problem: Occupational Therapy    Goal Priority Disciplines Outcome Interventions   Occupational Therapy Goal   (Resolved)     OT, PT/OT Met    Description: Patient recommended for low intensity therapy and a RW and shower chair at home. Occupational therapy to sign off due to at PLOF.                           DME Justifications:   Alexander's mobility limitation cannot be sufficiently resolved by the use of a cane. His functional mobility deficit can be sufficiently resolved with the use of a Rolling Walker. Patient's mobility limitation significantly impairs their ability to participate in one of more activities of daily living.  The use of a RW will significantly improve the patient's ability to participate in MRADLS and the patient will use it on regular basis in the home.    History:     Past Medical History:   Diagnosis Date    Allergy     Hypertension          Past Surgical History:   Procedure Laterality Date    APPENDECTOMY      COLONOSCOPY Left 10/19/2015    Procedure: COLONOSCOPY;  Surgeon: Randell Briceno MD;  Location: [facility];  Service: Endoscopy;  Laterality: Left;    ESOPHAGOGASTRODUODENOSCOPY N/A 2/22/2024     Procedure: EGD (ESOPHAGOGASTRODUODENOSCOPY);  Surgeon: Kulwant Blanchard MD;  Location: [facility];  Service: Endoscopy;  Laterality: N/A;       Time Tracking:     OT Date of Treatment: 05/11/25  OT Start Time: 0920  OT Stop Time: 0945  OT Total Time (min): 25 min    Billable Minutes: Evaluation 15  and Self Care/Home Management 10     5/11/2025

## 2025-05-11 NOTE — PLAN OF CARE
Problem: Adult Inpatient Plan of Care  Goal: Plan of Care Review  Outcome: Progressing  Flowsheets (Taken 5/11/2025 4410)  Plan of Care Reviewed With: patient     Problem: Acute Kidney Injury/Impairment  Goal: Fluid and Electrolyte Balance  Outcome: Progressing  Goal: Improved Oral Intake  Outcome: Progressing  Goal: Effective Renal Function  Outcome: Progressing     Problem: Skin Injury Risk Increased  Goal: Skin Health and Integrity  Outcome: Progressing

## 2025-05-11 NOTE — PT/OT/SLP EVAL
Physical Therapy Evaluation and Discharge Home Today    Patient Name:  Alexander Duong Jr.   MRN:  [id number]    Recommendations:     Discharge Recommendations: Low Intensity Therapy (with caregiver supervision/assistance)  Discharge Equipment Recommendations: walker, rolling, shower chair   Barriers to discharge: Decreased caregiver support    Assessment:     Alexander Duong Jr. is a 85 y.o. male admitted with a medical diagnosis of Liver mass.      Recent Surgery: * No surgery found *      Plan:     Pt to be D/C'ed home today.     Subjective     Chief Complaint: N/A  Patient/Family Comments/goals: Pt agreeable to therapy.   Pain/Comfort:  Pain Rating 1: 0/10    Living Environment:  Pt lives alone on the 1st floor apartment with no concerns.   Prior to admission, patients level of function was mod I with ambulation using SPC in the community.  Pt does not drive.   Pt able to cook and manage his home.  Equipment used at home: cane, straight.  Upon discharge, patient will have assistance from niece (comes by daily and stays ~2 hrs).  Pt has no children.  Pt has older sisters.     Objective:     Patient found up in chair with peripheral IV, telemetry (telesitter) upon PT entry to room.    General Precautions: Standard, fall, hearing impaired    Orthopedic Precautions:N/A   Braces: N/A  Respiratory Status: Room air    Exams:  Cognitive Exam:  Patient was able to follow simple commands 2* [place].    Gross Motor Coordination:  WFL  Postural Exam:  Patient presented with the following abnormalities:    -       Rounded shoulders  -       Forward head  Sensation:    -       Intact light/touch BLE  Skin Integrity/Edema:      -       Skin integrity: Visible skin intact  -       Edema: None noted BLE  BLE ROM: WFL  BLE Strength: WFL    Functional Mobility:  Pt pleasant and cooperative with therapy, very [place].    Transfers:     Sit to Stand: stand by assistance with no AD and rolling walker  Bed to Chair: stand by assistance and contact  guard assistance with  rolling walker  using  Step Transfer  Gait: Pt ambulated ~80 ft with min A-CGA using no AD.  Pt ambulated ~200 ft with CGA-SBA using RW.  Pt with narrow CARMEN, decreased foot clearance, decreased step length, and decreased billy.  Pt required min VC's for safety with RW management and to increase CARMEN and foot clearance.    Balance: Pt with fair dynamic standing balance.     AM-PAC 6 CLICK MOBILITY  Total Score:21       Treatment and Education:  BLE seated therex x15 reps: hip flex, hip abd/add, LAQ, and AP    Pt educated on RW vs SPC, pt not very receptive to RW at this time.      Patient left up in chair on pillow with all lines intact, call button in reach, and nurse Ivette notified and telesitter present.  Tray table close by.     GOALS:   Multidisciplinary Problems       Physical Therapy Goals          Problem: Physical Therapy    Goal Priority Disciplines Outcome Interventions   Physical Therapy Goal     PT, PT/OT Progressing                        DME Justifications:   Alexander's mobility limitation cannot be sufficiently resolved by the use of a cane. His functional mobility deficit can be sufficiently resolved with the use of a Rolling Walker. Patient's mobility limitation significantly impairs their ability to participate in one of more activities of daily living.  The use of a RW will significantly improve the patient's ability to participate in MRADLS and the patient will use it on regular basis in the home.    History:     Past Medical History:   Diagnosis Date    Allergy     Hypertension        Past Surgical History:   Procedure Laterality Date    APPENDECTOMY      COLONOSCOPY Left 10/19/2015    Procedure: COLONOSCOPY;  Surgeon: Randell Briceno MD;  Location: [facility];  Service: Endoscopy;  Laterality: Left;    ESOPHAGOGASTRODUODENOSCOPY N/A 2/22/2024    Procedure: EGD (ESOPHAGOGASTRODUODENOSCOPY);  Surgeon: Kulwant Blanchard MD;  Location: [facility];  Service: Endoscopy;  Laterality:  N/A;       Time Tracking:     PT Received On: 05/11/25  PT Start Time: 1024     PT Stop Time: 1043  PT Total Time (min): 19 min     Billable Minutes: Evaluation 11 min and Therapeutic Exercise 8 min      05/11/2025

## 2025-05-11 NOTE — PLAN OF CARE
Problem: Physical Therapy  Goal: Physical Therapy Goal  Outcome: Progressing     Pt ambulated ~200 ft with CGA-SBA using RW.  Pt will benefit from low intensity therapy with caregiver supervision/assistance.  Pt will also benefit from RW and shower chair for home.

## 2025-05-11 NOTE — NURSING
Patient is discharged on room air, no distress noted. Tele and IV removed with tip intact. Instructions printed. To be reviewed by virtual nurse. Family at bedside to receive teaching. Patient sitting up in chair, wheels locked call light in reach. Waiting for transport

## 2025-05-11 NOTE — PLAN OF CARE
Case Management Final Discharge Note    Discharge Disposition: Home with [facility]    New DME ordered / company name: Pt was denied for RW due to receiving one in '24    Relevant SDOH / Transition of Care Barriers:  None    Person available to provide assistance at home when needed and their contact information: Chani Duong [phone]    Scheduled followup appointment: PCP follow up appointment 5/19/2025 @ 10:30 am.     Referrals placed: [facility]    Transportation: Private vehicle    Patient and family educated on discharge services and updated on DC plan. [facility] stated that they will call the pt tomorrow to confirm for an appointment. Bedside RN Ivette notified, patient clear to discharge from Case Management Perspective.      05/11/25 1316   Final Note   Assessment Type Final Discharge Note   Anticipated Discharge Disposition Home-Health   What phone number can be called within the next 1-3 days to see how you are doing after discharge? [phone]   Hospital Resources/Appts/Education Provided Appointments scheduled and added to AVS   Post-Acute Status   Post-Acute Authorization Home Health   Home Health Status Set-up Complete/Auth obtained   [facility] SECURE SNP -   Discharge Delays None known at this time

## 2025-05-11 NOTE — NURSING
[facility]  Nurses Note -- 4 Eyes      5/10/2025       Skin assessed on: Q Shift      [x] No Pressure Injuries Present    [x]Prevention Measures Documented    [] Yes LDA  for Pressure Injury Previously documented     [] Yes New Pressure Injury Discovered   [] LDA for New Pressure Injury Added      Attending RN:  Wang Torre RN     Second RN:  VALENCIA Steele

## 2025-05-11 NOTE — PLAN OF CARE
I provided the patient a choice of post acute providers and offered a list of CMS rated Home Health Agencies    Patient has declined to select a preferred provider and elects placement with the first accepting in network provider that is available to provide services as ordered by the physician.     Referral was sent to:    [facility]  [facility]    [facility] - Denied

## 2025-05-11 NOTE — PLAN OF CARE
Problem: Adult Inpatient Plan of Care  Goal: Plan of Care Review  Outcome: Progressing     Problem: Adult Inpatient Plan of Care  Goal: Absence of Hospital-Acquired Illness or Injury  Outcome: Progressing     Problem: Adult Inpatient Plan of Care  Goal: Optimal Comfort and Wellbeing  Outcome: Progressing     Problem: Skin Injury Risk Increased  Goal: Skin Health and Integrity  Outcome: Progressing

## 2025-05-11 NOTE — PLAN OF CARE
Problem: Occupational Therapy  Goal: Occupational Therapy Goal  Description: Patient recommended for low intensity therapy and a RW and shower chair at home. Occupational therapy to sign off due to at PLOF.      Outcome: Met

## 2025-05-11 NOTE — PLAN OF CARE
Per Mayra with [facility], patient received a rolling walker in 2024 and does not qualify for another one at this time.

## 2025-05-11 NOTE — DISCHARGE INSTRUCTIONS
Our goal at [facility] is to always give you outstanding care and exceptional service. You may receive a survey from [employer] by mail, text or e-mail in the next 24-48 hours asking about the care you received with us. The survey should only take 5-10 minutes to complete and is very important to us.     Your feedback provides us with a way to recognize our staff who work tirelessly to provide the best care! Also, your responses help us learn how to improve when your experience was below our aspiration of excellence. We are always looking for ways to improve your stay. We WILL use your feedback to continue making improvements to help us provide the highest quality care. We keep your personal information and feedback confidential. We appreciate your time completing this survey and can't wait to hear from you!!!    We look forward to your continued care with us! Thanks so much for choosing [facility] for your healthcare needs!

## 2025-05-11 NOTE — PROGRESS NOTES
Pharmacokinetic Assessment Follow Up: IV Vancomycin    Vancomycin serum concentration assessment(s):    The trough level was drawn correctly and can be used to guide therapy at this time. The measurement is within the desired definitive target range of 10 to 20 mcg/mL.    Vancomycin Regimen Plan:    Continue regimen to Vancomycin 1500 mg IV every 24 hours with next serum trough concentration measured at 13:00 prior to 3rd dose on 5/13    Drug levels (last 3 results):  Recent Labs   Lab Result Units 05/11/25  1309   Vancomycin Trough ug/ml 11.5       Pharmacy will continue to follow and monitor vancomycin.    Please contact pharmacy at extension [phone] for questions regarding this assessment.    Thank you for the consult,   Eliane Calixto       Patient brief summary:  Alexander Duong Jr. is a 85 y.o. male initiated on antimicrobial therapy with IV Vancomycin for treatment of bacteremia    The patient's current regimen is vancomycin 1500mg q24hrs    Drug Allergies:   Review of patient's allergies indicates:  No Known Allergies    Actual Body Weight:   93.1 kg    Renal Function:   Estimated Creatinine Clearance: 44.3 mL/min (based on SCr of 1.3 mg/dL).,     Dialysis Method (if applicable):  N/A    CBC (last 72 hours):  Recent Labs   Lab Result Units 05/09/25  0441 05/10/25  0550 05/11/25  0445   WBC K/uL 7.33 6.32 6.94   HGB gm/dL 7.6* 7.6* 7.9*   HCT % 24.1* 24.3* 25.7*   Platelet Count K/uL 194 225 271   Lymph % % 17.7* 21.2 16.4*   Mono % % 13.1 11.7 10.5   Eos % % 0.5 2.5 2.2   Basophil % % 0.3 0.3 0.3       Metabolic Panel (last 72 hours):  Recent Labs   Lab Result Units 05/09/25  0441 05/10/25  0550 05/11/25  0445   Sodium mmol/L 137 135* 136   Potassium mmol/L 3.9 3.6 3.5   Chloride mmol/L 100 102 101   CO2 mmol/L 26 26 25   Glucose mg/dL 107 101 96   BUN mg/dL 18 16 15   Creatinine mg/dL 1.4 1.4 1.3   Albumin g/dL 2.6* 2.4* 2.4*   Bilirubin Total mg/dL 0.9 1.5* 1.8*   ALP unit/L 68 59 64   AST unit/L 68* 67* 68*    ALT unit/L 18 24 29   Magnesium  mg/dL 1.7 1.7 1.6   Phosphorus Level mg/dL 2.7 2.2* 2.2*       Vancomycin Administrations:  vancomycin given in the last 96 hours                     vancomycin 1,500 mg in 0.9% NaCl 250 mL IVPB (admixture device) (mg) 1,500 mg New Bag 05/11/25 1341     1,500 mg New Bag 05/10/25 1425    vancomycin 1,750 mg in 0.9% NaCl 500 mL IVPB (admixture device) (mg) 1,750 mg New Bag 05/09/25 1356                    Microbiologic Results:  Microbiology Results (last 7 days)       Procedure Component Value Units Date/Time    Blood culture [[id number]]  (Normal) Collected: 05/09/25 1159    Order Status: Completed Specimen: Blood Updated: 05/11/25 1302     Blood Culture No Growth After 48 Hours    Blood culture [[id number]]  (Normal) Collected: 05/09/25 1159    Order Status: Completed Specimen: Blood Updated: 05/11/25 1302     Blood Culture No Growth After 48 Hours    Influenza A & B by Molecular [[id number]]  (Normal) Collected: 05/10/25 0850    Order Status: Completed Specimen: Nasal Swab Updated: 05/10/25 0915     INFLUENZA A MOLECULAR Negative     INFLUENZA B MOLECULAR  Negative

## 2025-05-11 NOTE — PLAN OF CARE
"Dr. Castro informed [facility] that his niece Chani was interested in [employer]. [facility] called to provide her the resources and she said "do not worry about it, we will find it on the internet". [facility] offered to send Chani the information via email and she declined.   "

## 2025-05-12 LAB — ANA (OHS): NORMAL

## 2025-05-12 NOTE — ASSESSMENT & PLAN NOTE
RAFAEL is likely due to pre-renal azotemia due to dehydration. Baseline creatinine is 1.4. Most recent creatinine and eGFR are listed below.  Recent Labs     05/10/25  0550 05/11/25  0445   CREATININE 1.4 1.3   EGFRNORACEVR 49* 53*      Plan  - RAFAEL is resolved  - Avoid nephrotoxins and renally dose meds for GFR listed above  - Monitor urine output, serial BMP, and adjust therapy as needed

## 2025-05-12 NOTE — ASSESSMENT & PLAN NOTE
Patient's hemorrhage is due to liver mass, this bleeding is not associated with a medication or a coagulopathy. Patients most recent Hgb, Hct, platelets, and INR are listed below.  Recent Labs     05/10/25  0550 05/11/25  0445   HGB 7.6* 7.9*   HCT 24.3* 25.7*    271     Plan  - Will trend hemoglobin/hematocrit Daily  - Will monitor and correct any coagulation defects  - Will transfuse if Hgb is <7g/dl (<8g/dl in cases of active ACS) or if patient has rapid bleeding leading to hemodynamic instability- MRI stable.  - Hb stable and repeat imaging noted stability  - discussed with Gen Surgery and GI -- no intervention needed, discussed with niece and sister that if patient develops any abdomina pain, weakness or nausea/vomiting he will need to go straight to ED

## 2025-05-12 NOTE — HOSPITAL COURSE
Mr. Duong is a 84 yo M who was admitted to the hospital with encephalopathy and abdominal pain.  He was found to have a liver mass with hemorrhage.  GI, IR and General surgery consulted.  There was concern for hepatocellular carcinoma.  AFP ordered.  MRCP also consistent with hepatocellular carcinoma but surprisingly AFP within normal limits.  Hemoglobin remained stable throughout hospital stay.  Patient did develop fevers with cultures drawn.  Empiric antibiotics for 48 hours with no evidence of infection.  Suspect fevers from hemorrhage which is now stable.  Overall, patient feeling well and stable for discharge.  PT OT recommending home health which was arranged.  Rolling walker ordered for patient as well.  Patient will be followed up with the tumor board and GI team.  They will arrange follow up with patient after discharge.

## 2025-05-12 NOTE — DISCHARGE SUMMARY
[facility] Medicine  Discharge Summary      Patient Name: Alexander Duong Jr.  MRN: [id number]  KRISHAN: [id number]  Patient Class: IP- Inpatient  Admission Date: 5/8/2025  Hospital Length of Stay: 3 days  Discharge Date and Time: 5/11/2025  5:00 PM  Attending Physician: No att. providers found   Discharging Provider: Moisés Castro MD  Primary Care Provider: Alesia Moreno MD    Primary Care Team: Networked reference to record PCT     HPI:   Mr. Duong is an 85-year-old male with past medical history of hypertension, BPH who presents to the emergency department with his niece for evaluation of abdominal pain, nausea and vomiting.  Niece also reports that patient has been intermittently confused for the past week or so.  She reports he is very independent, lives alone and does all of his ADLs.  He does not drive.  He washes his own clothes, cooks his own meals and takes care of his house.   In the ED, patient afebrile with pulse 88 and .60 and 96% on room air. Labs notable for Hb 8, BUN/Cr 24/1.8.  T bili 1.2 and AST 70.  UA without evidence of infection.  CT of the abdomen and pelvis with a new right hepatic lesion with associated rupture extrahepatic component with a subcapsular hematoma concerning for hepatocellular carcinoma or metastatic disease.  IR and General surgery consulted.  MRCP ordered.  AFP and ammonia ordered.  He was given 1 L lactated Ringer's.  We will start on lactulose.  Niece at bedside and is main person that helps patient out.  He has no paperwork or advanced directives.  I discussed code status and encouraged the patient and niece to discuss this further.  He will be admitted to hospital medicine for further management.    * No surgery found *      Hospital Course:   Mr. Duong is a 86 yo M who was admitted to the hospital with encephalopathy and abdominal pain.  He was found to have a liver mass with hemorrhage.  GI, IR and General surgery consulted.  There was concern for  hepatocellular carcinoma.  AFP ordered.  MRCP also consistent with hepatocellular carcinoma but surprisingly AFP within normal limits.  Hemoglobin remained stable throughout hospital stay.  Patient did develop fevers with cultures drawn.  Empiric antibiotics for 48 hours with no evidence of infection.  Suspect fevers from hemorrhage which is now stable.  Overall, patient feeling well and stable for discharge.  PT OT recommending home health which was arranged.  Rolling walker ordered for patient as well.  Patient will be followed up with the tumor board and GI team.  They will arrange follow up with patient after discharge.     Goals of Care Treatment Preferences:  Code Status: Full Code      SDOH Screening:  The patient was screened for utility difficulties, food insecurity, transport difficulties, housing insecurity, and interpersonal safety and there were no concerns identified this admission.     Consults:   Consults (From admission, onward)          Status Ordering Provider     Inpatient consult to Gastroenterology  Once        Provider:  Maye Rollins NP    Completed LOGAN GOYAL     Inpatient consult to General surgery  Once        Provider:  Yanni Hemphill MD    Completed ABDULKADIR ESCOBEDO     Inpatient consult to Interventional Radiology  Once        Provider:  Mirza Gold MD    Completed ABDULKADIR ESCOBEDO            Assessment & Plan  Liver mass  Presents with abdominal pain, nausea and vomiting. Imaging concerning for new liver mass of the right hepatic lobe with extracapsular hemorrhage.  Hemoglobin stable at 8 right now.  IR consulted and following along in case patient needs further embolization.  Concerned that this is malignancy.  Checking AFP and ammonia given history of intermittent confusion.  No mass noted on imaging back in February of 2024.  Discussed with patient and niece at bedside.   - MRCP consistent with likely HCC  - oddly, AFP is within normal limits  - discussed with GI  - he will need Hepatology follow up    Stage 3b chronic kidney disease  Creatine stable for now. BMP reviewed- noted Estimated Creatinine Clearance: 44.3 mL/min (based on SCr of 1.3 mg/dL). according to latest data. Based on current GFR, CKD stage is stage 3 - GFR 30-59.  Monitor UOP and serial BMP and adjust therapy as needed. Renally dose meds. Avoid nephrotoxic medications and procedures.  RAFAEL (acute kidney injury)  RAFAEL is likely due to pre-renal azotemia due to dehydration. Baseline creatinine is 1.4. Most recent creatinine and eGFR are listed below.  Recent Labs     05/10/25  0550 05/11/25  0445   CREATININE 1.4 1.3   EGFRNORACEVR 49* 53*      Plan  - RAFAEL is resolved  - Avoid nephrotoxins and renally dose meds for GFR listed above  - Monitor urine output, serial BMP, and adjust therapy as needed  Advanced care planning/counseling discussion  I encouraged discussion between patient and niece regarding goals of care. They have never spoken about it and patient does not have any paperwork that she knows of for this. Continue with Full code. Ongoing discussion through hospital stay and as results.    ACP time 5 minutes.    BPH with urinary obstruction  - resume flomax    Liver hematoma  Patient's hemorrhage is due to liver mass, this bleeding is not associated with a medication or a coagulopathy. Patients most recent Hgb, Hct, platelets, and INR are listed below.  Recent Labs     05/10/25  0550 05/11/25  0445   HGB 7.6* 7.9*   HCT 24.3* 25.7*    271     Plan  - Will trend hemoglobin/hematocrit Daily  - Will monitor and correct any coagulation defects  - Will transfuse if Hgb is <7g/dl (<8g/dl in cases of active ACS) or if patient has rapid bleeding leading to hemodynamic instability- MRI stable.  - Hb stable and repeat imaging noted stability  - discussed with Gen Surgery and GI -- no intervention needed, discussed with niece and sister that if patient develops any abdomina pain, weakness or nausea/vomiting  "he will need to go straight to ED  Fever  Developed fever on 5/9 -- blood cultures ordered, empiric antibiotics started  - Flu/COVID negative  - blood cultures neg today  - continue to monitor another 24 hours (total 48 hours), if no growth likely d/c antibiotics  - f/u results    Final Active Diagnoses:    Diagnosis Date Noted POA    PRINCIPAL PROBLEM:  Liver mass [R16.0] 05/08/2025 Yes    Fever [R50.9] 05/09/2025 No    Liver hematoma [S36.112A] 05/08/2025 Yes    BPH with urinary obstruction [N40.1, N13.8] 06/10/2024 Yes    Advanced care planning/counseling discussion [Z71.89] 03/04/2024 Not Applicable    RAFAEL (acute kidney injury) [N17.9] 05/15/2021 Yes    Stage 3b chronic kidney disease [N18.32] 10/17/2015 Yes      Problems Resolved During this Admission:       Discharged Condition: stable    Disposition: Home or Self Care    Follow Up:   Contact information for after-discharge care       [facility] Medical [facility]       [facility]    Service: [facility] Health Services  Why: Someone will call you to schedule a follow up appointment.  Contact information:  [address]  [facility] [address]  [phone]                                 Patient Instructions:      WALKER FOR HOME USE     Order Specific Question Answer Comments   Type of Walker: Adult (5'4"-6'6")    With wheels? Yes    Height: 5' 8" (1.727 m)    Weight: 93.1 kg (205 lb 4 oz)    Length of need (1-99 months): 6    Does patient have medical equipment at home? cane, straight    Please check all that apply: Patient's condition impairs ambulation.    Please check all that apply: Patient is unable to safely ambulate without equipment.      Diet Cardiac     Notify your health care provider if you experience any of the following:  temperature >100.4     Notify your health care provider if you experience any of the following:  persistent nausea and vomiting or diarrhea     Notify your health care provider if you experience any of the " following:  severe uncontrolled pain     Notify your health care provider if you experience any of the following:  difficulty breathing or increased cough     Notify your health care provider if you experience any of the following:  severe persistent headache     Notify your health care provider if you experience any of the following:  worsening rash     Notify your health care provider if you experience any of the following:  persistent dizziness, light-headedness, or visual disturbances     Notify your health care provider if you experience any of the following:  increased confusion or weakness     Activity as tolerated       Significant Diagnostic Studies: Labs: All labs within the past 24 hours have been reviewed    Pending Diagnostic Studies:       Procedure Component Value Units Date/Time    TABATHA [[id number]] Collected: 05/09/25 1159    Order Status: Sent Lab Status: In process Updated: 05/09/25 1204    Specimen: Blood     Alpha 1 Antitrypsin Defiiciency Profile [[id number]] Collected: 05/09/25 1159    Order Status: Sent Lab Status: In process Updated: 05/09/25 1204    Specimen: Blood     Anti-smooth muscle antibody [[id number]] Collected: 05/09/25 1159    Order Status: Sent Lab Status: In process Updated: 05/09/25 1204    Specimen: Blood     Antimitochondrial antibody [[id number]] Collected: 05/09/25 1159    Order Status: Sent Lab Status: In process Updated: 05/09/25 1204    Specimen: Blood     Phosphatidylethanol (PETH) [[id number]] Collected: 05/09/25 1159    Order Status: Sent Lab Status: In process Updated: 05/09/25 1205    Specimen: Blood            Medications:  Reconciled Home Medications:      Medication List        CONTINUE taking these medications      albuterol 90 mcg/actuation inhaler  Commonly known as: VENTOLIN HFA  Inhale 2 puffs into the lungs every 6 (six) hours as needed for Wheezing or Shortness of Breath. Rescue     amLODIPine 5 MG tablet  Commonly known as: NORVASC  Take 5 mg by mouth once  daily.     atorvastatin 40 MG tablet  Commonly known as: LIPITOR  Take 1 tablet (40 mg total) by mouth once daily.     azelastine 137 mcg (0.1 %) nasal spray  Commonly known as: ASTELIN  1 spray (137 mcg total) by Nasal route 2 (two) times daily.     bisacodyL 5 mg EC tablet  Commonly known as: DULCOLAX  Take 1 tablet (5 mg total) by mouth daily as needed for Constipation.     metoprolol tartrate 100 MG tablet  Commonly known as: LOPRESSOR  Take 1 tablet (100 mg total) by mouth 2 (two) times daily.     polyethylene glycol 17 gram Pwpk  Commonly known as: GLYCOLAX  Take 17 g by mouth 2 (two) times daily as needed for Constipation.     tamsulosin 0.4 mg Cap  Commonly known as: FLOMAX  TAKE 2 CAPSULES BY MOUTH ONCE DAILY.            STOP taking these medications      cetirizine 10 MG tablet  Commonly known as: ZYRTEC     hydrALAZINE 50 MG tablet  Commonly known as: APRESOLINE              Indwelling Lines/Drains at time of discharge:   Lines/Drains/Airways       None                   Time spent on the discharge of patient: >35 minutes         Moisés Castro MD  Department of Hospital Medicine  [facility]

## 2025-05-12 NOTE — ASSESSMENT & PLAN NOTE
Creatine stable for now. BMP reviewed- noted Estimated Creatinine Clearance: 44.3 mL/min (based on SCr of 1.3 mg/dL). according to latest data. Based on current GFR, CKD stage is stage 3 - GFR 30-59.  Monitor UOP and serial BMP and adjust therapy as needed. Renally dose meds. Avoid nephrotoxic medications and procedures.

## 2025-05-13 ENCOUNTER — CONFERENCE (OUTPATIENT)
Dept: TRANSPLANT | Facility: CLINIC | Age: 85
End: 2025-05-13
Payer: MEDICARE

## 2025-05-13 LAB
MITOCHONDRIA AB TITR SER IF: NORMAL {TITER}
PLPETH BLD-MCNC: <10 NG/ML
POPETH BLD-MCNC: <10 NG/ML
TOXICOLOGIST REVIEW: NORMAL

## 2025-05-14 ENCOUNTER — PATIENT OUTREACH (OUTPATIENT)
Dept: ADMINISTRATIVE | Facility: CLINIC | Age: 85
End: 2025-05-14
Payer: MEDICARE

## 2025-05-14 LAB
BACTERIA BLD CULT: NORMAL
BACTERIA BLD CULT: NORMAL

## 2025-05-14 NOTE — PROGRESS NOTES
C3 nurse attempted to contact Alexander Duong Jr. for a TCC post hospital discharge follow up call. No answer . The patient has a scheduled HOSFU appointment with Teresa Ramon PA-C on 05/19/25 @ 8059.

## 2025-05-15 ENCOUNTER — RESULTS FOLLOW-UP (OUTPATIENT)
Dept: GASTROENTEROLOGY | Facility: HOSPITAL | Age: 85
End: 2025-05-15

## 2025-05-15 LAB — SMOOTH MUSCLE AB TITR SER IF: ABNORMAL {TITER}

## 2025-05-15 NOTE — PROGRESS NOTES
Patent followed by Hepatology and presented at liver conference to develop plan of care    Maye Rollins NP  Gastroenterology

## 2025-05-16 LAB
A1AT PHENOTYP SERPL-IMP: ABNORMAL
A1AT SERPL NEPH-MCNC: 224 MG/DL (ref 100–190)

## 2025-05-20 ENCOUNTER — NURSE TRIAGE (OUTPATIENT)
Dept: ADMINISTRATIVE | Facility: CLINIC | Age: 85
End: 2025-05-20
Payer: MEDICARE

## 2025-05-20 DIAGNOSIS — I10 ESSENTIAL HYPERTENSION: Primary | ICD-10-CM

## 2025-05-20 NOTE — TELEPHONE ENCOUNTER
nurse, Josefa, reports pt was discharged on 25, but pt did not realize he was supposed to be taking some BP medication. His BP was 208/100, no other symptoms.  nurse called pt's pharmacy to see if they still had the prescription(s) for him, but they are saying they will need a new prescription sent since his old one was never picked up and has now . Pt does have an appt on Thursday, but  nurse is reaching out to see if another prescription can be sent in for the pt tonight.  nurse is not currently with the pt anymore, so a call was placed to the pt at this time x2, but both times pt hangs up/disconnects the call. Attempt made to contact pt's Niece Chani as the  nurse also listed her as someone to contact if unable to get thru to the pt, but the call went straight to voicemail, so a message was left to call OOC if assistance is wanted for pt's hypertension/medication issue. The two BP meds that are listed on pt's hospital discharge AVS:  metoprolol tartrate (lopressor) 100mg BID  amLODIPine 5 MG QD  (Needing to clarify with pt what he needs or is out of)      Reason for Disposition   Message left on unidentified voice mail.  Phone number verified.   Caller hangs up    Additional Information   Caller hangs up    Protocols used: No Contact or Duplicate Contact Call-A-, Difficult Call-A-AH

## 2025-05-21 DIAGNOSIS — C22.0 HCC (HEPATOCELLULAR CARCINOMA): Primary | ICD-10-CM

## 2025-05-21 RX ORDER — AMLODIPINE BESYLATE 10 MG/1
10 TABLET ORAL DAILY
Qty: 90 TABLET | Refills: 3 | Status: SHIPPED | OUTPATIENT
Start: 2025-05-21 | End: 2025-05-22 | Stop reason: SDUPTHER

## 2025-05-21 RX ORDER — METOPROLOL TARTRATE 100 MG/1
100 TABLET ORAL 2 TIMES DAILY
Qty: 180 TABLET | Refills: 3 | Status: SHIPPED | OUTPATIENT
Start: 2025-05-21 | End: 2025-05-22 | Stop reason: SDUPTHER

## 2025-05-22 ENCOUNTER — OFFICE VISIT (OUTPATIENT)
Dept: FAMILY MEDICINE | Facility: CLINIC | Age: 85
End: 2025-05-22
Payer: MEDICARE

## 2025-05-22 ENCOUNTER — TELEPHONE (OUTPATIENT)
Dept: FAMILY MEDICINE | Facility: CLINIC | Age: 85
End: 2025-05-22

## 2025-05-22 VITALS
HEART RATE: 75 BPM | BODY MASS INDEX: 30.8 KG/M2 | SYSTOLIC BLOOD PRESSURE: 160 MMHG | HEIGHT: 68 IN | TEMPERATURE: 98 F | WEIGHT: 203.25 LBS | OXYGEN SATURATION: 95 % | DIASTOLIC BLOOD PRESSURE: 72 MMHG | RESPIRATION RATE: 16 BRPM

## 2025-05-22 DIAGNOSIS — R16.0 LIVER MASS: ICD-10-CM

## 2025-05-22 DIAGNOSIS — I10 ESSENTIAL HYPERTENSION: ICD-10-CM

## 2025-05-22 DIAGNOSIS — Z23 NEED FOR COVID-19 VACCINE: ICD-10-CM

## 2025-05-22 DIAGNOSIS — H91.90 HEARING LOSS, UNSPECIFIED HEARING LOSS TYPE, UNSPECIFIED LATERALITY: ICD-10-CM

## 2025-05-22 DIAGNOSIS — Z09 HOSPITAL DISCHARGE FOLLOW-UP: Primary | ICD-10-CM

## 2025-05-22 DIAGNOSIS — J41.8 MIXED SIMPLE AND MUCOPURULENT CHRONIC BRONCHITIS: ICD-10-CM

## 2025-05-22 DIAGNOSIS — E66.01 MORBID (SEVERE) OBESITY DUE TO EXCESS CALORIES: ICD-10-CM

## 2025-05-22 DIAGNOSIS — I27.20 PULMONARY HYPERTENSION: ICD-10-CM

## 2025-05-22 DIAGNOSIS — E78.2 MIXED HYPERLIPIDEMIA: ICD-10-CM

## 2025-05-22 DIAGNOSIS — K59.09 OTHER CONSTIPATION: ICD-10-CM

## 2025-05-22 PROCEDURE — G2211 COMPLEX E/M VISIT ADD ON: HCPCS | Mod: S$GLB,,,

## 2025-05-22 PROCEDURE — 91320 SARSCV2 VAC 30MCG TRS-SUC IM: CPT | Mod: S$GLB,,,

## 2025-05-22 PROCEDURE — 99999 PR PBB SHADOW E&M-EST. PATIENT-LVL V: CPT | Mod: PBBFAC,,,

## 2025-05-22 PROCEDURE — 90480 ADMN SARSCOV2 VAC 1/ONLY CMP: CPT | Mod: S$GLB,,,

## 2025-05-22 PROCEDURE — 3288F FALL RISK ASSESSMENT DOCD: CPT | Mod: CPTII,S$GLB,,

## 2025-05-22 PROCEDURE — 1159F MED LIST DOCD IN RCRD: CPT | Mod: CPTII,S$GLB,,

## 2025-05-22 PROCEDURE — 1101F PT FALLS ASSESS-DOCD LE1/YR: CPT | Mod: CPTII,S$GLB,,

## 2025-05-22 PROCEDURE — 99214 OFFICE O/P EST MOD 30 MIN: CPT | Mod: S$GLB,,,

## 2025-05-22 PROCEDURE — 1111F DSCHRG MED/CURRENT MED MERGE: CPT | Mod: CPTII,S$GLB,,

## 2025-05-22 RX ORDER — AMLODIPINE BESYLATE 10 MG/1
10 TABLET ORAL DAILY
Qty: 90 TABLET | Refills: 3 | Status: SHIPPED | OUTPATIENT
Start: 2025-05-22 | End: 2025-05-23 | Stop reason: SDUPTHER

## 2025-05-22 RX ORDER — ATORVASTATIN CALCIUM 40 MG/1
40 TABLET, FILM COATED ORAL DAILY
Qty: 90 TABLET | Refills: 3 | Status: SHIPPED | OUTPATIENT
Start: 2025-05-22 | End: 2026-05-22

## 2025-05-22 RX ORDER — METOPROLOL TARTRATE 100 MG/1
100 TABLET ORAL 2 TIMES DAILY
Qty: 180 TABLET | Refills: 3 | Status: SHIPPED | OUTPATIENT
Start: 2025-05-22 | End: 2026-05-22

## 2025-05-22 RX ORDER — ALBUTEROL SULFATE 90 UG/1
2 INHALANT RESPIRATORY (INHALATION) EVERY 6 HOURS PRN
Qty: 18 G | Refills: 1 | Status: SHIPPED | OUTPATIENT
Start: 2025-05-22

## 2025-05-22 NOTE — PROGRESS NOTES
HPI     Chief Complaint:  Chief Complaint   Patient presents with    Follow-up    Hearing Problem       Alexander Duong Jr. is a 85 y.o. male with multiple medical diagnoses as listed in the medical history and problem list that presents for hospital follow up and hearing problem    HPI    History of Present Illness    CHIEF COMPLAINT:  Alexander presents today for follow up of liver cancer.    CONSTITUTIONAL SYMPTOMS:  He reports decreased appetite compared to baseline, though it is gradually improving. He has experienced a 2-pound weight loss over two weeks, from 205 to 203 lbs. He reports mild nausea, particularly last night, but denies consistent or severe nausea.    GENITOURINARY:  He reports frequent urination occurring approximately every 20 minutes at night.    GASTROINTESTINAL:  He reports longstanding irregular bowel habits with constipation and inquires about stool softeners. He denies any specific frequency of bowel movements.    MEDICATIONS:  He is not taking Lipitor as prescribed due to misunderstanding of dosing instructions. He has run out of amlodipine and is currently not taking any blood pressure medications.    HOME HEALTH:  Home health services have been initiated with a recent visit completed and additional visits scheduled for today and Monday.      ROS:  General: -fever, -chills, -fatigue, -weight gain, +weight loss, +loss of appetite  Eyes: -vision changes, -redness, -discharge  ENT: -ear pain, -nasal congestion, -sore throat, +difficulty hearing, +hearing loss  Cardiovascular: -chest pain, -palpitations, -lower extremity edema  Respiratory: -cough, -shortness of breath  Gastrointestinal: -abdominal pain, +nausea, -vomiting, -diarrhea, -constipation, -blood in stool  Genitourinary: -dysuria, -hematuria, +frequency  Musculoskeletal: -joint pain, -muscle pain  Skin: -rash, -lesion  Neurological: -headache, -dizziness, -numbness, -tingling  Psychiatric: -anxiety, -depression, -sleep difficulty              May 12, 2025 hospital discharge    [facility]  Discharge Summary        Patient Name: Alexander Duong Jr.  MRN: [id number]  KRISHAN: [id number]  Patient Class: IP- Inpatient  Admission Date: 5/8/2025  Hospital Length of Stay: 3 days  Discharge Date and Time: 5/11/2025  5:00 PM  Attending Physician: No att. providers found   Discharging Provider: Moisés Castro MD  Primary Care Provider: Alesia Moreno MD     Primary Care Team: Networked reference to record PCT      HPI:   Mr. Duong is an 85-year-old male with past medical history of hypertension, BPH who presents to the emergency department with his niece for evaluation of abdominal pain, nausea and vomiting.  Niece also reports that patient has been intermittently confused for the past week or so.  She reports he is very independent, lives alone and does all of his ADLs.  He does not drive.  He washes his own clothes, cooks his own meals and takes care of his house.   In the ED, patient afebrile with pulse 88 and .60 and 96% on room air. Labs notable for Hb 8, BUN/Cr 24/1.8.  T bili 1.2 and AST 70.  UA without evidence of infection.  CT of the abdomen and pelvis with a new right hepatic lesion with associated rupture extrahepatic component with a subcapsular hematoma concerning for hepatocellular carcinoma or metastatic disease.  IR and General surgery consulted.  MRCP ordered.  AFP and ammonia ordered.  He was given 1 L lactated Ringer's.  We will start on lactulose.  Niece at bedside and is main person that helps patient out.  He has no paperwork or advanced directives.  I discussed code status and encouraged the patient and niece to discuss this further.  He will be admitted to hospital medicine for further management.     * No surgery found *       Hospital Course:   Mr. Duong is a 84 yo M who was admitted to the hospital with encephalopathy and abdominal pain.  He was found to have a liver mass with hemorrhage.  GI, IR and  General surgery consulted.  There was concern for hepatocellular carcinoma.  AFP ordered.  MRCP also consistent with hepatocellular carcinoma but surprisingly AFP within normal limits.  Hemoglobin remained stable throughout hospital stay.  Patient did develop fevers with cultures drawn.  Empiric antibiotics for 48 hours with no evidence of infection.  Suspect fevers from hemorrhage which is now stable.  Overall, patient feeling well and stable for discharge.  PT OT recommending home health which was arranged.  Rolling walker ordered for patient as well.  Patient will be followed up with the tumor board and GI team.  They will arrange follow up with patient after discharge.      Goals of Care Treatment Preferences:  Code Status: Full Code     Assessment & Plan  Liver mass  Presents with abdominal pain, nausea and vomiting. Imaging concerning for new liver mass of the right hepatic lobe with extracapsular hemorrhage.  Hemoglobin stable at 8 right now.  IR consulted and following along in case patient needs further embolization.  Concerned that this is malignancy.  Checking AFP and ammonia given history of intermittent confusion.  No mass noted on imaging back in February of 2024.  Discussed with patient and niece at bedside.   - MRCP consistent with likely HCC  - oddly, AFP is within normal limits  - discussed with GI - he will need Hepatology follow up     Stage 3b chronic kidney disease  Creatine stable for now. BMP reviewed- noted Estimated Creatinine Clearance: 44.3 mL/min (based on SCr of 1.3 mg/dL). according to latest data. Based on current GFR, CKD stage is stage 3 - GFR 30-59.  Monitor UOP and serial BMP and adjust therapy as needed. Renally dose meds. Avoid nephrotoxic medications and procedures.  RAFAEL (acute kidney injury)  RAFAEL is likely due to pre-renal azotemia due to dehydration. Baseline creatinine is 1.4. Most recent creatinine and eGFR are listed below.       Recent Labs     05/10/25  0529  05/11/25  0445   CREATININE 1.4 1.3   EGFRNORACEVR 49* 53*       Plan  - RAFAEL is resolved  - Avoid nephrotoxins and renally dose meds for GFR listed above  - Monitor urine output, serial BMP, and adjust therapy as needed  Advanced care planning/counseling discussion  I encouraged discussion between patient and niece regarding goals of care. They have never spoken about it and patient does not have any paperwork that she knows of for this. Continue with Full code. Ongoing discussion through hospital stay and as results.     ACP time 5 minutes.     BPH with urinary obstruction  - resume flomax     Liver hematoma  Patient's hemorrhage is due to liver mass, this bleeding is not associated with a medication or a coagulopathy. Patients most recent Hgb, Hct, platelets, and INR are listed below.       Recent Labs     05/10/25  0550 05/11/25  0445   HGB 7.6* 7.9*   HCT 24.3* 25.7*    271      Plan  - Will trend hemoglobin/hematocrit Daily  - Will monitor and correct any coagulation defects  - Will transfuse if Hgb is <7g/dl (<8g/dl in cases of active ACS) or if patient has rapid bleeding leading to hemodynamic instability- MRI stable.  - Hb stable and repeat imaging noted stability  - discussed with Gen Surgery and GI -- no intervention needed, discussed with niece and sister that if patient develops any abdomina pain, weakness or nausea/vomiting he will need to go straight to ED  Fever  Developed fever on 5/9 -- blood cultures ordered, empiric antibiotics started  - Flu/COVID negative  - blood cultures neg today  - continue to monitor another 24 hours (total 48 hours), if no growth likely d/c antibiotics  - f/u results  Discharged Condition: stable     Disposition: Home or Self Care     Follow Up:    Contact information for after-discharge care         Home Medical Care         [facility] .    Service: Home Health Services  Why: Someone will call you to schedule a follow up appointment.  Contact  information:  [address], [address]  [facility] [address]  [phone]              Assessment & Plan     Assessment & Plan    BP remains elevated despite previous treatment.  Added Metoprolol as second antihypertensive medication to existing regimen (Amlodipine) for better BP control, to be taken twice daily: Morning dose with Amlodipine, Evening dose.  Evaluated recent weight loss and appetite changes.  Considered potential nausea as contributing factor to decreased appetite.  Kidney injury has resolved but requires ongoing monitoring through lab surveillance.  Assessed urinary symptoms and current use of Flomax.  Evaluated bowel habits and determined no immediate need for stool softeners.  Continued Lipitor (cholesterol medication).  Continued inhaler.              Problem List Items Addressed This Visit       Essential hypertension    Relevant Medications    amLODIPine (NORVASC) 10 MG tablet    metoprolol tartrate (LOPRESSOR) 100 MG tablet  HYPERTENSION:  - Noted that the patient's blood pressure was extremely high today and still elevated.  - Added Metoprolol as second antihypertensive medication to existing regimen (Amlodipine) for better BP control, to be taken twice daily: Morning dose with Amlodipine, Evening dose alone.  - Instructed the patient to monitor and record BP readings at home.  - Scheduled follow up in 2 weeks for nurse visit to recheck BP and evaluate effectiveness of new medication regimen.    Mixed hyperlipidemia    Relevant Medications    atorvastatin (LIPITOR) 40 MG tablet    Other Relevant Orders    LIPID PANEL    HYPERLIPIDEMIA:  - Continued Lipitor for cholesterol management.    Pulmonary hypertension  Stable.  Has seen cardiologist before in the past    Mixed simple and mucopurulent chronic bronchitis    Relevant Medications    albuterol (VENTOLIN HFA) 90 mcg/actuation inhaler    Morbid (severe) obesity due to excess calories  Patient is losing weight    Hearing loss     Relevant Orders    Ambulatory referral/consult to Audiology    Ambulatory referral/consult to ENT  HEARING LOSS:  - Referred the patient to audiology for hearing test and to ENT for hearing aid evaluation.    Liver mass  LIVER MASS:  - Referred the patient to hepatology for liver cancer follow-up and further testing.  - Instructed the patient to contact the referral line if hepatology department has not called to schedule an appointment within a few days.    APPETITE AND WEIGHT LOSS:  - Noted that the patient's appetite is improving but not yet returned to baseline.  - Advised the patient to consult with hepatology and gastroenterology if appetite issues persist.  - Documented that the patient's weight decreased from 205 to 203 lbs over 2 weeks.    Hospital discharge follow-up - Primary  NAUSEA:  - Noted that the patient experiences intermittent nausea, with a recent episode last night.    NOCTURIA:  - Documented that the patient experiences frequent urination at night, occurring approximately every 20 minutes.    MEDICATION MANAGEMENT:  - Discovered that the patient misunderstood instructions and had not been taking any of the prescribed medications, including Lipitor and antihypertensive medications.     Other Visit Diagnoses         Need for COVID-19 vaccine        Relevant Medications    COVID-19 (Pfizer) 30 mcg/0.3 mL IM vaccine (>/= 11 yo) 0.3 mL (Completed)  GENERAL HEALTH MANAGEMENT:  - Explained importance of staying hydrated, especially with approaching summer, recommending 70-80 oz of water daily for kidney health.  - Continued inhaler.  - COVID-19 vaccine administered in office.      Other constipation  CONSTIPATION:  - Noted that the patient sometimes experiences constipation, but it is not consistent.  - Educated the patient on natural methods to address constipation including increased water intake, prune juice, and fiber supplements like Metamucil.  - Discussed normalcy of bowel movements every 1-2  "days.  - Recommend using stool softeners if no bowel movement after 2 days.           --------------------------------------------      Health Maintenance:  Health Maintenance         Date Due Completion Date    Shingles Vaccine (1 of 2) Never done ---    RSV Vaccine (Age 60+ and Pregnant patients) (1 - 1-dose 75+ series) Never done ---    Lipid Panel 11/03/2023 11/3/2022    Override on 4/8/2016: Done (future)    Influenza Vaccine (Season Ended) 09/01/2025 11/29/2022 (Declined)    Override on 11/29/2022: Declined (Declined for season)            Health maintenance reviewed, Advised patient on the importance of completing overdue health maintenance items, and Lipid panel ordered COVID vaccine    Follow Up:  Follow up if symptoms worsen or fail to improve.    Exam     Review of Systems:  (as noted above)  Review of Systems    Physical Exam:   Physical Exam  Vitals:    05/22/25 1014   BP: (!) 160/72   BP Location: Left arm   Patient Position: Sitting   Pulse: 75   Resp: 16   Temp: 97.9 °F (36.6 °C)   TempSrc: Oral   SpO2: 95%   Weight: 92.2 kg (203 lb 4.2 oz)   Height: 5' 8" (1.727 m)      Body mass index is 30.91 kg/m².    Physical Exam    Vitals: Weight: 203 lbs. Blood pressure is extremely high.  General: No acute distress. Well-developed. Well-nourished.  Eyes: EOMI. Sclerae anicteric.  HENT: Normocephalic. Atraumatic. Nares patent.   Cardiovascular: Regular rate. Regular rhythm. No murmurs. No rubs. No gallops. Normal S1, S2.  Respiratory: Normal respiratory effort. Clear to auscultation bilaterally. No rales. No rhonchi. No wheezing.  Abdomen: Soft. Non-distended.   Extremities: No lower extremity edema.  Neurological: Alert & oriented x3. No slurred speech. Normal gait.  Psychiatric: Normal mood. Normal affect. Good insight. Good judgment.  Skin: Warm. Dry. No rash.           History     Past Medical History:  Past Medical History:   Diagnosis Date    Allergy     Hypertension        Past Surgical History:  Past " Surgical History:   Procedure Laterality Date    APPENDECTOMY      COLONOSCOPY Left 10/19/2015    Procedure: COLONOSCOPY;  Surgeon: Randell Briceno MD;  Location: [facility];  Service: Endoscopy;  Laterality: Left;    ESOPHAGOGASTRODUODENOSCOPY N/A 2/22/2024    Procedure: EGD (ESOPHAGOGASTRODUODENOSCOPY);  Surgeon: Kulwant Blanchard MD;  Location: [facility];  Service: Endoscopy;  Laterality: N/A;       Social History:  Social History[1]    Family History:  No family history on file.    Allergies and Medications: (updated and reviewed)  Review of patient's allergies indicates:  No Known Allergies  Current Medications[2]    Patient Care Team:  Alesia Moreno MD as PCP - General (Internal Medicine)  Isabela Mancera as ED Navigator         - The patient is given an After Visit Summary that lists all medications with directions, allergies, education, orders placed during this encounter and follow-up instructions.      - I have reviewed the patient's medical information including past medical, family, and social history sections including the medications and allergies.      - We discussed the patient's current medications.     This note was created by combination of typed  and MModal dictation.  Transcription errors may be present.  If there are any questions, please contact me.     This note was generated with the assistance of ambient listening technology. Verbal consent was obtained by the patient and accompanying visitor(s) for the recording of patient appointment to facilitate this note. I attest to having reviewed and edited the generated note for accuracy, though some syntax or spelling errors may persist. Please contact the author of this note for any clarification.          Teresa Ramon PA-C                      [1]   Social History  Socioeconomic History    Marital status: Single   Tobacco Use    Smoking status: Former     Types: Cigarettes    Smokeless tobacco: Never    Tobacco comments:      Quit 15 years ago   Substance and Sexual Activity    Alcohol use: Yes     Alcohol/week: 1.0 standard drink of alcohol     Types: 1 Standard drinks or equivalent per week     Comment: sometimes beer or [place]    Drug use: No    Sexual activity: Not Currently     Social Drivers of Health     Financial Resource Strain: Low Risk  (5/9/2025)    Overall Financial Resource Strain (CARDIA)     Difficulty of Paying Living Expenses: Not hard at all   Food Insecurity: No Food Insecurity (5/9/2025)    Hunger Vital Sign     Worried About Running Out of Food in the Last Year: Never true     Ran Out of Food in the Last Year: Never true   Transportation Needs: No Transportation Needs (5/9/2025)    PRAPARE - Transportation     Lack of Transportation (Medical): No     Lack of Transportation (Non-Medical): No   Physical Activity: Unknown (5/9/2025)    Exercise Vital Sign     Days of Exercise per Week: 5 days   Stress: Stress Concern Present (5/9/2025)    [employer] [place] [employer] - Occupational Stress Questionnaire     Feeling of Stress : To some extent   Housing Stability: Low Risk  (5/9/2025)    Housing Stability Vital Sign     Unable to Pay for Housing in the Last Year: No     Homeless in the Last Year: No   [2]   Current Outpatient Medications   Medication Sig Dispense Refill    azelastine (ASTELIN) 137 mcg (0.1 %) nasal spray 1 spray (137 mcg total) by Nasal route 2 (two) times daily. 30 mL 5    bisacodyL (DULCOLAX) 5 mg EC tablet Take 1 tablet (5 mg total) by mouth daily as needed for Constipation. 30 tablet 0    polyethylene glycol (GLYCOLAX) 17 gram PwPk Take 17 g by mouth 2 (two) times daily as needed for Constipation. 30 each 0    tamsulosin (FLOMAX) 0.4 mg Cap TAKE 2 CAPSULES BY MOUTH ONCE DAILY. 180 capsule 1    albuterol (VENTOLIN HFA) 90 mcg/actuation inhaler Inhale 2 puffs into the lungs every 6 (six) hours as needed for Wheezing or Shortness of Breath. Rescue 18 g 1    amLODIPine (NORVASC) 10 MG  tablet Take 1 tablet (10 mg total) by mouth once daily. 90 tablet 3    atorvastatin (LIPITOR) 40 MG tablet Take 1 tablet (40 mg total) by mouth once daily. 90 tablet 3    metoprolol tartrate (LOPRESSOR) 100 MG tablet Take 1 tablet (100 mg total) by mouth 2 (two) times daily. 180 tablet 3     No current facility-administered medications for this visit.

## 2025-05-22 NOTE — TELEPHONE ENCOUNTER
----- Message from Ana Cristina sent at 5/22/2025  3:53 PM CDT -----  Call the clinic reply in [facility]: Y Please refill the medication(s) listed below. Please call the patient when the prescription(s) is ready for  at this phone number. [phone] #1:amLODIPine (NORVASC) 10 MG tablet ( need a different brand because the insurance purposes) Medication #2:Preferred Pharmacy: [facility]/pharmacy #[id number] - [place] - [address] [address]: [phone] Fax: [phone]  ----- Message -----  From: Ana Cristina Pate  Sent: 5/22/2025   4:01 PM CDT  To: Yenny Moore Staff    Call the clinic reply in [facility]: Y Please refill the medication(s) listed below. Please call the patient when the prescription(s) is ready for  at this phone number.[phone] #1:amLODIPine (NORVASC) 10 MG tablet ( need a different brand because the insurance purposes) Medication #2:Preferred Pharmacy: [facility]/pharmacy #[id number] - [place] - [address] [address]: [phone] Fax: [phone]

## 2025-05-22 NOTE — TELEPHONE ENCOUNTER
Called to get more information regarding patient needing a different brand of medication for Amlodipine.  Patient stated he is not sure of what is needed, but his caretaker would know but she was not available at the time of the call. Advised patient to have caretaker call the office to discuss.  Patient verbalized understanding.

## 2025-05-22 NOTE — PROGRESS NOTES
Health Maintenance Due   Topic     Shingles Vaccine (1 of 2) hx chickenpox ; inform pt can get vaccine at pharmacy.    RSV Vaccine (Age 60+ and Pregnant patients) (1 - 1-dose 75+ series) Not given at this facility       Lipid Panel      COVID-19 Vaccine (3 - 2024-25 season)

## 2025-05-23 ENCOUNTER — NURSE TRIAGE (OUTPATIENT)
Dept: ADMINISTRATIVE | Facility: CLINIC | Age: 85
End: 2025-05-23
Payer: MEDICARE

## 2025-05-23 ENCOUNTER — OCHSNER VIRTUAL EMERGENCY DEPARTMENT (OUTPATIENT)
Facility: CLINIC | Age: 85
End: 2025-05-23
Payer: MEDICARE

## 2025-05-23 DIAGNOSIS — I10 ESSENTIAL HYPERTENSION: ICD-10-CM

## 2025-05-23 RX ORDER — AMLODIPINE BESYLATE 10 MG/1
10 TABLET ORAL DAILY
Qty: 90 TABLET | Refills: 3 | Status: SHIPPED | OUTPATIENT
Start: 2025-05-23 | End: 2026-05-23

## 2025-05-23 NOTE — TELEPHONE ENCOUNTER
Pt's niece is calling and states that pt went to a F/U yesterday and the Doctor was suppose to call in pt's BP medication Amlodipine 10mg. Pt's niece said when she went to go pick it up, the Pharmacy ( [facility] [address]) said they did not have a Rx for the pt. I was able to look in the pt's EMR and can see that Amlodipine was phoned into [facility] pharmacy on 5/22 @ 11:01 but when I called the pharmacy the Pharmacy tech states that they never did receive it.   Reached out to [place] Dr. Charlette House and she is able to call in the pt's Rx to the [facility] pharmacy. Pt's niece aware and VU. Advised to call back for any further concerns or questions.         Reason for Disposition   [1] Prescription not at pharmacy AND [2] was prescribed by PCP recently (Exception: [facility] has access to EMR and prescription is recorded there. Go to Home Care and confirm for pharmacy.)    Protocols used: Medication Question Call-A-

## 2025-05-23 NOTE — PLAN OF CARE-OVED
[facility] Emergency Department Plan of Care Note  Referral Source: Nurse On-Call                               Chief Complaint   Patient presents with    Medication Refill     Saw PCP, was told amlodipine was called in to pharmacy, does looks like phoned in on chart review, but pharmacy did not receive a call. Needs refill of medication. No symptoms currently.        Recommendation: Treat in place                          Refill provided.     Encounter Diagnosis   Name Primary?    Essential hypertension         Orders Placed This Encounter    amLODIPine (NORVASC) 10 MG tablet     Sig: Take 1 tablet (10 mg total) by mouth once daily.     Dispense:  90 tablet     Refill:  3     .

## 2025-05-26 ENCOUNTER — TELEPHONE (OUTPATIENT)
Dept: HEPATOLOGY | Facility: CLINIC | Age: 85
End: 2025-05-26
Payer: MEDICARE

## 2025-05-26 NOTE — TELEPHONE ENCOUNTER
Call returned to number listed below.  Spoke with Ms. Wilde pt's daughter.  Scheduled to paula next available appt 5/28/2025 with Dr. Morales.  Ms. Wilde confirmed and agreed with the appt.  Reminder letter mailed.        Kellen Chamorro RN  P [facility] Hepatology Scheduling         Previous Messages       ----- Message -----  From: Rashaun Lopez MA  Sent: 5/22/2025  11:31 AM CDT  To: [facility] Hepat Clinical Staff  Subject: Sooner Appointment                              Patient's daughter has called to request sooner appointment regarding patient's Hepatology referral order placed/ Dx: HCC (hepatocellular carcinoma) [C22.0]    Daughter would like to speak with someone in department for a possible sooner appointment than August 2025/ Please contact patient's daughter at [phone] (Chani Lion)

## 2025-05-28 ENCOUNTER — OFFICE VISIT (OUTPATIENT)
Dept: HEPATOLOGY | Facility: CLINIC | Age: 85
End: 2025-05-28
Payer: MEDICARE

## 2025-05-28 ENCOUNTER — TELEPHONE (OUTPATIENT)
Dept: HEPATOLOGY | Facility: CLINIC | Age: 85
End: 2025-05-28

## 2025-05-28 ENCOUNTER — LAB VISIT (OUTPATIENT)
Dept: LAB | Facility: HOSPITAL | Age: 85
End: 2025-05-28
Attending: INTERNAL MEDICINE
Payer: MEDICARE

## 2025-05-28 VITALS
RESPIRATION RATE: 18 BRPM | HEART RATE: 65 BPM | HEIGHT: 68 IN | WEIGHT: 200.19 LBS | SYSTOLIC BLOOD PRESSURE: 131 MMHG | TEMPERATURE: 98 F | DIASTOLIC BLOOD PRESSURE: 68 MMHG | BODY MASS INDEX: 30.34 KG/M2 | OXYGEN SATURATION: 96 %

## 2025-05-28 DIAGNOSIS — C22.0 HCC (HEPATOCELLULAR CARCINOMA): ICD-10-CM

## 2025-05-28 LAB
ABSOLUTE EOSINOPHIL (OHS): 0.22 K/UL
ABSOLUTE MONOCYTE (OHS): 0.34 K/UL (ref 0.3–1)
ABSOLUTE NEUTROPHIL COUNT (OHS): 2.28 K/UL (ref 1.8–7.7)
ALBUMIN SERPL BCP-MCNC: 3.1 G/DL (ref 3.5–5.2)
ALP SERPL-CCNC: 67 UNIT/L (ref 40–150)
ALT SERPL W/O P-5'-P-CCNC: 12 UNIT/L (ref 10–44)
ANION GAP (OHS): 4 MMOL/L (ref 8–16)
AST SERPL-CCNC: 41 UNIT/L (ref 11–45)
BASOPHILS # BLD AUTO: 0.04 K/UL
BASOPHILS NFR BLD AUTO: 0.8 %
BILIRUB SERPL-MCNC: 0.5 MG/DL (ref 0.1–1)
BUN SERPL-MCNC: 14 MG/DL (ref 8–23)
CALCIUM SERPL-MCNC: 8.5 MG/DL (ref 8.7–10.5)
CHLORIDE SERPL-SCNC: 104 MMOL/L (ref 95–110)
CO2 SERPL-SCNC: 29 MMOL/L (ref 23–29)
CREAT SERPL-MCNC: 1.5 MG/DL (ref 0.5–1.4)
ERYTHROCYTE [DISTWIDTH] IN BLOOD BY AUTOMATED COUNT: 14.6 % (ref 11.5–14.5)
GFR SERPLBLD CREATININE-BSD FMLA CKD-EPI: 45 ML/MIN/1.73/M2
GLUCOSE SERPL-MCNC: 98 MG/DL (ref 70–110)
HCT VFR BLD AUTO: 31.3 % (ref 40–54)
HGB BLD-MCNC: 9.4 GM/DL (ref 14–18)
IMM GRANULOCYTES # BLD AUTO: 0.01 K/UL (ref 0–0.04)
IMM GRANULOCYTES NFR BLD AUTO: 0.2 % (ref 0–0.5)
INR PPP: 1.1 (ref 0.8–1.2)
LYMPHOCYTES # BLD AUTO: 1.84 K/UL (ref 1–4.8)
MCH RBC QN AUTO: 29.9 PG (ref 27–31)
MCHC RBC AUTO-ENTMCNC: 30 G/DL (ref 32–36)
MCV RBC AUTO: 100 FL (ref 82–98)
NUCLEATED RBC (/100WBC) (OHS): 0 /100 WBC
PLATELET # BLD AUTO: 343 K/UL (ref 150–450)
PMV BLD AUTO: 10.3 FL (ref 9.2–12.9)
POTASSIUM SERPL-SCNC: 4.3 MMOL/L (ref 3.5–5.1)
PROT SERPL-MCNC: 8.3 GM/DL (ref 6–8.4)
PROTHROMBIN TIME: 11.9 SECONDS (ref 9–12.5)
RBC # BLD AUTO: 3.14 M/UL (ref 4.6–6.2)
RELATIVE EOSINOPHIL (OHS): 4.7 %
RELATIVE LYMPHOCYTE (OHS): 38.9 % (ref 18–48)
RELATIVE MONOCYTE (OHS): 7.2 % (ref 4–15)
RELATIVE NEUTROPHIL (OHS): 48.2 % (ref 38–73)
SODIUM SERPL-SCNC: 137 MMOL/L (ref 136–145)
WBC # BLD AUTO: 4.73 K/UL (ref 3.9–12.7)

## 2025-05-28 PROCEDURE — 80053 COMPREHEN METABOLIC PANEL: CPT

## 2025-05-28 PROCEDURE — 85610 PROTHROMBIN TIME: CPT

## 2025-05-28 PROCEDURE — 99999 PR PBB SHADOW E&M-EST. PATIENT-LVL IV: CPT | Mod: PBBFAC,,, | Performed by: INTERNAL MEDICINE

## 2025-05-28 PROCEDURE — 85025 COMPLETE CBC W/AUTO DIFF WBC: CPT

## 2025-05-28 PROCEDURE — 36415 COLL VENOUS BLD VENIPUNCTURE: CPT

## 2025-05-28 PROCEDURE — 82107 ALPHA-FETOPROTEIN L3: CPT

## 2025-05-28 NOTE — PROGRESS NOTES
Subjective:       Patient ID: Alexander Duong Jr. is a 85 y.o. male.    Chief Complaint: No chief complaint on file.    HPI  I saw this 85 y.o.man who came to the liver clinic with his niece.    Admitted to [facility] with abdominal pain and vomiting in early May 2025.    Significant weight loss- 50 lb    MRI abdo: 5/8/2025  Liver is normal in size.  Background signal appears within normal limits.  There is redemonstration of a 7.5 x 6.6 cm mass in the right lobe with internal enhancement, pseudo capsule, and suspected washout.  Mass again extends beyond the liver capsule with adjacent subcapsular hematoma measuring the order of 10.1 x 5.8 cm, similar to same day CT.  Additional heterogeneous blood products extend inferiorly from this region and along retroperitoneal plain, also similar to same day CT. Additional punctate cyst in the right hepatic lobe.  Chronic diminutive caliber of right portal vein, similar prior.  Gallbladder is unremarkable.  No abnormal biliary duct dilatation.  Pancreas, spleen, bilateral adrenal glands appear within normal limits.  Symmetric enhancement of bilateral kidneys.  Bilateral renal cysts.  No concerning mass.  No hydronephrosis.  Colonic diverticulosis.  No evidence of bowel obstruction.  No convincing inflammation.  Stable prominent periportal lymph node measuring 1.2 cm in short axis.  No aneurysm.  No focal concerning osseous lesion.  Impression:  Right hepatic lobe mass with associated rupture of liver capsule, subscapular hematoma, and right upper quadrant hemorrhage extending inferiorly along retroperitoneal planes, similar to same day CT.  Tumor signal/enhancement characteristics most suggestive of hepatocellular carcinoma.  Recommend clinical correlation.    He has a low Hb and normal platelets  Normal ferritin  Mildly elevated LFTs and bilirubin 1.8  Alb 2.4    AFP 11.7  CEA 1.8  Ca 19 9 was 11.7    EGD: 2/22/24  Normal    Colonoscopy: 10/19/2015  Severe  diverticulosis    PMH:  Hypertension  Hyperlipidemia  CKD  BPH    Appendectomy    SH:  Ex construction  Used to drink heavily until 4 years ago      Review of Systems   Constitutional:  Negative for activity change, appetite change, chills, fatigue, fever and unexpected weight change.   HENT:  Negative for hearing loss.    Eyes:  Negative for discharge and visual disturbance.   Respiratory:  Negative for cough, chest tightness, shortness of breath and wheezing.    Cardiovascular:  Negative for chest pain, palpitations and leg swelling.   Gastrointestinal:  Negative for abdominal distention, abdominal pain, constipation, diarrhea and nausea.   Genitourinary:  Negative for dysuria and frequency.   Musculoskeletal:  Negative for arthralgias and back pain.   Skin:  Negative for pallor and rash.   Neurological:  Negative for dizziness, tremors, speech difficulty and headaches.   Hematological:  Negative for adenopathy.   Psychiatric/Behavioral:  Negative for agitation and confusion.            Lab Results   Component Value Date    ALT 29 05/11/2025    AST 68 (H) 05/11/2025    ALKPHOS 64 05/11/2025    BILITOT 1.8 (H) 05/11/2025     Past Medical History:   Diagnosis Date    Allergy     Hypertension      Past Surgical History:   Procedure Laterality Date    APPENDECTOMY      COLONOSCOPY Left 10/19/2015    Procedure: COLONOSCOPY;  Surgeon: Randell Briceno MD;  Location: [facility];  Service: Endoscopy;  Laterality: Left;    ESOPHAGOGASTRODUODENOSCOPY N/A 2/22/2024    Procedure: EGD (ESOPHAGOGASTRODUODENOSCOPY);  Surgeon: Kulwant Blanchard MD;  Location: [facility];  Service: Endoscopy;  Laterality: N/A;     Current Outpatient Medications   Medication Sig    albuterol (VENTOLIN HFA) 90 mcg/actuation inhaler Inhale 2 puffs into the lungs every 6 (six) hours as needed for Wheezing or Shortness of Breath. Rescue    amLODIPine (NORVASC) 10 MG tablet Take 1 tablet (10 mg total) by mouth once daily.    atorvastatin (LIPITOR) 40 MG tablet  Take 1 tablet (40 mg total) by mouth once daily.    azelastine (ASTELIN) 137 mcg (0.1 %) nasal spray 1 spray (137 mcg total) by Nasal route 2 (two) times daily.    bisacodyL (DULCOLAX) 5 mg EC tablet Take 1 tablet (5 mg total) by mouth daily as needed for Constipation.    metoprolol tartrate (LOPRESSOR) 100 MG tablet Take 1 tablet (100 mg total) by mouth 2 (two) times daily.    polyethylene glycol (GLYCOLAX) 17 gram PwPk Take 17 g by mouth 2 (two) times daily as needed for Constipation.    tamsulosin (FLOMAX) 0.4 mg Cap TAKE 2 CAPSULES BY MOUTH ONCE DAILY.     No current facility-administered medications for this visit.       Objective:      Physical Exam  HENT:      Head: Normocephalic.   Eyes:      Pupils: Pupils are equal, round, and reactive to light.   Neck:      Thyroid: No thyromegaly.   Cardiovascular:      Rate and Rhythm: Normal rate and regular rhythm.      Heart sounds: Normal heart sounds.   Pulmonary:      Effort: Pulmonary effort is normal.      Breath sounds: Normal breath sounds. No wheezing.   Abdominal:      General: There is no distension.      Palpations: Abdomen is soft. There is no mass.      Tenderness: There is no abdominal tenderness.   Lymphadenopathy:      Cervical: No cervical adenopathy.   Skin:     General: Skin is warm.      Findings: No erythema or rash.   Neurological:      Mental Status: He is alert and oriented to person, place, and time.   Psychiatric:         Behavior: Behavior normal.         Assessment:       1. HCC (hepatocellular carcinoma)        Plan:   In summary this 85 year old man was admitted with abdo pain in early May 2025 and was found to have a large liver mass.  He does not appear to have clear evidence of cirrhosis and his tumor markers including AFP, CEA and CA 19-9 are negative.    His imaging was reviewed by Dr. Swain who felt that there was not sufficient evidence for confirmation that this mass is indeed an HCC.  He agreed to see him in IR for a targeted  liver biopsy of the mass as well as mapping for possible Y90.    I explained this plan to Mr Duong and his niece.    I spent a total of 40 minutes on the day of the visit.  This includes face to face time and non-face to face time preparing to see the patient (eg, review of tests), obtaining and/or reviewing separately obtained history, documenting clinical information in the electronic or other health record, independently interpreting results and communicating results to the patient/family/caregiver, or care coordinator.

## 2025-05-28 NOTE — TELEPHONE ENCOUNTER
Patient discussed in IR conference.    Committee Discussion: No lesion seen on 2/2024 scan. Presented with a ruptured tumor coming off the posterior aspect of the RHL. No history of prior surgery, but the posterior lobe does look a little atrophic. Read as images features most consistent with HCC.      Plan: IR for Y90 with biopsy at time of mapping, if indicated.

## 2025-05-30 LAB
AFP L3 MFR SERPL: 28 %
AFP SERPL-MCNC: 2.7 NG/ML

## 2025-06-02 ENCOUNTER — CLINICAL SUPPORT (OUTPATIENT)
Dept: AUDIOLOGY | Facility: CLINIC | Age: 85
End: 2025-06-02
Payer: MEDICARE

## 2025-06-02 ENCOUNTER — OFFICE VISIT (OUTPATIENT)
Dept: OTOLARYNGOLOGY | Facility: CLINIC | Age: 85
End: 2025-06-02
Payer: MEDICARE

## 2025-06-02 ENCOUNTER — TELEPHONE (OUTPATIENT)
Dept: FAMILY MEDICINE | Facility: CLINIC | Age: 85
End: 2025-06-02
Payer: MEDICARE

## 2025-06-02 ENCOUNTER — TELEPHONE (OUTPATIENT)
Dept: OTOLARYNGOLOGY | Facility: CLINIC | Age: 85
End: 2025-06-02

## 2025-06-02 VITALS
SYSTOLIC BLOOD PRESSURE: 160 MMHG | WEIGHT: 201.63 LBS | RESPIRATION RATE: 18 BRPM | BODY MASS INDEX: 30.56 KG/M2 | HEART RATE: 58 BPM | HEIGHT: 68 IN | DIASTOLIC BLOOD PRESSURE: 71 MMHG

## 2025-06-02 DIAGNOSIS — H90.3 ASYMMETRICAL SENSORINEURAL HEARING LOSS: Primary | ICD-10-CM

## 2025-06-02 DIAGNOSIS — H91.90 HEARING LOSS, UNSPECIFIED HEARING LOSS TYPE, UNSPECIFIED LATERALITY: ICD-10-CM

## 2025-06-02 PROCEDURE — 92557 COMPREHENSIVE HEARING TEST: CPT | Mod: S$GLB,,,

## 2025-06-02 PROCEDURE — 92567 TYMPANOMETRY: CPT | Mod: S$GLB,,,

## 2025-06-02 PROCEDURE — 99203 OFFICE O/P NEW LOW 30 MIN: CPT | Mod: S$GLB,,, | Performed by: NURSE PRACTITIONER

## 2025-06-02 PROCEDURE — 1111F DSCHRG MED/CURRENT MED MERGE: CPT | Mod: CPTII,S$GLB,, | Performed by: NURSE PRACTITIONER

## 2025-06-02 PROCEDURE — 3288F FALL RISK ASSESSMENT DOCD: CPT | Mod: CPTII,S$GLB,, | Performed by: NURSE PRACTITIONER

## 2025-06-02 PROCEDURE — 1101F PT FALLS ASSESS-DOCD LE1/YR: CPT | Mod: CPTII,S$GLB,, | Performed by: NURSE PRACTITIONER

## 2025-06-02 PROCEDURE — 1126F AMNT PAIN NOTED NONE PRSNT: CPT | Mod: CPTII,S$GLB,, | Performed by: NURSE PRACTITIONER

## 2025-06-04 ENCOUNTER — OFFICE VISIT (OUTPATIENT)
Dept: INTERVENTIONAL RADIOLOGY/VASCULAR | Facility: CLINIC | Age: 85
End: 2025-06-04
Payer: MEDICARE

## 2025-06-04 DIAGNOSIS — R16.0 LIVER MASS: Primary | ICD-10-CM

## 2025-06-04 PROCEDURE — 1160F RVW MEDS BY RX/DR IN RCRD: CPT | Mod: CPTII,95,, | Performed by: PHYSICIAN ASSISTANT

## 2025-06-04 PROCEDURE — 98007 SYNCH AUDIO-VIDEO EST HI 40: CPT | Mod: 95,,, | Performed by: PHYSICIAN ASSISTANT

## 2025-06-04 PROCEDURE — 1159F MED LIST DOCD IN RCRD: CPT | Mod: CPTII,95,, | Performed by: PHYSICIAN ASSISTANT

## 2025-06-05 ENCOUNTER — OFFICE VISIT (OUTPATIENT)
Dept: FAMILY MEDICINE | Facility: CLINIC | Age: 85
End: 2025-06-05
Payer: MEDICARE

## 2025-06-05 ENCOUNTER — TELEPHONE (OUTPATIENT)
Dept: OTOLARYNGOLOGY | Facility: CLINIC | Age: 85
End: 2025-06-05
Payer: MEDICARE

## 2025-06-05 VITALS
TEMPERATURE: 98 F | HEIGHT: 68 IN | OXYGEN SATURATION: 98 % | BODY MASS INDEX: 30.37 KG/M2 | SYSTOLIC BLOOD PRESSURE: 130 MMHG | WEIGHT: 200.38 LBS | RESPIRATION RATE: 18 BRPM | DIASTOLIC BLOOD PRESSURE: 68 MMHG | HEART RATE: 65 BPM

## 2025-06-05 DIAGNOSIS — I10 ESSENTIAL HYPERTENSION: ICD-10-CM

## 2025-06-05 DIAGNOSIS — R79.9 ABNORMAL FINDING OF BLOOD CHEMISTRY, UNSPECIFIED: ICD-10-CM

## 2025-06-05 DIAGNOSIS — R16.0 LIVER MASS: Primary | ICD-10-CM

## 2025-06-05 DIAGNOSIS — E78.2 MIXED HYPERLIPIDEMIA: ICD-10-CM

## 2025-06-05 DIAGNOSIS — H91.90 HEARING LOSS, UNSPECIFIED HEARING LOSS TYPE, UNSPECIFIED LATERALITY: ICD-10-CM

## 2025-06-05 DIAGNOSIS — Z71.89 ADVANCED CARE PLANNING/COUNSELING DISCUSSION: ICD-10-CM

## 2025-06-05 DIAGNOSIS — N40.1 BPH WITH URINARY OBSTRUCTION: ICD-10-CM

## 2025-06-05 DIAGNOSIS — R41.89 COGNITIVE IMPAIRMENT: ICD-10-CM

## 2025-06-05 DIAGNOSIS — N13.8 BPH WITH URINARY OBSTRUCTION: ICD-10-CM

## 2025-06-05 PROBLEM — Z09 HOSPITAL DISCHARGE FOLLOW-UP: Status: RESOLVED | Noted: 2025-05-22 | Resolved: 2025-06-05

## 2025-06-05 PROBLEM — R50.9 FEVER: Status: RESOLVED | Noted: 2025-05-09 | Resolved: 2025-06-05

## 2025-06-05 PROBLEM — Z12.5 ENCOUNTER FOR SCREENING FOR MALIGNANT NEOPLASM OF PROSTATE: Status: RESOLVED | Noted: 2024-08-26 | Resolved: 2025-06-05

## 2025-06-05 PROCEDURE — 99999 PR PBB SHADOW E&M-EST. PATIENT-LVL III: CPT | Mod: PBBFAC,,, | Performed by: INTERNAL MEDICINE

## 2025-06-16 ENCOUNTER — PATIENT MESSAGE (OUTPATIENT)
Dept: INTERVENTIONAL RADIOLOGY/VASCULAR | Facility: HOSPITAL | Age: 85
End: 2025-06-16
Payer: MEDICARE

## 2025-06-24 ENCOUNTER — HOSPITAL ENCOUNTER (OUTPATIENT)
Dept: RADIOLOGY | Facility: HOSPITAL | Age: 85
Discharge: HOME | End: 2025-06-24
Attending: PHYSICIAN ASSISTANT
Payer: MEDICARE

## 2025-06-24 ENCOUNTER — ANESTHESIA EVENT (OUTPATIENT)
Dept: INTERVENTIONAL RADIOLOGY/VASCULAR | Facility: HOSPITAL | Age: 85
End: 2025-06-24
Payer: MEDICARE

## 2025-06-24 ENCOUNTER — HOSPITAL ENCOUNTER (OUTPATIENT)
Dept: INTERVENTIONAL RADIOLOGY/VASCULAR | Facility: HOSPITAL | Age: 85
Discharge: HOME | End: 2025-06-24
Attending: PHYSICIAN ASSISTANT
Payer: MEDICARE

## 2025-06-24 VITALS
HEIGHT: 68 IN | OXYGEN SATURATION: 96 % | DIASTOLIC BLOOD PRESSURE: 72 MMHG | SYSTOLIC BLOOD PRESSURE: 159 MMHG | BODY MASS INDEX: 30.31 KG/M2 | TEMPERATURE: 98 F | WEIGHT: 200 LBS | RESPIRATION RATE: 16 BRPM | HEART RATE: 74 BPM

## 2025-06-24 DIAGNOSIS — R16.0 LIVER MASS: ICD-10-CM

## 2025-06-24 DIAGNOSIS — R16.0 LIVER MASS: Primary | ICD-10-CM

## 2025-06-24 LAB
ABSOLUTE EOSINOPHIL (OHS): 0.22 K/UL
ABSOLUTE MONOCYTE (OHS): 0.46 K/UL (ref 0.3–1)
ABSOLUTE NEUTROPHIL COUNT (OHS): 2 K/UL (ref 1.8–7.7)
ALBUMIN SERPL BCP-MCNC: 3.1 G/DL (ref 3.5–5.2)
ALP SERPL-CCNC: 55 UNIT/L (ref 40–150)
ALT SERPL W/O P-5'-P-CCNC: 14 UNIT/L (ref 10–44)
ANION GAP (OHS): 6 MMOL/L (ref 8–16)
AST SERPL-CCNC: 50 UNIT/L (ref 11–45)
BASOPHILS # BLD AUTO: 0.02 K/UL
BASOPHILS NFR BLD AUTO: 0.4 %
BILIRUB DIRECT SERPL-MCNC: 0.2 MG/DL (ref 0.1–0.3)
BILIRUB SERPL-MCNC: 0.5 MG/DL (ref 0.1–1)
BUN SERPL-MCNC: 19 MG/DL (ref 8–23)
CALCIUM SERPL-MCNC: 9 MG/DL (ref 8.7–10.5)
CHLORIDE SERPL-SCNC: 102 MMOL/L (ref 95–110)
CO2 SERPL-SCNC: 28 MMOL/L (ref 23–29)
CREAT SERPL-MCNC: 1.3 MG/DL (ref 0.5–1.4)
ERYTHROCYTE [DISTWIDTH] IN BLOOD BY AUTOMATED COUNT: 14.1 % (ref 11.5–14.5)
GFR SERPLBLD CREATININE-BSD FMLA CKD-EPI: 53 ML/MIN/1.73/M2
GLUCOSE SERPL-MCNC: 93 MG/DL (ref 70–110)
HCT VFR BLD AUTO: 31.1 % (ref 40–54)
HGB BLD-MCNC: 10 GM/DL (ref 14–18)
IMM GRANULOCYTES # BLD AUTO: 0 K/UL (ref 0–0.04)
IMM GRANULOCYTES NFR BLD AUTO: 0 % (ref 0–0.5)
LYMPHOCYTES # BLD AUTO: 1.85 K/UL (ref 1–4.8)
MCH RBC QN AUTO: 30.1 PG (ref 27–31)
MCHC RBC AUTO-ENTMCNC: 32.2 G/DL (ref 32–36)
MCV RBC AUTO: 94 FL (ref 82–98)
NUCLEATED RBC (/100WBC) (OHS): 0 /100 WBC
PLATELET # BLD AUTO: 192 K/UL (ref 150–450)
PMV BLD AUTO: 10.5 FL (ref 9.2–12.9)
POTASSIUM SERPL-SCNC: 4.1 MMOL/L (ref 3.5–5.1)
PROT SERPL-MCNC: 8.1 GM/DL (ref 6–8.4)
RBC # BLD AUTO: 3.32 M/UL (ref 4.6–6.2)
RELATIVE EOSINOPHIL (OHS): 4.8 %
RELATIVE LYMPHOCYTE (OHS): 40.7 % (ref 18–48)
RELATIVE MONOCYTE (OHS): 10.1 % (ref 4–15)
RELATIVE NEUTROPHIL (OHS): 44 % (ref 38–73)
SODIUM SERPL-SCNC: 136 MMOL/L (ref 136–145)
WBC # BLD AUTO: 4.55 K/UL (ref 3.9–12.7)

## 2025-06-24 PROCEDURE — C1760 CLOSURE DEV, VASC: HCPCS

## 2025-06-24 PROCEDURE — 88313 SPECIAL STAINS GROUP 2: CPT | Mod: TC | Performed by: INTERNAL MEDICINE

## 2025-06-24 PROCEDURE — C1769 GUIDE WIRE: HCPCS

## 2025-06-24 PROCEDURE — 63600175 PHARM REV CODE 636 W HCPCS: Performed by: NURSE ANESTHETIST, CERTIFIED REGISTERED

## 2025-06-24 PROCEDURE — 78830 RP LOCLZJ TUM SPECT W/CT 1: CPT | Mod: TC

## 2025-06-24 PROCEDURE — 78201 LIVER IMAGING STATIC ONLY: CPT | Mod: TC

## 2025-06-24 PROCEDURE — 25000003 PHARM REV CODE 250: Performed by: NURSE ANESTHETIST, CERTIFIED REGISTERED

## 2025-06-24 PROCEDURE — C1894 INTRO/SHEATH, NON-LASER: HCPCS

## 2025-06-24 PROCEDURE — 82248 BILIRUBIN DIRECT: CPT | Performed by: STUDENT IN AN ORGANIZED HEALTH CARE EDUCATION/TRAINING PROGRAM

## 2025-06-24 PROCEDURE — 25000003 PHARM REV CODE 250: Performed by: FAMILY MEDICINE

## 2025-06-24 PROCEDURE — 78201 LIVER IMAGING STATIC ONLY: CPT | Mod: 26,XE,, | Performed by: NUCLEAR MEDICINE

## 2025-06-24 PROCEDURE — 25500020 PHARM REV CODE 255: Performed by: ANESTHESIOLOGY

## 2025-06-24 PROCEDURE — 37000008 HC ANESTHESIA 1ST 15 MINUTES

## 2025-06-24 PROCEDURE — A9540 TC99M MAA: HCPCS | Performed by: PHYSICIAN ASSISTANT

## 2025-06-24 PROCEDURE — 78830 RP LOCLZJ TUM SPECT W/CT 1: CPT | Mod: 26,,, | Performed by: NUCLEAR MEDICINE

## 2025-06-24 PROCEDURE — C1887 CATHETER, GUIDING: HCPCS

## 2025-06-24 PROCEDURE — 85025 COMPLETE CBC W/AUTO DIFF WBC: CPT | Performed by: STUDENT IN AN ORGANIZED HEALTH CARE EDUCATION/TRAINING PROGRAM

## 2025-06-24 PROCEDURE — 84450 TRANSFERASE (AST) (SGOT): CPT | Performed by: STUDENT IN AN ORGANIZED HEALTH CARE EDUCATION/TRAINING PROGRAM

## 2025-06-24 PROCEDURE — 37000009 HC ANESTHESIA EA ADD 15 MINS

## 2025-06-24 RX ORDER — SODIUM CHLORIDE 9 MG/ML
INJECTION, SOLUTION INTRAVENOUS CONTINUOUS
Status: DISCONTINUED | OUTPATIENT
Start: 2025-06-24 | End: 2025-06-25 | Stop reason: HOSPADM

## 2025-06-24 RX ORDER — LIDOCAINE HYDROCHLORIDE 20 MG/ML
INJECTION, SOLUTION EPIDURAL; INFILTRATION; INTRACAUDAL; PERINEURAL
Status: DISCONTINUED | OUTPATIENT
Start: 2025-06-24 | End: 2025-06-24

## 2025-06-24 RX ORDER — NAPROXEN SODIUM 220 MG/1
81 TABLET, FILM COATED ORAL DAILY
COMMUNITY

## 2025-06-24 RX ORDER — GLUCAGON 1 MG
1 KIT INJECTION
Status: DISCONTINUED | OUTPATIENT
Start: 2025-06-24 | End: 2025-06-25 | Stop reason: HOSPADM

## 2025-06-24 RX ORDER — ONDANSETRON HYDROCHLORIDE 2 MG/ML
INJECTION, SOLUTION INTRAVENOUS
Status: DISCONTINUED | OUTPATIENT
Start: 2025-06-24 | End: 2025-06-24

## 2025-06-24 RX ORDER — PHENYLEPHRINE HYDROCHLORIDE 10 MG/ML
INJECTION INTRAVENOUS
Status: DISCONTINUED | OUTPATIENT
Start: 2025-06-24 | End: 2025-06-24

## 2025-06-24 RX ORDER — PROPOFOL 10 MG/ML
VIAL (ML) INTRAVENOUS
Status: DISCONTINUED | OUTPATIENT
Start: 2025-06-24 | End: 2025-06-24

## 2025-06-24 RX ORDER — FENTANYL CITRATE 50 UG/ML
INJECTION, SOLUTION INTRAMUSCULAR; INTRAVENOUS
Status: DISCONTINUED | OUTPATIENT
Start: 2025-06-24 | End: 2025-06-24

## 2025-06-24 RX ORDER — ROCURONIUM BROMIDE 10 MG/ML
INJECTION, SOLUTION INTRAVENOUS
Status: DISCONTINUED | OUTPATIENT
Start: 2025-06-24 | End: 2025-06-24

## 2025-06-24 RX ORDER — SODIUM CHLORIDE 0.9 % (FLUSH) 0.9 %
10 SYRINGE (ML) INJECTION
Status: DISCONTINUED | OUTPATIENT
Start: 2025-06-24 | End: 2025-06-25 | Stop reason: HOSPADM

## 2025-06-24 RX ORDER — ETOMIDATE 2 MG/ML
INJECTION INTRAVENOUS
Status: DISCONTINUED | OUTPATIENT
Start: 2025-06-24 | End: 2025-06-24

## 2025-06-24 RX ORDER — HALOPERIDOL LACTATE 5 MG/ML
0.5 INJECTION, SOLUTION INTRAMUSCULAR EVERY 10 MIN PRN
Status: DISCONTINUED | OUTPATIENT
Start: 2025-06-24 | End: 2025-06-25 | Stop reason: HOSPADM

## 2025-06-24 RX ORDER — HYDROMORPHONE HYDROCHLORIDE 1 MG/ML
0.2 INJECTION, SOLUTION INTRAMUSCULAR; INTRAVENOUS; SUBCUTANEOUS EVERY 5 MIN PRN
Status: DISCONTINUED | OUTPATIENT
Start: 2025-06-24 | End: 2025-06-25 | Stop reason: HOSPADM

## 2025-06-24 RX ORDER — EPHEDRINE SULFATE 50 MG/ML
INJECTION, SOLUTION INTRAVENOUS
Status: DISCONTINUED | OUTPATIENT
Start: 2025-06-24 | End: 2025-06-24

## 2025-06-24 RX ORDER — LIDOCAINE HYDROCHLORIDE 10 MG/ML
1 INJECTION, SOLUTION EPIDURAL; INFILTRATION; INTRACAUDAL; PERINEURAL ONCE
Status: DISCONTINUED | OUTPATIENT
Start: 2025-06-24 | End: 2025-06-25 | Stop reason: HOSPADM

## 2025-06-24 RX ADMIN — PROPOFOL 100 MG: 10 INJECTION, EMULSION INTRAVENOUS at 03:06

## 2025-06-24 RX ADMIN — ONDANSETRON 4 MG: 2 INJECTION INTRAMUSCULAR; INTRAVENOUS at 05:06

## 2025-06-24 RX ADMIN — EPHEDRINE SULFATE 10 MG: 50 INJECTION INTRAVENOUS at 03:06

## 2025-06-24 RX ADMIN — ETOMIDATE 7 MG: 2 INJECTION INTRAVENOUS at 03:06

## 2025-06-24 RX ADMIN — ROCURONIUM BROMIDE 50 MG: 10 INJECTION INTRAVENOUS at 03:06

## 2025-06-24 RX ADMIN — FENTANYL CITRATE 100 MCG: 50 INJECTION, SOLUTION INTRAMUSCULAR; INTRAVENOUS at 03:06

## 2025-06-24 RX ADMIN — KIT FOR THE PREPARATION OF TECHNETIUM TC 99M ALBUMIN AGGREGATED 4.11 MILLICURIE: 2 INJECTION, POWDER, LYOPHILIZED, FOR SUSPENSION INTRAPERITONEAL; INTRAVENOUS at 02:06

## 2025-06-24 RX ADMIN — IOHEXOL 70 ML: 300 INJECTION, SOLUTION INTRAVENOUS at 05:06

## 2025-06-24 RX ADMIN — SODIUM CHLORIDE: 0.9 INJECTION, SOLUTION INTRAVENOUS at 03:06

## 2025-06-24 RX ADMIN — SUGAMMADEX 200 MG: 100 INJECTION, SOLUTION INTRAVENOUS at 05:06

## 2025-06-24 RX ADMIN — ROCURONIUM BROMIDE 20 MG: 10 INJECTION INTRAVENOUS at 04:06

## 2025-06-24 RX ADMIN — LIDOCAINE HYDROCHLORIDE 60 MG: 20 INJECTION, SOLUTION EPIDURAL; INFILTRATION; INTRACAUDAL; PERINEURAL at 03:06

## 2025-06-24 RX ADMIN — EPHEDRINE SULFATE 5 MG: 50 INJECTION INTRAVENOUS at 04:06

## 2025-06-24 RX ADMIN — EPHEDRINE SULFATE 15 MG: 50 INJECTION INTRAVENOUS at 03:06

## 2025-06-24 RX ADMIN — PHENYLEPHRINE HYDROCHLORIDE 200 MCG: 10 INJECTION INTRAVENOUS at 03:06

## 2025-06-24 RX ADMIN — EPHEDRINE SULFATE 10 MG: 50 INJECTION INTRAVENOUS at 04:06

## 2025-06-24 NOTE — PLAN OF CARE
Pt arrived to IR room 188  for y-90 mapping and liver biopsy. Pt oriented to unit and staff. Plan of care reviewed with patient, patient verbalizes understanding. Comfort measures utilized. Pt safely transferred from stretcher to procedural table. Fall risk reviewed with patient, fall risk interventions maintained. Positioner pillows utilized to minimize pressure points. Blankets applied. Pt prepped and draped utilizing standard sterile technique. Timeouts completed utilizing standard universal time-out, per department and facility policy.  Anesthesia at bedside; Refer to anesthesia record regarding sedation and vital signs.

## 2025-06-24 NOTE — NURSING
PT transferred from [facility] to Nuclear Medicine for post procedure scan. Pt arrived to [employer] at 1852. Will continue to monitor patient for the remainder of his recovery until he is able to sit up at 1920.

## 2025-06-24 NOTE — ANESTHESIA PROCEDURE NOTES
Intubation    Date/Time: 6/24/2025 3:40 PM    Performed by: Lamar García CRNA  Authorized by: Arvind Fischer III, MD    Intubation:     Intubated:  Postinduction    Mask Ventilation:  Easy with oral airway    Attempts:  1    Attempted By:  CRNA    Method of Intubation:  Video laryngoscopy    Blade:  Mccullough 3    Laryngeal View Grade: Grade I - full view of cords      Difficult Airway Encountered?: No      Complications:  None    Airway Device:  Oral endotracheal tube    Airway Device Size:  7.5    Style/Cuff Inflation:  Cuffed    Inflation Amount (mL):  10    Tube secured:  23    Secured at:  The lips    Placement Verified By:  Capnometry    Complicating Factors:  None

## 2025-06-24 NOTE — ANESTHESIA PREPROCEDURE EVALUATION
06/24/2025  Alexander Duong Jr. is a 85 y.o., male.      Pre-op Assessment    I have reviewed the Patient Summary Reports.     I have reviewed the Nursing Notes.    I have reviewed the Medications.     Review of Systems  Anesthesia Hx:  No problems with previous Anesthesia             Denies Family Hx of Anesthesia complications.     Cardiovascular:  Exercise tolerance: good   Hypertension                                          Pulmonary:  Pulmonary Normal                       Renal/:  Chronic Renal Disease, CKD                Hepatic/GI:      Liver Disease,               Musculoskeletal:  Arthritis               Neurological:    Neuromuscular Disease,                                     Past Medical History:   Diagnosis Date    Allergy     Hypertension      Past Surgical History:   Procedure Laterality Date    APPENDECTOMY      COLONOSCOPY Left 10/19/2015    Procedure: COLONOSCOPY;  Surgeon: Randell Briceno MD;  Location: [facility];  Service: Endoscopy;  Laterality: Left;    ESOPHAGOGASTRODUODENOSCOPY N/A 2/22/2024    Procedure: EGD (ESOPHAGOGASTRODUODENOSCOPY);  Surgeon: Kulwant Blanchard MD;  Location: [facility];  Service: Endoscopy;  Laterality: N/A;     Problem List[1]  Outpatient Medications as of 6/24/2025   Medication Sig Dispense Refill    amLODIPine (NORVASC) 10 MG tablet Take 1 tablet (10 mg total) by mouth once daily. 90 tablet 3    atorvastatin (LIPITOR) 40 MG tablet Take 1 tablet (40 mg total) by mouth once daily. 90 tablet 3    metoprolol tartrate (LOPRESSOR) 100 MG tablet Take 1 tablet (100 mg total) by mouth 2 (two) times daily. 180 tablet 3    tamsulosin (FLOMAX) 0.4 mg Cap TAKE 2 CAPSULES BY MOUTH ONCE DAILY. 180 capsule 1    albuterol (VENTOLIN HFA) 90 mcg/actuation inhaler Inhale 2 puffs into the lungs every 6 (six) hours as needed for Wheezing or Shortness of Breath. Rescue 18 g 1     azelastine (ASTELIN) 137 mcg (0.1 %) nasal spray 1 spray (137 mcg total) by Nasal route 2 (two) times daily. 30 mL 5    bisacodyL (DULCOLAX) 5 mg EC tablet Take 1 tablet (5 mg total) by mouth daily as needed for Constipation. 30 tablet 0     Facility-Administered Medications as of 6/24/2025   Medication Dose Route Frequency Provider Last Rate Last Admin    0.9% NaCl infusion   Intravenous Continuous Nancy Newman NP        LIDOcaine (PF) 10 mg/ml (1%) injection 10 mg  1 mL Other Once Nancy Newman NP        Review of patient's allergies indicates:  No Known Allergies     Physical Exam  General: Well nourished, Cooperative, Alert and Oriented    Airway:  Mallampati: II   Mouth Opening: Normal  TM Distance: Normal  Tongue: Normal  Neck ROM: Normal ROM    Chest/Lungs:  Normal Respiratory Rate    Heart:  Rate: Normal      Wt Readings from Last 3 Encounters:   06/24/25 90.7 kg (200 lb)   06/05/25 90.9 kg (200 lb 6.4 oz)   06/02/25 91.4 kg (201 lb 9.8 oz)     Temp Readings from Last 3 Encounters:   06/24/25 36.5 °C (97.7 °F) (Temporal)   06/05/25 36.6 °C (97.9 °F) (Oral)   05/28/25 36.6 °C (97.8 °F) (Oral)     BP Readings from Last 3 Encounters:   06/24/25 (!) 165/77   06/05/25 130/68   06/02/25 (!) 160/71     Pulse Readings from Last 3 Encounters:   06/24/25 63   06/05/25 65   06/02/25 (!) 58     Lab Results   Component Value Date    WBC 4.55 06/24/2025    HGB 10.0 (L) 06/24/2025    HCT 31.1 (L) 06/24/2025    MCV 94 06/24/2025     06/24/2025         Chemistry        Component Value Date/Time     05/28/2025 1506     02/19/2024 1659    K 4.3 05/28/2025 1506    K 4.3 02/19/2024 1659     05/28/2025 1506     02/19/2024 1659    CO2 29 05/28/2025 1506    CO2 26 02/19/2024 1659    BUN 14 05/28/2025 1506    CREATININE 1.5 (H) 05/28/2025 1506    GLU 98 05/28/2025 1506    GLU 99 02/19/2024 1659        Component Value Date/Time    CALCIUM 8.5 (L) 05/28/2025 1506    CALCIUM 8.5 (L)  02/19/2024 1659    ALKPHOS 67 05/28/2025 1506    ALKPHOS 60 02/19/2024 1659    AST 41 05/28/2025 1506    AST 28 02/19/2024 1659    ALT 12 05/28/2025 1506    ALT 16 02/19/2024 1659    BILITOT 0.5 05/28/2025 1506    BILITOT 0.6 02/19/2024 1659    ESTGFRAFRICA 45.4 (A) 10/29/2021 0827    EGFRNONAA 39.3 (A) 10/29/2021 0827        Results for orders placed or performed during the hospital encounter of 05/08/25   Repeat EKG 12-lead    Collection Time: 05/08/25 11:01 AM   Result Value Ref Range    QRS Duration 82 ms    OHS QTC Calculation 479 ms    Narrative    Test Reason : R79.89,    Vent. Rate :  92 BPM     Atrial Rate :  93 BPM     P-R Int : 168 ms          QRS Dur :  82 ms      QT Int : 388 ms       P-R-T Axes :  78  46  76 degrees    QTcB Int : 479 ms    Normal sinus rhythm  Nonspecific T wave abnormality  Prolonged QT  Abnormal ECG  When compared with ECG of 08-May-2025 09:27,  No significant change was found  Confirmed by Andre Kebede ([id number]) on 5/8/2025 7:51:11 PM    Referred By: AAAREFERRAL SELF           Confirmed By: Andre Kebede      9/7/23    Interpretation Summary  Show Result Comparison     Normal myocardial perfusion scan. There is no evidence of myocardial ischemia or infarction.    There is a  mild intensity fixed perfusion abnormality in the inferior wall of the left ventricle secondary to diaphragm attenuation.    The gated perfusion images showed an ejection fraction of 57% post stress.    The ECG portion of the study is negative for ischemia.    The patient reported no chest pain during the stress test.    There were no arrhythmias during stress.    Anesthesia Plan  Type of Anesthesia, risks & benefits discussed:    Anesthesia Type: Gen ETT  Intra-op Monitoring Plan: Standard ASA Monitors  Post Op Pain Control Plan: multimodal analgesia and IV/PO Opioids PRN  Induction:  IV  Informed Consent: Informed consent signed with the Patient and all parties understand the risks and agree  with anesthesia plan.  All questions answered.   ASA Score: 3  Day of Surgery Review of History & Physical: H&P Update referred to the surgeon/provider.    Ready For Surgery From Anesthesia Perspective.     .           [1]   Patient Active Problem List  Diagnosis    Primary osteoarthritis involving multiple joints    Acute blood loss anemia    Essential hypertension    Stage 3b chronic kidney disease    Mixed hyperlipidemia    Pulmonary hypertension    Mixed simple and mucopurulent chronic bronchitis    Morbid (severe) obesity due to excess calories    Chronic bilateral low back pain with left-sided sciatica    Other constipation    RAFAEL (acute kidney injury)    Advanced care planning/counseling discussion    Insomnia    BPH with urinary obstruction    Hearing loss    Snores    Cough    Liver mass    Liver hematoma    Cognitive impairment

## 2025-06-24 NOTE — PROCEDURES
IR Post-Procedure Note    Pre Op Diagnosis: Liver mass, likely hepatocellular carcinoma    Post Op Diagnosis: Same    Procedure: Yttrium-90 mapping, liver mass biopsy    Procedure performed by: Nikole Jeffries MD    Written Informed Consent Obtained:  Yes    Specimen Removed: liver mass, 18ga x 10    Estimated Blood Loss:  Minimal     Findings:  US guided 18 gauge x10 specimens of liver mass. Pathology confirmed adequacy.     RCFA access, Celiac and superselective hepatic angiography    Radioembolization preparatory angiography with administration of [id number].     Patient tolerated procedure well.      Plan: Patient to be sent to nuclear medicine for radioisotope imaging and calculation of lung shunt fraction        Nikole Jeffries MD  Interventional Radiology

## 2025-06-24 NOTE — PLAN OF CARE
Pt umaña to radiology via stretcher. Stable for trans, NAD, VSS  Pt drank PO fluids and danny. Pt urinated. No c/o pain  Pt states that he feels good  Pt's niece called , she will  pt at front entrance when all finished.

## 2025-06-24 NOTE — H&P
Radiology History & Physical      SUBJECTIVE:     Chief Complaint: Right hepatic lobe lesion    History of Present Illness:  Alexander Duong Jr. is a 85 y.o. male w/ a hx of CKD, who was fund to have a large R hepatic lobe lesion concerning for HCC. Patient was referred to IR for lesion biopsy as well as pre-y90 prior to treatment.   Past Medical History:   Diagnosis Date    Allergy     Hypertension      Past Surgical History:   Procedure Laterality Date    APPENDECTOMY      COLONOSCOPY Left 10/19/2015    Procedure: COLONOSCOPY;  Surgeon: Randell Briceno MD;  Location: [facility] ENDO;  Service: Endoscopy;  Laterality: Left;    ESOPHAGOGASTRODUODENOSCOPY N/A 2/22/2024    Procedure: EGD (ESOPHAGOGASTRODUODENOSCOPY);  Surgeon: Kulwant Blanchard MD;  Location: [facility] ENDO;  Service: Endoscopy;  Laterality: N/A;       Home Meds:   Prior to Admission medications    Medication Sig Start Date End Date Taking? Authorizing Provider   amLODIPine (NORVASC) 10 MG tablet Take 1 tablet (10 mg total) by mouth once daily. 5/23/25 5/23/26 Yes Charlette House MD   atorvastatin (LIPITOR) 40 MG tablet Take 1 tablet (40 mg total) by mouth once daily. 5/22/25 5/22/26 Yes Teresa Ramon PA-C   metoprolol tartrate (LOPRESSOR) 100 MG tablet Take 1 tablet (100 mg total) by mouth 2 (two) times daily. 5/22/25 5/22/26 Yes Teresa Ramon PA-C   tamsulosin (FLOMAX) 0.4 mg Cap TAKE 2 CAPSULES BY MOUTH ONCE DAILY. 12/16/24  Yes Salinas Glasgow MD   albuterol (VENTOLIN HFA) 90 mcg/actuation inhaler Inhale 2 puffs into the lungs every 6 (six) hours as needed for Wheezing or Shortness of Breath. Rescue 5/22/25   Teresa Ramon PA-C   aspirin 81 MG Chew Take 81 mg by mouth once daily.    Provider, Historical   azelastine (ASTELIN) 137 mcg (0.1 %) nasal spray 1 spray (137 mcg total) by Nasal route 2 (two) times daily. 6/10/24 6/10/25  Alesia Moreno MD   bisacodyL (DULCOLAX) 5 mg EC tablet Take 1 tablet (5 mg total) by  mouth daily as needed for Constipation. 3/4/24   Alesia Moreno MD     Anticoagulants/Antiplatelets: no anticoagulation    Allergies: Review of patient's allergies indicates:  No Known Allergies  Sedation History:  no adverse reactions    Review of Systems:   Hematological: no known coagulopathies  Respiratory: no shortness of breath  Cardiovascular: no chest pain  Gastrointestinal: no abdominal pain  Genito-Urinary: no dysuria  Musculoskeletal: negative  Neurological: no TIA or stroke symptoms         OBJECTIVE:     Vital Signs (Most Recent)  Temp: 97.7 °F (36.5 °C) (06/24/25 1136)  Pulse: 63 (06/24/25 1136)  Resp: 16 (06/24/25 1136)  BP: (!) 165/77 (06/24/25 1136)  SpO2: 97 % (06/24/25 1136)    Physical Exam:  ASA: per anesthesia  Mallampati: per anesthesia   Access: per anesthesia    General: no acute distress  Mental Status: alert and oriented to person, place and time  HEENT: normocephalic, atraumatic  Chest: unlabored breathing  Heart: regular heart rate  Abdomen: nondistended  Extremity: moves all extremities    ASSESSMENT/PLAN:     Sedation Plan: per anesthesia  Patient will undergo: Pre-Y90, and liver mass biopsy    Chaim Wiseman  Diagnostic Radiology PGY-2

## 2025-06-24 NOTE — DISCHARGE INSTRUCTIONS
BIOPSY DISCHARGE EDUCATION       Information:   A biopsy is a procedure in which a biopsy needle is used to remove small amounts of tissue to evaluate for organ dysfunction or the presence of cancerous versus benign (non-cancerous) tissue.      What should I expect after the biopsy?      There may be some soreness around the biopsy site.    You may return to work after 24 hours unless your primary doctor instructs you otherwise.    You do not have any diet restrictions because of this procedure but should continue any that were given to you by any other doctors.    Continue all previously prescribed medications with the exception of Coumadin/warfarin which should be resumed after 24 hours. Pradaxa, Xarelto, Eliquis or Savaysa can be resumed after 48 hours.     Bathing & Wound Care:    You may shower after 24 hours; do not tub bath or submerge in water for 3 days (bath tub, hot tub, swimming pool, river or any other body of water).    Dressing to stay on 2-3 days (clean and dry)    If the biopsy site(s) become red, tender, swollen, or starts to drain, contact us.    Avoid heavy lifting and strenuous activity for 3 days.     Follow-up visit information:   Your ordering physician will typically get your biopsy results in three to five business days. If you do not hear from the ordering physician in 7 business days, please call his/her office to set up an appointment to review the results. Follow up with Interventional Radiology is not usually necessary.       Occasionally, a situation will require prompt attention and an emergency room visit is necessary:    Sudden chest pain, shortness of breath, or fainting    Increasing redness, swelling or drainage from the biopsy site    Increasing pain not relieved by medication    Bleeding or drainage from the needle site that is saturating the dressing    You have shaking, chills and/or a temperature over 100.3°F    New, sudden difficulty breathing    Drop in blood pressure,  and/or light-headed feeling        Y 90 Mapping Discharge Instructions    Monitor the groin site for bleeding. Apply firm pressure to the groin site if you need to cough, during sneezes, and in the event you have to vomit. This reduces the risk of your site bleeding. If bleeding does occurs, apply firm, manual pressure and seek medical assistance immediately!  Do not shower/bathe tonight. Shower tomorrow, at least 24 hours post-procedure. After your shower, you may remove the groin dressing.   Carefully cleanse the groin site with gentle soap and water; do not pick at any scab formation.  Monitor the groin site for signs and symptoms of infection (See below).    Recovering at Home:    Follow your doctor's recommendations on when it is safe to drive - at least THREE days after your procedure.  Do not lift anything heavier than a gallon of milk for the next TEN days.  Avoid strenuous activity/exercise for the next TEN days - this includes climbing stairs.   Do not submerge in a bathtub, pool, or Jacuzzi until the groin site is fully closed - at least 14 days.  Rest often. You may be more tired than usual.  Take your medications exactly as directed. Do not skip doses. Note - some medications should not be resumed after this procedure to prevent any adverse interaction with the contrast dye, which may be harmful to your kidneys. Be sure to ask your healthcare team about any potential reactions and for guidance on what medications to hold.  Unless directed otherwise, drink six to eight glasses of water a day for the next TWO days to prevent dehydration and to help flush your body of the contrast dye used during your procedure.  Take your temperature and check the groin site for signs of infection - redness, swelling, drainage, or warmth - every day for one week.    When to call your doctor:    Fever of 100.4°F (38°C) or higher, or as directed by your health care provider.  Sudden shortness breath or any bleeding,  bruising, or a large area of swelling at the groin site.  Signs of an allergic reaction to the contrast dye: rash, nausea, vomiting, or trouble breathing.  Signs of infection at the groin site: increased pain, redness, swelling, warmth, or foul-smelling drainage.   Constant or increasing pain or numbness in your leg.  Your leg feels cold, looks blue; numbness and/or tingling in the leg, especially near the catheter insertion site.   Rapid, pounding, or irregular heartbeat.  Blood in your urine or black, tarry stools.    Interventional Radiology Clinic   For complications   [phone]. Monday - Friday, 8:00 am - 4:00 pm    [phone] After hours and on holidays. Ask to speak with the interventional radiologist on call.     For Scheduling   [phone] Monday - Friday, 8:00 am - 4:00 pm

## 2025-06-24 NOTE — PLAN OF CARE
Procedure completed. Patient tolerated well; monitoring maintained per CRNA. Hemostasis achieved to right groin via 5 Fr Vascade at 1720; patient to remain flat until 1920. Right groin site CDI without hematoma. Patient to be transported to [facility] accompanied by this RN and CRNA; report to be given at the bedside.

## 2025-06-24 NOTE — ANESTHESIA POSTPROCEDURE EVALUATION
Anesthesia Post Evaluation    Patient: Alexander Duong Jr.    Procedure(s) Performed: * No procedures listed *    Final Anesthesia Type: general      Patient location during evaluation: PACU  Patient participation: Yes- Able to Participate  Level of consciousness: awake and alert  Post-procedure vital signs: reviewed and stable  Pain management: adequate  Airway patency: patent    PONV status at discharge: No PONV  Anesthetic complications: no      Cardiovascular status: blood pressure returned to baseline and hemodynamically stable  Respiratory status: unassisted  Hydration status: euvolemic  Follow-up not needed.              Vitals Value Taken Time   /71 06/24/25 18:32   Temp 36.7 °C (98 °F) 06/24/25 18:45   Pulse 66 06/24/25 18:45   Resp 15 06/24/25 18:45   SpO2 98 % 06/24/25 18:45   Vitals shown include unfiled device data.      No case tracking events are documented in the log.      Pain/Howard Score: Howard Score: 9 (6/24/2025  6:15 PM)

## 2025-06-24 NOTE — TRANSFER OF CARE
"Anesthesia Transfer of Care Note    Patient: Alexander Duong Jr.    Procedure(s) Performed: * No procedures listed *    Patient location: [facility]    Anesthesia Type: general    Transport from OR: Transported from OR on 2-3 L/min O2 by NC with adequate spontaneous ventilation    Post pain: adequate analgesia    Post assessment: no apparent anesthetic complications    Post vital signs: stable    Level of consciousness: awake and alert    Nausea/Vomiting: no nausea/vomiting    Complications: none    Transfer of care protocol was followed      Last vitals: Visit Vitals  BP (!) 168/77 (BP Location: Left arm, Patient Position: Lying)   Pulse 70   Temp 36.7 °C (98 °F) (Temporal)   Resp 20   Ht 5' 8" (1.727 m)   Wt 90.7 kg (200 lb)   SpO2 100%   BMI 30.41 kg/m²     "

## 2025-06-25 NOTE — PLAN OF CARE
Recovery completed. Patient tolerated well; VSS. Right groin site remains CDI without hematoma. Pedal pulses intact. PIV removed. Discharge instructions printed and reviewed with patient; he verbalized understanding and all questions were answered. Patient escorted to front entrance via wheelchair for discharge home with niece.

## 2025-06-26 LAB
DHEA SERPL-MCNC: ABNORMAL
ESTROGEN SERPL-MCNC: ABNORMAL PG/ML
INSULIN SERPL-ACNC: ABNORMAL U[IU]/ML
LAB AP CLINICAL INFORMATION: ABNORMAL
LAB AP DIAGNOSIS CATEGORY: ABNORMAL
LAB AP GROSS DESCRIPTION: ABNORMAL
LAB AP INTRA OP: ABNORMAL
LAB AP PERFORMING LOCATION(S): ABNORMAL
LAB AP REPORT FOOTNOTES: ABNORMAL
T3RU NFR SERPL: ABNORMAL %

## 2025-07-14 ENCOUNTER — TELEPHONE (OUTPATIENT)
Dept: TRANSPLANT | Facility: CLINIC | Age: 85
End: 2025-07-14
Payer: MEDICARE

## 2025-07-14 ENCOUNTER — RESEARCH ENCOUNTER (OUTPATIENT)
Dept: RESEARCH | Facility: HOSPITAL | Age: 85
End: 2025-07-14
Payer: MEDICARE

## 2025-07-14 ENCOUNTER — TELEPHONE (OUTPATIENT)
Dept: HEPATOLOGY | Facility: CLINIC | Age: 85
End: 2025-07-14
Payer: MEDICARE

## 2025-07-14 NOTE — TELEPHONE ENCOUNTER
Spoke to the patient and told him that he needs treatment for his liver.  He is quite deaf and asked me to communicate with his niece Adam.

## 2025-07-14 NOTE — PROGRESS NOTES
RESEARCH STUDY TELEPHONE ENCOUNTER  ORGAN TRANSPLANT  [facility]    Study Title: Role of Tumor-Induced Immune Tolerance in the Patient Response to Locoregional Therapy: Implications in Assessment Risk of Hepatocellular Carcinoma Recurrence Following Liver Transplantation    IRB #: 2016.131.B    IRB Approval Date: 6/8/2016    : Adam Willoughby MD  Sub-investigator: Ayden Wilks, PhD    Patient Number: TBD    Telephone contact was initiated on (date: 7/14/2025) by Vidal Romano    Present for discussion: Patient and his niece, Chani Duong, separately    Matters Discussed: Approached for consent. I talked to his niece Chani Duong and she stated she won't be home until Thursday. I then called patient to consent and he stated he would like for us to talk to his niece about it. We informed him that we will called back once his niece is back in town.       Vidal Romano  Admin Research- Liver Transplant

## 2025-07-14 NOTE — TELEPHONE ENCOUNTER
Pt placed on recalls when schedule for November opens up to schedule f/u appointment. As for the moment there's no available appointments.

## 2025-07-17 ENCOUNTER — PATIENT MESSAGE (OUTPATIENT)
Dept: INTERVENTIONAL RADIOLOGY/VASCULAR | Facility: HOSPITAL | Age: 85
End: 2025-07-17
Payer: MEDICARE

## 2025-07-19 PROCEDURE — G0179 MD RECERTIFICATION HHA PT: HCPCS | Mod: ,,, | Performed by: INTERNAL MEDICINE

## 2025-07-21 ENCOUNTER — RESEARCH ENCOUNTER (OUTPATIENT)
Dept: RESEARCH | Facility: HOSPITAL | Age: 85
End: 2025-07-21
Payer: MEDICARE

## 2025-07-21 NOTE — PROGRESS NOTES
RESEARCH STUDY CONSENT ENCOUNTER  ORGAN TRANSPLANT  [facility]    Study Title: Role of Tumor-Induced Immune Tolerance in the Patient Response to Locoregional Therapy: Implications in Assessment Risk of Hepatocellular Carcinoma Recurrence Following Liver Transplantation    IRB #: [id number].B    IRB Approval Date: 6/8/2016    : Adam Willoughby MD  Sub-investigator: Ayden Wilks, PhD    Patient Number: TBD    Patient was scheduled for a Y90 on (date: 07/24/2025).     This study is an observational study to evaluate patients receiving transarterial chemoembolization (TACE) or transarterial radioembolization (TARE) therapy to define the link between tumor-elicited peripheral cell populations, and the risk of hepatocellular carcinoma (HCC) recurrence before orthotopic liver transplantation (OLT). [IRB 2016.131.B]      Present for discussion: Patient's Niece only, Chani Duong  Is LAR Consenting for Subject: YES/NO: No    Prior to the Informed Consent (IC) being signed, or any protocol required testing, procedure, or intervention being performed, the following was done or discussed with Alexander Duong Jr.:    Purpose of the Study, Qualifications to Participate: YES/NO: Yes  Study Design, Schedule and Procedures: YES/NO: Yes  Risks, Benefits, Alternative Treatments, Compensation and Costs: YES/NO: Yes  Confidentiality and HIPAA Authorization for Release of Medical Records for the research trial/subject's right/study related injury: YES/NO: Yes  Study related contact information: YES/NO: Yes  Voluntary Participation and Withdrawal from the research trial at any time: YES/NO: Yes  Patient has been offered the opportunity to ask questions regarding the study and all questions were answered satisfactorily: YES/NO: Yes  Patient verbalizes understanding of the study/procedures and agrees to participate: YES/NO: Yes  CRC and PI contact information given to patient:YES/NO: Yes  Verification the ICF was  appropriately completed: YES/NO: Yes  Signed copy given to patient: YES/NO: Yes  Copy in patient's chart and original uploaded to Epic: YES/NO: Yes    Research staff present during consent: Wally Crump (Consenter) and Vidal Romano (Witness)    No study procedures (I.e. specimen collection) were performed before the informed consent was signed.     Specimens were collected in [facility] SSCU at the time of peripheral IV line placement: YES/NO: No  Specimens were collected in [facility] RAD IR: YES/NO: No    NOTE: Colleague approached patient for consent via phone call on 7/14/2025. Patient let colleague know that he would like her to speak to his niece first. Colleague spoke to his niece Chani Duong. Chani asked for a callback this week, so I called her back today. Chani gave consent for patient to participate in our research study, but she is not a legally authorized representative, so I tried to call patient to relay the message to him and get a definitive agreement. Patient did not answer phone.      Wally Crump  Admin Research- Liver Transplant

## 2025-07-22 ENCOUNTER — RESEARCH ENCOUNTER (OUTPATIENT)
Dept: RESEARCH | Facility: HOSPITAL | Age: 85
End: 2025-07-22
Payer: MEDICARE

## 2025-07-22 NOTE — PROGRESS NOTES
"RESEARCH STUDY CONSENT ENCOUNTER  ORGAN TRANSPLANT  [facility]    Study Title: Role of Tumor-Induced Immune Tolerance in the Patient Response to Locoregional Therapy: Implications in Assessment Risk of Hepatocellular Carcinoma Recurrence Following Liver Transplantation    IRB #: [id number].B    IRB Approval Date: 6/8/2016    : Adam Willoughby MD  Sub-investigator: Ayden Wilks, PhD    NOTE: Colleague approached patient for consent, but patient requested we speak to his niece as she has a better understanding. We spoke to his niece Chani Duong who gave verbal consent for patient to participate in our research study; however, she is not a legally authorized representative, so we will consider this consent a "decline" as the patient cannot consent with full awareness.      Wally Crump  Admin Research- Liver Transplant    "

## 2025-07-23 ENCOUNTER — ANESTHESIA EVENT (OUTPATIENT)
Dept: INTERVENTIONAL RADIOLOGY/VASCULAR | Facility: HOSPITAL | Age: 85
End: 2025-07-23
Payer: MEDICARE

## 2025-07-23 ENCOUNTER — LAB VISIT (OUTPATIENT)
Dept: LAB | Facility: HOSPITAL | Age: 85
End: 2025-07-23
Attending: PHYSICIAN ASSISTANT
Payer: MEDICARE

## 2025-07-23 DIAGNOSIS — R16.0 LIVER MASS: ICD-10-CM

## 2025-07-23 LAB
ABSOLUTE EOSINOPHIL (OHS): 0.23 K/UL
ABSOLUTE MONOCYTE (OHS): 0.52 K/UL (ref 0.3–1)
ABSOLUTE NEUTROPHIL COUNT (OHS): 2.25 K/UL (ref 1.8–7.7)
ALBUMIN SERPL BCP-MCNC: 3.4 G/DL (ref 3.5–5.2)
ALP SERPL-CCNC: 63 UNIT/L (ref 40–150)
ALT SERPL W/O P-5'-P-CCNC: 17 UNIT/L (ref 10–44)
ANION GAP (OHS): 9 MMOL/L (ref 8–16)
AST SERPL-CCNC: 66 UNIT/L (ref 11–45)
BASOPHILS # BLD AUTO: 0.02 K/UL
BASOPHILS NFR BLD AUTO: 0.4 %
BILIRUB DIRECT SERPL-MCNC: 0.3 MG/DL (ref 0.1–0.3)
BILIRUB SERPL-MCNC: 0.6 MG/DL (ref 0.1–1)
BUN SERPL-MCNC: 17 MG/DL (ref 8–23)
CALCIUM SERPL-MCNC: 9.1 MG/DL (ref 8.7–10.5)
CHLORIDE SERPL-SCNC: 98 MMOL/L (ref 95–110)
CO2 SERPL-SCNC: 28 MMOL/L (ref 23–29)
CREAT SERPL-MCNC: 1.3 MG/DL (ref 0.5–1.4)
ERYTHROCYTE [DISTWIDTH] IN BLOOD BY AUTOMATED COUNT: 13.7 % (ref 11.5–14.5)
GFR SERPLBLD CREATININE-BSD FMLA CKD-EPI: 53 ML/MIN/1.73/M2
GLUCOSE SERPL-MCNC: 92 MG/DL (ref 70–110)
HCT VFR BLD AUTO: 33.8 % (ref 40–54)
HGB BLD-MCNC: 10.6 GM/DL (ref 14–18)
IMM GRANULOCYTES # BLD AUTO: 0.01 K/UL (ref 0–0.04)
IMM GRANULOCYTES NFR BLD AUTO: 0.2 % (ref 0–0.5)
INR PPP: 1.1 (ref 0.8–1.2)
LYMPHOCYTES # BLD AUTO: 2.16 K/UL (ref 1–4.8)
MCH RBC QN AUTO: 29.7 PG (ref 27–31)
MCHC RBC AUTO-ENTMCNC: 31.4 G/DL (ref 32–36)
MCV RBC AUTO: 95 FL (ref 82–98)
NUCLEATED RBC (/100WBC) (OHS): 0 /100 WBC
PLATELET # BLD AUTO: 203 K/UL (ref 150–450)
PMV BLD AUTO: 9.9 FL (ref 9.2–12.9)
POTASSIUM SERPL-SCNC: 4.5 MMOL/L (ref 3.5–5.1)
PROT SERPL-MCNC: 9.2 GM/DL (ref 6–8.4)
PROTHROMBIN TIME: 11.8 SECONDS (ref 9–12.5)
RBC # BLD AUTO: 3.57 M/UL (ref 4.6–6.2)
RELATIVE EOSINOPHIL (OHS): 4.4 %
RELATIVE LYMPHOCYTE (OHS): 41.6 % (ref 18–48)
RELATIVE MONOCYTE (OHS): 10 % (ref 4–15)
RELATIVE NEUTROPHIL (OHS): 43.4 % (ref 38–73)
SODIUM SERPL-SCNC: 135 MMOL/L (ref 136–145)
WBC # BLD AUTO: 5.19 K/UL (ref 3.9–12.7)

## 2025-07-23 PROCEDURE — 82248 BILIRUBIN DIRECT: CPT

## 2025-07-23 PROCEDURE — 85610 PROTHROMBIN TIME: CPT

## 2025-07-23 PROCEDURE — 85025 COMPLETE CBC W/AUTO DIFF WBC: CPT

## 2025-07-23 PROCEDURE — 36415 COLL VENOUS BLD VENIPUNCTURE: CPT

## 2025-07-23 PROCEDURE — 80053 COMPREHEN METABOLIC PANEL: CPT

## 2025-07-24 ENCOUNTER — HOSPITAL ENCOUNTER (OUTPATIENT)
Dept: RADIOLOGY | Facility: HOSPITAL | Age: 85
Discharge: HOME | End: 2025-07-24
Attending: PHYSICIAN ASSISTANT
Payer: MEDICARE

## 2025-07-24 ENCOUNTER — HOSPITAL ENCOUNTER (OUTPATIENT)
Dept: INTERVENTIONAL RADIOLOGY/VASCULAR | Facility: HOSPITAL | Age: 85
Discharge: HOME | End: 2025-07-24
Attending: PHYSICIAN ASSISTANT | Admitting: STUDENT IN AN ORGANIZED HEALTH CARE EDUCATION/TRAINING PROGRAM
Payer: MEDICARE

## 2025-07-24 ENCOUNTER — ANESTHESIA (OUTPATIENT)
Dept: INTERVENTIONAL RADIOLOGY/VASCULAR | Facility: HOSPITAL | Age: 85
End: 2025-07-24
Payer: MEDICARE

## 2025-07-24 VITALS
BODY MASS INDEX: 30.31 KG/M2 | HEIGHT: 68 IN | TEMPERATURE: 97 F | SYSTOLIC BLOOD PRESSURE: 163 MMHG | HEART RATE: 62 BPM | WEIGHT: 200 LBS | OXYGEN SATURATION: 95 % | RESPIRATION RATE: 14 BRPM | DIASTOLIC BLOOD PRESSURE: 74 MMHG

## 2025-07-24 DIAGNOSIS — C22.0 HCC (HEPATOCELLULAR CARCINOMA): ICD-10-CM

## 2025-07-24 DIAGNOSIS — R16.0 LIVER MASS: ICD-10-CM

## 2025-07-24 PROCEDURE — C1894 INTRO/SHEATH, NON-LASER: HCPCS

## 2025-07-24 PROCEDURE — 75774 ARTERY X-RAY EACH VESSEL: CPT | Mod: 26,XU,, | Performed by: STUDENT IN AN ORGANIZED HEALTH CARE EDUCATION/TRAINING PROGRAM

## 2025-07-24 PROCEDURE — 37243 VASC EMBOLIZE/OCCLUDE ORGAN: CPT | Performed by: STUDENT IN AN ORGANIZED HEALTH CARE EDUCATION/TRAINING PROGRAM

## 2025-07-24 PROCEDURE — G0269 OCCLUSIVE DEVICE IN VEIN ART: HCPCS | Performed by: STUDENT IN AN ORGANIZED HEALTH CARE EDUCATION/TRAINING PROGRAM

## 2025-07-24 PROCEDURE — 36248 INS CATH ABD/L-EXT ART ADDL: CPT | Mod: RT | Performed by: STUDENT IN AN ORGANIZED HEALTH CARE EDUCATION/TRAINING PROGRAM

## 2025-07-24 PROCEDURE — 76937 US GUIDE VASCULAR ACCESS: CPT | Mod: TC | Performed by: STUDENT IN AN ORGANIZED HEALTH CARE EDUCATION/TRAINING PROGRAM

## 2025-07-24 PROCEDURE — 75726 ARTERY X-RAYS ABDOMEN: CPT | Mod: 26,XU,, | Performed by: STUDENT IN AN ORGANIZED HEALTH CARE EDUCATION/TRAINING PROGRAM

## 2025-07-24 PROCEDURE — 36247 INS CATH ABD/L-EXT ART 3RD: CPT | Mod: 51,RT,, | Performed by: STUDENT IN AN ORGANIZED HEALTH CARE EDUCATION/TRAINING PROGRAM

## 2025-07-24 PROCEDURE — 76380 CAT SCAN FOLLOW-UP STUDY: CPT | Mod: 26,XU,, | Performed by: STUDENT IN AN ORGANIZED HEALTH CARE EDUCATION/TRAINING PROGRAM

## 2025-07-24 PROCEDURE — 77300 RADIATION THERAPY DOSE PLAN: CPT | Mod: TC | Performed by: STUDENT IN AN ORGANIZED HEALTH CARE EDUCATION/TRAINING PROGRAM

## 2025-07-24 PROCEDURE — 76377 3D RENDER W/INTRP POSTPROCES: CPT | Mod: TC | Performed by: STUDENT IN AN ORGANIZED HEALTH CARE EDUCATION/TRAINING PROGRAM

## 2025-07-24 PROCEDURE — 36247 INS CATH ABD/L-EXT ART 3RD: CPT | Mod: RT | Performed by: STUDENT IN AN ORGANIZED HEALTH CARE EDUCATION/TRAINING PROGRAM

## 2025-07-24 PROCEDURE — 25500020 PHARM REV CODE 255: Performed by: STUDENT IN AN ORGANIZED HEALTH CARE EDUCATION/TRAINING PROGRAM

## 2025-07-24 PROCEDURE — 75774 ARTERY X-RAY EACH VESSEL: CPT | Mod: TC,XU | Performed by: STUDENT IN AN ORGANIZED HEALTH CARE EDUCATION/TRAINING PROGRAM

## 2025-07-24 PROCEDURE — C1757 CATH, THROMBECTOMY/EMBOLECT: HCPCS

## 2025-07-24 PROCEDURE — 77300 RADIATION THERAPY DOSE PLAN: CPT | Mod: 26,,, | Performed by: STUDENT IN AN ORGANIZED HEALTH CARE EDUCATION/TRAINING PROGRAM

## 2025-07-24 PROCEDURE — C1769 GUIDE WIRE: HCPCS

## 2025-07-24 PROCEDURE — 63600175 PHARM REV CODE 636 W HCPCS

## 2025-07-24 PROCEDURE — 75726 ARTERY X-RAYS ABDOMEN: CPT | Mod: TC,XU | Performed by: STUDENT IN AN ORGANIZED HEALTH CARE EDUCATION/TRAINING PROGRAM

## 2025-07-24 PROCEDURE — 37000008 HC ANESTHESIA 1ST 15 MINUTES

## 2025-07-24 PROCEDURE — C1887 CATHETER, GUIDING: HCPCS

## 2025-07-24 PROCEDURE — 37000009 HC ANESTHESIA EA ADD 15 MINS

## 2025-07-24 PROCEDURE — 77790 RADIATION HANDLING: CPT | Mod: TC | Performed by: STUDENT IN AN ORGANIZED HEALTH CARE EDUCATION/TRAINING PROGRAM

## 2025-07-24 PROCEDURE — 25000003 PHARM REV CODE 250

## 2025-07-24 PROCEDURE — 79445 NUCLEAR RX INTRA-ARTERIAL: CPT | Mod: 26,,, | Performed by: STUDENT IN AN ORGANIZED HEALTH CARE EDUCATION/TRAINING PROGRAM

## 2025-07-24 PROCEDURE — 79445 NUCLEAR RX INTRA-ARTERIAL: CPT | Mod: TC | Performed by: STUDENT IN AN ORGANIZED HEALTH CARE EDUCATION/TRAINING PROGRAM

## 2025-07-24 PROCEDURE — 78830 RP LOCLZJ TUM SPECT W/CT 1: CPT | Mod: 26,,, | Performed by: NUCLEAR MEDICINE

## 2025-07-24 PROCEDURE — 77263 THER RADIOLOGY TX PLNG CPLX: CPT | Mod: ,,, | Performed by: STUDENT IN AN ORGANIZED HEALTH CARE EDUCATION/TRAINING PROGRAM

## 2025-07-24 PROCEDURE — 76377 3D RENDER W/INTRP POSTPROCES: CPT | Mod: 26,,, | Performed by: STUDENT IN AN ORGANIZED HEALTH CARE EDUCATION/TRAINING PROGRAM

## 2025-07-24 PROCEDURE — 78830 RP LOCLZJ TUM SPECT W/CT 1: CPT | Mod: TC

## 2025-07-24 PROCEDURE — 36248 INS CATH ABD/L-EXT ART ADDL: CPT | Mod: RT,,, | Performed by: STUDENT IN AN ORGANIZED HEALTH CARE EDUCATION/TRAINING PROGRAM

## 2025-07-24 PROCEDURE — 25000242 PHARM REV CODE 250 ALT 637 W/ HCPCS

## 2025-07-24 PROCEDURE — 78201 LIVER IMAGING STATIC ONLY: CPT | Mod: 26,XE,, | Performed by: NUCLEAR MEDICINE

## 2025-07-24 PROCEDURE — 76380 CAT SCAN FOLLOW-UP STUDY: CPT | Mod: TC,XU | Performed by: STUDENT IN AN ORGANIZED HEALTH CARE EDUCATION/TRAINING PROGRAM

## 2025-07-24 PROCEDURE — 76937 US GUIDE VASCULAR ACCESS: CPT | Mod: 26,,, | Performed by: STUDENT IN AN ORGANIZED HEALTH CARE EDUCATION/TRAINING PROGRAM

## 2025-07-24 PROCEDURE — 78201 LIVER IMAGING STATIC ONLY: CPT | Mod: TC

## 2025-07-24 PROCEDURE — C2616 BRACHYTX, NON-STR,YTTRIUM-90: HCPCS

## 2025-07-24 RX ORDER — HYDROMORPHONE HYDROCHLORIDE 1 MG/ML
0.2 INJECTION, SOLUTION INTRAMUSCULAR; INTRAVENOUS; SUBCUTANEOUS EVERY 5 MIN PRN
Status: DISCONTINUED | OUTPATIENT
Start: 2025-07-24 | End: 2025-07-25 | Stop reason: HOSPADM

## 2025-07-24 RX ORDER — ROCURONIUM BROMIDE 10 MG/ML
INJECTION, SOLUTION INTRAVENOUS
Status: DISCONTINUED | OUTPATIENT
Start: 2025-07-24 | End: 2025-07-24

## 2025-07-24 RX ORDER — LIDOCAINE HYDROCHLORIDE 20 MG/ML
INJECTION INTRAVENOUS
Status: DISCONTINUED | OUTPATIENT
Start: 2025-07-24 | End: 2025-07-24

## 2025-07-24 RX ORDER — ETOMIDATE 2 MG/ML
INJECTION INTRAVENOUS
Status: DISCONTINUED | OUTPATIENT
Start: 2025-07-24 | End: 2025-07-24

## 2025-07-24 RX ORDER — PROCHLORPERAZINE EDISYLATE 5 MG/ML
5 INJECTION INTRAMUSCULAR; INTRAVENOUS EVERY 30 MIN PRN
Status: DISCONTINUED | OUTPATIENT
Start: 2025-07-24 | End: 2025-07-25 | Stop reason: HOSPADM

## 2025-07-24 RX ORDER — SODIUM CHLORIDE 0.9 % (FLUSH) 0.9 %
3 SYRINGE (ML) INJECTION
Status: DISCONTINUED | OUTPATIENT
Start: 2025-07-24 | End: 2025-07-25 | Stop reason: HOSPADM

## 2025-07-24 RX ORDER — VASOPRESSIN 20 [USP'U]/ML
INJECTION, SOLUTION INTRAMUSCULAR; SUBCUTANEOUS
Status: DISCONTINUED | OUTPATIENT
Start: 2025-07-24 | End: 2025-07-24

## 2025-07-24 RX ORDER — PHENYLEPHRINE HYDROCHLORIDE 10 MG/ML
INJECTION INTRAVENOUS
Status: DISCONTINUED | OUTPATIENT
Start: 2025-07-24 | End: 2025-07-24

## 2025-07-24 RX ORDER — ALBUTEROL SULFATE 90 UG/1
INHALANT RESPIRATORY (INHALATION)
Status: DISCONTINUED | OUTPATIENT
Start: 2025-07-24 | End: 2025-07-24

## 2025-07-24 RX ORDER — HALOPERIDOL LACTATE 5 MG/ML
0.5 INJECTION, SOLUTION INTRAMUSCULAR EVERY 10 MIN PRN
Status: DISCONTINUED | OUTPATIENT
Start: 2025-07-24 | End: 2025-07-25 | Stop reason: HOSPADM

## 2025-07-24 RX ORDER — SODIUM CHLORIDE 9 MG/ML
INJECTION, SOLUTION INTRAVENOUS CONTINUOUS
Status: DISCONTINUED | OUTPATIENT
Start: 2025-07-24 | End: 2025-07-25 | Stop reason: HOSPADM

## 2025-07-24 RX ORDER — PROPOFOL 10 MG/ML
VIAL (ML) INTRAVENOUS
Status: DISCONTINUED | OUTPATIENT
Start: 2025-07-24 | End: 2025-07-24

## 2025-07-24 RX ORDER — LIDOCAINE HYDROCHLORIDE 10 MG/ML
1 INJECTION, SOLUTION EPIDURAL; INFILTRATION; INTRACAUDAL; PERINEURAL ONCE
Status: DISCONTINUED | OUTPATIENT
Start: 2025-07-24 | End: 2025-07-25 | Stop reason: HOSPADM

## 2025-07-24 RX ORDER — DEXAMETHASONE SODIUM PHOSPHATE 4 MG/ML
INJECTION, SOLUTION INTRA-ARTICULAR; INTRALESIONAL; INTRAMUSCULAR; INTRAVENOUS; SOFT TISSUE
Status: DISCONTINUED | OUTPATIENT
Start: 2025-07-24 | End: 2025-07-24

## 2025-07-24 RX ORDER — FENTANYL CITRATE 50 UG/ML
INJECTION, SOLUTION INTRAMUSCULAR; INTRAVENOUS
Status: DISCONTINUED | OUTPATIENT
Start: 2025-07-24 | End: 2025-07-24

## 2025-07-24 RX ORDER — ONDANSETRON HYDROCHLORIDE 2 MG/ML
INJECTION, SOLUTION INTRAVENOUS
Status: DISCONTINUED | OUTPATIENT
Start: 2025-07-24 | End: 2025-07-24

## 2025-07-24 RX ORDER — GLUCAGON 1 MG
1 KIT INJECTION
Status: DISCONTINUED | OUTPATIENT
Start: 2025-07-24 | End: 2025-07-25 | Stop reason: HOSPADM

## 2025-07-24 RX ADMIN — DEXAMETHASONE SODIUM PHOSPHATE 10 MG: 4 INJECTION, SOLUTION INTRAMUSCULAR; INTRAVENOUS at 10:07

## 2025-07-24 RX ADMIN — ROCURONIUM BROMIDE 60 MG: 10 INJECTION INTRAVENOUS at 10:07

## 2025-07-24 RX ADMIN — PROPOFOL 50 MG: 10 INJECTION, EMULSION INTRAVENOUS at 10:07

## 2025-07-24 RX ADMIN — SODIUM CHLORIDE, SODIUM GLUCONATE, SODIUM ACETATE, POTASSIUM CHLORIDE, MAGNESIUM CHLORIDE, SODIUM PHOSPHATE, DIBASIC, AND POTASSIUM PHOSPHATE: .53; .5; .37; .037; .03; .012; .00082 INJECTION, SOLUTION INTRAVENOUS at 11:07

## 2025-07-24 RX ADMIN — GLYCOPYRROLATE 0.2 MG: 0.2 INJECTION, SOLUTION INTRAMUSCULAR; INTRAVENOUS at 10:07

## 2025-07-24 RX ADMIN — VASOPRESSIN 1 UNITS: 20 INJECTION INTRAVENOUS at 11:07

## 2025-07-24 RX ADMIN — ETOMIDATE 14 MG: 2 INJECTION, SOLUTION INTRAVENOUS at 10:07

## 2025-07-24 RX ADMIN — ALBUTEROL SULFATE 6 PUFF: 108 AEROSOL, METERED RESPIRATORY (INHALATION) at 10:07

## 2025-07-24 RX ADMIN — ONDANSETRON 4 MG: 2 INJECTION INTRAMUSCULAR; INTRAVENOUS at 11:07

## 2025-07-24 RX ADMIN — FENTANYL CITRATE 25 MCG: 50 INJECTION, SOLUTION INTRAMUSCULAR; INTRAVENOUS at 10:07

## 2025-07-24 RX ADMIN — LIDOCAINE HYDROCHLORIDE 60 MG: 20 INJECTION INTRAVENOUS at 10:07

## 2025-07-24 RX ADMIN — VASOPRESSIN 0.5 UNITS: 20 INJECTION INTRAVENOUS at 11:07

## 2025-07-24 RX ADMIN — IOHEXOL 60 ML: 300 INJECTION, SOLUTION INTRAVENOUS at 11:07

## 2025-07-24 RX ADMIN — SODIUM CHLORIDE: 0.9 INJECTION, SOLUTION INTRAVENOUS at 10:07

## 2025-07-24 RX ADMIN — SUGAMMADEX 200 MG: 100 INJECTION, SOLUTION INTRAVENOUS at 11:07

## 2025-07-24 RX ADMIN — PHENYLEPHRINE HYDROCHLORIDE 0.3 MCG/KG/MIN: 10 INJECTION INTRAVENOUS at 10:07

## 2025-07-24 RX ADMIN — PHENYLEPHRINE HYDROCHLORIDE 100 MCG: 10 INJECTION INTRAVENOUS at 10:07

## 2025-07-24 NOTE — ANESTHESIA POSTPROCEDURE EVALUATION
Anesthesia Post Evaluation    Patient: Alexander Duong JrTodd    Procedure(s) Performed: * No procedures listed *    Final Anesthesia Type: general      Patient location during evaluation: PACU  Patient participation: Yes- Able to Participate  Level of consciousness: awake and alert and awake  Post-procedure vital signs: reviewed and stable  Pain management: adequate  Airway patency: patent    PONV status at discharge: No PONV  Anesthetic complications: no      Cardiovascular status: blood pressure returned to baseline  Respiratory status: unassisted and spontaneous ventilation  Hydration status: euvolemic  Follow-up not needed.              Vitals Value Taken Time   /72 07/24/25 13:30   Temp 36.5 °C (97.7 °F) 07/24/25 12:30   Pulse 73 07/24/25 13:30   Resp 12 07/24/25 13:30   SpO2 93 % 07/24/25 13:30         Event Time   Out of Recovery 13:06:00         Pain/Howard Score: Howard Score: 10 (7/24/2025 12:30 PM)

## 2025-07-24 NOTE — PROGRESS NOTES
Patient transferred to [facility] by Dayan Epstein in stretcher. Belongings at side in bag. Pt bedrest will be up at 1350. Rt Groin site intact with guaze and tegaderm.

## 2025-07-24 NOTE — PLAN OF CARE
Pt arrived to IR room 188  for y-90 delivery. Pt oriented to unit and staff. Plan of care reviewed with patient, patient verbalizes understanding. Comfort measures utilized. Pt safely transferred from stretcher to procedural table. Fall risk reviewed with patient, fall risk interventions maintained. Positioner pillows utilized to minimize pressure points. Blankets applied. Pt prepped and draped utilizing standard sterile technique. Timeouts completed utilizing standard universal time-out, per department and facility policy.  Anesthesia at bedside; Refer to anesthesia record regarding sedation and vital signs.

## 2025-07-24 NOTE — ANESTHESIA PROCEDURE NOTES
Intubation    Date/Time: 7/24/2025 10:46 AM    Performed by: Yenni Santa CRNA  Authorized by: Almas Jeong MD    Intubation:     Induction:  Intravenous    Intubated:  Postinduction    Mask Ventilation:  Easy mask    Attempts:  1    Attempted By:  CRNA    Method of Intubation:  Video laryngoscopy    Blade:  Mccullough 3    Laryngeal View Grade: Grade IIA - cords partially seen      Difficult Airway Encountered?: No      Airway Device:  Oral endotracheal tube    Airway Device Size:  7.5    Style/Cuff Inflation:  Cuffed (inflated to minimal occlusive pressure)    Tube secured:  23    Secured at:  The lips    Placement Verified By:  Capnometry and Revisualization with laryngoscopy    Complicating Factors:  None    Findings Post-Intubation:  BS equal bilateral and atraumatic/condition of teeth unchanged

## 2025-07-24 NOTE — NURSING TRANSFER
Nursing Transfer Note      7/24/2025   12:36 PM    Report given Ky DIAZ Rn  Report received Dayan Epstein    Reason patient is being transferred: Nuclear med    Transfer To: Nuclear med     Transfer via stretcher    Transfer with n/a    Transported by Dayan Epstein    Transfer Vital Signs:  Blood Pressure:See flowsheet  Heart Rate:  O2:  Temperature:  Respirations:    Telemetry: n.a  Order for Tele Monitor? N/a    Additional Lines: n/a    Medicines sent:     Any special needs or follow-up needed: Lay flat until 1350    Patient belongings transferred with patient: Yes    Chart send with patient: Yes    Notified:     Patient reassessed at: 7/24/25  1  Upon arrival to floor: bed in lowest position

## 2025-07-24 NOTE — DISCHARGE SUMMARY
Radiology Discharge Summary      Hospital Course: No complications    Admit Date: 7/24/2025  Discharge Date: 07/24/2025     Instructions Given to Patient: Yes  Diet: Resume prior diet  Activity: Activity as tolerated and no driving for today    Description of Condition on Discharge: Stable  Vital Signs (Most Recent): Temp: 98.2 °F (36.8 °C) (07/24/25 0943)  Pulse: 62 (07/24/25 0943)  Resp: 17 (07/24/25 0943)  BP: (!) 167/75 (07/24/25 0943)  SpO2: 97 % (07/24/25 0943)    Discharge Disposition: Home    Discharge Diagnosis: HCC     Follow-up: Follow-up with CMP and MRI Abd WWO contrast in 4 weeks with IR clinic visit to follow    Nikole Jeffries MD

## 2025-07-24 NOTE — DISCHARGE INSTRUCTIONS
Y 90 Delivery Discharge Instructions    Monitor the groin site for bleeding. Apply firm pressure to the groin site if you need to cough, during sneezes, and in the event you have to vomit. This reduces the risk of your site bleeding. If bleeding does occurs, apply firm, manual pressure and seek medical assistance immediately!  Do not shower/bathe tonight. Shower tomorrow, at least 24 hours post-procedure. After your shower, you may remove the groin dressing.   Carefully cleanse the groin site with gentle soap and water; do not pick at any scab formation.  Monitor the groin site for signs and symptoms of infection (See below).  Stay three feet away from people, especially pregnant people, small pets, and small children for three days.  Sleep alone for the next three days.  You may use the restroom as other members of your home; your waste is not radioactive.    Recovering at Home:    Follow your doctor's recommendations on when it is safe to drive - at least THREE days after your procedure.  Do not lift anything heavier than a gallon of milk for the next TEN days.  Avoid strenuous activity/exercise for the next TEN days - this includes climbing stairs.   Do not submerge in a bathtub, pool, or Jacuzzi until the groin site is fully closed - at least 14 days.  Rest often. You may be more tired than usual.  Take your medications exactly as directed. Do not skip doses. Note - some medications should not be resumed after this procedure to prevent any adverse interaction with the contrast dye, which may be harmful to your kidneys. Be sure to ask your healthcare team about any potential reactions and for guidance on what medications to hold.  Unless directed otherwise, drink six to eight glasses of water a day for the next TWO days to prevent dehydration and to help flush your body of the contrast dye used during your procedure.  Take your temperature and check the groin site for signs of infection - redness, swelling,  drainage, or warmth - every day for one week.    When to call your doctor:    Fever of 100.4°F (38°C) or higher, or as directed by your health care provider.  Sudden shortness breath or any bleeding, bruising, or a large area of swelling at the groin site.  Signs of an allergic reaction to the contrast dye: rash, nausea, vomiting, or trouble breathing.  Signs of infection at the groin site: increased pain, redness, swelling, warmth, or foul-smelling drainage.   Constant or increasing pain or numbness in your leg.  Your leg feels cold, looks blue; numbness and/or tingling in the leg, especially near the catheter insertion site.   Rapid, pounding, or irregular heartbeat.  Blood in your urine or black, tarry stools.    Interventional Radiology Clinic   For complications   [phone]. Monday - Friday, 8:00 am - 4:00 pm    [phone] After hours and on holidays. Ask to speak with the interventional radiologist on call.     For Scheduling   [phone] Monday - Friday, 8:00 am - 4:00 pm

## 2025-07-24 NOTE — TRANSFER OF CARE
"Anesthesia Transfer of Care Note    Patient: Alexander Duong Jr.    Procedure(s) Performed: * No procedures listed *    Patient location: PACU    Anesthesia Type: general    Transport from OR: Transported from OR on 6-10 L/min O2 by face mask with adequate spontaneous ventilation    Post pain: adequate analgesia    Post assessment: no apparent anesthetic complications    Post vital signs: stable    Level of consciousness: awake and alert    Nausea/Vomiting: no nausea/vomiting    Complications: none    Transfer of care protocol was followed      Last vitals: Visit Vitals  /75   Pulse 55   Temp 36.8 °C (98.2 °F) (Temporal)   Resp 14   Ht 5' 8" (1.727 m)   Wt 90.7 kg (200 lb)   SpO2 99%   BMI 30.41 kg/m²     "

## 2025-07-24 NOTE — PLAN OF CARE
Y-90 delivery procedure completed. Patient tolerated well. Vascade closure device deployed in right femoral artery; hemostasis achieved at 1150. Patient to lay flat for 2 hours until 1350 time on 7/24 date per MD. Right  groin site clean, dry, and intact; no bleeding or hematoma noted. Anesthesia at bedside; Refer to anesthesia record for further information regarding sedation and vital signs. Patient to be transferred to Phase II for post-procedural recovery per MD. Leg immobilizer can be removed once laying-flat time is complete. Report to be given at bedside to RN.

## 2025-07-24 NOTE — ANESTHESIA PREPROCEDURE EVALUATION
07/24/2025  Alexander Duong Jr. is a 85 y.o., male.      Pre-op Assessment    I have reviewed the Patient Summary Reports.     I have reviewed the Nursing Notes. I have reviewed the NPO Status.   I have reviewed the Medications.     Review of Systems  Anesthesia Hx:  No problems with previous Anesthesia             Denies Family Hx of Anesthesia complications.     Cardiovascular:  Exercise tolerance: good   Hypertension                                          Pulmonary:  Pulmonary Normal                       Renal/:  Chronic Renal Disease, CKD                Hepatic/GI:      Liver Disease,               Musculoskeletal:  Arthritis               Neurological:    Neuromuscular Disease,                                     Past Medical History:   Diagnosis Date    Allergy     Hypertension      Past Surgical History:   Procedure Laterality Date    APPENDECTOMY      COLONOSCOPY Left 10/19/2015    Procedure: COLONOSCOPY;  Surgeon: Randell Briceno MD;  Location: [facility];  Service: Endoscopy;  Laterality: Left;    ESOPHAGOGASTRODUODENOSCOPY N/A 2/22/2024    Procedure: EGD (ESOPHAGOGASTRODUODENOSCOPY);  Surgeon: Kulwant Blanchard MD;  Location: [facility];  Service: Endoscopy;  Laterality: N/A;     Problem List[1]  Outpatient Medications as of 7/24/2025   Medication Sig Dispense Refill    albuterol (VENTOLIN HFA) 90 mcg/actuation inhaler Inhale 2 puffs into the lungs every 6 (six) hours as needed for Wheezing or Shortness of Breath. Rescue 18 g 1    amLODIPine (NORVASC) 10 MG tablet Take 1 tablet (10 mg total) by mouth once daily. 90 tablet 3    atorvastatin (LIPITOR) 40 MG tablet Take 1 tablet (40 mg total) by mouth once daily. 90 tablet 3    bisacodyL (DULCOLAX) 5 mg EC tablet Take 1 tablet (5 mg total) by mouth daily as needed for Constipation. 30 tablet 0    metoprolol tartrate (LOPRESSOR) 100 MG tablet  Take 1 tablet (100 mg total) by mouth 2 (two) times daily. 180 tablet 3    tamsulosin (FLOMAX) 0.4 mg Cap TAKE 2 CAPSULES BY MOUTH ONCE DAILY. (Patient taking differently: Take 0.4 mg by mouth 2 (two) times a day.) 180 capsule 1    aspirin 81 MG Chew Take 81 mg by mouth once daily. (Patient not taking: Reported on 7/23/2025)      azelastine (ASTELIN) 137 mcg (0.1 %) nasal spray 1 spray (137 mcg total) by Nasal route 2 (two) times daily. 30 mL 5     Facility-Administered Medications as of 7/24/2025   Medication Dose Route Frequency Provider Last Rate Last Admin    0.9% NaCl infusion   Intravenous Continuous [place]Nancy Davis NP        LIDOcaine (PF) 10 mg/ml (1%) injection 10 mg  1 mL Other Once Nancy Newman NP        Review of patient's allergies indicates:  No Known Allergies     Physical Exam  General: Well nourished, Cooperative, Alert and Oriented    Airway:  Mallampati: II   Mouth Opening: Normal  TM Distance: Normal  Tongue: Normal  Neck ROM: Normal ROM    Chest/Lungs:  Normal Respiratory Rate    Heart:  Rate: Normal    Wt Readings from Last 3 Encounters:   06/24/25 90.7 kg (200 lb)   06/05/25 90.9 kg (200 lb 6.4 oz)   06/02/25 91.4 kg (201 lb 9.8 oz)     Temp Readings from Last 3 Encounters:   06/24/25 36.7 °C (98 °F) (Temporal)   06/05/25 36.6 °C (97.9 °F) (Oral)   05/28/25 36.6 °C (97.8 °F) (Oral)     BP Readings from Last 3 Encounters:   07/24/25 (!) 167/75   06/24/25 (!) 159/72   06/05/25 130/68     Pulse Readings from Last 3 Encounters:   06/24/25 74   06/05/25 65   06/02/25 (!) 58     Lab Results   Component Value Date    WBC 5.19 07/23/2025    HGB 10.6 (L) 07/23/2025    HCT 33.8 (L) 07/23/2025    MCV 95 07/23/2025     07/23/2025         Chemistry        Component Value Date/Time     (L) 07/23/2025 1348     02/19/2024 1659    K 4.5 07/23/2025 1348    K 4.3 02/19/2024 1659    CL 98 07/23/2025 1348     02/19/2024 1659    CO2 28 07/23/2025 1348    CO2 26  02/19/2024 1659    BUN 17 07/23/2025 1348    CREATININE 1.3 07/23/2025 1348    GLU 92 07/23/2025 1348    GLU 99 02/19/2024 1659        Component Value Date/Time    CALCIUM 9.1 07/23/2025 1348    CALCIUM 8.5 (L) 02/19/2024 1659    ALKPHOS 63 07/23/2025 1348    ALKPHOS 60 02/19/2024 1659    AST 66 (H) 07/23/2025 1348    AST 28 02/19/2024 1659    ALT 17 07/23/2025 1348    ALT 16 02/19/2024 1659    BILITOT 0.6 07/23/2025 1348    BILITOT 0.6 02/19/2024 1659    ESTGFRAFRICA 45.4 (A) 10/29/2021 0827    EGFRNONAA 39.3 (A) 10/29/2021 0827        Results for orders placed or performed during the hospital encounter of 05/08/25   Repeat EKG 12-lead    Collection Time: 05/08/25 11:01 AM   Result Value Ref Range    QRS Duration 82 ms    [facility] QTC Calculation 479 ms    Narrative    Test Reason : R79.89,    Vent. Rate :  92 BPM     Atrial Rate :  93 BPM     P-R Int : 168 ms          QRS Dur :  82 ms      QT Int : 388 ms       P-R-T Axes :  78  46  76 degrees    QTcB Int : 479 ms    Normal sinus rhythm  Nonspecific T wave abnormality  Prolonged QT  Abnormal ECG  When compared with ECG of 08-May-2025 09:27,  No significant change was found  Confirmed by Andre Kebede ([id number]) on 5/8/2025 7:51:11 PM    Referred By: AAAREFERRAL SELF           Confirmed By: Andre Kebede      9/7/23    Interpretation Summary  Show Result Comparison     Normal myocardial perfusion scan. There is no evidence of myocardial ischemia or infarction.    There is a  mild intensity fixed perfusion abnormality in the inferior wall of the left ventricle secondary to diaphragm attenuation.    The gated perfusion images showed an ejection fraction of 57% post stress.    The ECG portion of the study is negative for ischemia.    The patient reported no chest pain during the stress test.    There were no arrhythmias during stress.    Anesthesia Plan  Type of Anesthesia, risks & benefits discussed:    Anesthesia Type: Gen ETT  Intra-op Monitoring  Plan: Standard ASA Monitors  Post Op Pain Control Plan: multimodal analgesia and IV/PO Opioids PRN  Induction:  IV  Airway Plan: Video, Post-Induction  Informed Consent: Informed consent signed with the Patient and all parties understand the risks and agree with anesthesia plan.  All questions answered.   ASA Score: 3  Day of Surgery Review of History & Physical: H&P Update referred to the surgeon/provider.    Ready For Surgery From Anesthesia Perspective.     .               [1]  Patient Active Problem List  Diagnosis    Primary osteoarthritis involving multiple joints    Acute blood loss anemia    Essential hypertension    Stage 3b chronic kidney disease    Mixed hyperlipidemia    Pulmonary hypertension    Mixed simple and mucopurulent chronic bronchitis    Morbid (severe) obesity due to excess calories    Chronic bilateral low back pain with left-sided sciatica    Other constipation    RAFAEL (acute kidney injury)    Advanced care planning/counseling discussion    Insomnia    BPH with urinary obstruction    Hearing loss    Snores    Cough    Liver mass    Liver hematoma    Cognitive impairment

## 2025-07-24 NOTE — PROCEDURES
IR Post-Procedure Note    Pre Op Diagnosis: Hepatocellular carcinoma    Post Op Diagnosis: Same    Procedure: Yttrium treatment    Procedure performed by:  Nikole Jeffries MD    Written Informed Consent Obtained:  Yes    Specimen Removed: No    Estimated Blood Loss:  Minimal    Findings:    RCFA access and celiac/hepatic angiography    Successful delivery of Y90 into segment 8 branches    Patient tolerated procedure well.      Nikole Jeffries MD

## 2025-07-24 NOTE — NURSING
Pt fully recovered. Site CDI. PIV removed. Pt tolerated PO fluids.  Pt verbalized d/c instructions. Pt to be transported to meet ride.

## 2025-07-24 NOTE — H&P
Radiology History & Physical      SUBJECTIVE:     Chief Complaint: HCC    History of Present Illness:  Alexander Duong Jr. is a 85 y.o. male who presents for Y-90 delivery. Labs drawn yesterday.    Past Medical History:   Diagnosis Date    Allergy     Hypertension      Past Surgical History:   Procedure Laterality Date    APPENDECTOMY      COLONOSCOPY Left 10/19/2015    Procedure: COLONOSCOPY;  Surgeon: Randell Birceno MD;  Location: [facility];  Service: Endoscopy;  Laterality: Left;    ESOPHAGOGASTRODUODENOSCOPY N/A 2/22/2024    Procedure: EGD (ESOPHAGOGASTRODUODENOSCOPY);  Surgeon: Kulwant Blanchard MD;  Location: [facility];  Service: Endoscopy;  Laterality: N/A;       Home Meds:   Prior to Admission medications    Medication Sig Start Date End Date Taking? Authorizing Provider   albuterol (VENTOLIN HFA) 90 mcg/actuation inhaler Inhale 2 puffs into the lungs every 6 (six) hours as needed for Wheezing or Shortness of Breath. Rescue 5/22/25  Yes Teresa Ramon PA-C   amLODIPine (NORVASC) 10 MG tablet Take 1 tablet (10 mg total) by mouth once daily. 5/23/25 5/23/26 Yes Charlette House MD   atorvastatin (LIPITOR) 40 MG tablet Take 1 tablet (40 mg total) by mouth once daily. 5/22/25 5/22/26 Yes Teresa Ramon PA-C   bisacodyL (DULCOLAX) 5 mg EC tablet Take 1 tablet (5 mg total) by mouth daily as needed for Constipation. 3/4/24  Yes Alesia Moreno MD   metoprolol tartrate (LOPRESSOR) 100 MG tablet Take 1 tablet (100 mg total) by mouth 2 (two) times daily. 5/22/25 5/22/26 Yes Teresa Ramon PA-C   tamsulosin (FLOMAX) 0.4 mg Cap TAKE 2 CAPSULES BY MOUTH ONCE DAILY.  Patient taking differently: Take 0.4 mg by mouth 2 (two) times a day. 12/16/24  Yes Salinas Glasgow MD   aspirin 81 MG Chew Take 81 mg by mouth once daily.  Patient not taking: Reported on 7/23/2025    Provider, Historical   azelastine (ASTELIN) 137 mcg (0.1 %) nasal spray 1 spray (137 mcg total) by Nasal route 2 (two)  times daily. 6/10/24 6/10/25  Alesia Moreno MD     Anticoagulants/Antiplatelets: Reviewed    Allergies: Review of patient's allergies indicates:  No Known Allergies  Sedation History:  no adverse reactions    Review of Systems:   Hematological: no known coagulopathies  Respiratory: no shortness of breath  Cardiovascular: no chest pain  Gastrointestinal: no abdominal pain  Genito-Urinary: no dysuria  Musculoskeletal: negative  Neurological: no TIA or stroke symptoms         OBJECTIVE:     Vital Signs (Most Recent)       Physical Exam:  ASA: 3  Mallampati: 2    General: no acute distress  Mental Status: alert and oriented to person, place and time  HEENT: normocephalic, atraumatic  Chest: unlabored breathing  Heart: regular heart rate  Abdomen: nondistended  Extremity: moves all extremities    Laboratory  Lab Results   Component Value Date    INR 1.1 07/23/2025       Lab Results   Component Value Date    WBC 5.19 07/23/2025    HGB 10.6 (L) 07/23/2025    HCT 33.8 (L) 07/23/2025    MCV 95 07/23/2025     07/23/2025      Lab Results   Component Value Date    GLU 92 07/23/2025     (L) 07/23/2025    K 4.5 07/23/2025    CL 98 07/23/2025    CO2 28 07/23/2025    BUN 17 07/23/2025    CREATININE 1.3 07/23/2025    CALCIUM 9.1 07/23/2025    MG 1.6 05/11/2025    ALT 17 07/23/2025    AST 66 (H) 07/23/2025    ALBUMIN 3.4 (L) 07/23/2025    BILITOT 0.6 07/23/2025    BILIDIR 0.3 07/23/2025       ASSESSMENT/PLAN:     Sedation Plan: per anesthesia  Patient will undergo Y-90 delivery.    Written consent was obtained from the patient. All questions answered.     Tello Rothman MD  Radiology PGY-2

## 2025-07-24 NOTE — PLAN OF CARE
Patient admitted to [facility] room 2 accompanied by Chani sanchez, who will return to pick patient up.. Patient aaox4. Vss. No complaints. Pre procedure admission completed. Orders on chart carried out. PIV started. Plan of care reviewed with patient and family. All questions answered. Call light placed within reach.

## 2025-07-25 DIAGNOSIS — C22.0 HCC (HEPATOCELLULAR CARCINOMA): Primary | ICD-10-CM

## 2025-07-30 DIAGNOSIS — J41.8 MIXED SIMPLE AND MUCOPURULENT CHRONIC BRONCHITIS: ICD-10-CM

## 2025-07-30 RX ORDER — ALBUTEROL SULFATE 90 UG/1
2 INHALANT RESPIRATORY (INHALATION) EVERY 6 HOURS PRN
Qty: 18 G | Refills: 1 | Status: SHIPPED | OUTPATIENT
Start: 2025-07-30

## 2025-08-13 ENCOUNTER — DOCUMENT SCAN (OUTPATIENT)
Dept: HOME HEALTH SERVICES | Facility: HOSPITAL | Age: 85
End: 2025-08-13
Payer: MEDICARE

## 2025-08-13 DIAGNOSIS — E78.2 MIXED HYPERLIPIDEMIA: Primary | ICD-10-CM

## 2025-08-14 ENCOUNTER — PATIENT OUTREACH (OUTPATIENT)
Dept: ADMINISTRATIVE | Facility: HOSPITAL | Age: 85
End: 2025-08-14
Payer: MEDICARE

## 2025-08-15 ENCOUNTER — EXTERNAL HOME HEALTH (OUTPATIENT)
Dept: HOME HEALTH SERVICES | Facility: HOSPITAL | Age: 85
End: 2025-08-15
Payer: MEDICARE

## 2025-08-15 DIAGNOSIS — E78.2 MIXED HYPERLIPIDEMIA: Primary | ICD-10-CM

## 2025-08-18 ENCOUNTER — PATIENT OUTREACH (OUTPATIENT)
Dept: ADMINISTRATIVE | Facility: HOSPITAL | Age: 85
End: 2025-08-18
Payer: MEDICARE

## 2025-08-18 VITALS — DIASTOLIC BLOOD PRESSURE: 58 MMHG | SYSTOLIC BLOOD PRESSURE: 116 MMHG

## 2025-08-27 ENCOUNTER — DOCUMENT SCAN (OUTPATIENT)
Dept: HOME HEALTH SERVICES | Facility: HOSPITAL | Age: 85
End: 2025-08-27
Payer: MEDICARE

## 2025-09-03 DIAGNOSIS — C22.0 HEPATOCELLULAR CARCINOMA: Primary | ICD-10-CM

## 2025-09-05 ENCOUNTER — HOSPITAL ENCOUNTER (OUTPATIENT)
Dept: RADIOLOGY | Facility: HOSPITAL | Age: 85
Discharge: HOME | End: 2025-09-05
Attending: FAMILY MEDICINE
Payer: MEDICARE

## 2025-09-05 DIAGNOSIS — C22.0 HCC (HEPATOCELLULAR CARCINOMA): ICD-10-CM

## 2025-09-05 PROCEDURE — 74181 MRI ABDOMEN W/O CONTRAST: CPT | Mod: 26,,, | Performed by: RADIOLOGY

## 2025-09-05 PROCEDURE — 74181 MRI ABDOMEN W/O CONTRAST: CPT | Mod: TC
